# Patient Record
Sex: MALE | Race: WHITE | NOT HISPANIC OR LATINO | Employment: FULL TIME | URBAN - METROPOLITAN AREA
[De-identification: names, ages, dates, MRNs, and addresses within clinical notes are randomized per-mention and may not be internally consistent; named-entity substitution may affect disease eponyms.]

---

## 2017-05-04 ENCOUNTER — PATIENT MESSAGE (OUTPATIENT)
Dept: INTERNAL MEDICINE | Facility: CLINIC | Age: 62
End: 2017-05-04

## 2017-05-04 DIAGNOSIS — Z00.00 ROUTINE GENERAL MEDICAL EXAMINATION AT A HEALTH CARE FACILITY: Primary | ICD-10-CM

## 2017-05-18 ENCOUNTER — PATIENT MESSAGE (OUTPATIENT)
Dept: INTERNAL MEDICINE | Facility: CLINIC | Age: 62
End: 2017-05-18

## 2017-05-19 ENCOUNTER — LAB VISIT (OUTPATIENT)
Dept: LAB | Facility: HOSPITAL | Age: 62
End: 2017-05-19
Attending: INTERNAL MEDICINE
Payer: COMMERCIAL

## 2017-05-19 DIAGNOSIS — Z00.00 ROUTINE GENERAL MEDICAL EXAMINATION AT A HEALTH CARE FACILITY: ICD-10-CM

## 2017-05-19 LAB
ALBUMIN SERPL BCP-MCNC: 3.5 G/DL
ALP SERPL-CCNC: 76 U/L
ALT SERPL W/O P-5'-P-CCNC: 34 U/L
ANION GAP SERPL CALC-SCNC: 8 MMOL/L
AST SERPL-CCNC: 20 U/L
BASOPHILS # BLD AUTO: 0.02 K/UL
BASOPHILS NFR BLD: 0.3 %
BILIRUB SERPL-MCNC: 0.4 MG/DL
BUN SERPL-MCNC: 23 MG/DL
CALCIUM SERPL-MCNC: 8.8 MG/DL
CHLORIDE SERPL-SCNC: 107 MMOL/L
CHOLEST/HDLC SERPL: 2.9 {RATIO}
CO2 SERPL-SCNC: 25 MMOL/L
COMPLEXED PSA SERPL-MCNC: 0.22 NG/ML
CREAT SERPL-MCNC: 1.2 MG/DL
CREAT UR-MCNC: 204 MG/DL
DIFFERENTIAL METHOD: ABNORMAL
EOSINOPHIL # BLD AUTO: 0.1 K/UL
EOSINOPHIL NFR BLD: 1.2 %
ERYTHROCYTE [DISTWIDTH] IN BLOOD BY AUTOMATED COUNT: 13.4 %
EST. GFR  (AFRICAN AMERICAN): >60 ML/MIN/1.73 M^2
EST. GFR  (NON AFRICAN AMERICAN): >60 ML/MIN/1.73 M^2
GLUCOSE SERPL-MCNC: 152 MG/DL
HCT VFR BLD AUTO: 41.6 %
HDL/CHOLESTEROL RATIO: 34.2 %
HDLC SERPL-MCNC: 111 MG/DL
HDLC SERPL-MCNC: 38 MG/DL
HGB BLD-MCNC: 13.4 G/DL
LDLC SERPL CALC-MCNC: 56.2 MG/DL
LYMPHOCYTES # BLD AUTO: 1.9 K/UL
LYMPHOCYTES NFR BLD: 31.4 %
MCH RBC QN AUTO: 30.4 PG
MCHC RBC AUTO-ENTMCNC: 32.2 %
MCV RBC AUTO: 94 FL
MICROALBUMIN UR DL<=1MG/L-MCNC: 28 UG/ML
MICROALBUMIN/CREATININE RATIO: 13.7 UG/MG
MONOCYTES # BLD AUTO: 0.6 K/UL
MONOCYTES NFR BLD: 9.9 %
NEUTROPHILS # BLD AUTO: 3.5 K/UL
NEUTROPHILS NFR BLD: 57 %
NONHDLC SERPL-MCNC: 73 MG/DL
PLATELET # BLD AUTO: 235 K/UL
PMV BLD AUTO: 10.1 FL
POTASSIUM SERPL-SCNC: 4.3 MMOL/L
PROT SERPL-MCNC: 6.6 G/DL
RBC # BLD AUTO: 4.41 M/UL
SODIUM SERPL-SCNC: 140 MMOL/L
TRIGL SERPL-MCNC: 84 MG/DL
WBC # BLD AUTO: 6.05 K/UL

## 2017-05-19 PROCEDURE — 36415 COLL VENOUS BLD VENIPUNCTURE: CPT | Mod: PO

## 2017-05-19 PROCEDURE — 80053 COMPREHEN METABOLIC PANEL: CPT

## 2017-05-19 PROCEDURE — 83036 HEMOGLOBIN GLYCOSYLATED A1C: CPT

## 2017-05-19 PROCEDURE — 84153 ASSAY OF PSA TOTAL: CPT

## 2017-05-19 PROCEDURE — 85025 COMPLETE CBC W/AUTO DIFF WBC: CPT

## 2017-05-19 PROCEDURE — 82570 ASSAY OF URINE CREATININE: CPT

## 2017-05-19 PROCEDURE — 80061 LIPID PANEL: CPT

## 2017-05-20 LAB
ESTIMATED AVG GLUCOSE: 177 MG/DL
HBA1C MFR BLD HPLC: 7.8 %

## 2017-05-22 NOTE — PROGRESS NOTES
Here are the results of your recent labs.  We will review them in detail at your follow up visit.    Sincerely,    Jacek Mohr M.D.        If you would like to review your experience with Dr. Mohr or [facility], please follow the link below:    [email or web address]

## 2017-05-25 ENCOUNTER — OFFICE VISIT (OUTPATIENT)
Dept: INTERNAL MEDICINE | Facility: CLINIC | Age: 62
End: 2017-05-25
Payer: COMMERCIAL

## 2017-05-25 VITALS
HEIGHT: 72 IN | HEART RATE: 80 BPM | TEMPERATURE: 98 F | SYSTOLIC BLOOD PRESSURE: 110 MMHG | DIASTOLIC BLOOD PRESSURE: 70 MMHG | BODY MASS INDEX: 42.66 KG/M2 | WEIGHT: 315 LBS

## 2017-05-25 DIAGNOSIS — E11.59 HYPERTENSION ASSOCIATED WITH DIABETES: ICD-10-CM

## 2017-05-25 DIAGNOSIS — E66.01 MORBID OBESITY WITH BMI OF 50.0-59.9, ADULT: ICD-10-CM

## 2017-05-25 DIAGNOSIS — E78.5 HYPERLIPIDEMIA ASSOCIATED WITH TYPE 2 DIABETES MELLITUS: ICD-10-CM

## 2017-05-25 DIAGNOSIS — E11.69 HYPERLIPIDEMIA ASSOCIATED WITH TYPE 2 DIABETES MELLITUS: ICD-10-CM

## 2017-05-25 DIAGNOSIS — Z00.00 ROUTINE GENERAL MEDICAL EXAMINATION AT HEALTH CARE FACILITY: Primary | ICD-10-CM

## 2017-05-25 DIAGNOSIS — E11.9 TYPE 2 DIABETES MELLITUS WITHOUT COMPLICATION, WITHOUT LONG-TERM CURRENT USE OF INSULIN: ICD-10-CM

## 2017-05-25 DIAGNOSIS — I15.2 HYPERTENSION ASSOCIATED WITH DIABETES: ICD-10-CM

## 2017-05-25 PROCEDURE — 99396 PREV VISIT EST AGE 40-64: CPT | Mod: S$GLB,,, | Performed by: INTERNAL MEDICINE

## 2017-05-25 PROCEDURE — 99999 PR PBB SHADOW E&M-EST. PATIENT-LVL III: CPT | Mod: PBBFAC,,, | Performed by: INTERNAL MEDICINE

## 2017-05-25 RX ORDER — METFORMIN HYDROCHLORIDE 850 MG/1
850 TABLET ORAL 2 TIMES DAILY WITH MEALS
Qty: 180 TABLET | Refills: 3 | Status: SHIPPED | OUTPATIENT
Start: 2017-05-25 | End: 2018-03-20 | Stop reason: SDUPTHER

## 2017-05-25 NOTE — ASSESSMENT & PLAN NOTE
Diabetes is not under good control at this time.  Most recent a1c is 7.8.  We will make the following adjustments to the medications: Increase metformin to 850 mg twice daily.      We need to see continued focus on low carbohydrate diet and consistent exercise.

## 2017-05-25 NOTE — PROGRESS NOTES
Subjective:       Patient ID: Santhosh Goff is a 62 y.o. male.    Chief Complaint: Annual Exam    HPI  Patient is a 62-year-old male presenting today for updated physical exam review chronic health issues.  Patient has type 2 diabetes, hypertension, hyperlipidemia and morbid obesity.  In general he is been doing well however his diabetes is showing lack of control at this time.  His A1c was under control previously but now is gone up to 7.8.  He indicates a lack of dietary control.  He notes no other significant issues.  He is tolerating his medications well.  We discussed the importance of glycemic control.  We talked about diet and exercise.  We talked about counting calories and limiting carbohydrate intake.  We will make adjustments to his medication as well.    Review of Systems   Constitutional: Negative for activity change, fever and unexpected weight change.   HENT: Negative for hearing loss, postnasal drip, rhinorrhea and trouble swallowing.    Eyes: Negative for pain, discharge and visual disturbance.   Respiratory: Negative for cough, chest tightness, shortness of breath and wheezing.    Cardiovascular: Negative for chest pain and palpitations.   Gastrointestinal: Negative for blood in stool, constipation, diarrhea, nausea and vomiting.   Endocrine: Negative for polydipsia and polyuria.   Genitourinary: Negative for difficulty urinating, dysuria, hematuria and urgency.   Musculoskeletal: Negative for arthralgias, back pain, joint swelling, myalgias, neck pain and neck stiffness.   Skin: Negative for pallor and rash.   Neurological: Negative for seizures, syncope, weakness and headaches.   Hematological: Negative for adenopathy.   Psychiatric/Behavioral: Negative for confusion and dysphoric mood. The patient is not nervous/anxious.        Objective:   /70   Pulse 80   Temp 98.2 °F (36.8 °C) (Tympanic)   Ht 6' (1.829 m)   Wt (!) 171 kg (376 lb 15.8 oz)   BMI 51.13 kg/m²      Physical Exam    Constitutional: He is oriented to person, place, and time. He appears well-developed and well-nourished. No distress.   HENT:   Head: Normocephalic and atraumatic.   Mouth/Throat: Oropharynx is clear and moist.   Eyes: EOM are normal. Pupils are equal, round, and reactive to light.   Neck: Normal range of motion. Neck supple. No JVD present. No thyromegaly present.   Cardiovascular: Normal rate, regular rhythm, normal heart sounds and intact distal pulses.  Exam reveals no gallop and no friction rub.    No murmur heard.  Pulses:       Dorsalis pedis pulses are 2+ on the right side, and 2+ on the left side.        Posterior tibial pulses are 2+ on the right side, and 2+ on the left side.   Pulmonary/Chest: Effort normal and breath sounds normal. He has no wheezes. He has no rales.   Abdominal: Soft. Bowel sounds are normal. He exhibits no distension. There is no tenderness. There is no rebound and no guarding.   Musculoskeletal: Normal range of motion. He exhibits no edema.        Right foot: There is normal range of motion and no deformity.        Left foot: There is normal range of motion and no deformity.   Feet:   Right Foot:   Protective Sensation: 10 sites tested. 10 sites sensed.   Skin Integrity: Negative for ulcer, blister, skin breakdown, erythema, callus or dry skin.   Left Foot:   Protective Sensation: 10 sites tested. 10 sites sensed.   Skin Integrity: Negative for ulcer, blister, skin breakdown, erythema, callus or dry skin.   Lymphadenopathy:     He has no cervical adenopathy.   Neurological: He is alert and oriented to person, place, and time. He has normal reflexes. No cranial nerve deficit.   Skin: Skin is warm and dry. No rash noted.   Psychiatric: He has a normal mood and affect. Judgment normal.   Vitals reviewed.      Lab Visit on 05/19/2017   Component Date Value    WBC 05/19/2017 6.05     RBC 05/19/2017 4.41*    Hemoglobin 05/19/2017 13.4*    Hematocrit 05/19/2017 41.6     MCV  05/19/2017 94     MCH 05/19/2017 30.4     MCHC 05/19/2017 32.2     RDW 05/19/2017 13.4     Platelets 05/19/2017 235     MPV 05/19/2017 10.1     Gran # 05/19/2017 3.5     Lymph # 05/19/2017 1.9     Mono # 05/19/2017 0.6     Eos # 05/19/2017 0.1     Baso # 05/19/2017 0.02     Gran% 05/19/2017 57.0     Lymph% 05/19/2017 31.4     Mono% 05/19/2017 9.9     Eosinophil% 05/19/2017 1.2     Basophil% 05/19/2017 0.3     Differential Method 05/19/2017 Automated     Sodium 05/19/2017 140     Potassium 05/19/2017 4.3     Chloride 05/19/2017 107     CO2 05/19/2017 25     Glucose 05/19/2017 152*    BUN, Bld 05/19/2017 23     Creatinine 05/19/2017 1.2     Calcium 05/19/2017 8.8     Total Protein 05/19/2017 6.6     Albumin 05/19/2017 3.5     Total Bilirubin 05/19/2017 0.4     Alkaline Phosphatase 05/19/2017 76     AST 05/19/2017 20     ALT 05/19/2017 34     Anion Gap 05/19/2017 8     eGFR if African American 05/19/2017 >60.0     eGFR if non African Amer* 05/19/2017 >60.0     Cholesterol 05/19/2017 111*    Triglycerides 05/19/2017 84     HDL 05/19/2017 38*    LDL Cholesterol 05/19/2017 56.2*    HDL/Chol Ratio 05/19/2017 34.2     Total Cholesterol/HDL Ra* 05/19/2017 2.9     Non-HDL Cholesterol 05/19/2017 73     Hemoglobin A1C 05/20/2017 7.8*    Estimated Avg Glucose 05/20/2017 177*    PSA, SCREEN 05/19/2017 0.22     Microalbum.,U,Random 05/19/2017 28.0     Creatinine, Random Ur 05/19/2017 204.0     Microalb Creat Ratio 05/19/2017 13.7        Assessment:       1. Routine general medical examination at health care facility    2. Type 2 diabetes mellitus without complication, without long-term current use of insulin    3. Hypertension associated with diabetes    4. Hyperlipidemia associated with type 2 diabetes mellitus    5. Morbid obesity with BMI of 50.0-59.9, adult        Plan:   Type 2 diabetes mellitus without complication, without long-term current use of insulin  Diabetes is not under  good control at this time.  Most recent a1c is 7.8.  We will make the following adjustments to the medications: Increase metformin to 850 mg twice daily.      We need to see continued focus on low carbohydrate diet and consistent exercise.      Hypertension associated with diabetes  Blood pressure is under good control.  We will continue the current regimen.  Will work on regular aerobic exercise and a low salt diet.        Hyperlipidemia associated with type 2 diabetes mellitus  Cholesterol numbers look good.  We will continue the current regimen at this time.  Remain focused on low fat diet and high dietary fiber intake.      Morbid obesity with BMI of 50.0-59.9, adult  Focus on diet and exercise, weight loss.      Low carbohydrate diet.  Increase lean protein.    Santhosh was seen today for annual exam.    Diagnoses and all orders for this visit:    Routine general medical examination at health care facility  Comments:  focus on good health habits, low fat diet, regular exercise, seatbelt use, sunscreen use.    Type 2 diabetes mellitus without complication, without long-term current use of insulin  -     Hemoglobin A1c; Future    Hypertension associated with diabetes    Hyperlipidemia associated with type 2 diabetes mellitus    Morbid obesity with BMI of 50.0-59.9, adult    Other orders  -     metformin (GLUCOPHAGE) 850 MG tablet; Take 1 tablet (850 mg total) by mouth 2 (two) times daily with meals.        Return in about 3 months (around 8/25/2017).

## 2017-08-21 ENCOUNTER — LAB VISIT (OUTPATIENT)
Dept: LAB | Facility: HOSPITAL | Age: 62
End: 2017-08-21
Attending: INTERNAL MEDICINE
Payer: COMMERCIAL

## 2017-08-21 DIAGNOSIS — E11.9 TYPE 2 DIABETES MELLITUS WITHOUT COMPLICATION, WITHOUT LONG-TERM CURRENT USE OF INSULIN: ICD-10-CM

## 2017-08-21 PROCEDURE — 83036 HEMOGLOBIN GLYCOSYLATED A1C: CPT

## 2017-08-21 PROCEDURE — 36415 COLL VENOUS BLD VENIPUNCTURE: CPT | Mod: PO

## 2017-08-22 LAB
ESTIMATED AVG GLUCOSE: 151 MG/DL
HBA1C MFR BLD HPLC: 6.9 %

## 2017-08-22 NOTE — PROGRESS NOTES
Here are the results of your recent labs.  We will review them in detail at your follow up visit.    Sincerely,    Jacek Mohr M.D.        If you would like to review your experience with Dr. Mohr or [facility], please follow the link below:    [email or web address]

## 2017-08-24 ENCOUNTER — OFFICE VISIT (OUTPATIENT)
Dept: INTERNAL MEDICINE | Facility: CLINIC | Age: 62
End: 2017-08-24
Payer: COMMERCIAL

## 2017-08-24 VITALS
TEMPERATURE: 98 F | BODY MASS INDEX: 42.66 KG/M2 | HEIGHT: 72 IN | DIASTOLIC BLOOD PRESSURE: 80 MMHG | SYSTOLIC BLOOD PRESSURE: 124 MMHG | HEART RATE: 74 BPM | WEIGHT: 315 LBS

## 2017-08-24 DIAGNOSIS — E11.59 HYPERTENSION ASSOCIATED WITH DIABETES: ICD-10-CM

## 2017-08-24 DIAGNOSIS — I15.2 HYPERTENSION ASSOCIATED WITH DIABETES: ICD-10-CM

## 2017-08-24 DIAGNOSIS — E11.9 TYPE 2 DIABETES MELLITUS WITHOUT COMPLICATION, WITHOUT LONG-TERM CURRENT USE OF INSULIN: ICD-10-CM

## 2017-08-24 PROCEDURE — 4010F ACE/ARB THERAPY RXD/TAKEN: CPT | Mod: S$GLB,,, | Performed by: INTERNAL MEDICINE

## 2017-08-24 PROCEDURE — 99213 OFFICE O/P EST LOW 20 MIN: CPT | Mod: S$GLB,,, | Performed by: INTERNAL MEDICINE

## 2017-08-24 PROCEDURE — 3079F DIAST BP 80-89 MM HG: CPT | Mod: S$GLB,,, | Performed by: INTERNAL MEDICINE

## 2017-08-24 PROCEDURE — 99999 PR PBB SHADOW E&M-EST. PATIENT-LVL III: CPT | Mod: PBBFAC,,, | Performed by: INTERNAL MEDICINE

## 2017-08-24 PROCEDURE — 3044F HG A1C LEVEL LT 7.0%: CPT | Mod: S$GLB,,, | Performed by: INTERNAL MEDICINE

## 2017-08-24 PROCEDURE — 3008F BODY MASS INDEX DOCD: CPT | Mod: S$GLB,,, | Performed by: INTERNAL MEDICINE

## 2017-08-24 PROCEDURE — 3074F SYST BP LT 130 MM HG: CPT | Mod: S$GLB,,, | Performed by: INTERNAL MEDICINE

## 2017-08-24 NOTE — ASSESSMENT & PLAN NOTE
Diabetes continues to do well at this time.  Most recent a1c is 6.9.  We will continue the current regimen.  Focus on a low carbohydrate diet and consistent exercise.

## 2017-08-24 NOTE — ASSESSMENT & PLAN NOTE
Blood pressure is under good control.  We will continue the current regimen.  Will work on regular aerobic exercise and a low salt diet.  ]

## 2017-08-24 NOTE — PROGRESS NOTES
Subjective:       Patient ID: Santhosh Goff is a 62 y.o. male.    Chief Complaint: Follow-up    HPI  patient is a 62-year-old male coming in following up on his diabetes.  His A1c has improved to 6.9.  He is not quite back down to his best numbers of 6.2 but he deftly improved from 7.4 where he was last time.  He indicates he's continuing to work on the diet and doing some exercising.  He is not really had much success with weight loss at this point.  He is taking his medications as prescribed.    His blood pressure also remains under good control at this time he is not experiencing any adverse effects the medications.  He is stable on the medication at this time.  No signs or symptoms of cardiac decompensation.    Review of Systems    Objective:   /80   Pulse 74   Temp 97.9 °F (36.6 °C)   Ht 6' (1.829 m)   Wt (!) 171 kg (376 lb 15.8 oz)   BMI 51.13 kg/m²      Physical Exam   Constitutional: He appears well-developed and well-nourished.   HENT:   Head: Normocephalic and atraumatic.   Eyes: Pupils are equal, round, and reactive to light.   Neck: Neck supple. No thyromegaly present.   Cardiovascular: Normal rate, regular rhythm and normal heart sounds.  Exam reveals no gallop and no friction rub.    No murmur heard.  Pulmonary/Chest: Breath sounds normal. He has no wheezes. He has no rales.   Abdominal: Soft. Bowel sounds are normal. He exhibits no distension. There is no tenderness.   Vitals reviewed.      Lab Visit on 08/21/2017   Component Date Value    Hemoglobin A1C 08/22/2017 6.9*    Estimated Avg Glucose 08/22/2017 151*       Assessment:       1. Type 2 diabetes mellitus without complication, without long-term current use of insulin    2. Hypertension associated with diabetes        Plan:   Type 2 diabetes mellitus without complication, without long-term current use of insulin  Diabetes continues to do well at this time.  Most recent a1c is 6.9.  We will continue the current regimen.  Focus on  a low carbohydrate diet and consistent exercise.        Hypertension associated with diabetes  Blood pressure is under good control.  We will continue the current regimen.  Will work on regular aerobic exercise and a low salt diet.  ]      Santhosh was seen today for follow-up.    Diagnoses and all orders for this visit:    Type 2 diabetes mellitus without complication, without long-term current use of insulin  -     Hemoglobin A1c; Future  -     Lipid panel; Future    Hypertension associated with diabetes        Return in about 5 months (around 1/10/2018).

## 2017-10-02 ENCOUNTER — OFFICE VISIT (OUTPATIENT)
Dept: OPHTHALMOLOGY | Facility: CLINIC | Age: 62
End: 2017-10-02
Payer: COMMERCIAL

## 2017-10-02 ENCOUNTER — TELEPHONE (OUTPATIENT)
Dept: OPHTHALMOLOGY | Facility: CLINIC | Age: 62
End: 2017-10-02

## 2017-10-02 DIAGNOSIS — E11.9 TYPE 2 DIABETES MELLITUS WITHOUT COMPLICATION, WITHOUT LONG-TERM CURRENT USE OF INSULIN: Primary | ICD-10-CM

## 2017-10-02 DIAGNOSIS — H35.30 AMD (AGE-RELATED MACULAR DEGENERATION), BILATERAL: ICD-10-CM

## 2017-10-02 DIAGNOSIS — H52.7 REFRACTION DISORDER: ICD-10-CM

## 2017-10-02 PROCEDURE — 92014 COMPRE OPH EXAM EST PT 1/>: CPT | Mod: S$GLB,,, | Performed by: OPHTHALMOLOGY

## 2017-10-02 PROCEDURE — 92015 DETERMINE REFRACTIVE STATE: CPT | Mod: S$GLB,,, | Performed by: OPHTHALMOLOGY

## 2017-10-02 PROCEDURE — 99999 PR PBB SHADOW E&M-EST. PATIENT-LVL I: CPT | Mod: PBBFAC,,, | Performed by: OPHTHALMOLOGY

## 2017-10-02 RX ORDER — LISINOPRIL 5 MG/1
5 TABLET ORAL DAILY
COMMUNITY
End: 2017-10-09 | Stop reason: SDUPTHER

## 2017-10-02 NOTE — TELEPHONE ENCOUNTER
----- Message from Melina Lowery sent at 10/2/2017  9:09 AM CDT -----  Contact: [phone]  Would like to consult with nurse about appt for today[place] like to know if he can still arrive. Please call bk at [phone]. thx lj

## 2017-10-09 DIAGNOSIS — E11.59 HYPERTENSION ASSOCIATED WITH DIABETES: ICD-10-CM

## 2017-10-09 DIAGNOSIS — I15.2 HYPERTENSION ASSOCIATED WITH DIABETES: ICD-10-CM

## 2017-10-09 DIAGNOSIS — E78.5 HYPERLIPIDEMIA, UNSPECIFIED HYPERLIPIDEMIA TYPE: ICD-10-CM

## 2017-10-09 RX ORDER — LISINOPRIL 5 MG/1
TABLET ORAL
Qty: 90 TABLET | Refills: 3 | Status: SHIPPED | OUTPATIENT
Start: 2017-10-09 | End: 2018-09-25 | Stop reason: SDUPTHER

## 2017-10-09 RX ORDER — ATORVASTATIN CALCIUM 40 MG/1
TABLET, FILM COATED ORAL
Qty: 90 TABLET | Refills: 3 | Status: SHIPPED | OUTPATIENT
Start: 2017-10-09 | End: 2018-10-23 | Stop reason: SDUPTHER

## 2017-12-22 ENCOUNTER — OFFICE VISIT (OUTPATIENT)
Dept: PULMONOLOGY | Facility: CLINIC | Age: 62
End: 2017-12-22
Payer: COMMERCIAL

## 2017-12-22 VITALS
DIASTOLIC BLOOD PRESSURE: 80 MMHG | RESPIRATION RATE: 20 BRPM | OXYGEN SATURATION: 98 % | HEIGHT: 72 IN | HEART RATE: 82 BPM | WEIGHT: 315 LBS | SYSTOLIC BLOOD PRESSURE: 146 MMHG | BODY MASS INDEX: 42.66 KG/M2

## 2017-12-22 DIAGNOSIS — G47.33 OSA TREATED WITH BIPAP: ICD-10-CM

## 2017-12-22 DIAGNOSIS — E66.01 MORBID OBESITY WITH BMI OF 50.0-59.9, ADULT: Primary | ICD-10-CM

## 2017-12-22 PROCEDURE — 99999 PR PBB SHADOW E&M-EST. PATIENT-LVL III: CPT | Mod: PBBFAC,,, | Performed by: INTERNAL MEDICINE

## 2017-12-22 PROCEDURE — 99214 OFFICE O/P EST MOD 30 MIN: CPT | Mod: S$GLB,,, | Performed by: INTERNAL MEDICINE

## 2017-12-22 NOTE — PROGRESS NOTES
Subjective:      Patient ID: Santhosh Goff is a 62 y.o. male.    Patient Active Problem List   Diagnosis    Type 2 diabetes mellitus without complication, without long-term current use of insulin    Hyperlipidemia associated with type 2 diabetes mellitus    Hypertension associated with diabetes    Morbid obesity with BMI of 50.0-59.9, adult    Hypoventilation associated with obesity    Need for influenza vaccination    ALIREZA treated with AVAPS     Problem list has been reviewed.    Chief Complaint: Sleep Apnea    HPI    He is a here for follow up for ALIREZA. He is on TRIOLOGY 100 Ventilator. AVAPS /5/22:14/6/8:8. Complaince download today reveals 94.0 % days with greater than 4 hours of device use. He is complaint with his PAP. Patient denies snoring, headaches, excessive daytime sleepiness.      He definitely thinks PAP is beneficial to his health and he wants to continue with PAP therapy.     [place] Sleepiness Scale   EPWORTH SLEEPINESS SCALE 12/22/2017 12/23/2016 6/23/2016 12/23/2015 6/22/2015   Sitting and reading 0 1 0 1 0   Watching TV 1 1 1 1 3   Sitting, inactive in a public place (e.g. a theatre or a meeting) 0 0 0 1 0   As a passenger in a car for an hour without a break 0 0 0 0 2   Lying down to rest in the afternoon when circumstances permit 1 0 1 0 1   Sitting and talking to someone 0 0 0 0 0   Sitting quietly after a lunch without alcohol 0 1 0 0 3   In a car, while stopped for a few minutes in traffic 0 0 0 0 0   Total score 2 3 2 3 9     Previous Report Reviewed: office notes     The following portions of the patient's history were reviewed and updated as appropriate: He  has a past medical history of Arthritis; Diabetes mellitus, type 2; Hyperlipidemia; Hypertension; Morbid obesity with BMI of 45.0-49.9, adult; and Staphylococcus aureus infection, multiple-resistant (MRSA) (2009).  He  has no past surgical history on file.  His family history includes Early death in his father.  He   reports that he has never smoked. He has never used smokeless tobacco. He reports that he does not drink alcohol or use drugs.  He has a current medication list which includes the following prescription(s): aspirin, atorvastatin, furosemide, glimepiride, lisinopril, metformin, and potassium chloride sa.  He is allergic to cephalosporins..    Review of Systems   Constitutional: Positive for fatigue. Negative for fever and chills.   HENT: Negative for nosebleeds and congestion.    Eyes: Negative for itching.   Respiratory: Positive for dyspnea on extertion. Negative for cough and shortness of breath.    Cardiovascular: Negative for chest pain and leg swelling.   Genitourinary: Negative for difficulty urinating and hematuria.   Skin: Negative for rash.   Gastrointestinal: Negative for nausea and vomiting.   Hematological: Does not bruise/bleed easily.   All other systems reviewed and are negative.     Objective:     BP (!) 146/80   Pulse 82   Resp 20   Ht 6' (1.829 m)   Wt (!) 168.1 kg (370 lb 7.7 oz)   SpO2 98%   BMI 50.25 kg/m²   Body mass index is 50.25 kg/m².    Physical Exam   Constitutional: He is oriented to person, place, and time. He appears well-developed and well-nourished.   Morbidly obese   HENT:   Head: Normocephalic and atraumatic.   Eyes: Conjunctivae are normal. Pupils are equal, round, and reactive to light.   Neck: Normal range of motion. Neck supple.   Cardiovascular: Normal rate.    Pulmonary/Chest: Effort normal and breath sounds normal.   Abdominal: Soft. Bowel sounds are normal.   Musculoskeletal: Normal range of motion. He exhibits edema.   Neurological: He is alert and oriented to person, place, and time.   Skin: Skin is warm and dry.   Psychiatric: He has a normal mood and affect.   Nursing note and vitals reviewed.      Personal Diagnostic Review  none pertinent    Assessment:     1. Morbid obesity with BMI of 50.0-59.9, adult    2. ALIREZA treated with AVAPS      Outpatient Encounter  Prescriptions as of 12/22/2017   Medication Sig Dispense Refill    aspirin (ECOTRIN) 81 MG EC tablet Take 81 mg by mouth once daily.      atorvastatin (LIPITOR) 40 MG tablet TAKE ONE TABLET BY MOUTH ONCE DAILY 90 tablet 3    furosemide (LASIX) 20 MG tablet Take 1 tablet (20 mg total) by mouth once daily. 90 tablet 4    glimepiride (AMARYL) 4 MG tablet Take 1 tablet (4 mg total) by mouth daily with breakfast. 90 tablet 4    lisinopril (PRINIVIL,ZESTRIL) 5 MG tablet TAKE ONE TABLET BY MOUTH ONCE DAILY 90 tablet 3    metformin (GLUCOPHAGE) 850 MG tablet Take 1 tablet (850 mg total) by mouth 2 (two) times daily with meals. 180 tablet 3    potassium chloride SA (K-DUR,KLOR-CON) 20 MEQ tablet Take 1 tablet (20 mEq total) by mouth once daily. 90 tablet 4     No facility-administered encounter medications on file as of 12/22/2017.      No orders of the defined types were placed in this encounter.    Plan:     Discussed diagnosis, its evaluation, treatment and usual course. All questions answered.     ALIREZA treated with AVAPS  Continue AVAPS AE: AVAPS /5/22:14/6/8:8. (DME -  [facility])     Discussed therapeutic goals for positive airway pressure therapy(CPAP or BiPAP): Ideal is usage 100% of nights for 6 - 8 hours per night. Minimum usage is 70% of night for at least 4 hours per night used. Pateint expressed understanding. All Questions answered.    SUPPLIES RENEWED.     Morbid obesity with BMI of 50.0-59.9, adult  General weight loss/lifestyle modification strategies discussed (elicit support from others; identify saboteurs; non-food rewards, etc).  Diet interventions: low calorie (1000 kCal/d) deficit diet.      TIME SPENT WITH PATIENT: Time spent: 25 minutes in face to face  discussion concerning diagnosis, prognosis, review of lab and test results, benefits of treatment as well as management of disease, counseling of patient and coordination of care between various health  care providers . Greater than half the  time spent was used for coordination of care and counseling of patient.          Return in about 1 year (around 12/22/2018) for ALIREZA, Morbid Obesity.

## 2017-12-22 NOTE — ASSESSMENT & PLAN NOTE
Continue AVAPS AE: AVAPS /5/22:14/6/8:8. (DME -  [facility])     Discussed therapeutic goals for positive airway pressure therapy(CPAP or BiPAP): Ideal is usage 100% of nights for 6 - 8 hours per night. Minimum usage is 70% of night for at least 4 hours per night used. Pateint expressed understanding. All Questions answered.    SUPPLIES RENEWED.

## 2017-12-22 NOTE — PATIENT INSTRUCTIONS
Using a BPAP  A BPAP is a type of ventilator, a device that helps with breathing. Its also known as a bilevel positive airway pressure device. It may be used when a health problem is making it hard for you to breathe.  The BPAP machine  You should be familiar with the parts of your BPAP machine. They include:  · A face mask, nasal mask, or nasal plugs  · The machines motor, which blows air into a tube  · The tubing that connects the machines motor to the mask or plugs  Your BPAP machine might also have other features, such as a heated humidifier.  Before you start BPAP therapy, your machine may need to be calibrated. Someone from your healthcare team will adjust the settings. That person is often a respiratory therapist. The settings need to be correct so that you receive the right therapy. You may also get other instructions on how to get ready for your BPAP therapy.  When to use your BPAP  You might get BPAP therapy while at the hospital for a breathing emergency. You also might use it at home for a chronic condition. Follow your healthcare providers instructions about when to use your BPAP. You might need to use it only while you sleep. Or you might need to use it all the time. Make sure to use it exactly as instructed. Otherwise you will not get the full benefits from your BPAP therapy.  Getting used to your BPAP  When you first start using BPAP, you may feel uncomfortable. It may feel odd wearing a mask and feeling the flow of air. Over time, you should get used to it. If you feel like you really cant breathe while using BPAP, talk with your healthcare provider. He or she may need to adjust the pressure settings on your machine.  Its important not to eat or drink anything while using BPAP. You might inhale food or liquid into your lungs if you do so. Your healthcare provider may give you other instructions about the best way to use your machine.  If your health problem gets better, you may be able to  start using less pressure on your BPAP machine. Or you might be able to use the machine less often. Work with your healthcare team to help get the best treatment.     The BPAP device settings are given as centimeters of water, or cm/H2O. Each persons pressure settings are different. Your healthcare provider will tell you what settings to use. Never change your BPAP pressure setting unless your provider tells you to.  BPAP ____________cm/H20 pressure when you breathe in  BPAP ____________cm/H20 pressure when you breathe out   Fixing problems with your BPAP  · Nasal dryness. A humidifier may help reduce nasal dryness.  · Eye or sinus symptoms. Using a facial mask instead of a nasal mask may also help lessen any eye or sinus symptoms.  · Headaches. If you get headaches, they could be because of sinus congestion. In some cases, your healthcare provider might prescribe nasal saline or an antihistamine for these symptoms.  · Leaky mask, skin irritation, or pressure lines. You may need a different size or type of mask. You may also find that adjusting the straps around your mask helps.  · Stomach bloating. Your healthcare provider may be able to reduce the pressure setting on your machine to stop stomach bloating.  · Noise. If the noise from the BPAP bothers you, try using earplugs. If the device is very loud, check with the medical supplier to make sure it is working correctly.     When to call your healthcare provider  Have someone call 911 if you are struggling for breath or unable to breathe.  Call your healthcare provider right away if any of these occur:  · You feel like you cant breathe while using your BPAP  · Your mask is constantly leaking air and you cant make it fit  · You have severe or ongoing skin irritation from your mask   Date Last Reviewed: 2/1/2017  © 6396-5953 [employer]. [address] [place] [address], [place] [address]. All rights reserved. This information is not intended as a substitute  for professional medical care. Always follow your healthcare professional's instructions.

## 2018-01-02 RX ORDER — POTASSIUM CHLORIDE 20 MEQ/1
TABLET, EXTENDED RELEASE ORAL
Qty: 90 TABLET | Refills: 4 | Status: SHIPPED | OUTPATIENT
Start: 2018-01-02 | End: 2019-05-29 | Stop reason: SDUPTHER

## 2018-01-06 DIAGNOSIS — E11.9 TYPE 2 DIABETES MELLITUS WITHOUT COMPLICATION, WITHOUT LONG-TERM CURRENT USE OF INSULIN: ICD-10-CM

## 2018-01-07 RX ORDER — FUROSEMIDE 20 MG/1
TABLET ORAL
Qty: 90 TABLET | Refills: 4 | Status: SHIPPED | OUTPATIENT
Start: 2018-01-07 | End: 2019-03-19 | Stop reason: SDUPTHER

## 2018-01-07 RX ORDER — GLIMEPIRIDE 4 MG/1
TABLET ORAL
Qty: 90 TABLET | Refills: 4 | Status: SHIPPED | OUTPATIENT
Start: 2018-01-07 | End: 2019-03-19 | Stop reason: SDUPTHER

## 2018-01-18 ENCOUNTER — OFFICE VISIT (OUTPATIENT)
Dept: URGENT CARE | Facility: CLINIC | Age: 63
End: 2018-01-18
Payer: COMMERCIAL

## 2018-01-18 ENCOUNTER — PATIENT MESSAGE (OUTPATIENT)
Dept: INTERNAL MEDICINE | Facility: CLINIC | Age: 63
End: 2018-01-18

## 2018-01-18 VITALS
DIASTOLIC BLOOD PRESSURE: 82 MMHG | OXYGEN SATURATION: 95 % | TEMPERATURE: 101 F | HEIGHT: 72 IN | HEART RATE: 86 BPM | WEIGHT: 315 LBS | BODY MASS INDEX: 42.66 KG/M2 | SYSTOLIC BLOOD PRESSURE: 146 MMHG

## 2018-01-18 DIAGNOSIS — R68.89 FLU-LIKE SYMPTOMS: Primary | ICD-10-CM

## 2018-01-18 DIAGNOSIS — E11.9 TYPE 2 DIABETES MELLITUS WITHOUT COMPLICATION, WITHOUT LONG-TERM CURRENT USE OF INSULIN: ICD-10-CM

## 2018-01-18 DIAGNOSIS — I15.2 HYPERTENSION ASSOCIATED WITH DIABETES: ICD-10-CM

## 2018-01-18 DIAGNOSIS — E66.2 HYPOVENTILATION ASSOCIATED WITH OBESITY: Chronic | ICD-10-CM

## 2018-01-18 DIAGNOSIS — J10.1 INFLUENZA A: ICD-10-CM

## 2018-01-18 DIAGNOSIS — E11.59 HYPERTENSION ASSOCIATED WITH DIABETES: ICD-10-CM

## 2018-01-18 LAB
CTP QC/QA: YES
FLUAV AG NPH QL: POSITIVE
FLUBV AG NPH QL: NEGATIVE

## 2018-01-18 PROCEDURE — 99214 OFFICE O/P EST MOD 30 MIN: CPT | Mod: S$GLB,,, | Performed by: PHYSICIAN ASSISTANT

## 2018-01-18 PROCEDURE — 99999 PR PBB SHADOW E&M-EST. PATIENT-LVL III: CPT | Mod: PBBFAC,,, | Performed by: PHYSICIAN ASSISTANT

## 2018-01-18 PROCEDURE — 87804 INFLUENZA ASSAY W/OPTIC: CPT | Mod: QW,S$GLB,, | Performed by: PHYSICIAN ASSISTANT

## 2018-01-18 RX ORDER — OSELTAMIVIR PHOSPHATE 75 MG/1
75 CAPSULE ORAL 2 TIMES DAILY
Qty: 14 CAPSULE | Refills: 0 | Status: SHIPPED | OUTPATIENT
Start: 2018-01-18 | End: 2018-01-25

## 2018-01-18 NOTE — PROGRESS NOTES
Subjective:       Patient ID: Santhosh Goff is a 63 y.o. male.    Chief Complaint: Influenza    Fever    This is a new problem. The current episode started yesterday. The problem occurs constantly. The problem has been unchanged. The maximum temperature noted was 101 to 101.9 F. The temperature was taken using an oral thermometer. Associated symptoms include congestion, coughing, headaches and muscle aches. Pertinent negatives include no abdominal pain, chest pain, diarrhea, ear pain, nausea, rash, sleepiness, sore throat, urinary pain, vomiting or wheezing. He has tried nothing for the symptoms.   Risk factors: sick contacts    Risk factors comment:         Past Medical History:   Diagnosis Date    Arthritis     Diabetes mellitus, type 2     Hyperlipidemia     Hypertension     Morbid obesity with BMI of 45.0-49.9, adult     Staphylococcus aureus infection, multiple-resistant (MRSA) 2009       Current Outpatient Prescriptions   Medication Sig Dispense Refill    aspirin (ECOTRIN) 81 MG EC tablet Take 81 mg by mouth once daily.      atorvastatin (LIPITOR) 40 MG tablet TAKE ONE TABLET BY MOUTH ONCE DAILY 90 tablet 3    furosemide (LASIX) 20 MG tablet TAKE ONE TABLET BY MOUTH ONCE DAILY 90 tablet 4    glimepiride (AMARYL) 4 MG tablet TAKE ONE TABLET BY MOUTH ONCE DAILY WITH BREAKFAST 90 tablet 4    lisinopril (PRINIVIL,ZESTRIL) 5 MG tablet TAKE ONE TABLET BY MOUTH ONCE DAILY 90 tablet 3    metformin (GLUCOPHAGE) 850 MG tablet Take 1 tablet (850 mg total) by mouth 2 (two) times daily with meals. 180 tablet 3    potassium chloride SA (K-DUR,KLOR-CON) 20 MEQ tablet TAKE ONE TABLET BY MOUTH ONCE DAILY 90 tablet 4    oseltamivir (TAMIFLU) 75 MG capsule Take 1 capsule (75 mg total) by mouth 2 (two) times daily. 14 capsule 0     No current facility-administered medications for this visit.        Review of Systems   Constitutional: Positive for fever.   HENT: Positive for congestion. Negative  for ear pain and sore throat.    Respiratory: Positive for cough. Negative for wheezing.    Cardiovascular: Negative for chest pain.   Gastrointestinal: Negative for abdominal pain, diarrhea, nausea and vomiting.   Genitourinary: Negative for dysuria.   Skin: Negative for rash.   Neurological: Positive for headaches.       Objective:   BP (!) 146/82   Pulse 86   Temp (!) 101.3 °F (38.5 °C)   Ht 6' (1.829 m)   Wt (!) 166 kg (365 lb 15.4 oz)   SpO2 95%   BMI 49.63 kg/m²      Physical Exam   Constitutional: He is oriented to person, place, and time. He appears well-developed and well-nourished. No distress.   Morbid obesity    HENT:   Head: Normocephalic and atraumatic.   Right Ear: External ear normal.   Left Ear: External ear normal.   Nose: Nose normal.   Mouth/Throat: Oropharynx is clear and moist. No oropharyngeal exudate.   Eyes: Conjunctivae and EOM are normal. Pupils are equal, round, and reactive to light.   Neck: Normal range of motion. Neck supple.   Cardiovascular: Normal rate, regular rhythm and normal heart sounds.    Pulmonary/Chest: Effort normal and breath sounds normal.   Musculoskeletal: Normal range of motion.   Neurological: He is alert and oriented to person, place, and time.   Skin: Skin is warm. He is not diaphoretic.   Psychiatric: He has a normal mood and affect. His behavior is normal. Judgment and thought content normal.         Lab Results   Component Value Date    WBC 6.05 05/19/2017    HGB 13.4 (L) 05/19/2017    HCT 41.6 05/19/2017     05/19/2017    CHOL 111 (L) 05/19/2017    TRIG 84 05/19/2017    HDL 38 (L) 05/19/2017    ALT 34 05/19/2017    AST 20 05/19/2017     05/19/2017    K 4.3 05/19/2017     05/19/2017    CREATININE 1.2 05/19/2017    BUN 23 05/19/2017    CO2 25 05/19/2017    PSA 0.22 05/19/2017    INR 1.1 02/04/2015    HGBA1C 6.9 (H) 08/21/2017       Assessment:       1. Flu-like symptoms    2. Influenza A    3. Hypoventilation associated with obesity    4.  Hypertension associated with diabetes    5. Type 2 diabetes mellitus without complication, without long-term current use of insulin        Plan:       Flu-like symptoms  -     POCT Influenza A/B    Influenza A  -     oseltamivir (TAMIFLU) 75 MG capsule; Take 1 capsule (75 mg total) by mouth 2 (two) times daily.  Dispense: 14 capsule; Refill: 0  Hypoventilation associated with obesity  Breath sounds normal, pulse O2 ok but did discuss with patient and wife to make sure symptosm do not worsen.  If cough, cp, trouble breathing occur, seek UC or ER treatment   Hypertension associated with diabetes  A little elevated today, monitor at home   Type 2 diabetes mellitus without complication, without long-term current use of insulin  Monitor sugar levels, may rise in illness   Other orders

## 2018-01-22 ENCOUNTER — HOSPITAL ENCOUNTER (OUTPATIENT)
Dept: RADIOLOGY | Facility: HOSPITAL | Age: 63
Discharge: HOME | End: 2018-01-22
Attending: NURSE PRACTITIONER
Payer: COMMERCIAL

## 2018-01-22 ENCOUNTER — OFFICE VISIT (OUTPATIENT)
Dept: URGENT CARE | Facility: CLINIC | Age: 63
End: 2018-01-22
Payer: COMMERCIAL

## 2018-01-22 VITALS
TEMPERATURE: 98 F | OXYGEN SATURATION: 96 % | HEART RATE: 100 BPM | SYSTOLIC BLOOD PRESSURE: 120 MMHG | WEIGHT: 315 LBS | DIASTOLIC BLOOD PRESSURE: 80 MMHG | BODY MASS INDEX: 49.19 KG/M2

## 2018-01-22 DIAGNOSIS — R06.2 WHEEZING: ICD-10-CM

## 2018-01-22 DIAGNOSIS — R05.9 COUGH: ICD-10-CM

## 2018-01-22 DIAGNOSIS — J18.9 PNEUMONIA OF RIGHT LOWER LOBE DUE TO INFECTIOUS ORGANISM: Primary | ICD-10-CM

## 2018-01-22 PROCEDURE — 99999 PR PBB SHADOW E&M-EST. PATIENT-LVL III: CPT | Mod: PBBFAC,,, | Performed by: NURSE PRACTITIONER

## 2018-01-22 PROCEDURE — 99214 OFFICE O/P EST MOD 30 MIN: CPT | Mod: S$GLB,,, | Performed by: NURSE PRACTITIONER

## 2018-01-22 PROCEDURE — 71046 X-RAY EXAM CHEST 2 VIEWS: CPT | Mod: 26,,, | Performed by: RADIOLOGY

## 2018-01-22 PROCEDURE — 94640 AIRWAY INHALATION TREATMENT: CPT | Mod: S$GLB,,, | Performed by: FAMILY MEDICINE

## 2018-01-22 PROCEDURE — 71046 X-RAY EXAM CHEST 2 VIEWS: CPT | Mod: TC,FY,PO

## 2018-01-22 RX ORDER — DOXYCYCLINE 100 MG/1
100 CAPSULE ORAL EVERY 12 HOURS
Qty: 20 CAPSULE | Refills: 0 | Status: SHIPPED | OUTPATIENT
Start: 2018-01-22 | End: 2018-02-01

## 2018-01-22 RX ORDER — ALBUTEROL SULFATE 0.83 MG/ML
2.5 SOLUTION RESPIRATORY (INHALATION)
Status: COMPLETED | OUTPATIENT
Start: 2018-01-22 | End: 2018-01-22

## 2018-01-22 RX ORDER — BENZONATATE 100 MG/1
100 CAPSULE ORAL 3 TIMES DAILY PRN
Qty: 60 CAPSULE | Refills: 0 | Status: SHIPPED | OUTPATIENT
Start: 2018-01-22 | End: 2018-02-01

## 2018-01-22 RX ORDER — ALBUTEROL SULFATE 90 UG/1
2 AEROSOL, METERED RESPIRATORY (INHALATION) EVERY 6 HOURS PRN
Qty: 18 G | Refills: 0 | Status: SHIPPED | OUTPATIENT
Start: 2018-01-22 | End: 2019-05-13 | Stop reason: SDUPTHER

## 2018-01-22 RX ADMIN — ALBUTEROL SULFATE 2.5 MG: 0.83 SOLUTION RESPIRATORY (INHALATION) at 10:01

## 2018-01-22 NOTE — PROGRESS NOTES
Subjective:       Patient ID: Santhosh Goff is a 63 y.o. male.    Chief Complaint: Sinus Problem    Cough   This is a new problem. The current episode started yesterday. The problem has been unchanged. The cough is non-productive. Associated symptoms include rhinorrhea and wheezing. Pertinent negatives include no chest pain, ear congestion, ear pain, fever, headaches, heartburn, hemoptysis, myalgias, nasal congestion, postnasal drip, shortness of breath or sweats. Nothing aggravates the symptoms. He has tried nothing for the symptoms. The treatment provided no relief. There is no history of asthma, bronchiectasis, bronchitis, COPD, emphysema, environmental allergies or pneumonia.     Review of Systems   Constitutional: Negative for fever.   HENT: Positive for rhinorrhea. Negative for ear pain and postnasal drip.    Respiratory: Positive for cough and wheezing. Negative for hemoptysis, shortness of breath and stridor.    Cardiovascular: Negative for chest pain, palpitations and leg swelling.   Gastrointestinal: Negative for heartburn.   Musculoskeletal: Negative for myalgias.   Allergic/Immunologic: Negative for environmental allergies.   Neurological: Negative for dizziness and headaches.   Psychiatric/Behavioral: Negative for agitation.       Objective:      Physical Exam   Constitutional: He appears well-developed and well-nourished.   HENT:   Head: Normocephalic.   Nose: Rhinorrhea present. No mucosal edema.   Mouth/Throat: Uvula is midline, oropharynx is clear and moist and mucous membranes are normal.   Cardiovascular: Normal rate and normal heart sounds.    Pulmonary/Chest: Effort normal. No accessory muscle usage. No apnea, no tachypnea and no bradypnea. No respiratory distress. He has no decreased breath sounds. He has wheezes (mildly and faint) in the right upper field and the right lower field. He has no rhonchi. He has no rales.   Nursing note and vitals reviewed.      Assessment:       1. Pneumonia  of right lower lobe due to infectious organism    2. Wheezing    3. Cough        Plan:         Santhosh was seen today for sinus problem.    Diagnoses and all orders for this visit:    Pneumonia of right lower lobe due to infectious organism  -     doxycycline (MONODOX) 100 MG capsule; Take 1 capsule (100 mg total) by mouth every 12 (twelve) hours.  -     albuterol 90 mcg/actuation inhaler; Inhale 2 puffs into the lungs every 6 (six) hours as needed for Wheezing. Rescue    Wheezing  -     X-Ray Chest PA And Lateral; Future  -     albuterol nebulizer solution 2.5 mg; Take 3 mLs (2.5 mg total) by nebulization one time.    Cough  -     benzonatate (TESSALON) 100 MG capsule; Take 1 capsule (100 mg total) by mouth 3 (three) times daily as needed.    Antibiotic Therapy  Take antibiotics for entire course.  Do not save medications for later, all medications must be taken for full regimen.  Follow up with PCP or ER if symptoms do not improve or worsen.

## 2018-01-22 NOTE — PATIENT INSTRUCTIONS
Treating Pneumonia  Pneumonia is an infection of one or both of the lungs. Pneumonia:  · Is usually caused by either a virus or a bacteria  · Can be very serious, especially in infants, young children, and older adults. Its also serious for those with other long-term health problems or weakened immune systems.  · Is sometimes treated at home and sometimes in the hospital    Antibiotic medicines  Antibiotics may be prescribed for pneumonia caused by bacteria. They may be pills (oral medicines), or shots (injections). Or they may be given by IV (intravenously) into a vein. If you are taking oral medicines at home:  · Fill your prescription and start taking your medicine as soon as you can.  · You will likely start to feel better in a day or 2, but dont stop taking the antibiotic.  · Use a pill organizer to help you remember to take your medicine.  · Let your healthcare provider know if you have side effects.  · Take your medicine exactly as directed on the label. Talk to your provider or pharmacist if you have any questions.  Antiviral medicines  Antiviral medicine may be prescribed for pneumonia caused by a virus. For example, antiviral medicine may be prescribed for pneumonia caused by the flu virus. Antibiotics do not work against viruses. If you are taking antiviral medicine at home:  · Fill your prescription and start taking your medicine as soon as you can.  · Talk with your provider or pharmacist about possible side effects.  · Take the medicine exactly as instructed.  To relieve symptoms  There are many medicines that can help relieve symptoms of pneumonia. Some are prescription and some are over-the-counter.  Your healthcare provider may recommend:  · Acetaminophen or ibuprofen to lower your fever and to lessen headache or other pain  · Cough medicine to loosen mucus or to reduce coughing  Make sure you check with your healthcare provider or pharmacist before taking any over-the-counter  medicines.  Special treatments  If you are hospitalized for pneumonia, you may have other therapies, including:  · Inhaled medicines to help with breathing or chest congestion  · Supplemental oxygen to increase low oxygen levels  Drink fluids and eat healthy  You should eat healthy to help your body fight the infection. Drinking a lot of fluids helps to replace fluids lost from fever and to loosen mucus in your chest.  · Diet. Make healthy food choices, including fruits and vegetables, lean meats and other proteins, 100% whole grain and low- or no-fat dairy products.  · Fluids. Drink at least 6 to 8 tall glasses a day. Water and 100% fruit or vegetable juice are best.  Get plenty of rest and sleep  You may be more tired than usual for a while. It is important to get enough sleep at night. Its also important to rest during the day. Talk with your healthcare provider if coughing or other symptoms are interfering with your sleep.  Preventing the spread of germs  The best thing you can do to prevent spreading germs is to wash your hands often. You should:  · Rub your hand with soap and water for 20 to 30 seconds.  · Clean in between your fingers, the backs of your hands, and around your nails.  · Dry your hands on a separate towel or use paper towels.  You should also:  · Keep alcohol-based hand  nearby.  · Make sure you also clean surfaces that you touch. Use a product that kills all types of germs.  · Stay away from others until you are feeling better.  When to call your healthcare provider  Call your healthcare provider if you have any of the following:  · Symptoms get worse  · Fever continues  · Shortness of breath gets worse  · Increased mucus or mucus that is darker in color  · Coughing gets worse  · Lips or fingers are bluish in color  · Side effects from your medicine   Date Last Reviewed: 12/1/2016  © 6459-3758 [employer]. [address] [place] [address], [place] [address]. All rights reserved.  This information is not intended as a substitute for professional medical care. Always follow your healthcare professional's instructions.

## 2018-01-24 ENCOUNTER — PATIENT MESSAGE (OUTPATIENT)
Dept: INTERNAL MEDICINE | Facility: CLINIC | Age: 63
End: 2018-01-24

## 2018-01-29 ENCOUNTER — LAB VISIT (OUTPATIENT)
Dept: LAB | Facility: HOSPITAL | Age: 63
End: 2018-01-29
Attending: INTERNAL MEDICINE
Payer: COMMERCIAL

## 2018-01-29 DIAGNOSIS — E11.9 TYPE 2 DIABETES MELLITUS WITHOUT COMPLICATION, WITHOUT LONG-TERM CURRENT USE OF INSULIN: ICD-10-CM

## 2018-01-29 LAB
CHOLEST SERPL-MCNC: 131 MG/DL
CHOLEST/HDLC SERPL: 3 {RATIO}
ESTIMATED AVG GLUCOSE: 151 MG/DL
HBA1C MFR BLD HPLC: 6.9 %
HDLC SERPL-MCNC: 43 MG/DL
HDLC SERPL: 32.8 %
LDLC SERPL CALC-MCNC: 64.6 MG/DL
NONHDLC SERPL-MCNC: 88 MG/DL
TRIGL SERPL-MCNC: 117 MG/DL

## 2018-01-29 PROCEDURE — 83036 HEMOGLOBIN GLYCOSYLATED A1C: CPT

## 2018-01-29 PROCEDURE — 80061 LIPID PANEL: CPT

## 2018-01-29 PROCEDURE — 36415 COLL VENOUS BLD VENIPUNCTURE: CPT | Mod: PO

## 2018-01-30 NOTE — PROGRESS NOTES
Here are the results of your recent labs.  We will review them in detail at your follow up visit.    Sincerely,    Jacek Mohr M.D.        If you would like to review your experience with Dr. Mohr or [facility], please follow the link below:    [email or web address]

## 2018-02-12 ENCOUNTER — OFFICE VISIT (OUTPATIENT)
Dept: INTERNAL MEDICINE | Facility: CLINIC | Age: 63
End: 2018-02-12
Payer: COMMERCIAL

## 2018-02-12 VITALS
BODY MASS INDEX: 42.66 KG/M2 | HEIGHT: 72 IN | TEMPERATURE: 97 F | SYSTOLIC BLOOD PRESSURE: 128 MMHG | HEART RATE: 70 BPM | DIASTOLIC BLOOD PRESSURE: 80 MMHG | WEIGHT: 315 LBS

## 2018-02-12 DIAGNOSIS — I15.2 HYPERTENSION ASSOCIATED WITH DIABETES: ICD-10-CM

## 2018-02-12 DIAGNOSIS — N52.1 ERECTILE DYSFUNCTION DUE TO DISEASES CLASSIFIED ELSEWHERE: ICD-10-CM

## 2018-02-12 DIAGNOSIS — E11.69 HYPERLIPIDEMIA ASSOCIATED WITH TYPE 2 DIABETES MELLITUS: ICD-10-CM

## 2018-02-12 DIAGNOSIS — E11.59 HYPERTENSION ASSOCIATED WITH DIABETES: ICD-10-CM

## 2018-02-12 DIAGNOSIS — E78.5 HYPERLIPIDEMIA ASSOCIATED WITH TYPE 2 DIABETES MELLITUS: ICD-10-CM

## 2018-02-12 DIAGNOSIS — E11.9 TYPE 2 DIABETES MELLITUS WITHOUT COMPLICATION, WITHOUT LONG-TERM CURRENT USE OF INSULIN: ICD-10-CM

## 2018-02-12 DIAGNOSIS — E66.01 MORBID OBESITY WITH BMI OF 50.0-59.9, ADULT: ICD-10-CM

## 2018-02-12 PROCEDURE — 99999 PR PBB SHADOW E&M-EST. PATIENT-LVL IV: CPT | Mod: PBBFAC,,, | Performed by: INTERNAL MEDICINE

## 2018-02-12 PROCEDURE — 3008F BODY MASS INDEX DOCD: CPT | Mod: S$GLB,,, | Performed by: INTERNAL MEDICINE

## 2018-02-12 PROCEDURE — 99214 OFFICE O/P EST MOD 30 MIN: CPT | Mod: 25,S$GLB,, | Performed by: INTERNAL MEDICINE

## 2018-02-12 PROCEDURE — 90471 IMMUNIZATION ADMIN: CPT | Mod: S$GLB,,, | Performed by: INTERNAL MEDICINE

## 2018-02-12 PROCEDURE — 90686 IIV4 VACC NO PRSV 0.5 ML IM: CPT | Mod: S$GLB,,, | Performed by: INTERNAL MEDICINE

## 2018-02-12 PROCEDURE — 90715 TDAP VACCINE 7 YRS/> IM: CPT | Mod: S$GLB,,, | Performed by: INTERNAL MEDICINE

## 2018-02-12 PROCEDURE — 90472 IMMUNIZATION ADMIN EACH ADD: CPT | Mod: S$GLB,,, | Performed by: INTERNAL MEDICINE

## 2018-02-12 RX ORDER — SILDENAFIL 100 MG/1
TABLET, FILM COATED ORAL
Qty: 10 TABLET | Refills: 11 | Status: SHIPPED | OUTPATIENT
Start: 2018-02-12 | End: 2018-08-13

## 2018-02-12 NOTE — ASSESSMENT & PLAN NOTE
Has lost about 10 pounds over the last six months.  Needs to continue to focus on low calorie diet.

## 2018-02-12 NOTE — PROGRESS NOTES
"Subjective:       Patient ID: Santhosh Goff is a 63 y.o. male.    Chief Complaint: Follow-up (6 mo )    HPI  Patient is a 63-year-old male presenting today for follow-up of his diabetes, hyperlipidemia, hypertension, obesity.  In regards to his hyper tension his blood pressure is doing well at this time.  He is tolerating his medication without adverse effects.  His diabetes is showing good control with an A1c of 6.9.  He's been stable for 3 months at that level.  He's not quite back down to the 6.2 where he was before but he says at least maintaining at this time.  Cholesterol numbers have been stable he continues take his cholesterol medications as prescribed.  He has lost about 10 pounds over the last 6 months.  He states he really started focusing on that just a few weeks ago so he is happy about the weight loss.  He is just trying to limit his calories.    He's had some issues with erectile dysfunction.  He notes that his erections are not as strong as it once were and they sometimes fail on him.  He is interested in seeing what we can do for that.    Review of Systems   Constitutional: Negative for activity change and unexpected weight change.   HENT: Negative for hearing loss, rhinorrhea and trouble swallowing.    Eyes: Negative for discharge and visual disturbance.   Respiratory: Negative for chest tightness and wheezing.    Cardiovascular: Negative for chest pain and palpitations.   Gastrointestinal: Negative for blood in stool, constipation, diarrhea and vomiting.   Endocrine: Negative for polydipsia and polyuria.   Genitourinary: Negative for difficulty urinating, hematuria and urgency.   Musculoskeletal: Negative for arthralgias, joint swelling and neck pain.   Neurological: Negative for weakness and headaches.   Psychiatric/Behavioral: Negative for confusion and dysphoric mood.       Objective:   /80   Pulse 70   Temp 97.2 °F (36.2 °C) (Tympanic)   Ht 5' 11.75" (1.822 m)   Wt (!) 166.5 kg " (367 lb 1.1 oz)   BMI 50.13 kg/m²      Physical Exam   Constitutional: He appears well-developed and well-nourished.   HENT:   Head: Normocephalic and atraumatic.   Eyes: Pupils are equal, round, and reactive to light.   Neck: Neck supple. No thyromegaly present.   Cardiovascular: Normal rate, regular rhythm and normal heart sounds.  Exam reveals no gallop and no friction rub.    No murmur heard.  Pulmonary/Chest: Breath sounds normal. He has no wheezes. He has no rales.   Abdominal: Soft. Bowel sounds are normal. He exhibits no distension. There is no tenderness.   Vitals reviewed.      No visits with results within 2 Week(s) from this visit.   Latest known visit with results is:   Lab Visit on 01/29/2018   Component Date Value    Hemoglobin A1C 01/29/2018 6.9*    Estimated Avg Glucose 01/29/2018 151*    Cholesterol 01/29/2018 131     Triglycerides 01/29/2018 117     HDL 01/29/2018 43     LDL Cholesterol 01/29/2018 64.6     HDL/Chol Ratio 01/29/2018 32.8     Total Cholesterol/HDL Ra* 01/29/2018 3.0     Non-HDL Cholesterol 01/29/2018 88        Assessment:       1. Type 2 diabetes mellitus without complication, without long-term current use of insulin    2. Hyperlipidemia associated with type 2 diabetes mellitus    3. Hypertension associated with diabetes    4. Morbid obesity with BMI of 50.0-59.9, adult    5. Erectile dysfunction due to diseases classified elsewhere        Plan:   Type 2 diabetes mellitus without complication, without long-term current use of insulin  Diabetes continues to do well at this time.  Most recent a1c is 6.9.  We will continue the current regimen.  Focus on a low carbohydrate diet and consistent exercise.        Hyperlipidemia associated with type 2 diabetes mellitus  Cholesterol numbers look good.  We will continue the current regimen at this time.  Remain focused on low fat diet and high dietary fiber intake.      Hypertension associated with diabetes  Blood pressure is under  good control.  We will continue the current regimen.  Will work on regular aerobic exercise and a low salt diet.        Morbid obesity with BMI of 50.0-59.9, adult  Has lost about 10 pounds over the last six months.  Needs to continue to focus on low calorie diet.    Santhosh was seen today for follow-up.    Diagnoses and all orders for this visit:    Type 2 diabetes mellitus without complication, without long-term current use of insulin  -     CBC auto differential; Future  -     Comprehensive metabolic panel; Future  -     Lipid panel; Future  -     Hemoglobin A1c; Future  -     Microalbumin/creatinine urine ratio; Future  -     PSA, Screening; Future    Hyperlipidemia associated with type 2 diabetes mellitus    Hypertension associated with diabetes    Morbid obesity with BMI of 50.0-59.9, adult    Erectile dysfunction due to diseases classified elsewhere  -     sildenafil (VIAGRA) 100 MG tablet; Take 1/2 tablet or 1 tablet 30 minutes before sexual activity    Other orders  -     (In Office Administered) Tdap Vaccine  -     Influenza - Quadrivalent (3 years & older) (PF)        Follow-up in about 6 months (around 8/12/2018).

## 2018-03-15 ENCOUNTER — PATIENT OUTREACH (OUTPATIENT)
Dept: ADMINISTRATIVE | Facility: HOSPITAL | Age: 63
End: 2018-03-15

## 2018-03-15 NOTE — PROGRESS NOTES
PATIENT IS ON [employer] OPTIMAL CKD REPORT. DOS 1/18/18 WAS AN URGENT CARE VISIT WITH B/P >140/90. PATIENT HAS HAD 2 VISITS SINCE AND B/P IS WNL.

## 2018-03-20 RX ORDER — METFORMIN HYDROCHLORIDE 850 MG/1
TABLET ORAL
Qty: 180 TABLET | Refills: 3 | Status: SHIPPED | OUTPATIENT
Start: 2018-03-20 | End: 2018-07-04 | Stop reason: SDUPTHER

## 2018-06-01 ENCOUNTER — PATIENT MESSAGE (OUTPATIENT)
Dept: INTERNAL MEDICINE | Facility: CLINIC | Age: 63
End: 2018-06-01

## 2018-07-02 ENCOUNTER — PATIENT MESSAGE (OUTPATIENT)
Dept: INTERNAL MEDICINE | Facility: CLINIC | Age: 63
End: 2018-07-02

## 2018-07-02 DIAGNOSIS — E11.9 TYPE 2 DIABETES MELLITUS WITHOUT COMPLICATION, WITHOUT LONG-TERM CURRENT USE OF INSULIN: Primary | ICD-10-CM

## 2018-07-03 ENCOUNTER — LAB VISIT (OUTPATIENT)
Dept: LAB | Facility: HOSPITAL | Age: 63
End: 2018-07-03
Attending: INTERNAL MEDICINE
Payer: COMMERCIAL

## 2018-07-03 DIAGNOSIS — E11.9 TYPE 2 DIABETES MELLITUS WITHOUT COMPLICATION, WITHOUT LONG-TERM CURRENT USE OF INSULIN: ICD-10-CM

## 2018-07-03 LAB
ESTIMATED AVG GLUCOSE: 169 MG/DL
HBA1C MFR BLD HPLC: 7.5 %

## 2018-07-03 PROCEDURE — 36415 COLL VENOUS BLD VENIPUNCTURE: CPT | Mod: PO

## 2018-07-03 PROCEDURE — 83036 HEMOGLOBIN GLYCOSYLATED A1C: CPT

## 2018-07-04 ENCOUNTER — PATIENT MESSAGE (OUTPATIENT)
Dept: INTERNAL MEDICINE | Facility: CLINIC | Age: 63
End: 2018-07-04

## 2018-07-04 RX ORDER — METFORMIN HYDROCHLORIDE 1000 MG/1
1000 TABLET ORAL 2 TIMES DAILY WITH MEALS
Qty: 180 TABLET | Refills: 3 | Status: SHIPPED | OUTPATIENT
Start: 2018-07-04 | End: 2019-06-26 | Stop reason: SDUPTHER

## 2018-08-06 ENCOUNTER — LAB VISIT (OUTPATIENT)
Dept: LAB | Facility: HOSPITAL | Age: 63
End: 2018-08-06
Attending: INTERNAL MEDICINE
Payer: COMMERCIAL

## 2018-08-06 DIAGNOSIS — E11.9 TYPE 2 DIABETES MELLITUS WITHOUT COMPLICATION, WITHOUT LONG-TERM CURRENT USE OF INSULIN: ICD-10-CM

## 2018-08-06 LAB
ALBUMIN SERPL BCP-MCNC: 3.8 G/DL
ALP SERPL-CCNC: 61 U/L
ALT SERPL W/O P-5'-P-CCNC: 41 U/L
ANION GAP SERPL CALC-SCNC: 10 MMOL/L
AST SERPL-CCNC: 27 U/L
BASOPHILS # BLD AUTO: 0.03 K/UL
BASOPHILS NFR BLD: 0.4 %
BILIRUB SERPL-MCNC: 0.5 MG/DL
BUN SERPL-MCNC: 23 MG/DL
CALCIUM SERPL-MCNC: 9.6 MG/DL
CHLORIDE SERPL-SCNC: 103 MMOL/L
CHOLEST SERPL-MCNC: 129 MG/DL
CHOLEST/HDLC SERPL: 3 {RATIO}
CO2 SERPL-SCNC: 25 MMOL/L
COMPLEXED PSA SERPL-MCNC: 0.2 NG/ML
CREAT SERPL-MCNC: 1.2 MG/DL
DIFFERENTIAL METHOD: ABNORMAL
EOSINOPHIL # BLD AUTO: 0.1 K/UL
EOSINOPHIL NFR BLD: 1.9 %
ERYTHROCYTE [DISTWIDTH] IN BLOOD BY AUTOMATED COUNT: 13.3 %
EST. GFR  (AFRICAN AMERICAN): >60 ML/MIN/1.73 M^2
EST. GFR  (NON AFRICAN AMERICAN): >60 ML/MIN/1.73 M^2
ESTIMATED AVG GLUCOSE: 148 MG/DL
GLUCOSE SERPL-MCNC: 104 MG/DL
HBA1C MFR BLD HPLC: 6.8 %
HCT VFR BLD AUTO: 44.5 %
HDLC SERPL-MCNC: 43 MG/DL
HDLC SERPL: 33.3 %
HGB BLD-MCNC: 14 G/DL
IMM GRANULOCYTES # BLD AUTO: 0.01 K/UL
IMM GRANULOCYTES NFR BLD AUTO: 0.1 %
LDLC SERPL CALC-MCNC: 65.4 MG/DL
LYMPHOCYTES # BLD AUTO: 2.4 K/UL
LYMPHOCYTES NFR BLD: 32.7 %
MCH RBC QN AUTO: 30.7 PG
MCHC RBC AUTO-ENTMCNC: 31.5 G/DL
MCV RBC AUTO: 98 FL
MONOCYTES # BLD AUTO: 0.7 K/UL
MONOCYTES NFR BLD: 9.4 %
NEUTROPHILS # BLD AUTO: 4.1 K/UL
NEUTROPHILS NFR BLD: 55.5 %
NONHDLC SERPL-MCNC: 86 MG/DL
NRBC BLD-RTO: 0 /100 WBC
PLATELET # BLD AUTO: 254 K/UL
PMV BLD AUTO: 10 FL
POTASSIUM SERPL-SCNC: 4.3 MMOL/L
PROT SERPL-MCNC: 7.1 G/DL
RBC # BLD AUTO: 4.56 M/UL
SODIUM SERPL-SCNC: 138 MMOL/L
TRIGL SERPL-MCNC: 103 MG/DL
WBC # BLD AUTO: 7.36 K/UL

## 2018-08-06 PROCEDURE — 36415 COLL VENOUS BLD VENIPUNCTURE: CPT | Mod: PO

## 2018-08-06 PROCEDURE — 80053 COMPREHEN METABOLIC PANEL: CPT

## 2018-08-06 PROCEDURE — 83036 HEMOGLOBIN GLYCOSYLATED A1C: CPT

## 2018-08-06 PROCEDURE — 85025 COMPLETE CBC W/AUTO DIFF WBC: CPT

## 2018-08-06 PROCEDURE — 80061 LIPID PANEL: CPT

## 2018-08-06 PROCEDURE — 84153 ASSAY OF PSA TOTAL: CPT

## 2018-08-07 NOTE — PROGRESS NOTES
Here are the results of your recent labs.  We will review them in detail at your follow up visit.    Sincerely,    Jacek Mohr M.D.        If you would like to review your experience with Dr. Mohr or [facility], please follow the link below:    [email or web address]

## 2018-08-13 ENCOUNTER — OFFICE VISIT (OUTPATIENT)
Dept: INTERNAL MEDICINE | Facility: CLINIC | Age: 63
End: 2018-08-13
Payer: COMMERCIAL

## 2018-08-13 VITALS
SYSTOLIC BLOOD PRESSURE: 130 MMHG | BODY MASS INDEX: 42.66 KG/M2 | WEIGHT: 315 LBS | HEART RATE: 74 BPM | DIASTOLIC BLOOD PRESSURE: 78 MMHG | TEMPERATURE: 97 F | HEIGHT: 72 IN

## 2018-08-13 DIAGNOSIS — E11.9 TYPE 2 DIABETES MELLITUS WITHOUT COMPLICATION, WITHOUT LONG-TERM CURRENT USE OF INSULIN: ICD-10-CM

## 2018-08-13 DIAGNOSIS — E11.59 HYPERTENSION ASSOCIATED WITH DIABETES: ICD-10-CM

## 2018-08-13 DIAGNOSIS — E78.5 HYPERLIPIDEMIA ASSOCIATED WITH TYPE 2 DIABETES MELLITUS: ICD-10-CM

## 2018-08-13 DIAGNOSIS — E11.69 HYPERLIPIDEMIA ASSOCIATED WITH TYPE 2 DIABETES MELLITUS: ICD-10-CM

## 2018-08-13 DIAGNOSIS — Z00.00 ROUTINE GENERAL MEDICAL EXAMINATION AT HEALTH CARE FACILITY: Primary | ICD-10-CM

## 2018-08-13 DIAGNOSIS — I15.2 HYPERTENSION ASSOCIATED WITH DIABETES: ICD-10-CM

## 2018-08-13 DIAGNOSIS — G47.33 OSA TREATED WITH BIPAP: ICD-10-CM

## 2018-08-13 PROCEDURE — 99999 PR PBB SHADOW E&M-EST. PATIENT-LVL III: CPT | Mod: PBBFAC,,, | Performed by: INTERNAL MEDICINE

## 2018-08-13 PROCEDURE — 3044F HG A1C LEVEL LT 7.0%: CPT | Mod: CPTII,S$GLB,, | Performed by: INTERNAL MEDICINE

## 2018-08-13 PROCEDURE — 3075F SYST BP GE 130 - 139MM HG: CPT | Mod: CPTII,S$GLB,, | Performed by: INTERNAL MEDICINE

## 2018-08-13 PROCEDURE — 3078F DIAST BP <80 MM HG: CPT | Mod: CPTII,S$GLB,, | Performed by: INTERNAL MEDICINE

## 2018-08-13 PROCEDURE — 99396 PREV VISIT EST AGE 40-64: CPT | Mod: S$GLB,,, | Performed by: INTERNAL MEDICINE

## 2018-08-13 NOTE — PROGRESS NOTES
Subjective:       Patient ID: Santhosh Goff is a 63 y.o. male.    Chief Complaint: No chief complaint on file.    HPI  Patient is a 63-year-old male presenting today for updated physical exam review of chronic health issues.  Patient has history of type 2 diabetes, hypertension, hyperlipidemia, sleep apnea.  He indicates he has been doing well.  He is taking his medications as prescribed.  He is not experiencing any adverse effects.  He notes no significant issues.  We reviewed his lab work and everything is looking pretty good at this time.  We discussed his weight and that this continues to be a problem for him.  He indicates he is trying to lose weight and will continue to focus on that.    Review of Systems   Constitutional: Negative for fever and unexpected weight change.   HENT: Negative for hearing loss, postnasal drip and rhinorrhea.    Eyes: Negative for pain and visual disturbance.   Respiratory: Negative for cough, shortness of breath and wheezing.    Cardiovascular: Negative for chest pain and palpitations.   Gastrointestinal: Negative for constipation, diarrhea, nausea and vomiting.   Genitourinary: Negative for dysuria and hematuria.   Musculoskeletal: Negative for arthralgias, back pain, myalgias and neck stiffness.   Skin: Negative for pallor and rash.   Neurological: Negative for seizures, syncope and headaches.   Hematological: Negative for adenopathy.   Psychiatric/Behavioral: Negative for dysphoric mood. The patient is not nervous/anxious.        Objective:   /78   Pulse 74   Temp 97.4 °F (36.3 °C) (Tympanic)   Ht 6' (1.829 m)   Wt (!) 164.7 kg (363 lb 1.6 oz)   BMI 49.24 kg/m²      Physical Exam   Constitutional: He is oriented to person, place, and time. He appears well-developed and well-nourished. No distress.   HENT:   Head: Normocephalic and atraumatic.   Mouth/Throat: Oropharynx is clear and moist.   Eyes: EOM are normal. Pupils are equal, round, and reactive to light.    Neck: Normal range of motion. Neck supple. No JVD present. No thyromegaly present.   Cardiovascular: Normal rate, regular rhythm, normal heart sounds and intact distal pulses. Exam reveals no gallop and no friction rub.   No murmur heard.  Pulmonary/Chest: Effort normal and breath sounds normal. He has no wheezes. He has no rales.   Abdominal: Soft. Bowel sounds are normal. He exhibits no distension. There is no tenderness. There is no rebound and no guarding.   Musculoskeletal: Normal range of motion. He exhibits no edema.   Lymphadenopathy:     He has no cervical adenopathy.   Neurological: He is alert and oriented to person, place, and time. He has normal reflexes. No cranial nerve deficit.   Skin: Skin is warm and dry. No rash noted.   Psychiatric: He has a normal mood and affect. Judgment normal.   Vitals reviewed.      Lab Visit on 08/06/2018   Component Date Value    Microalbum.,U,Random 08/06/2018 4.0     Creatinine, Random Ur 08/06/2018 25.0     Microalb Creat Ratio 08/06/2018 16.0    Lab Visit on 08/06/2018   Component Date Value    WBC 08/06/2018 7.36     RBC 08/06/2018 4.56*    Hemoglobin 08/06/2018 14.0     Hematocrit 08/06/2018 44.5     MCV 08/06/2018 98     MCH 08/06/2018 30.7     MCHC 08/06/2018 31.5*    RDW 08/06/2018 13.3     Platelets 08/06/2018 254     MPV 08/06/2018 10.0     Immature Granulocytes 08/06/2018 0.1     Gran # (ANC) 08/06/2018 4.1     Immature Grans (Abs) 08/06/2018 0.01     Lymph # 08/06/2018 2.4     Mono # 08/06/2018 0.7     Eos # 08/06/2018 0.1     Baso # 08/06/2018 0.03     nRBC 08/06/2018 0     Gran% 08/06/2018 55.5     Lymph% 08/06/2018 32.7     Mono% 08/06/2018 9.4     Eosinophil% 08/06/2018 1.9     Basophil% 08/06/2018 0.4     Differential Method 08/06/2018 Automated     Sodium 08/06/2018 138     Potassium 08/06/2018 4.3     Chloride 08/06/2018 103     CO2 08/06/2018 25     Glucose 08/06/2018 104     BUN, Bld 08/06/2018 23     Creatinine  08/06/2018 1.2     Calcium 08/06/2018 9.6     Total Protein 08/06/2018 7.1     Albumin 08/06/2018 3.8     Total Bilirubin 08/06/2018 0.5     Alkaline Phosphatase 08/06/2018 61     AST 08/06/2018 27     ALT 08/06/2018 41     Anion Gap 08/06/2018 10     eGFR if African American 08/06/2018 >60.0     eGFR if non African Amer* 08/06/2018 >60.0     Cholesterol 08/06/2018 129     Triglycerides 08/06/2018 103     HDL 08/06/2018 43     LDL Cholesterol 08/06/2018 65.4     HDL/Chol Ratio 08/06/2018 33.3     Total Cholesterol/HDL Ra* 08/06/2018 3.0     Non-HDL Cholesterol 08/06/2018 86     Hemoglobin A1C 08/06/2018 6.8*    Estimated Avg Glucose 08/06/2018 148*    PSA, SCREEN 08/06/2018 0.20        Assessment:       1. Routine general medical examination at health care facility    2. Type 2 diabetes mellitus without complication, without long-term current use of insulin    3. Hypertension associated with diabetes    4. Hyperlipidemia associated with type 2 diabetes mellitus    5. ALIREZA treated with AVAPS        Plan:   Type 2 diabetes mellitus without complication, without long-term current use of insulin  Diabetes continues to do well at this time.  Most recent a1c is 6.8.  We will continue the current regimen.  Focus on a low carbohydrate diet and consistent exercise.        Hypertension associated with diabetes  Blood pressure is under good control.  We will continue the current regimen.  Will work on regular aerobic exercise and a low salt diet.        Hyperlipidemia associated with type 2 diabetes mellitus  Cholesterol numbers look good.  We will continue the current regimen at this time.  Remain focused on low fat diet and high dietary fiber intake.      ALIREZA treated with AVAPS  Patient is compliant with use of cpap, using it the majority of nights.  Benefit from the use of the device is noted.      Diagnoses and all orders for this visit:    Routine general medical examination at [facility]  facility  Comments:  Focus on good health habits, low fat low carb diet, regular exercise, seatbelt use, sunscreen use    Type 2 diabetes mellitus without complication, without long-term current use of insulin  -     Hemoglobin A1c; Future    Hypertension associated with diabetes    Hyperlipidemia associated with type 2 diabetes mellitus  -     Lipid panel; Future    ALIREZA treated with AVAPS        Follow-up in about 6 months (around 2/13/2019) for DM, HTN, HLP.

## 2018-08-13 NOTE — ASSESSMENT & PLAN NOTE
Diabetes continues to do well at this time.  Most recent a1c is 6.8.  We will continue the current regimen.  Focus on a low carbohydrate diet and consistent exercise.

## 2018-09-25 DIAGNOSIS — E11.59 HYPERTENSION ASSOCIATED WITH DIABETES: ICD-10-CM

## 2018-09-25 DIAGNOSIS — I15.2 HYPERTENSION ASSOCIATED WITH DIABETES: ICD-10-CM

## 2018-09-25 RX ORDER — LISINOPRIL 5 MG/1
TABLET ORAL
Qty: 90 TABLET | Refills: 3 | Status: SHIPPED | OUTPATIENT
Start: 2018-09-25 | End: 2019-03-18 | Stop reason: ALTCHOICE

## 2018-10-10 ENCOUNTER — OFFICE VISIT (OUTPATIENT)
Dept: OPHTHALMOLOGY | Facility: CLINIC | Age: 63
End: 2018-10-10
Payer: COMMERCIAL

## 2018-10-10 DIAGNOSIS — H25.13 CATARACT, NUCLEAR SCLEROTIC SENILE, BILATERAL: ICD-10-CM

## 2018-10-10 DIAGNOSIS — H52.03 HYPEROPIA, BILATERAL: ICD-10-CM

## 2018-10-10 DIAGNOSIS — H52.4 BILATERAL PRESBYOPIA: ICD-10-CM

## 2018-10-10 DIAGNOSIS — E11.9 TYPE 2 DIABETES MELLITUS WITHOUT COMPLICATION, WITHOUT LONG-TERM CURRENT USE OF INSULIN: Primary | ICD-10-CM

## 2018-10-10 DIAGNOSIS — H35.3131 NONEXUDATIVE AGE-RELATED MACULAR DEGENERATION, BILATERAL, EARLY DRY STAGE: ICD-10-CM

## 2018-10-10 PROCEDURE — 92014 COMPRE OPH EXAM EST PT 1/>: CPT | Mod: S$GLB,,, | Performed by: OPTOMETRIST

## 2018-10-10 PROCEDURE — 92015 DETERMINE REFRACTIVE STATE: CPT | Mod: S$GLB,,, | Performed by: OPTOMETRIST

## 2018-10-10 PROCEDURE — 99999 PR PBB SHADOW E&M-EST. PATIENT-LVL II: CPT | Mod: PBBFAC,,, | Performed by: OPTOMETRIST

## 2018-10-10 NOTE — PATIENT INSTRUCTIONS
Type 2 diabetes mellitus without complication, without long-term current use of insulin     Nonexudative age-related macular degeneration, bilateral, early dry stage     Cataract, nuclear sclerotic senile, bilateral     Hyperopia, bilateral     Bilateral presbyopia        No Background Diabetic Retinopathy     Minimal mottling of macula OU, vitamins.     Minimal cataracts OU, not surgical     Dispense Final Rx for glasses.  RTC 1 year  Discussed above and answered questions.

## 2018-10-10 NOTE — PROGRESS NOTES
HPI     NIDDM exam.  No visual complaints.  Need new glasses.   Last eye exam 10/02/2017 [facility].  New patient to [facility].    Last edited by Keyla López on 10/10/2018 10:18 AM. (History)            Assessment /Plan     For exam results, see Encounter Report.    Type 2 diabetes mellitus without complication, without long-term current use of insulin    Nonexudative age-related macular degeneration, bilateral, early dry stage    Cataract, nuclear sclerotic senile, bilateral    Hyperopia, bilateral    Bilateral presbyopia      No Background Diabetic Retinopathy    Minimal mottling of macula OU.    Minimal cataracts OU, not surgical    Dispense Final Rx for glasses.  RTC 1 year  Discussed above and answered questions.

## 2018-10-23 DIAGNOSIS — E78.5 HYPERLIPIDEMIA, UNSPECIFIED HYPERLIPIDEMIA TYPE: ICD-10-CM

## 2018-10-24 RX ORDER — ATORVASTATIN CALCIUM 40 MG/1
TABLET, FILM COATED ORAL
Qty: 90 TABLET | Refills: 3 | Status: SHIPPED | OUTPATIENT
Start: 2018-10-24 | End: 2019-10-10 | Stop reason: SDUPTHER

## 2018-12-17 ENCOUNTER — OFFICE VISIT (OUTPATIENT)
Dept: PULMONOLOGY | Facility: CLINIC | Age: 63
End: 2018-12-17
Payer: COMMERCIAL

## 2018-12-17 VITALS
HEART RATE: 88 BPM | HEIGHT: 72 IN | SYSTOLIC BLOOD PRESSURE: 130 MMHG | BODY MASS INDEX: 42.66 KG/M2 | DIASTOLIC BLOOD PRESSURE: 80 MMHG | OXYGEN SATURATION: 97 % | WEIGHT: 315 LBS | RESPIRATION RATE: 17 BRPM

## 2018-12-17 DIAGNOSIS — G47.33 OSA TREATED WITH BIPAP: Primary | Chronic | ICD-10-CM

## 2018-12-17 DIAGNOSIS — E66.2 HYPOVENTILATION ASSOCIATED WITH OBESITY: Chronic | ICD-10-CM

## 2018-12-17 DIAGNOSIS — E66.01 MORBID OBESITY WITH BMI OF 50.0-59.9, ADULT: Chronic | ICD-10-CM

## 2018-12-17 PROCEDURE — 3075F SYST BP GE 130 - 139MM HG: CPT | Mod: CPTII,S$GLB,, | Performed by: INTERNAL MEDICINE

## 2018-12-17 PROCEDURE — 3079F DIAST BP 80-89 MM HG: CPT | Mod: CPTII,S$GLB,, | Performed by: INTERNAL MEDICINE

## 2018-12-17 PROCEDURE — 99999 PR PBB SHADOW E&M-EST. PATIENT-LVL III: CPT | Mod: PBBFAC,,, | Performed by: INTERNAL MEDICINE

## 2018-12-17 PROCEDURE — 3008F BODY MASS INDEX DOCD: CPT | Mod: CPTII,S$GLB,, | Performed by: INTERNAL MEDICINE

## 2018-12-17 PROCEDURE — 99214 OFFICE O/P EST MOD 30 MIN: CPT | Mod: S$GLB,,, | Performed by: INTERNAL MEDICINE

## 2018-12-17 NOTE — PROGRESS NOTES
Subjective:      Patient ID: Santhosh Goff is a 63 y.o. male.    Patient Active Problem List   Diagnosis    Type 2 diabetes mellitus without complication, without long-term current use of insulin    Hyperlipidemia associated with type 2 diabetes mellitus    Hypertension associated with diabetes    Morbid obesity with BMI of 50.0-59.9, adult    Hypoventilation associated with obesity    Need for influenza vaccination    ALIREZA treated with AVAPS     Problem list has been reviewed.    Chief Complaint: Sleep Apnea    HPI    He is a here for follow up for ALIREZA.     He is on TRIOLOGY 100 Ventilator. AVAPS /5/22:14/6/8:8. He uses his TRIOLOGY religiously with the exception of when his has sinus problems for an average of 5 hours per night.     Complaince download reveals  85% days with greater than 4 hours of device use.     Patient denies snoring, headaches, excessive daytime sleepiness.      He definitely thinks PAP is beneficial to his health and he wants to continue with PAP therapy.     [place] Sleepiness Scale   EPWORTH SLEEPINESS SCALE 12/17/2018 12/22/2017 12/23/2016 6/23/2016 12/23/2015 6/22/2015   Sitting and reading 0 0 1 0 1 0   Watching TV 1 1 1 1 1 3   Sitting, inactive in a public place (e.g. a theatre or a meeting) 0 0 0 0 1 0   As a passenger in a car for an hour without a break 0 0 0 0 0 2   Lying down to rest in the afternoon when circumstances permit 0 1 0 1 0 1   Sitting and talking to someone 0 0 0 0 0 0   Sitting quietly after a lunch without alcohol 0 0 1 0 0 3   In a car, while stopped for a few minutes in traffic 0 0 0 0 0 0   Total score 1 2 3 2 3 9     Previous Report Reviewed: office notes     The following portions of the patient's history were reviewed and updated as appropriate: He  has a past medical history of Arthritis, Diabetes mellitus, type 2 (2014), Hyperlipidemia, Hypertension, Morbid obesity with BMI of 45.0-49.9, adult, and Staphylococcus aureus infection,  multiple-resistant (MRSA) (2009).  He  has a past surgical history that includes THORACOSCOPY (Right, 8/21/2014); EVACUATION-CLOT (Right, 8/21/2014); and COLONOSCOPY (N/A, 6/3/2014).  His family history includes Early death in his father.  He  reports that  has never smoked. he has never used smokeless tobacco. He reports that he does not drink alcohol or use drugs.  He has a current medication list which includes the following prescription(s): albuterol, aspirin, atorvastatin, chromium-brindal berry, furosemide, glimepiride, lisinopril, metformin, and potassium chloride sa.  He is allergic to cephalosporins..    Review of Systems   Constitutional: Positive for fatigue. Negative for fever and chills.   HENT: Negative for nosebleeds and congestion.    Eyes: Negative for itching.   Respiratory: Positive for dyspnea on extertion. Negative for cough and shortness of breath.    Cardiovascular: Negative for chest pain and leg swelling.   Genitourinary: Negative for difficulty urinating and hematuria.   Skin: Negative for rash.   Gastrointestinal: Negative for nausea and vomiting.   Hematological: Does not bruise/bleed easily.   All other systems reviewed and are negative.     Objective:     /80   Pulse 88   Resp 17   Ht 6' (1.829 m)   Wt (!) 162.9 kg (359 lb 3.8 oz)   SpO2 97%   BMI 48.72 kg/m²   Body mass index is 48.72 kg/m².    Physical Exam   Constitutional: He is oriented to person, place, and time. He appears well-developed and well-nourished.   Morbidly obese   HENT:   Head: Normocephalic and atraumatic.   Eyes: Conjunctivae are normal. Pupils are equal, round, and reactive to light.   Neck: Normal range of motion. Neck supple.   Cardiovascular: Normal rate.   Pulmonary/Chest: Effort normal and breath sounds normal.   Abdominal: Soft. Bowel sounds are normal.   Musculoskeletal: Normal range of motion. He exhibits edema.   Neurological: He is alert and oriented to person, place, and time.   Skin: Skin is  warm and dry.   Psychiatric: He has a normal mood and affect.   Nursing note and vitals reviewed.      Personal Diagnostic Review  none pertinent    Assessment / plan:       Discussed diagnosis, its evaluation, treatment and usual course. All questions answered.     Problem List Items Addressed This Visit        Endocrine    Morbid obesity with BMI of 50.0-59.9, adult    Current Assessment & Plan     General weight loss/lifestyle modification strategies discussed (elicit support from others; identify saboteurs; non-food rewards, etc).  Diet interventions: low calorie (1000 kCal/d) deficit diet.            Other    Hypoventilation associated with obesity (Chronic)    Current Assessment & Plan     General weight loss/lifestyle modification strategies discussed (elicit support from others; identify saboteurs; non-food rewards, etc).  Diet interventions: low calorie (1000 kCal/d) deficit diet.         ALIREZA treated with AVAPS - Primary    Current Assessment & Plan     Continue AVAPS AE: AVAPS /5/22:14/6/8:8. (DME -  [facility])     Discussed therapeutic goals for positive airway pressure therapy(CPAP or BiPAP): Ideal is usage 100% of nights for 6 - 8 hours per night. Minimum usage is 70% of night for at least 4 hours per night used. Pateint expressed understanding. All Questions answered.    SUPPLIES RENEWED.          Relevant Orders    CPAP/BIPAP SUPPLIES        TIME SPENT WITH PATIENT: Time spent: 25 minutes in face to face  discussion concerning diagnosis, prognosis, review of lab and test results, benefits of treatment as well as management of disease, counseling of patient and coordination of care between various health  care providers . Greater than half the time spent was used for coordination of care and counseling of patient.          Follow-up in about 1 year (around 12/17/2019) for ALIREZA, Morbid Obesity.

## 2018-12-17 NOTE — PATIENT INSTRUCTIONS
Obstructive Sleep Apnea  Obstructive sleep apnea is a condition that causes your air passages to become narrowed or blocked during sleep. As a result, breathing stops for short periods. Your body wakes up enough for breathing to begin again, though you don't remember it. The cycle of stopped breathing and brief awakenings can repeat dozens of times a night. This prevents the body from getting to the deeper stages of sleep that are needed for good rest and may cause your body's oxygen level to fall.  Signs of sleep apnea include loud snoring, noisy breathing, and gasping sounds during sleep. Daytime symptoms include waking up tired after a full night's sleep, waking up with headaches, feeling very sleepy or falling asleep during the day, and having problems with memory or concentration.  Risk factors for sleep apnea include:  · Being overweight  · Being a man, or a woman in menopause  · Smoking  · Using alcohol or sedating medicines  · Having enlarged structures in the nose or throat  Home care  Lifestyle changes that can help treat snoring and sleep apnea include the following:  · If you are overweight, lose weight. Talk to your healthcare provider about a weight-loss plan for you.  · Avoid alcohol for 3 to 4 hours before bedtime. Avoid sedating medications. Ask your healthcare provider about the medicines you take.  · If you smoke, talk to your healthcare provider about ways to quit.  · Sleep on your side. This can help prevent gravity from pulling relaxed throat tissues into your breathing passages.  · If you have allergies or sinus problems that block your nose, ask your healthcare provider for help.  Follow-up care  Follow up with your healthcare provider, or as advised. A diagnosis of sleep apnea is made with a sleep study. Your healthcare provider can tell you more about this test.  When to seek medical advice  Sleep apnea can make you more likely to have certain health problems. These include high blood  pressure, heart attack, stroke, and sexual dysfunction. If you have sleep apnea, talk to your healthcare provider about the best treatments for you.  Date Last Reviewed: 4/1/2017 © 2000-2017 [employer] [address] [place] [id number]. All rights reserved. This information is not intended as a substitute for professional medical care. Always follow your healthcare professional's instructions.        Lung Anatomy  Your lungs take air in to give your body oxygen, which the body needs to work. Your lungs, like all the tissues in your body, are made up of billions of tiny specialized cells. Old lung cells die and are replaced by new, identical lung cells. This natural process helps ensure healthy lungs.    Date Last Reviewed: 11/1/2016 © 2000-2017 [employer] [address] [place] [id number]. All rights reserved. This information is not intended as a substitute for professional medical care. Always follow your healthcare professional's instructions.

## 2019-02-09 ENCOUNTER — HOSPITAL ENCOUNTER (INPATIENT)
Facility: HOSPITAL | Age: 64
LOS: 6 days | Discharge: HOME | DRG: 871 | End: 2019-02-15
Attending: FAMILY MEDICINE | Admitting: INTERNAL MEDICINE
Payer: COMMERCIAL

## 2019-02-09 DIAGNOSIS — A41.9 SEPTIC SHOCK: ICD-10-CM

## 2019-02-09 DIAGNOSIS — I15.2 HYPERTENSION ASSOCIATED WITH DIABETES: ICD-10-CM

## 2019-02-09 DIAGNOSIS — E11.59 HYPERTENSION ASSOCIATED WITH DIABETES: ICD-10-CM

## 2019-02-09 DIAGNOSIS — L03.115 CELLULITIS OF RIGHT LEG WITHOUT FOOT: ICD-10-CM

## 2019-02-09 DIAGNOSIS — E11.9 TYPE 2 DIABETES MELLITUS WITHOUT COMPLICATION, WITHOUT LONG-TERM CURRENT USE OF INSULIN: ICD-10-CM

## 2019-02-09 DIAGNOSIS — L03.115 CELLULITIS OF RIGHT LOWER EXTREMITY: ICD-10-CM

## 2019-02-09 DIAGNOSIS — R60.9 SWELLING: ICD-10-CM

## 2019-02-09 DIAGNOSIS — R65.21 SEPTIC SHOCK: ICD-10-CM

## 2019-02-09 DIAGNOSIS — E66.01 MORBID OBESITY WITH BMI OF 50.0-59.9, ADULT: ICD-10-CM

## 2019-02-09 DIAGNOSIS — A41.9 SEPSIS, DUE TO UNSPECIFIED ORGANISM: Primary | ICD-10-CM

## 2019-02-09 DIAGNOSIS — R53.83 FATIGUE: ICD-10-CM

## 2019-02-09 PROBLEM — N17.9 AKI (ACUTE KIDNEY INJURY): Status: ACTIVE | Noted: 2019-02-09

## 2019-02-09 PROBLEM — R65.20 SEVERE SEPSIS: Status: ACTIVE | Noted: 2019-02-09

## 2019-02-09 LAB
ALBUMIN SERPL BCP-MCNC: 3.3 G/DL
ALP SERPL-CCNC: 69 U/L
ALT SERPL W/O P-5'-P-CCNC: 44 U/L
ANION GAP SERPL CALC-SCNC: 12 MMOL/L
ANISOCYTOSIS BLD QL SMEAR: SLIGHT
APTT BLDCRRT: 26.8 SEC
AST SERPL-CCNC: 31 U/L
BACTERIA #/AREA URNS HPF: ABNORMAL /HPF
BASOPHILS # BLD AUTO: 0.02 K/UL
BASOPHILS NFR BLD: 0.1 %
BILIRUB SERPL-MCNC: 0.9 MG/DL
BILIRUB UR QL STRIP: NEGATIVE
BUN SERPL-MCNC: 23 MG/DL
CALCIUM SERPL-MCNC: 9.2 MG/DL
CHLORIDE SERPL-SCNC: 98 MMOL/L
CLARITY UR: CLEAR
CO2 SERPL-SCNC: 24 MMOL/L
COLOR UR: YELLOW
CREAT SERPL-MCNC: 1.9 MG/DL
DACRYOCYTES BLD QL SMEAR: ABNORMAL
DIFFERENTIAL METHOD: ABNORMAL
EOSINOPHIL # BLD AUTO: 0 K/UL
EOSINOPHIL NFR BLD: 0 %
ERYTHROCYTE [DISTWIDTH] IN BLOOD BY AUTOMATED COUNT: 13.4 %
EST. GFR  (AFRICAN AMERICAN): 42 ML/MIN/1.73 M^2
EST. GFR  (NON AFRICAN AMERICAN): 36 ML/MIN/1.73 M^2
GLUCOSE SERPL-MCNC: 338 MG/DL
GLUCOSE UR QL STRIP: ABNORMAL
HCT VFR BLD AUTO: 40.8 %
HGB BLD-MCNC: 13.7 G/DL
HGB UR QL STRIP: NEGATIVE
HYALINE CASTS #/AREA URNS LPF: 10 /LPF
INR PPP: 1.1
KETONES UR QL STRIP: ABNORMAL
LACTATE SERPL-SCNC: 4 MMOL/L
LACTATE SERPL-SCNC: 5.8 MMOL/L
LEUKOCYTE ESTERASE UR QL STRIP: NEGATIVE
LYMPHOCYTES # BLD AUTO: 0.5 K/UL
LYMPHOCYTES NFR BLD: 2.2 %
MAGNESIUM SERPL-MCNC: 1.1 MG/DL
MCH RBC QN AUTO: 31.1 PG
MCHC RBC AUTO-ENTMCNC: 33.6 G/DL
MCV RBC AUTO: 93 FL
MICROSCOPIC COMMENT: ABNORMAL
MONOCYTES # BLD AUTO: 1.2 K/UL
MONOCYTES NFR BLD: 5.3 %
NEUTROPHILS # BLD AUTO: 21 K/UL
NEUTROPHILS NFR BLD: 93.2 %
NITRITE UR QL STRIP: NEGATIVE
OVALOCYTES BLD QL SMEAR: ABNORMAL
PH UR STRIP: 6 [PH] (ref 5–8)
PLATELET # BLD AUTO: 213 K/UL
PMV BLD AUTO: 9.6 FL
POCT GLUCOSE: 347 MG/DL (ref 70–110)
POIKILOCYTOSIS BLD QL SMEAR: SLIGHT
POLYCHROMASIA BLD QL SMEAR: ABNORMAL
POTASSIUM SERPL-SCNC: 4.8 MMOL/L
PROT SERPL-MCNC: 6.8 G/DL
PROT UR QL STRIP: ABNORMAL
PROTHROMBIN TIME: 12.1 SEC
RBC # BLD AUTO: 4.4 M/UL
RBC #/AREA URNS HPF: 1 /HPF (ref 0–4)
SODIUM SERPL-SCNC: 134 MMOL/L
SP GR UR STRIP: 1.02 (ref 1–1.03)
TROPONIN I SERPL DL<=0.01 NG/ML-MCNC: 0.01 NG/ML
TSH SERPL DL<=0.005 MIU/L-ACNC: 1.28 UIU/ML
URN SPEC COLLECT METH UR: ABNORMAL
UROBILINOGEN UR STRIP-ACNC: NEGATIVE EU/DL
WBC # BLD AUTO: 22.72 K/UL
WBC #/AREA URNS HPF: 3 /HPF (ref 0–5)
YEAST URNS QL MICRO: ABNORMAL

## 2019-02-09 PROCEDURE — 83605 ASSAY OF LACTIC ACID: CPT

## 2019-02-09 PROCEDURE — 85730 THROMBOPLASTIN TIME PARTIAL: CPT

## 2019-02-09 PROCEDURE — 84484 ASSAY OF TROPONIN QUANT: CPT

## 2019-02-09 PROCEDURE — 96367 TX/PROPH/DG ADDL SEQ IV INF: CPT

## 2019-02-09 PROCEDURE — 85610 PROTHROMBIN TIME: CPT

## 2019-02-09 PROCEDURE — 96372 THER/PROPH/DIAG INJ SC/IM: CPT

## 2019-02-09 PROCEDURE — 96361 HYDRATE IV INFUSION ADD-ON: CPT

## 2019-02-09 PROCEDURE — 84443 ASSAY THYROID STIM HORMONE: CPT

## 2019-02-09 PROCEDURE — 85025 COMPLETE CBC W/AUTO DIFF WBC: CPT

## 2019-02-09 PROCEDURE — 81000 URINALYSIS NONAUTO W/SCOPE: CPT

## 2019-02-09 PROCEDURE — 83735 ASSAY OF MAGNESIUM: CPT

## 2019-02-09 PROCEDURE — 25000003 PHARM REV CODE 250: Performed by: INTERNAL MEDICINE

## 2019-02-09 PROCEDURE — 83036 HEMOGLOBIN GLYCOSYLATED A1C: CPT

## 2019-02-09 PROCEDURE — 96366 THER/PROPH/DIAG IV INF ADDON: CPT

## 2019-02-09 PROCEDURE — 63600175 PHARM REV CODE 636 W HCPCS: Performed by: INTERNAL MEDICINE

## 2019-02-09 PROCEDURE — 93010 EKG 12-LEAD: ICD-10-PCS | Mod: ,,, | Performed by: INTERNAL MEDICINE

## 2019-02-09 PROCEDURE — 11000001 HC ACUTE MED/SURG PRIVATE ROOM

## 2019-02-09 PROCEDURE — 93010 ELECTROCARDIOGRAM REPORT: CPT | Mod: ,,, | Performed by: INTERNAL MEDICINE

## 2019-02-09 PROCEDURE — 25000003 PHARM REV CODE 250: Performed by: FAMILY MEDICINE

## 2019-02-09 PROCEDURE — 99291 CRITICAL CARE FIRST HOUR: CPT | Mod: 25

## 2019-02-09 PROCEDURE — 87040 BLOOD CULTURE FOR BACTERIA: CPT | Mod: 59

## 2019-02-09 PROCEDURE — 82962 GLUCOSE BLOOD TEST: CPT

## 2019-02-09 PROCEDURE — 86803 HEPATITIS C AB TEST: CPT

## 2019-02-09 PROCEDURE — 96365 THER/PROPH/DIAG IV INF INIT: CPT

## 2019-02-09 PROCEDURE — 80053 COMPREHEN METABOLIC PANEL: CPT

## 2019-02-09 PROCEDURE — 83605 ASSAY OF LACTIC ACID: CPT | Mod: 91

## 2019-02-09 PROCEDURE — 63600175 PHARM REV CODE 636 W HCPCS: Performed by: FAMILY MEDICINE

## 2019-02-09 PROCEDURE — 96368 THER/DIAG CONCURRENT INF: CPT

## 2019-02-09 RX ORDER — GUAIFENESIN 100 MG/5ML
200 SOLUTION ORAL EVERY 4 HOURS PRN
Status: DISCONTINUED | OUTPATIENT
Start: 2019-02-09 | End: 2019-02-16 | Stop reason: HOSPADM

## 2019-02-09 RX ORDER — INSULIN ASPART 100 [IU]/ML
1-10 INJECTION, SOLUTION INTRAVENOUS; SUBCUTANEOUS
Status: DISCONTINUED | OUTPATIENT
Start: 2019-02-09 | End: 2019-02-16 | Stop reason: HOSPADM

## 2019-02-09 RX ORDER — VANCOMYCIN HCL IN 5 % DEXTROSE 1G/250ML
2000 PLASTIC BAG, INJECTION (ML) INTRAVENOUS
Status: COMPLETED | OUTPATIENT
Start: 2019-02-09 | End: 2019-02-10

## 2019-02-09 RX ORDER — MAGNESIUM SULFATE HEPTAHYDRATE 40 MG/ML
2 INJECTION, SOLUTION INTRAVENOUS
Status: COMPLETED | OUTPATIENT
Start: 2019-02-09 | End: 2019-02-09

## 2019-02-09 RX ORDER — IBUPROFEN 200 MG
16 TABLET ORAL
Status: DISCONTINUED | OUTPATIENT
Start: 2019-02-09 | End: 2019-02-16 | Stop reason: HOSPADM

## 2019-02-09 RX ORDER — ASPIRIN 81 MG/1
81 TABLET ORAL DAILY
Status: DISCONTINUED | OUTPATIENT
Start: 2019-02-10 | End: 2019-02-16 | Stop reason: HOSPADM

## 2019-02-09 RX ORDER — IPRATROPIUM BROMIDE AND ALBUTEROL SULFATE 2.5; .5 MG/3ML; MG/3ML
3 SOLUTION RESPIRATORY (INHALATION) EVERY 4 HOURS PRN
Status: DISCONTINUED | OUTPATIENT
Start: 2019-02-09 | End: 2019-02-16 | Stop reason: HOSPADM

## 2019-02-09 RX ORDER — ACETAMINOPHEN 325 MG/1
650 TABLET ORAL EVERY 6 HOURS PRN
Status: DISCONTINUED | OUTPATIENT
Start: 2019-02-09 | End: 2019-02-16 | Stop reason: HOSPADM

## 2019-02-09 RX ORDER — ATORVASTATIN CALCIUM 40 MG/1
40 TABLET, FILM COATED ORAL DAILY
Status: DISCONTINUED | OUTPATIENT
Start: 2019-02-10 | End: 2019-02-16 | Stop reason: HOSPADM

## 2019-02-09 RX ORDER — MAG HYDROX/ALUMINUM HYD/SIMETH 200-200-20
30 SUSPENSION, ORAL (FINAL DOSE FORM) ORAL EVERY 6 HOURS PRN
Status: DISCONTINUED | OUTPATIENT
Start: 2019-02-09 | End: 2019-02-16 | Stop reason: HOSPADM

## 2019-02-09 RX ORDER — ONDANSETRON 2 MG/ML
4 INJECTION INTRAMUSCULAR; INTRAVENOUS EVERY 8 HOURS PRN
Status: DISCONTINUED | OUTPATIENT
Start: 2019-02-09 | End: 2019-02-16 | Stop reason: HOSPADM

## 2019-02-09 RX ORDER — GLUCAGON 1 MG
1 KIT INJECTION
Status: DISCONTINUED | OUTPATIENT
Start: 2019-02-09 | End: 2019-02-16 | Stop reason: HOSPADM

## 2019-02-09 RX ORDER — DIPHENHYDRAMINE HCL 25 MG
25 CAPSULE ORAL EVERY 6 HOURS PRN
Status: DISCONTINUED | OUTPATIENT
Start: 2019-02-09 | End: 2019-02-16 | Stop reason: HOSPADM

## 2019-02-09 RX ORDER — IBUPROFEN 200 MG
24 TABLET ORAL
Status: DISCONTINUED | OUTPATIENT
Start: 2019-02-09 | End: 2019-02-16 | Stop reason: HOSPADM

## 2019-02-09 RX ORDER — SODIUM CHLORIDE 0.9 % (FLUSH) 0.9 %
5 SYRINGE (ML) INJECTION
Status: DISCONTINUED | OUTPATIENT
Start: 2019-02-09 | End: 2019-02-16 | Stop reason: HOSPADM

## 2019-02-09 RX ORDER — OXYCODONE AND ACETAMINOPHEN 10; 325 MG/1; MG/1
1 TABLET ORAL EVERY 4 HOURS PRN
Status: DISCONTINUED | OUTPATIENT
Start: 2019-02-09 | End: 2019-02-10

## 2019-02-09 RX ORDER — OXYCODONE AND ACETAMINOPHEN 5; 325 MG/1; MG/1
1 TABLET ORAL EVERY 4 HOURS PRN
Status: DISCONTINUED | OUTPATIENT
Start: 2019-02-09 | End: 2019-02-10

## 2019-02-09 RX ORDER — ENOXAPARIN SODIUM 100 MG/ML
40 INJECTION SUBCUTANEOUS EVERY 24 HOURS
Status: DISCONTINUED | OUTPATIENT
Start: 2019-02-09 | End: 2019-02-16 | Stop reason: HOSPADM

## 2019-02-09 RX ADMIN — ENOXAPARIN SODIUM 40 MG: 100 INJECTION SUBCUTANEOUS at 09:02

## 2019-02-09 RX ADMIN — MAGNESIUM SULFATE IN WATER 2 G: 40 INJECTION, SOLUTION INTRAVENOUS at 08:02

## 2019-02-09 RX ADMIN — INSULIN ASPART 4 UNITS: 100 INJECTION, SOLUTION INTRAVENOUS; SUBCUTANEOUS at 09:02

## 2019-02-09 RX ADMIN — SODIUM CHLORIDE 3000 ML: 0.9 INJECTION, SOLUTION INTRAVENOUS at 11:02

## 2019-02-09 RX ADMIN — SODIUM CHLORIDE 1000 ML: 0.9 INJECTION, SOLUTION INTRAVENOUS at 08:02

## 2019-02-09 RX ADMIN — VANCOMYCIN HYDROCHLORIDE 2000 MG: 1 INJECTION, POWDER, LYOPHILIZED, FOR SOLUTION INTRAVENOUS at 08:02

## 2019-02-09 RX ADMIN — OXYCODONE HYDROCHLORIDE AND ACETAMINOPHEN 1 TABLET: 5; 325 TABLET ORAL at 09:02

## 2019-02-10 PROBLEM — A41.9 SEPSIS: Status: ACTIVE | Noted: 2019-02-10

## 2019-02-10 LAB
ALBUMIN SERPL BCP-MCNC: 2.8 G/DL
ALP SERPL-CCNC: 59 U/L
ALT SERPL W/O P-5'-P-CCNC: 41 U/L
ANION GAP SERPL CALC-SCNC: 10 MMOL/L
AST SERPL-CCNC: 31 U/L
BASOPHILS # BLD AUTO: 0.01 K/UL
BASOPHILS NFR BLD: 0 %
BILIRUB SERPL-MCNC: 0.6 MG/DL
BUN SERPL-MCNC: 23 MG/DL
CALCIUM SERPL-MCNC: 8.3 MG/DL
CHLORIDE SERPL-SCNC: 102 MMOL/L
CO2 SERPL-SCNC: 20 MMOL/L
CREAT SERPL-MCNC: 1.5 MG/DL
DIFFERENTIAL METHOD: ABNORMAL
EOSINOPHIL # BLD AUTO: 0 K/UL
EOSINOPHIL NFR BLD: 0 %
ERYTHROCYTE [DISTWIDTH] IN BLOOD BY AUTOMATED COUNT: 13.7 %
EST. GFR  (AFRICAN AMERICAN): 56 ML/MIN/1.73 M^2
EST. GFR  (NON AFRICAN AMERICAN): 48 ML/MIN/1.73 M^2
ESTIMATED AVG GLUCOSE: 212 MG/DL
GLUCOSE SERPL-MCNC: 267 MG/DL
HBA1C MFR BLD HPLC: 9 %
HCT VFR BLD AUTO: 36.9 %
HGB BLD-MCNC: 12.3 G/DL
LACTATE SERPL-SCNC: 1.7 MMOL/L
LACTATE SERPL-SCNC: 3.4 MMOL/L
LYMPHOCYTES # BLD AUTO: 0.9 K/UL
LYMPHOCYTES NFR BLD: 3.6 %
MAGNESIUM SERPL-MCNC: 1.5 MG/DL
MCH RBC QN AUTO: 30.7 PG
MCHC RBC AUTO-ENTMCNC: 33.3 G/DL
MCV RBC AUTO: 92 FL
MONOCYTES # BLD AUTO: 0.9 K/UL
MONOCYTES NFR BLD: 3.6 %
NEUTROPHILS # BLD AUTO: 22 K/UL
NEUTROPHILS NFR BLD: 93.7 %
PHOSPHATE SERPL-MCNC: 1.8 MG/DL
PLATELET # BLD AUTO: 193 K/UL
PMV BLD AUTO: 9.2 FL
POCT GLUCOSE: 239 MG/DL (ref 70–110)
POCT GLUCOSE: 255 MG/DL (ref 70–110)
POCT GLUCOSE: 283 MG/DL (ref 70–110)
POCT GLUCOSE: 283 MG/DL (ref 70–110)
POTASSIUM SERPL-SCNC: 4.5 MMOL/L
PROT SERPL-MCNC: 6 G/DL
RBC # BLD AUTO: 4.01 M/UL
SODIUM SERPL-SCNC: 132 MMOL/L
VANCOMYCIN SERPL-MCNC: 7.6 UG/ML
WBC # BLD AUTO: 23.68 K/UL

## 2019-02-10 PROCEDURE — 21400001 HC TELEMETRY ROOM

## 2019-02-10 PROCEDURE — 63600175 PHARM REV CODE 636 W HCPCS: Performed by: INTERNAL MEDICINE

## 2019-02-10 PROCEDURE — 25000003 PHARM REV CODE 250: Performed by: INTERNAL MEDICINE

## 2019-02-10 PROCEDURE — 36415 COLL VENOUS BLD VENIPUNCTURE: CPT

## 2019-02-10 PROCEDURE — 83735 ASSAY OF MAGNESIUM: CPT

## 2019-02-10 PROCEDURE — 84100 ASSAY OF PHOSPHORUS: CPT

## 2019-02-10 PROCEDURE — 83605 ASSAY OF LACTIC ACID: CPT

## 2019-02-10 PROCEDURE — 80202 ASSAY OF VANCOMYCIN: CPT

## 2019-02-10 PROCEDURE — 63600175 PHARM REV CODE 636 W HCPCS: Performed by: NURSE PRACTITIONER

## 2019-02-10 PROCEDURE — 96372 THER/PROPH/DIAG INJ SC/IM: CPT

## 2019-02-10 PROCEDURE — 83605 ASSAY OF LACTIC ACID: CPT | Mod: 91

## 2019-02-10 PROCEDURE — 25000003 PHARM REV CODE 250: Performed by: NURSE PRACTITIONER

## 2019-02-10 PROCEDURE — 80053 COMPREHEN METABOLIC PANEL: CPT

## 2019-02-10 PROCEDURE — 85025 COMPLETE CBC W/AUTO DIFF WBC: CPT

## 2019-02-10 PROCEDURE — 11000001 HC ACUTE MED/SURG PRIVATE ROOM

## 2019-02-10 RX ORDER — SODIUM CHLORIDE 9 MG/ML
INJECTION, SOLUTION INTRAVENOUS CONTINUOUS
Status: DISCONTINUED | OUTPATIENT
Start: 2019-02-10 | End: 2019-02-12

## 2019-02-10 RX ORDER — MORPHINE SULFATE 4 MG/ML
4 INJECTION, SOLUTION INTRAMUSCULAR; INTRAVENOUS EVERY 6 HOURS PRN
Status: DISCONTINUED | OUTPATIENT
Start: 2019-02-10 | End: 2019-02-16 | Stop reason: HOSPADM

## 2019-02-10 RX ORDER — OXYCODONE AND ACETAMINOPHEN 10; 325 MG/1; MG/1
1 TABLET ORAL EVERY 4 HOURS PRN
Status: DISCONTINUED | OUTPATIENT
Start: 2019-02-10 | End: 2019-02-16 | Stop reason: HOSPADM

## 2019-02-10 RX ADMIN — INSULIN ASPART 6 UNITS: 100 INJECTION, SOLUTION INTRAVENOUS; SUBCUTANEOUS at 04:02

## 2019-02-10 RX ADMIN — INSULIN ASPART 6 UNITS: 100 INJECTION, SOLUTION INTRAVENOUS; SUBCUTANEOUS at 07:02

## 2019-02-10 RX ADMIN — PIPERACILLIN SODIUM AND TAZOBACTAM SODIUM 3.38 G: 3; .375 INJECTION, POWDER, LYOPHILIZED, FOR SOLUTION INTRAVENOUS at 12:02

## 2019-02-10 RX ADMIN — INSULIN ASPART 4 UNITS: 100 INJECTION, SOLUTION INTRAVENOUS; SUBCUTANEOUS at 11:02

## 2019-02-10 RX ADMIN — MORPHINE SULFATE 4 MG: 4 INJECTION INTRAVENOUS at 03:02

## 2019-02-10 RX ADMIN — VANCOMYCIN HYDROCHLORIDE 1750 MG: 750 INJECTION, POWDER, LYOPHILIZED, FOR SOLUTION INTRAVENOUS at 04:02

## 2019-02-10 RX ADMIN — OXYCODONE HYDROCHLORIDE AND ACETAMINOPHEN 1 TABLET: 5; 325 TABLET ORAL at 05:02

## 2019-02-10 RX ADMIN — ATORVASTATIN CALCIUM 40 MG: 40 TABLET, FILM COATED ORAL at 08:02

## 2019-02-10 RX ADMIN — PIPERACILLIN SODIUM AND TAZOBACTAM SODIUM 3.38 G: 3; .375 INJECTION, POWDER, LYOPHILIZED, FOR SOLUTION INTRAVENOUS at 08:02

## 2019-02-10 RX ADMIN — PIPERACILLIN SODIUM AND TAZOBACTAM SODIUM 3.38 G: 3; .375 INJECTION, POWDER, LYOPHILIZED, FOR SOLUTION INTRAVENOUS at 02:02

## 2019-02-10 RX ADMIN — ENOXAPARIN SODIUM 40 MG: 100 INJECTION SUBCUTANEOUS at 04:02

## 2019-02-10 RX ADMIN — INSULIN ASPART 3 UNITS: 100 INJECTION, SOLUTION INTRAVENOUS; SUBCUTANEOUS at 09:02

## 2019-02-10 RX ADMIN — SODIUM CHLORIDE: 0.9 INJECTION, SOLUTION INTRAVENOUS at 06:02

## 2019-02-10 RX ADMIN — ASPIRIN 81 MG: 81 TABLET, COATED ORAL at 08:02

## 2019-02-10 RX ADMIN — OXYCODONE HYDROCHLORIDE AND ACETAMINOPHEN 1 TABLET: 10; 325 TABLET ORAL at 11:02

## 2019-02-10 NOTE — PLAN OF CARE
02/10/19 1602   Discharge Assessment   Assessment Type Discharge Planning Assessment   Confirmed/corrected address and phone number on facesheet? Yes   Assessment information obtained from? Medical Record   Communicated expected length of stay with patient/caregiver yes   Prior to hospitilization cognitive status: Alert/Oriented;Not Oriented to Time   Prior to hospitalization functional status: Independent   Current cognitive status: Alert/Oriented   Current Functional Status: Independent   Able to Return to Prior Arrangements yes   Is patient able to care for self after discharge? Yes   Patient's perception of discharge disposition home or selfcare   Readmission Within the Last 30 Days no previous admission in last 30 days   Patient currently being followed by outpatient case management? No   Patient currently receives any other outside agency services? No   Equipment Currently Used at Home none   Do you have any problems affording any of your prescribed medications? No   Is the patient taking medications as prescribed? yes   Does the patient have transportation home? Yes   Transportation Anticipated car, drives self   Does the patient receive services at the [facility]? No   Discharge Plan A Home   DME Needed Upon Discharge  none   Patient/Family in Agreement with Plan yes

## 2019-02-10 NOTE — ASSESSMENT & PLAN NOTE
Baseline creatinine less than 1.2.  Currently elevated at 1.9.  Likely secondary to sepsis.  Patient received normal saline 30 mL/kilogram bolus (almost close to 5 L).  Repeat labs in a.m..

## 2019-02-10 NOTE — H&P
[facility] Hospital Medicine  History & Physical    Patient Name: Santhosh Goff  MRN: [id number]  Admission Date: 2/9/2019  Attending Physician: Farhad Louis MD  Primary Care Provider: Jacek Mohr MD         Patient information was obtained from patient, spouse/SO, past medical records and ER records.     Subjective:     Principal Problem:Septic shock    Chief Complaint:   Chief Complaint   Patient presents with    Leg Pain     right leg with redness.    Fatigue     gen. fatigue and weakness        HPI: Mr. Goff is a 64-year-old morbidly obese  male with PMH significant for non-insulin-dependent DM, HTN, prior right leg wound/cellulitis in 2015, was in his usual state of health until two days ago.  He started complaining of not feeling well, with worsening right lower extremity redness, progressing to fever today, chills, nausea, and increasing redness of the right lower extremity.  In the ED, he is afebrile, heart rate less than 90, blood pressure 95/68, WBC 53840, 0% bands, lactic acid 4.0, creatinine 1.9 (baseline less than 1.2).  Patient was ordered 2 L normal saline boluses (will add three more L to make it a total of 5 L boluses, 30 mL/kilogram).  Blood cultures were obtained.  Started on IV vancomycin, IV Zosyn.    Past Medical History:   Diagnosis Date    Arthritis     Diabetes mellitus, type 2 2014     x 1 week ago 10/10/2018    Hyperlipidemia     Hypertension     Morbid obesity with BMI of 45.0-49.9, adult     Staphylococcus aureus infection, multiple-resistant (MRSA) 2009       Past Surgical History:   Procedure Laterality Date    COLONOSCOPY N/A 6/3/2014    Performed by [facility] Ellison [facility] at [facility] ENDO    EVACUATION-CLOT Right 8/21/2014    Performed by Ildefonso Hernández MD at [facility] OR    LUNG SURGERY      THORACOSCOPY Right 8/21/2014    Performed by Ildefonso Hernández MD at [facility] OR       Review of patient's allergies indicates:   Allergen Reactions     Cephalosporins Nausea And Vomiting       No current facility-administered medications on file prior to encounter.      Current Outpatient Medications on File Prior to Encounter   Medication Sig    atorvastatin (LIPITOR) 40 MG tablet TAKE ONE TABLET BY MOUTH ONCE DAILY    furosemide (LASIX) 20 MG tablet TAKE ONE TABLET BY MOUTH ONCE DAILY    glimepiride (AMARYL) 4 MG tablet TAKE ONE TABLET BY MOUTH ONCE DAILY WITH BREAKFAST    lisinopril (PRINIVIL,ZESTRIL) 5 MG tablet TAKE ONE TABLET BY MOUTH ONCE DAILY    metFORMIN (GLUCOPHAGE) 1000 MG tablet Take 1 tablet (1,000 mg total) by mouth 2 (two) times daily with meals.    potassium chloride SA (K-DUR,KLOR-CON) 20 MEQ tablet TAKE ONE TABLET BY MOUTH ONCE DAILY    albuterol 90 mcg/actuation inhaler Inhale 2 puffs into the lungs every 6 (six) hours as needed for Wheezing. Rescue    aspirin (ECOTRIN) 81 MG EC tablet Take 81 mg by mouth once daily.    chromium-brindal berry (GARCINIA CAMBOGIA) 200-500 mcg-mg Tab Take by mouth.     Family History     Problem Relation (Age of Onset)    Early death Father        Tobacco Use    Smoking status: Never Smoker    Smokeless tobacco: Never Used   Substance and Sexual Activity    Alcohol use: No    Drug use: No    Sexual activity: Yes     Partners: Female     Review of Systems   Constitutional: Positive for chills and fatigue. Negative for appetite change and fever.   HENT: Negative.  Negative for congestion, nosebleeds and sore throat.    Eyes: Negative.  Negative for visual disturbance.   Respiratory: Negative.  Negative for cough, shortness of breath and wheezing.    Cardiovascular: Positive for leg swelling. Negative for chest pain and palpitations.   Gastrointestinal: Negative.  Negative for abdominal pain, constipation, diarrhea, nausea and vomiting.   Endocrine: Negative.  Negative for polyuria.   Genitourinary: Negative.  Negative for dysuria, flank pain, frequency and urgency.   Musculoskeletal: Negative.   Negative for arthralgias, back pain and joint swelling.   Skin: Positive for color change (redness right lower leg). Negative for pallor and rash.   Allergic/Immunologic: Negative.  Negative for immunocompromised state.   Neurological: Negative.  Negative for dizziness, syncope, weakness, light-headedness, numbness and headaches.   Hematological: Negative.    Psychiatric/Behavioral: Negative.  Negative for confusion and hallucinations. The patient is not nervous/anxious.    All other systems reviewed and are negative.    Objective:     Vital Signs (Most Recent):  Temp: 98.1 °F (36.7 °C) (02/09/19 1808)  Pulse: 82 (02/09/19 1901)  Resp: 18 (02/09/19 1901)  BP: (!) 124/56 (02/09/19 1901)  SpO2: 96 % (02/09/19 1901) Vital Signs (24h Range):  Temp:  [98.1 °F (36.7 °C)] 98.1 °F (36.7 °C)  Pulse:  [82-91] 82  Resp:  [18-20] 18  SpO2:  [93 %-97 %] 96 %  BP: (108-124)/(56-64) 124/56     Weight: (!) 164.7 kg (363 lb)  Body mass index is 49.23 kg/m².    Physical Exam   Constitutional: He is oriented to person, place, and time. No distress.   Morbidly obese  male, appears uncomfortable, but in no distress. Spouse at the bedside.   HENT:   Head: Normocephalic and atraumatic.   Eyes: Conjunctivae and EOM are normal. Pupils are equal, round, and reactive to light. No scleral icterus.   Neck: Normal range of motion. Neck supple. No thyromegaly present.   Cardiovascular: Normal rate, regular rhythm, normal heart sounds and intact distal pulses.   No murmur heard.  Pulmonary/Chest: Effort normal and breath sounds normal. No respiratory distress. He has no wheezes. He exhibits no tenderness.   Abdominal: Soft. Bowel sounds are normal. He exhibits distension. There is no tenderness. No hernia.   Obese abdomen, but soft and nontender.   Musculoskeletal: Normal range of motion. He exhibits edema (trace). He exhibits no tenderness.   Lymphadenopathy:     He has no cervical adenopathy.   Neurological: He is alert and oriented to  person, place, and time. No cranial nerve deficit. He exhibits normal muscle tone. Coordination normal.   Skin: Skin is warm and dry. He is not diaphoretic. There is erythema (Erythema noted and marked on the right lower extremity, with small 1 cm x 1 cm discrete superficial ulcer on the right mid tib fib region.).   Psychiatric: He has a normal mood and affect. His behavior is normal. Judgment and thought content normal.   Nursing note and vitals reviewed.        CRANIAL NERVES     CN III, IV, VI   Pupils are equal, round, and reactive to light.  Extraocular motions are normal.        Significant Labs:   Bilirubin:   Recent Labs   Lab 02/09/19 1848   BILITOT 0.9     BMP:   Recent Labs   Lab 02/09/19 1848   *   *   K 4.8   CL 98   CO2 24   BUN 23   CREATININE 1.9*   CALCIUM 9.2   MG 1.1*     CBC:   Recent Labs   Lab 02/09/19 1848   WBC 22.72*   HGB 13.7*   HCT 40.8        CMP:   Recent Labs   Lab 02/09/19 1848   *   K 4.8   CL 98   CO2 24   *   BUN 23   CREATININE 1.9*   CALCIUM 9.2   PROT 6.8   ALBUMIN 3.3*   BILITOT 0.9   ALKPHOS 69   AST 31   ALT 44   ANIONGAP 12   EGFRNONAA 36*     Cardiac Markers: No results for input(s): CKMB, MYOGLOBIN, BNP, TROPISTAT in the last 48 hours.  Coagulation:   Recent Labs   Lab 02/09/19 1848   INR 1.1   APTT 26.8     Lactic Acid:   Recent Labs   Lab 02/09/19  1904   LACTATE 4.0*     Lipase: No results for input(s): LIPASE in the last 48 hours.  Lipid Panel: No results for input(s): CHOL, HDL, LDLCALC, TRIG, CHOLHDL in the last 48 hours.  Magnesium:   Recent Labs   Lab 02/09/19 1848   MG 1.1*     Troponin:   Recent Labs   Lab 02/09/19 1848   TROPONINI 0.009     TSH:   Recent Labs   Lab 02/09/19 1848   TSH 1.284     Urine Studies: No results for input(s): COLORU, APPEARANCEUA, PHUR, SPECGRAV, PROTEINUA, GLUCUA, KETONESU, BILIRUBINUA, OCCULTUA, NITRITE, UROBILINOGEN, LEUKOCYTESUR, RBCUA, WBCUA, BACTERIA, SQUAMEPITHEL, HYALINECASTS in the last  48 hours.    Invalid input(s): RadarChileR  All pertinent labs within the past 24 hours have been reviewed.    Significant Imaging: I have reviewed and interpreted all pertinent imaging results/findings within the past 24 hours.     Imaging Results          US Lower Extremity Veins Right (Final result)  Result time 02/09/19 19:53:05    Final result by Steve Boudreaux MD (02/09/19 19:53:05)                 Impression:      No evidence of deep venous thrombosis in the right lower extremity.      Electronically signed by: Steve Boudreaux MD  Date:    02/09/2019  Time:    19:53             Narrative:    EXAMINATION:  US LOWER EXTREMITY VEINS RIGHT    CLINICAL HISTORY:  Edema, unspecified    TECHNIQUE:  Duplex and color flow Doppler evaluation and graded compression of the right lower extremity veins was performed.    COMPARISON:  None    FINDINGS:  Right thigh veins: The common femoral, femoral, popliteal, upper greater saphenous, and deep femoral veins are patent and free of thrombus. The veins are normally compressible and have normal phasic flow and augmentation response.    Right calf veins: The visualized calf veins are patent.    Contralateral CFV: The contralateral (left) common femoral vein is patent and free of thrombus.    Miscellaneous: None                               X-Ray Chest AP Portable (Final result)  Result time 02/09/19 19:35:56    Final result by Micah Muller MD (02/09/19 19:35:56)                 Impression:      See above      Electronically signed by: Micah Muller MD  Date:    02/09/2019  Time:    19:35             Narrative:    EXAMINATION:  XR CHEST AP PORTABLE    CLINICAL HISTORY:  weakness;    COMPARISON:  Chest x-ray, 01/22/2018.    FINDINGS:  There is cardiomegaly.  There is right basilar atelectasis and/or developing infiltrate with a small right pleural effusion.  Left lung is clear.  There is a prominent opacity overlying the right hilar region which was not present on the prior exam and  could be due to different positioning as this x-ray is more lordotic than the prior x-rays, however a hilar mass cannot be excluded.                                I have independently reviewed and interpreted the EKG.    I have independently reviewed all pertinent labs within the past 24 hours.    I have independently reviewed, visualized and interpreted all pertinent imaging results within the past 24 hours and discussed the findings with the ED physician, Dr. Gandhi.            Assessment/Plan:     * Septic shock    Afebrile, HR 91, WBC 30437, 0% bands, lactic acid 4.0.  Blood pressure 95/68  Normal saline 30 mL/kilogram bolus, (close to 5 L).  Trend lactic acid Q 4 x 2.  Source right lower extremity cellulitis  Follow up on cultures.       Severe sepsis    Afebrile, HR 91, WBC 31354, lactic acid 4.0.  Source of right lower extremity cellulitis.  Creatinine 1.9 (baseline less than 1.2).  Follow up on blood cultures.  Continue IV vancomycin, IV Zosyn empirically.       Cellulitis of right leg without foot    Cellulitic area marked in the ED.  Continue IV vancomycin, IV Zosyn empirically.  Follow up on cultures.  Spouse showed pictures of prior infection 2015, history of MRSA and Pseudomonas requiring three week hospitalization at that time.     DON (acute kidney injury)    Baseline creatinine less than 1.2.  Currently elevated at 1.9.  Likely secondary to sepsis.  Patient received normal saline 30 mL/kilogram bolus (almost close to 5 L).  Repeat labs in a.m..     Type 2 diabetes mellitus without complication, without long-term current use of insulin    Non insulin-dependent type 2 diabetic.  Hold metformin and glipizide.  Will place him on moderate dose insulin sliding scale.  Monitor blood sugars every 6 hr and make adjustments as needed.       Hypertension associated with diabetes    Blood pressure currently on the low side systolic 90s.  When old antihypertensive agents.  Continue aggressive IV  hydration.  Monitor closely and make adjustments as needed.       Morbid obesity with BMI of 50.0-59.9, adult    BMI 50.         VTE Risk Mitigation (From admission, onward)        Ordered     enoxaparin injection 40 mg  Daily      02/09/19 2059     IP VTE HIGH RISK PATIENT  Once      02/09/19 2052     Place sequential compression device  Until discontinued      02/09/19 2052             Farhad Louis MD  Department of Hospital Medicine   [facility]

## 2019-02-10 NOTE — ED NOTES
Pt states redness, swelling and pain to RLE for 1 day.  Warm to touch, with redness noted at the level of the ankle upward to middle of calf.  Tender to palp.  + pedal pulse noted.

## 2019-02-10 NOTE — ASSESSMENT & PLAN NOTE
Afebrile, HR 91, WBC 36693, 0% bands, lactic acid 4.0.  Blood pressure 95/68  Normal saline 30 mL/kilogram bolus, (close to 5 L).  Trend lactic acid Q 4 x 2.  Source right lower extremity cellulitis  Follow up on cultures.

## 2019-02-10 NOTE — ED NOTES
Called [facility] to give report to nurse and was informed the nurse was not available on the floor yet

## 2019-02-10 NOTE — HPI
Mr. Goff is a 64-year-old morbidly obese  male with PMH significant for non-insulin-dependent DM, HTN, prior right leg wound/cellulitis in 2015, was in his usual state of health until two days ago.  He started complaining of not feeling well, with worsening right lower extremity redness, progressing to fever today, chills, nausea, and increasing redness of the right lower extremity.  In the ED, he is afebrile, heart rate less than 90, blood pressure 95/68, WBC 15023, 0% bands, lactic acid 4.0, creatinine 1.9 (baseline less than 1.2).  Patient was ordered 2 L normal saline boluses (will add three more L to make it a total of 5 L boluses, 30 mL/kilogram).  Blood cultures were obtained.  Started on IV vancomycin, IV Zosyn.

## 2019-02-10 NOTE — PROGRESS NOTES
Vanc Progress Note:  Cellulitis RLE, Zosyn/Vancomycin.   CrCl 78.1, SCR 1.5 ( down from 1.9 ),   WBC 23.68, Tmax 98.2, Goal Vanc level 10-15.   Random today = 7.6.Will dose Vancomycin 1750 mg today,   level with Am labs. Will change frequency if continues to improve.   Debi Santos RPh. 2/10/2019 10:40 AM

## 2019-02-10 NOTE — ASSESSMENT & PLAN NOTE
Cellulitic area marked in the ED.  Continue IV vancomycin, IV Zosyn empirically.  Follow up on cultures.  Spouse showed pictures of prior infection 2015, history of MRSA and Pseudomonas requiring three week hospitalization at that time.

## 2019-02-10 NOTE — ASSESSMENT & PLAN NOTE
Afebrile, HR 91, WBC 95961, lactic acid 4.0.  Source of right lower extremity cellulitis.  Creatinine 1.9 (baseline less than 1.2).  Follow up on blood cultures.  Continue IV vancomycin, IV Zosyn empirically.

## 2019-02-10 NOTE — ASSESSMENT & PLAN NOTE
Non insulin-dependent type 2 diabetic.  Hold metformin and glipizide.  Will place him on moderate dose insulin sliding scale.  Monitor blood sugars every 6 hr and make adjustments as needed.

## 2019-02-10 NOTE — CONSULTS
Santhosh Goff [id number] is a 64 y.o. male who has been consulted for vancomycin dosing.  Consult ordered by Farhad Louis MD     Dx: SSTI  Vancomycin goal trough = 10-15 mcg/mL  Concomitant abx: Zosyn 3.375 g every 8 hours    Tmax: 98.1 F,  Blood cultures collected: NGTD  The patient has the following labs:     Date Creatinine (mg/dl)    BUN WBC Count   2/9/2019 Estimated Creatinine Clearance: 61.6 mL/min (A) (based on SCr of 1.9 mg/dL (H)). Lab Results   Component Value Date    BUN 23 02/09/2019     Lab Results   Component Value Date    WBC 22.72 (H) 02/09/2019      which calculates to an Estimated Creatinine Clearance: 61.6 mL/min (A) (based on SCr of 1.9 mg/dL (H))..       Current weight is (!) 164.7 kg (363 lb)    The patient will be started on vancomycin at a dose of 2000 mg as an initial dose. Due to the patient's poor renal function we will collect random levels daily to determine the appropriate dose and interval. The goal trough range is 10-15 mcg/mL  A placeholder dose of Vancomycin has been ordered.     Patient will be followed by pharmacy for changes in renal function, toxicity, and efficacy.    The initial vancomycin random level has been ordered for 2/10 at 0900 due to the patient's large BMI. We will collect within 12 hours of the initial dose instead of waiting 18-24 hours post dose.      Thank you for allowing us to participate in this patient's care.     Juan Tatum

## 2019-02-10 NOTE — ED NOTES
Pt resting NAD.  Denies any new complaints or needs at this time.  Bed in low position, call light in reach.

## 2019-02-10 NOTE — ED NOTES
Called [facility] to give report to nurse taking patient. Nurse unavailable at this time. Charge nurse notified.

## 2019-02-10 NOTE — SUBJECTIVE & OBJECTIVE
Past Medical History:   Diagnosis Date    Arthritis     Diabetes mellitus, type 2 2014     x 1 week ago 10/10/2018    Hyperlipidemia     Hypertension     Morbid obesity with BMI of 45.0-49.9, adult     Staphylococcus aureus infection, multiple-resistant (MRSA) 2009       Past Surgical History:   Procedure Laterality Date    COLONOSCOPY N/A 6/3/2014    Performed by Aris Ellison MD at [facility] ENDO    EVACUATION-CLOT Right 8/21/2014    Performed by Ildefonso Hernández MD at [facility] OR    LUNG SURGERY      THORACOSCOPY Right 8/21/2014    Performed by Ildefonso Hernández MD at [facility] OR       Review of patient's allergies indicates:   Allergen Reactions    Cephalosporins Nausea And Vomiting       No current facility-administered medications on file prior to encounter.      Current Outpatient Medications on File Prior to Encounter   Medication Sig    atorvastatin (LIPITOR) 40 MG tablet TAKE ONE TABLET BY MOUTH ONCE DAILY    furosemide (LASIX) 20 MG tablet TAKE ONE TABLET BY MOUTH ONCE DAILY    glimepiride (AMARYL) 4 MG tablet TAKE ONE TABLET BY MOUTH ONCE DAILY WITH BREAKFAST    lisinopril (PRINIVIL,ZESTRIL) 5 MG tablet TAKE ONE TABLET BY MOUTH ONCE DAILY    metFORMIN (GLUCOPHAGE) 1000 MG tablet Take 1 tablet (1,000 mg total) by mouth 2 (two) times daily with meals.    potassium chloride SA (K-DUR,KLOR-CON) 20 MEQ tablet TAKE ONE TABLET BY MOUTH ONCE DAILY    albuterol 90 mcg/actuation inhaler Inhale 2 puffs into the lungs every 6 (six) hours as needed for Wheezing. Rescue    aspirin (ECOTRIN) 81 MG EC tablet Take 81 mg by mouth once daily.    chromium-brindal berry (GARCINIA CAMBOGIA) 200-500 mcg-mg Tab Take by mouth.     Family History     Problem Relation (Age of Onset)    Early death Father        Tobacco Use    Smoking status: Never Smoker    Smokeless tobacco: Never Used   Substance and Sexual Activity    Alcohol use: No    Drug use: No    Sexual activity: Yes     Partners: Female      Review of Systems   Constitutional: Positive for chills and fatigue. Negative for appetite change and fever.   HENT: Negative.  Negative for congestion, nosebleeds and sore throat.    Eyes: Negative.  Negative for visual disturbance.   Respiratory: Negative.  Negative for cough, shortness of breath and wheezing.    Cardiovascular: Positive for leg swelling. Negative for chest pain and palpitations.   Gastrointestinal: Negative.  Negative for abdominal pain, constipation, diarrhea, nausea and vomiting.   Endocrine: Negative.  Negative for polyuria.   Genitourinary: Negative.  Negative for dysuria, flank pain, frequency and urgency.   Musculoskeletal: Negative.  Negative for arthralgias, back pain and joint swelling.   Skin: Positive for color change (redness right lower leg). Negative for pallor and rash.   Allergic/Immunologic: Negative.  Negative for immunocompromised state.   Neurological: Negative.  Negative for dizziness, syncope, weakness, light-headedness, numbness and headaches.   Hematological: Negative.    Psychiatric/Behavioral: Negative.  Negative for confusion and hallucinations. The patient is not nervous/anxious.    All other systems reviewed and are negative.    Objective:     Vital Signs (Most Recent):  Temp: 98.1 °F (36.7 °C) (02/09/19 1808)  Pulse: 82 (02/09/19 1901)  Resp: 18 (02/09/19 1901)  BP: (!) 124/56 (02/09/19 1901)  SpO2: 96 % (02/09/19 1901) Vital Signs (24h Range):  Temp:  [98.1 °F (36.7 °C)] 98.1 °F (36.7 °C)  Pulse:  [82-91] 82  Resp:  [18-20] 18  SpO2:  [93 %-97 %] 96 %  BP: (108-124)/(56-64) 124/56     Weight: (!) 164.7 kg (363 lb)  Body mass index is 49.23 kg/m².    Physical Exam   Constitutional: He is oriented to person, place, and time. No distress.   Morbidly obese  male, appears uncomfortable, but in no distress. Spouse at the bedside.   HENT:   Head: Normocephalic and atraumatic.   Eyes: Conjunctivae and EOM are normal. Pupils are equal, round, and reactive to  light. No scleral icterus.   Neck: Normal range of motion. Neck supple. No thyromegaly present.   Cardiovascular: Normal rate, regular rhythm, normal heart sounds and intact distal pulses.   No murmur heard.  Pulmonary/Chest: Effort normal and breath sounds normal. No respiratory distress. He has no wheezes. He exhibits no tenderness.   Abdominal: Soft. Bowel sounds are normal. He exhibits distension. There is no tenderness. No hernia.   Obese abdomen, but soft and nontender.   Musculoskeletal: Normal range of motion. He exhibits edema (trace). He exhibits no tenderness.   Lymphadenopathy:     He has no cervical adenopathy.   Neurological: He is alert and oriented to person, place, and time. No cranial nerve deficit. He exhibits normal muscle tone. Coordination normal.   Skin: Skin is warm and dry. He is not diaphoretic. There is erythema (Erythema noted and marked on the right lower extremity, with small 1 cm x 1 cm discrete superficial ulcer on the right mid tib fib region.).   Psychiatric: He has a normal mood and affect. His behavior is normal. Judgment and thought content normal.   Nursing note and vitals reviewed.        CRANIAL NERVES     CN III, IV, VI   Pupils are equal, round, and reactive to light.  Extraocular motions are normal.        Significant Labs:   Bilirubin:   Recent Labs   Lab 02/09/19  1848   BILITOT 0.9     BMP:   Recent Labs   Lab 02/09/19  1848   *   *   K 4.8   CL 98   CO2 24   BUN 23   CREATININE 1.9*   CALCIUM 9.2   MG 1.1*     CBC:   Recent Labs   Lab 02/09/19  1848   WBC 22.72*   HGB 13.7*   HCT 40.8        CMP:   Recent Labs   Lab 02/09/19  1848   *   K 4.8   CL 98   CO2 24   *   BUN 23   CREATININE 1.9*   CALCIUM 9.2   PROT 6.8   ALBUMIN 3.3*   BILITOT 0.9   ALKPHOS 69   AST 31   ALT 44   ANIONGAP 12   EGFRNONAA 36*     Cardiac Markers: No results for input(s): CKMB, MYOGLOBIN, BNP, TROPISTAT in the last 48 hours.  Coagulation:   Recent Labs   Lab  02/09/19 1848   INR 1.1   APTT 26.8     Lactic Acid:   Recent Labs   Lab 02/09/19  1904   LACTATE 4.0*     Lipase: No results for input(s): LIPASE in the last 48 hours.  Lipid Panel: No results for input(s): CHOL, HDL, LDLCALC, TRIG, CHOLHDL in the last 48 hours.  Magnesium:   Recent Labs   Lab 02/09/19 1848   MG 1.1*     Troponin:   Recent Labs   Lab 02/09/19 1848   TROPONINI 0.009     TSH:   Recent Labs   Lab 02/09/19 1848   TSH 1.284     Urine Studies: No results for input(s): COLORU, APPEARANCEUA, PHUR, SPECGRAV, PROTEINUA, GLUCUA, KETONESU, BILIRUBINUA, OCCULTUA, NITRITE, UROBILINOGEN, LEUKOCYTESUR, RBCUA, WBCUA, BACTERIA, SQUAMEPITHEL, HYALINECASTS in the last 48 hours.    Invalid input(s): WRIGHTSUR  All pertinent labs within the past 24 hours have been reviewed.    Significant Imaging: I have reviewed and interpreted all pertinent imaging results/findings within the past 24 hours.     Imaging Results          US Lower Extremity Veins Right (Final result)  Result time 02/09/19 19:53:05    Final result by Steve Boudreaux MD (02/09/19 19:53:05)                 Impression:      No evidence of deep venous thrombosis in the right lower extremity.      Electronically signed by: Steve Boudreaux MD  Date:    02/09/2019  Time:    19:53             Narrative:    EXAMINATION:  US LOWER EXTREMITY VEINS RIGHT    CLINICAL HISTORY:  Edema, unspecified    TECHNIQUE:  Duplex and color flow Doppler evaluation and graded compression of the right lower extremity veins was performed.    COMPARISON:  None    FINDINGS:  Right thigh veins: The common femoral, femoral, popliteal, upper greater saphenous, and deep femoral veins are patent and free of thrombus. The veins are normally compressible and have normal phasic flow and augmentation response.    Right calf veins: The visualized calf veins are patent.    Contralateral CFV: The contralateral (left) common femoral vein is patent and free of thrombus.    Miscellaneous: None                                X-Ray Chest AP Portable (Final result)  Result time 02/09/19 19:35:56    Final result by Micah Muller MD (02/09/19 19:35:56)                 Impression:      See above      Electronically signed by: Micah Muller MD  Date:    02/09/2019  Time:    19:35             Narrative:    EXAMINATION:  XR CHEST AP PORTABLE    CLINICAL HISTORY:  weakness;    COMPARISON:  Chest x-ray, 01/22/2018.    FINDINGS:  There is cardiomegaly.  There is right basilar atelectasis and/or developing infiltrate with a small right pleural effusion.  Left lung is clear.  There is a prominent opacity overlying the right hilar region which was not present on the prior exam and could be due to different positioning as this x-ray is more lordotic than the prior x-rays, however a hilar mass cannot be excluded.                                I have independently reviewed and interpreted the EKG.    I have independently reviewed all pertinent labs within the past 24 hours.    I have independently reviewed, visualized and interpreted all pertinent imaging results within the past 24 hours and discussed the findings with the ED physician, Dr. Gandhi.

## 2019-02-10 NOTE — ED PROVIDER NOTES
SCRIBE #1 NOTE: I, Dominique Gregg, am scribing for, and in the presence of, Heydi Gandhi MD. I have scribed the entire note.      History      Chief Complaint   Patient presents with    Leg Pain     right leg with redness.    Fatigue     gen. fatigue and weakness       Review of patient's allergies indicates:   Allergen Reactions    Cephalosporins Nausea And Vomiting        HPI   HPI    2/9/2019, 6:13 PM   History obtained from the patient      History of Present Illness: Santhosh Goff is a 64 y.o. male patient with a PMHx of DM2, HLD, HTN who presents to the Emergency Department for RLE pain which onset gradually last PM. Symptoms are constant and moderate in severity. No mitigating or exacerbating factors reported. Associated sxs include RLE erythema, wounds to RLE, fatigue, chills. Patient denies any fever, n/v, CP, SOB, recent travel, extremity weakness/numbness, dizziness, and all other sxs at this time. Pt is a . He reports he has had cellulitis of the RLE in the past and required admission. No further complaints or concerns at this time.       Arrival mode: Personal vehicle      PCP: Jacek Mohr MD       Past Medical History:  Past Medical History:   Diagnosis Date    Arthritis     Diabetes mellitus, type 2 2014     x 1 week ago 10/10/2018    Hyperlipidemia     Hypertension     Morbid obesity with BMI of 45.0-49.9, adult     Staphylococcus aureus infection, multiple-resistant (MRSA) 2009       Past Surgical History:  Past Surgical History:   Procedure Laterality Date    COLONOSCOPY N/A 6/3/2014    Performed by [facility]ison [facility] at [facility] ENDO    EVACUATION-CLOT Right 8/21/2014    Performed by Ildefonso Hern[facility]dez MD at [facility] OR    LUNG SURGERY      THORACOSCOPY Right 8/21/2014    Performed by Ildefonso Hern[facility]dez MD at [facility] OR         Family History:  Family History   Problem Relation Age of Onset    Early death Father        Social History:  Social History      Tobacco Use    Smoking status: Never Smoker    Smokeless tobacco: Never Used   Substance and Sexual Activity    Alcohol use: No    Drug use: No    Sexual activity: Yes     Partners: Female       ROS   Review of Systems   Constitutional: Positive for chills and fatigue. Negative for fever.   HENT: Negative for sore throat.    Respiratory: Negative for shortness of breath.    Cardiovascular: Negative for chest pain and palpitations.   Gastrointestinal: Negative for nausea and vomiting.   Genitourinary: Negative for dysuria.   Musculoskeletal: Positive for myalgias (RLE). Negative for back pain.   Skin: Positive for color change (RLE erythema) and wound (RLE). Negative for rash.   Neurological: Negative for dizziness, weakness and numbness.   Hematological: Does not bruise/bleed easily.   All other systems reviewed and are negative.    Physical Exam      Initial Vitals [02/09/19 1808]   BP Pulse Resp Temp SpO2   108/64 89 20 98.1 °F (36.7 °C) (!) 93 %      MAP       --          Physical Exam  Nursing Notes and Vital Signs Reviewed.  Constitutional: Patient is in no acute distress. Well-developed and well-nourished.  Head: Atraumatic. Normocephalic.  Eyes: PERRL. EOM intact. Conjunctivae are not pale. No scleral icterus.  ENT: Mucous membranes are moist. Oropharynx is clear and symmetric.    Neck: Supple. Full ROM. No lymphadenopathy.  Cardiovascular: Regular rate. Regular rhythm. No murmurs, rubs, or gallops. Distal pulses are 2+ and symmetric.  Pulmonary/Chest: No respiratory distress. Clear to auscultation bilaterally. No wheezing or rales.  Abdominal: Soft and non-distended.  There is no tenderness.    Musculoskeletal: Moves all extremities. No obvious deformities.   RLE: RLE is hyperemic and warm to touch. 3+ pitting edema below patella. Multiple small eschars noted, largest is 1x1cm with surrounding erythema. Diffuse erythema noted to R lower leg. no evident deformity. ROM is normal. Cap refill distally  is <2 seconds. DP and PT pulses are equal and 2+ bilaterally. No motor deficit. No distal sensory deficit. NVI distally.   Skin: Warm and dry. Eschar with surrounding erythema to flexor aspect of LUE. Mild telangiectases to bilateral malar regions.   Neurological:  Alert, awake, and appropriate.  Normal speech.  No acute focal neurological deficits are appreciated.  Psychiatric: Normal affect. Good eye contact. Appropriate in content.    ED Course    Critical Care  Date/Time: 2/9/2019 8:25 PM  Performed by: Heydi Gandhi MD  Authorized by: Heydi Gandhi MD   Direct patient critical care time: 10 minutes  Additional history critical care time: 5 minutes  Ordering / reviewing critical care time: 5 minutes  Documentation critical care time: 5 minutes  Consulting other physicians critical care time: 5 minutes  Consult with family critical care time: 5 minutes  Total critical care time (exclusive of procedural time) : 35 minutes  Critical care time was exclusive of separately billable procedures and treating other patients and teaching time.  Critical care was necessary to treat or prevent imminent or life-threatening deterioration of the following conditions: sepsis.  Critical care was time spent personally by me on the following activities: blood draw for specimens, development of treatment plan with patient or surrogate, discussions with consultants, interpretation of cardiac output measurements, evaluation of patient's response to treatment, examination of patient, obtaining history from patient or surrogate, ordering and performing treatments and interventions, ordering and review of radiographic studies, ordering and review of laboratory studies, pulse oximetry, re-evaluation of patient's condition and review of old charts.        ED Vital Signs:  Vitals:    02/09/19 1808 02/09/19 1813 02/09/19 1816 02/09/19 1819   BP: 108/64   121/61   Pulse: 89   91   Resp: 20   18   Temp: 98.1 °F (36.7 °C)      TempSrc: Oral       SpO2: (!) 93%   97%   Weight:  (!) 164.7 kg (363 lb)     Height:   6' (1.829 m)     02/09/19 1901 02/09/19 2317 02/10/19 0003   BP: (!) 124/56 (!) 98/53 (!) 106/53   Pulse: 82 78 80   Resp: 18 (!) 32 (!) 39   Temp:      TempSrc:      SpO2: 96% 95% 96%   Weight:      Height:          Abnormal Lab Results:  Labs Reviewed   CBC W/ AUTO DIFFERENTIAL - Abnormal; Notable for the following components:       Result Value    WBC 22.72 (*)     RBC 4.40 (*)     Hemoglobin 13.7 (*)     MCH 31.1 (*)     Gran # (ANC) 21.0 (*)     Lymph # 0.5 (*)     Mono # 1.2 (*)     Gran% 93.2 (*)     Lymph% 2.2 (*)     All other components within normal limits   COMPREHENSIVE METABOLIC PANEL - Abnormal; Notable for the following components:    Sodium 134 (*)     Glucose 338 (*)     Creatinine 1.9 (*)     Albumin 3.3 (*)     eGFR if  42 (*)     eGFR if non  36 (*)     All other components within normal limits   LACTIC ACID, PLASMA - Abnormal; Notable for the following components:    Lactate (Lactic Acid) 4.0 (*)     All other components within normal limits    Narrative:      LA  critical result(s) called and verbal readback obtained from   Lia Marie RN, 02/09/2019 19:57   URINALYSIS, REFLEX TO URINE CULTURE - Abnormal; Notable for the following components:    Protein, UA Trace (*)     Glucose, UA 3+ (*)     Ketones, UA Trace (*)     All other components within normal limits    Narrative:     Preferred Collection Type->Urine, Clean Catch   MAGNESIUM - Abnormal; Notable for the following components:    Magnesium 1.1 (*)     All other components within normal limits   LACTIC ACID, PLASMA - Abnormal; Notable for the following components:    Lactate (Lactic Acid) 5.8 (*)     All other components within normal limits    Narrative:      LA  critical result(s) called and verbal readback obtained from   Rafi Silverman RN, 02/09/2019 23:04   URINALYSIS MICROSCOPIC - Abnormal; Notable for the following  components:    Hyaline Casts, UA 10 (*)     All other components within normal limits    Narrative:     Preferred Collection Type->Urine, Clean Catch   POCT GLUCOSE - Abnormal; Notable for the following components:    POCT Glucose 347 (*)     All other components within normal limits   CULTURE, BLOOD   CULTURE, BLOOD   APTT   PROTIME-INR   TSH   TROPONIN I   HEPATITIS C ANTIBODY   HEMOGLOBIN A1C   LACTIC ACID, PLASMA   CBC W/ AUTO DIFFERENTIAL   MAGNESIUM   PHOSPHORUS   COMPREHENSIVE METABOLIC PANEL   CBC W/ AUTO DIFFERENTIAL   MAGNESIUM   PHOSPHORUS   COMPREHENSIVE METABOLIC PANEL   POCT GLUCOSE MONITORING CONTINUOUS        All Lab Results:  Results for orders placed or performed during the hospital encounter of 02/09/19   APTT   Result Value Ref Range    aPTT 26.8 21.0 - 32.0 sec   CBC auto differential   Result Value Ref Range    WBC 22.72 (H) 3.90 - 12.70 K/uL    RBC 4.40 (L) 4.60 - 6.20 M/uL    Hemoglobin 13.7 (L) 14.0 - 18.0 g/dL    Hematocrit 40.8 40.0 - 54.0 %    MCV 93 82 - 98 fL    MCH 31.1 (H) 27.0 - 31.0 pg    MCHC 33.6 32.0 - 36.0 g/dL    RDW 13.4 11.5 - 14.5 %    Platelets 213 150 - 350 K/uL    MPV 9.6 9.2 - 12.9 fL    Gran # (ANC) 21.0 (H) 1.8 - 7.7 K/uL    Lymph # 0.5 (L) 1.0 - 4.8 K/uL    Mono # 1.2 (H) 0.3 - 1.0 K/uL    Eos # 0.0 0.0 - 0.5 K/uL    Baso # 0.02 0.00 - 0.20 K/uL    Gran% 93.2 (H) 38.0 - 73.0 %    Lymph% 2.2 (L) 18.0 - 48.0 %    Mono% 5.3 4.0 - 15.0 %    Eosinophil% 0.0 0.0 - 8.0 %    Basophil% 0.1 0.0 - 1.9 %    Aniso Slight     Poik Slight     Poly Occasional     Ovalocytes Occasional     Tear Drop Cells Occasional     Differential Method Automated    Comprehensive metabolic panel   Result Value Ref Range    Sodium 134 (L) 136 - 145 mmol/L    Potassium 4.8 3.5 - 5.1 mmol/L    Chloride 98 95 - 110 mmol/L    CO2 24 23 - 29 mmol/L    Glucose 338 (H) 70 - 110 mg/dL    BUN, Bld 23 8 - 23 mg/dL    Creatinine 1.9 (H) 0.5 - 1.4 mg/dL    Calcium 9.2 8.7 - 10.5 mg/dL    Total Protein 6.8 6.0 -  8.4 g/dL    Albumin 3.3 (L) 3.5 - 5.2 g/dL    Total Bilirubin 0.9 0.1 - 1.0 mg/dL    Alkaline Phosphatase 69 55 - 135 U/L    AST 31 10 - 40 U/L    ALT 44 10 - 44 U/L    Anion Gap 12 8 - 16 mmol/L    eGFR if African American 42 (A) >60 mL/min/1.73 m^2    eGFR if non African American 36 (A) >60 mL/min/1.73 m^2   Lactic acid, plasma   Result Value Ref Range    Lactate (Lactic Acid) 4.0 (HH) 0.5 - 2.2 mmol/L   Protime-INR   Result Value Ref Range    Prothrombin Time 12.1 9.0 - 12.5 sec    INR 1.1 0.8 - 1.2   TSH   Result Value Ref Range    TSH 1.284 0.400 - 4.000 uIU/mL   Urinalysis, Reflex to Urine Culture Urine, Clean Catch   Result Value Ref Range    Specimen UA Urine, Clean Catch     Color, UA Yellow Yellow, Straw, Valerie    Appearance, UA Clear Clear    pH, UA 6.0 5.0 - 8.0    Specific Gravity, UA 1.020 1.005 - 1.030    Protein, UA Trace (A) Negative    Glucose, UA 3+ (A) Negative    Ketones, UA Trace (A) Negative    Bilirubin (UA) Negative Negative    Occult Blood UA Negative Negative    Nitrite, UA Negative Negative    Urobilinogen, UA Negative <2.0 EU/dL    Leukocytes, UA Negative Negative   Magnesium   Result Value Ref Range    Magnesium 1.1 (L) 1.6 - 2.6 mg/dL   Troponin I   Result Value Ref Range    Troponin I 0.009 0.000 - 0.026 ng/mL   Lactic acid, plasma   Result Value Ref Range    Lactate (Lactic Acid) 5.8 (HH) 0.5 - 2.2 mmol/L   Urinalysis Microscopic   Result Value Ref Range    RBC, UA 1 0 - 4 /hpf    WBC, UA 3 0 - 5 /hpf    Bacteria, UA Occasional None-Occ /hpf    Yeast, UA None None    Hyaline Casts, UA 10 (A) 0-1/lpf /lpf    Microscopic Comment SEE COMMENT    POCT glucose   Result Value Ref Range    POCT Glucose 347 (H) 70 - 110 mg/dL       Imaging Results:  Imaging Results          US Lower Extremity Veins Right (Final result)  Result time 02/09/19 19:53:05    Final result by Steve Boudreaux MD (02/09/19 19:53:05)                 Impression:      No evidence of deep venous thrombosis in the right  lower extremity.      Electronically signed by: Steve Boudreaux MD  Date:    02/09/2019  Time:    19:53             Narrative:    EXAMINATION:  US LOWER EXTREMITY VEINS RIGHT    CLINICAL HISTORY:  Edema, unspecified    TECHNIQUE:  Duplex and color flow Doppler evaluation and graded compression of the right lower extremity veins was performed.    COMPARISON:  None    FINDINGS:  Right thigh veins: The common femoral, femoral, popliteal, upper greater saphenous, and deep femoral veins are patent and free of thrombus. The veins are normally compressible and have normal phasic flow and augmentation response.    Right calf veins: The visualized calf veins are patent.    Contralateral CFV: The contralateral (left) common femoral vein is patent and free of thrombus.    Miscellaneous: None                               X-Ray Chest AP Portable (Final result)  Result time 02/09/19 19:35:56    Final result by Micah Muller MD (02/09/19 19:35:56)                 Impression:      See above      Electronically signed by: Micah Muller MD  Date:    02/09/2019  Time:    19:35             Narrative:    EXAMINATION:  XR CHEST AP PORTABLE    CLINICAL HISTORY:  weakness;    COMPARISON:  Chest x-ray, 01/22/2018.    FINDINGS:  There is cardiomegaly.  There is right basilar atelectasis and/or developing infiltrate with a small right pleural effusion.  Left lung is clear.  There is a prominent opacity overlying the right hilar region which was not present on the prior exam and could be due to different positioning as this x-ray is more lordotic than the prior x-rays, however a hilar mass cannot be excluded.                               The EKG was ordered, reviewed, and independently interpreted by the ED provider.  Interpretation time: 1851  Rate: 83 BPM  Rhythm: normal sinus rhythm  Interpretation: No acute ST changes. No STEMI.         The Emergency Provider reviewed the vital signs and test results, which are outlined above.    ED  Discussion     8:00 PM: Re-evaluated pt. I have discussed test results, shared treatment plan, and the need for admission with patient and family at bedside. Pt and family express understanding at this time and agree with all information. All questions answered. Pt and family have no further questions or concerns at this time. Pt is ready for admit.    8:11 PM: Discussed case with Dr. Louis ([facility]). Dr. Louis agrees with current care and management of pt and accepts admission.   Admitting Service: Hospital medicine   Admitting Physician: Dr. Louis  Admit to: med/tele          ED Medication(s):  Medications   sodium chloride 0.9% flush 5 mL (not administered)   piperacillin-tazobactam 3.375 g in dextrose 5 % 50 mL IVPB (ready to mix system) (0 g Intravenous Stopped 2/10/19 0024)   acetaminophen tablet 650 mg (not administered)   ondansetron injection 4 mg (not administered)   diphenhydrAMINE capsule 25 mg (not administered)   guaifenesin 100 mg/5 ml syrup 200 mg (not administered)   aluminum-magnesium hydroxide-simethicone 200-200-20 mg/5 mL suspension 30 mL (not administered)   albuterol-ipratropium 2.5 mg-0.5 mg/3 mL nebulizer solution 3 mL (not administered)   oxyCODONE-acetaminophen 5-325 mg per tablet 1 tablet (1 tablet Oral Given 2/9/19 2142)   oxyCODONE-acetaminophen  mg per tablet 1 tablet (not administered)   enoxaparin injection 40 mg (40 mg Subcutaneous Given 2/9/19 2140)   aspirin EC tablet 81 mg (not administered)   atorvastatin tablet 40 mg (not administered)   glucose chewable tablet 16 g (not administered)   glucose chewable tablet 24 g (not administered)   dextrose 50% injection 12.5 g (not administered)   dextrose 50% injection 25 g (not administered)   glucagon (human recombinant) injection 1 mg (not administered)   insulin aspart U-100 pen 1-10 Units (4 Units Subcutaneous Given 2/9/19 2141)   Vancomycin 1.75 g in D5W 500 mL PLACEHOLDER DOSE (not administered)   vancomycin in  dextrose 5 % 1 gram/250 mL IVPB 2,000 mg (0 mg Intravenous Stopped 2/10/19 0007)   magnesium sulfate 2g in water 50mL IVPB (premix) (0 g Intravenous Stopped 2/9/19 2315)   sodium chloride 0.9% bolus 1,000 mL (0 mLs Intravenous Stopped 2/9/19 2315)   sodium chloride 0.9% bolus 3,000 mL (0 mLs Intravenous Stopped 2/10/19 0125)             Medical Decision Making    Medical Decision Making:   Clinical Tests:   Lab Tests: Ordered and Reviewed  Radiological Study: Ordered and Reviewed  Medical Tests: Ordered and Reviewed  ED Management:  This is a 64-year-old  male with history of diabetes who presents emergency department for right lower extremity cellulitis.  Patient's leg was evaluated by myself and showed clear signs of cellulitis.  Hyperemic demarcation was outlined with a marker.  Labs were obtained as well as an ultrasound of the right lower extremity which was negative for DVT.  The patient had an elevated white count and met sepsis criteria and was started on antibiotics.  As well as fluids.  Patient had a similar history of cellulitis of the right lower extremity which she was hospitalized for 2 weeks for.  Patient was vitally stable upon admission to medical floors under Dr. Louis.           Scribe Attestation:   Scribe #1: I performed the above scribed service and the documentation accurately describes the services I performed. I attest to the accuracy of the note.    Attending:   Physician Attestation Statement for Scribe #1: I, Heydi Gandhi MD, personally performed the services described in this documentation, as scribed by Dominique Gregg, in my presence, and it is both accurate and complete.          Clinical Impression       ICD-10-CM ICD-9-CM   1. Sepsis, due to unspecified organism A41.9 038.9     995.91   2. Fatigue R53.83 780.79   3. Swelling R60.9 782.3   4. Cellulitis of right lower extremity L03.115 682.6       Disposition:   Disposition: Admitted  Condition: Stable         Heydi  MD Oksana  02/10/19 0143

## 2019-02-10 NOTE — ASSESSMENT & PLAN NOTE
Blood pressure currently on the low side systolic 90s.  When old antihypertensive agents.  Continue aggressive IV hydration.  Monitor closely and make adjustments as needed.

## 2019-02-11 ENCOUNTER — PATIENT MESSAGE (OUTPATIENT)
Dept: INTERNAL MEDICINE | Facility: CLINIC | Age: 64
End: 2019-02-11

## 2019-02-11 LAB
ANION GAP SERPL CALC-SCNC: 8 MMOL/L
BASOPHILS # BLD AUTO: 0.01 K/UL
BASOPHILS NFR BLD: 0 %
BUN SERPL-MCNC: 15 MG/DL
CALCIUM SERPL-MCNC: 9.5 MG/DL
CHLORIDE SERPL-SCNC: 98 MMOL/L
CO2 SERPL-SCNC: 30 MMOL/L
CREAT SERPL-MCNC: 1.2 MG/DL
DIFFERENTIAL METHOD: ABNORMAL
EOSINOPHIL # BLD AUTO: 0.1 K/UL
EOSINOPHIL NFR BLD: 0.5 %
ERYTHROCYTE [DISTWIDTH] IN BLOOD BY AUTOMATED COUNT: 13.8 %
EST. GFR  (AFRICAN AMERICAN): >60 ML/MIN/1.73 M^2
EST. GFR  (NON AFRICAN AMERICAN): >60 ML/MIN/1.73 M^2
GLUCOSE SERPL-MCNC: 241 MG/DL
HCT VFR BLD AUTO: 37.4 %
HCV AB SERPL QL IA: NEGATIVE
HGB BLD-MCNC: 12.4 G/DL
LYMPHOCYTES # BLD AUTO: 1 K/UL
LYMPHOCYTES NFR BLD: 4.7 %
MCH RBC QN AUTO: 30.7 PG
MCHC RBC AUTO-ENTMCNC: 33.2 G/DL
MCV RBC AUTO: 93 FL
MONOCYTES # BLD AUTO: 0.6 K/UL
MONOCYTES NFR BLD: 2.9 %
NEUTROPHILS # BLD AUTO: 18.4 K/UL
NEUTROPHILS NFR BLD: 91.9 %
PLATELET # BLD AUTO: 187 K/UL
PMV BLD AUTO: 9.6 FL
POCT GLUCOSE: 197 MG/DL (ref 70–110)
POCT GLUCOSE: 231 MG/DL (ref 70–110)
POCT GLUCOSE: 255 MG/DL (ref 70–110)
POCT GLUCOSE: 261 MG/DL (ref 70–110)
POTASSIUM SERPL-SCNC: 4 MMOL/L
RBC # BLD AUTO: 4.04 M/UL
SODIUM SERPL-SCNC: 136 MMOL/L
VANCOMYCIN SERPL-MCNC: 7.3 UG/ML
WBC # BLD AUTO: 20.05 K/UL

## 2019-02-11 PROCEDURE — S5571 INSULIN DISPOS PEN 3 ML: HCPCS | Performed by: NURSE PRACTITIONER

## 2019-02-11 PROCEDURE — 25000003 PHARM REV CODE 250: Performed by: INTERNAL MEDICINE

## 2019-02-11 PROCEDURE — 63600175 PHARM REV CODE 636 W HCPCS: Performed by: INTERNAL MEDICINE

## 2019-02-11 PROCEDURE — 63600175 PHARM REV CODE 636 W HCPCS: Performed by: NURSE PRACTITIONER

## 2019-02-11 PROCEDURE — 25000003 PHARM REV CODE 250: Performed by: NURSE PRACTITIONER

## 2019-02-11 PROCEDURE — 21400001 HC TELEMETRY ROOM

## 2019-02-11 PROCEDURE — 96372 THER/PROPH/DIAG INJ SC/IM: CPT | Mod: 59

## 2019-02-11 PROCEDURE — 80202 ASSAY OF VANCOMYCIN: CPT

## 2019-02-11 PROCEDURE — 80048 BASIC METABOLIC PNL TOTAL CA: CPT

## 2019-02-11 PROCEDURE — 25500020 PHARM REV CODE 255: Performed by: INTERNAL MEDICINE

## 2019-02-11 PROCEDURE — 36415 COLL VENOUS BLD VENIPUNCTURE: CPT

## 2019-02-11 PROCEDURE — 85025 COMPLETE CBC W/AUTO DIFF WBC: CPT

## 2019-02-11 RX ORDER — VANCOMYCIN HCL IN 5 % DEXTROSE 1.5G/250ML
1500 PLASTIC BAG, INJECTION (ML) INTRAVENOUS
Status: DISCONTINUED | OUTPATIENT
Start: 2019-02-11 | End: 2019-02-12

## 2019-02-11 RX ADMIN — INSULIN ASPART 4 UNITS: 100 INJECTION, SOLUTION INTRAVENOUS; SUBCUTANEOUS at 11:02

## 2019-02-11 RX ADMIN — ENOXAPARIN SODIUM 40 MG: 100 INJECTION SUBCUTANEOUS at 04:02

## 2019-02-11 RX ADMIN — INSULIN ASPART 6 UNITS: 100 INJECTION, SOLUTION INTRAVENOUS; SUBCUTANEOUS at 04:02

## 2019-02-11 RX ADMIN — PIPERACILLIN AND TAZOBACTAM 4.5 G: 4; .5 INJECTION, POWDER, LYOPHILIZED, FOR SOLUTION INTRAVENOUS; PARENTERAL at 11:02

## 2019-02-11 RX ADMIN — OXYCODONE HYDROCHLORIDE AND ACETAMINOPHEN 1 TABLET: 10; 325 TABLET ORAL at 10:02

## 2019-02-11 RX ADMIN — INSULIN ASPART 3 UNITS: 100 INJECTION, SOLUTION INTRAVENOUS; SUBCUTANEOUS at 09:02

## 2019-02-11 RX ADMIN — IOHEXOL 75 ML: 350 INJECTION, SOLUTION INTRAVENOUS at 07:02

## 2019-02-11 RX ADMIN — OXYCODONE HYDROCHLORIDE AND ACETAMINOPHEN 1 TABLET: 10; 325 TABLET ORAL at 04:02

## 2019-02-11 RX ADMIN — ASPIRIN 81 MG: 81 TABLET, COATED ORAL at 08:02

## 2019-02-11 RX ADMIN — OXYCODONE HYDROCHLORIDE AND ACETAMINOPHEN 1 TABLET: 10; 325 TABLET ORAL at 08:02

## 2019-02-11 RX ADMIN — SODIUM CHLORIDE: 0.9 INJECTION, SOLUTION INTRAVENOUS at 08:02

## 2019-02-11 RX ADMIN — PIPERACILLIN AND TAZOBACTAM 4.5 G: 4; .5 INJECTION, POWDER, LYOPHILIZED, FOR SOLUTION INTRAVENOUS; PARENTERAL at 03:02

## 2019-02-11 RX ADMIN — ATORVASTATIN CALCIUM 40 MG: 40 TABLET, FILM COATED ORAL at 08:02

## 2019-02-11 RX ADMIN — INSULIN ASPART 2 UNITS: 100 INJECTION, SOLUTION INTRAVENOUS; SUBCUTANEOUS at 06:02

## 2019-02-11 RX ADMIN — PIPERACILLIN SODIUM AND TAZOBACTAM SODIUM 3.38 G: 3; .375 INJECTION, POWDER, LYOPHILIZED, FOR SOLUTION INTRAVENOUS at 12:02

## 2019-02-11 RX ADMIN — PIPERACILLIN SODIUM AND TAZOBACTAM SODIUM 3.38 G: 3; .375 INJECTION, POWDER, LYOPHILIZED, FOR SOLUTION INTRAVENOUS at 08:02

## 2019-02-11 RX ADMIN — ONDANSETRON 4 MG: 2 INJECTION INTRAMUSCULAR; INTRAVENOUS at 07:02

## 2019-02-11 RX ADMIN — VANCOMYCIN HYDROCHLORIDE 1500 MG: 10 INJECTION, POWDER, LYOPHILIZED, FOR SOLUTION INTRAVENOUS at 11:02

## 2019-02-11 RX ADMIN — MORPHINE SULFATE 4 MG: 4 INJECTION INTRAVENOUS at 01:02

## 2019-02-11 RX ADMIN — INSULIN DETEMIR 10 UNITS: 100 INJECTION, SOLUTION SUBCUTANEOUS at 08:02

## 2019-02-11 RX ADMIN — OXYCODONE HYDROCHLORIDE AND ACETAMINOPHEN 1 TABLET: 10; 325 TABLET ORAL at 12:02

## 2019-02-11 NOTE — PROGRESS NOTES
Pharmacist Renal Dose Adjustment Note    Santhosh Goff is a 64 y.o. male being treated with the medication Zosyn.     Patient Data:    Vital Signs (Most Recent):  Temp: 97.9 °F (36.6 °C) (02/11/19 1228)  Pulse: 80 (02/11/19 1303)  Resp: 20 (02/11/19 1228)  BP: 132/68 (02/11/19 1228)  SpO2: (!) 94 % (02/11/19 1228)   Vital Signs (72h Range):  Temp:  [97.7 °F (36.5 °C)-100.7 °F (38.2 °C)]   Pulse:  [75-91]   Resp:  [16-39]   BP: ()/(46-76)   SpO2:  [91 %-97 %]      Recent Labs   Lab 02/09/19  1848 02/10/19  0439   CREATININE 1.9* 1.5*     Serum creatinine: 1.5 mg/dL (H) 02/10/19 0439  Estimated creatinine clearance: 78.1 mL/min (A)    Medication dose and/or frequency will be changed to Zosyn 4.5 g IV Q8H.   Thank you for allowing us to participate in this patient's care.      Pharmacist's Name: Colette Khoury  Pharmacist's Extension: [phone]

## 2019-02-11 NOTE — NURSING
"Patient assessed for diabetes educational needs following chart review  He reports was diagnosed over 6 years ago and was provided with education from his PCP  He has a home glucose monitor and reports it became "too boring" to check his glucose daily, so now checks 1-2 times weekly.  Reports average ranges of 120-140  He is consistent with taking his diabetes med's  He reports is in the process of "changing the way he's eating"  Reviewed info on target glucose values, hyper/hypoglycemia  He verbalizes understanding    Literature provided  "

## 2019-02-11 NOTE — ASSESSMENT & PLAN NOTE
- Cellulitic area marked in the ED.  - Continue IV vancomycin, IV Zosyn empirically.  - BC NGTD  - Spouse showed pictures of prior infection 2015, history of MRSA and Pseudomonas requiring three week hospitalization at that time  - Unable to obtain MRI of RLE due to weight limit  - CT RLE with contrast pending for suspected abscess

## 2019-02-11 NOTE — ASSESSMENT & PLAN NOTE
- Cellulitic area marked in the ED.  - Continue IV vancomycin, IV Zosyn empirically.  - BC NGTD  - Spouse showed pictures of prior infection 2015, history of MRSA and Pseudomonas requiring three week hospitalization at that time.

## 2019-02-11 NOTE — PROGRESS NOTES
[facility] Hospital Medicine  Progress Note    Patient Name: Santhosh Goff  MRN: [id number]  Patient Class: IP- Inpatient   Admission Date: 2/9/2019  Length of Stay: 1 days  Attending Physician: Baudilio Bryant MD  Primary Care Provider: Jacek Mohr MD        Subjective:     Principal Problem:Septic shock    HPI:  Mr. Goff is a 64-year-old morbidly obese  male with PMH significant for non-insulin-dependent DM, HTN, prior right leg wound/cellulitis in 2015, was in his usual state of health until two days ago.  He started complaining of not feeling well, with worsening right lower extremity redness, progressing to fever today, chills, nausea, and increasing redness of the right lower extremity.  In the ED, he is afebrile, heart rate less than 90, blood pressure 95/68, WBC 70006, 0% bands, lactic acid 4.0, creatinine 1.9 (baseline less than 1.2).  Patient was ordered 2 L normal saline boluses (will add three more L to make it a total of 5 L boluses, 30 mL/kilogram).  Blood cultures were obtained.  Started on IV vancomycin, IV Zosyn.    Hospital Course:  Santhosh Goff is a 64 year old male admitted for Septic shock secondary to LLE. RLE US negative for DVT. Lactic acid normalized. BP stabilized. WBC count remains elevated at 23.68K. BC NGTD. Remains afebrile. Will continue IV abx and IVF. MRI RLE pending to r/o abscess.     Interval History: Pt seen and examined. Pt reports having RLE pain not relieved with current analgesics. Erythema and swelling appears improved but will obtain MRI RLE to r/o abscess and will adjust pain medications. WBC slightly increased from 22.72K and 23.68K.     Review of Systems   Constitutional: Positive for chills and fatigue. Negative for appetite change and fever.   HENT: Negative.  Negative for congestion, nosebleeds and sore throat.    Eyes: Negative.  Negative for visual disturbance.   Respiratory: Negative.  Negative for cough, shortness of  breath and wheezing.    Cardiovascular: Positive for leg swelling. Negative for chest pain and palpitations.   Gastrointestinal: Negative.  Negative for abdominal pain, constipation, diarrhea, nausea and vomiting.   Endocrine: Negative.  Negative for polyuria.   Genitourinary: Negative.  Negative for dysuria, flank pain, frequency and urgency.   Musculoskeletal: Negative.  Negative for arthralgias, back pain and joint swelling.   Skin: Positive for color change (redness right lower leg). Negative for pallor and rash.   Allergic/Immunologic: Negative.  Negative for immunocompromised state.   Neurological: Negative.  Negative for dizziness, syncope, weakness, light-headedness, numbness and headaches.   Hematological: Negative.    Psychiatric/Behavioral: Negative.  Negative for confusion and hallucinations. The patient is not nervous/anxious.    All other systems reviewed and are negative.    Objective:     Vital Signs (Most Recent):  Temp: 99 °F (37.2 °C) (02/10/19 1534)  Pulse: 82 (02/10/19 1534)  Resp: 18 (02/10/19 1534)  BP: 119/65 (02/10/19 1534)  SpO2: (!) 91 % (02/10/19 1534) Vital Signs (24h Range):  Temp:  [98 °F (36.7 °C)-99 °F (37.2 °C)] 99 °F (37.2 °C)  Pulse:  [75-82] 82  Resp:  [16-39] 18  SpO2:  [91 %-97 %] 91 %  BP: ()/(46-65) 119/65     Weight: (!) 164.7 kg (363 lb)  Body mass index is 49.23 kg/m².    Intake/Output Summary (Last 24 hours) at 2/10/2019 1826  Last data filed at 2/10/2019 1622  Gross per 24 hour   Intake 5100 ml   Output 1875 ml   Net 3225 ml      Physical Exam   Constitutional: He is oriented to person, place, and time. No distress.   Morbidly obese  male, appears uncomfortable, but in no distress. Spouse at the bedside.   HENT:   Head: Normocephalic and atraumatic.   Eyes: Conjunctivae and EOM are normal. Pupils are equal, round, and reactive to light. No scleral icterus.   Neck: Normal range of motion. Neck supple. No thyromegaly present.   Cardiovascular: Normal rate,  regular rhythm, normal heart sounds and intact distal pulses.   No murmur heard.  Pulmonary/Chest: Effort normal and breath sounds normal. No stridor. No respiratory distress. He has no wheezes. He has no rales. He exhibits no tenderness.   Abdominal: Soft. Bowel sounds are normal. He exhibits no distension. There is no tenderness. There is no rebound and no guarding.   Obese abdomen   Musculoskeletal: Normal range of motion. He exhibits edema (trace). He exhibits no tenderness.   Lymphadenopathy:     He has no cervical adenopathy.   Neurological: He is alert and oriented to person, place, and time. No cranial nerve deficit. He exhibits normal muscle tone. Coordination normal.   Skin: Skin is warm and dry. He is not diaphoretic. There is erythema (Erythema noted and marked on the right lower extremity, with small 1 cm x 1 cm discrete superficial ulcer on the right mid tib fib region.).   Psychiatric: He has a normal mood and affect. His behavior is normal. Judgment and thought content normal.   Nursing note and vitals reviewed.      Significant Labs: All pertinent labs within the past 24 hours have been reviewed.    Significant Imaging: I have reviewed all pertinent imaging results/findings within the past 24 hours.    Assessment/Plan:      * Septic shock    Afebrile, HR 91, WBC 47474, 0% bands, lactic acid 4.0.  Blood pressure 95/68  Normal saline 30 mL/kilogram bolus, (close to 5 L).  Trend lactic acid Q 4 x 2.  Source right lower extremity cellulitis  Follow up on cultures    02/10  - Septic shock resolved  - Lactic acid normalized, now 1.7  - WBC slightly worsened from 22.72K to 23.68K  - Blood cultures NGTD  - Continue IV Vancomycin and Zosyn  - MRI RLE pending to r/o abscess       Cellulitis of right leg without foot    - Cellulitic area marked in the ED.  - Continue IV vancomycin, IV Zosyn empirically.  - BC NGTD  - Spouse showed pictures of prior infection 2015, history of MRSA and Pseudomonas requiring  three week hospitalization at that time.     DON (acute kidney injury)    - Baseline creatinine less than 1.2.  - Creatinine upon admit 1.9, improved to 1.5  - Likely secondary to sepsis.  - Patient received normal saline 30 mL/kilogram bolus (almost close to 5 L).  - Daily BMP     Morbid obesity with BMI of 50.0-59.9, adult    - BMI 50  - Pt counseled on lifestyle modifications including diet and exercise     Hypertension associated with diabetes    - Blood pressure on the low side systolic 90s upon admit -- improved but still borderline hypotensive  - Continue to hold antihypertensive agents  - Continue IV hydration  - Monitor closely and make adjustments as needed       Type 2 diabetes mellitus without complication, without long-term current use of insulin    - Non insulin-dependent type 2 diabetic  - Hold metformin and glipizide  - Continue moderate dose insulin sliding scale  - Monitor blood sugars every 6 hr and make adjustments as needed         VTE Risk Mitigation (From admission, onward)        Ordered     enoxaparin injection 40 mg  Daily      02/09/19 2059     IP VTE HIGH RISK PATIENT  Once      02/09/19 2052     Place sequential compression device  Until discontinued      02/09/19 2052              MUNIRA Villar  Department of Hospital Medicine   [facility]

## 2019-02-11 NOTE — CONSULTS
Pharmacy Vancomycin Consult Note    WBC=23.68  Zfqa=662.7  CrCl=78.1ml/min Scr=1.5 (no new labs from today--ordered for tomorrow)    Cultures: NGTD  Dx. Cellulitis  Goal trough=10-15mcg/ml    Currently being pulse dosed  Vancomycin random level=7.3mcg/ml    Looks like patient could tolerate scheduled dosing. Will schedule patient to vancomycin 1500mg Q18  Vancomycin trough due 2/12 @2200    Thank you for allowing us to participate in this patient's care.  Siri Swan, Pharm D 2/11/2019 10:02 AM

## 2019-02-11 NOTE — ASSESSMENT & PLAN NOTE
- Baseline creatinine less than 1.2.  - Creatinine upon admit 1.9, improved to 1.5  - Likely secondary to sepsis.  - Patient received normal saline 30 mL/kilogram bolus (almost close to 5 L).  - Daily BMP

## 2019-02-11 NOTE — ASSESSMENT & PLAN NOTE
- Baseline creatinine less than 1.2.  - Creatinine upon admit 1.9, improved to 1.5  - Likely secondary to sepsis.  - Patient received normal saline 30 mL/kilogram bolus (almost close to 5 L).  - Daily BMP    02/11  - Now resolved  - Creatinine at 1.2  - Will continue to monitor

## 2019-02-11 NOTE — PROGRESS NOTES
[facility] Hospital Medicine  Progress Note    Patient Name: Santhosh Goff  MRN: [id number]  Patient Class: IP- Inpatient   Admission Date: 2/9/2019  Length of Stay: 2 days  Attending Physician: Baudilio Bryant MD  Primary Care Provider: Jacek Mohr MD        Subjective:     Principal Problem:Cellulitis of right leg without foot    HPI:  Mr. Goff is a 64-year-old morbidly obese  male with PMH significant for non-insulin-dependent DM, HTN, prior right leg wound/cellulitis in 2015, was in his usual state of health until two days ago.  He started complaining of not feeling well, with worsening right lower extremity redness, progressing to fever today, chills, nausea, and increasing redness of the right lower extremity.  In the ED, he is afebrile, heart rate less than 90, blood pressure 95/68, WBC 32961, 0% bands, lactic acid 4.0, creatinine 1.9 (baseline less than 1.2).  Patient was ordered 2 L normal saline boluses (will add three more L to make it a total of 5 L boluses, 30 mL/kilogram).  Blood cultures were obtained.  Started on IV vancomycin, IV Zosyn.    Hospital Course:  Santhosh Goff is a 64 year old male admitted for Septic shock secondary to LLE. RLE US negative for DVT. Septic shock now resolved -- lactic acid has normalized. BP stabilized. WBC count remains elevated at 23.68K. BC NGTD. Remains afebrile. Will continue IV abx and IVF. MRI RLE pending to r/o abscess. As of 02/11 max temp of 100.7 overnight. Unable to obtain MRI due to weight limit of 350 lbs. Will get CT RLE to r/o abscess (weight limit 450 lbs). RLE cellulitis improving but has area of increased erythema and swelling to back of leg. Leukocytosis trending down, currently 20.05. DON resolved. Blood cultures remain with no growth to date.       Review of Systems   Constitutional: Positive for chills and fatigue. Negative for appetite change and fever.   HENT: Negative.  Negative for congestion,  nosebleeds and sore throat.    Eyes: Negative.  Negative for visual disturbance.   Respiratory: Negative.  Negative for cough, shortness of breath and wheezing.    Cardiovascular: Positive for leg swelling. Negative for chest pain and palpitations.   Gastrointestinal: Negative.  Negative for abdominal pain, constipation, diarrhea, nausea and vomiting.   Endocrine: Negative.  Negative for polyuria.   Genitourinary: Negative.  Negative for dysuria, flank pain, frequency and urgency.   Musculoskeletal: Negative.  Negative for arthralgias, back pain and joint swelling.   Skin: Positive for color change (redness right lower leg). Negative for pallor and rash.   Allergic/Immunologic: Negative.  Negative for immunocompromised state.   Neurological: Negative.  Negative for dizziness, syncope, weakness, light-headedness, numbness and headaches.   Hematological: Negative.    Psychiatric/Behavioral: Negative.  Negative for confusion and hallucinations. The patient is not nervous/anxious.    All other systems reviewed and are negative.    Objective:     Vital Signs (Most Recent):  Temp: 98.8 °F (37.1 °C) (02/11/19 1702)  Pulse: 78 (02/11/19 1702)  Resp: 20 (02/11/19 1702)  BP: (!) 111/58 (02/11/19 1702)  SpO2: (!) 92 % (02/11/19 1702) Vital Signs (24h Range):  Temp:  [97.7 °F (36.5 °C)-100.7 °F (38.2 °C)] 98.8 °F (37.1 °C)  Pulse:  [76-86] 78  Resp:  [18-20] 20  SpO2:  [92 %-95 %] 92 %  BP: (107-142)/(53-76) 111/58     Weight: (!) 164.7 kg (363 lb)  Body mass index is 49.23 kg/m².    Intake/Output Summary (Last 24 hours) at 2/11/2019 1724  Last data filed at 2/11/2019 1300  Gross per 24 hour   Intake 2271.67 ml   Output 3800 ml   Net -1528.33 ml      Physical Exam   Constitutional: He is oriented to person, place, and time. No distress.   Morbidly obese  male, appears uncomfortable, but in no distress.    HENT:   Head: Normocephalic and atraumatic.   Eyes: Conjunctivae and EOM are normal. Pupils are equal, round, and  reactive to light. No scleral icterus.   Neck: Normal range of motion. Neck supple. No thyromegaly present.   Cardiovascular: Normal rate, regular rhythm, normal heart sounds and intact distal pulses.   No murmur heard.  Pulmonary/Chest: Effort normal and breath sounds normal. No stridor. No respiratory distress. He has no wheezes. He has no rales. He exhibits no tenderness.   Abdominal: Soft. Bowel sounds are normal. He exhibits no distension. There is no tenderness. There is no rebound and no guarding.   Obese abdomen   Musculoskeletal: Normal range of motion. He exhibits edema (mild). He exhibits no tenderness.   Lymphadenopathy:     He has no cervical adenopathy.   Neurological: He is alert and oriented to person, place, and time. No cranial nerve deficit. He exhibits normal muscle tone. Coordination normal.   Skin: Skin is warm and dry. Capillary refill takes 2 to 3 seconds. He is not diaphoretic. There is erythema (Erythema noted and marked on the right lower extremity, with small 1 cm x 1 cm discrete superficial ulcer on the right mid tib fib region.).   Psychiatric: He has a normal mood and affect. His behavior is normal. Judgment and thought content normal.   Nursing note and vitals reviewed.                Significant Labs:   BMP:   Recent Labs   Lab 02/10/19  0439 02/11/19  1405   * 241*   * 136   K 4.5 4.0    98   CO2 20* 30*   BUN 23 15   CREATININE 1.5* 1.2   CALCIUM 8.3* 9.5   MG 1.5*  --      CBC:   Recent Labs   Lab 02/09/19  1848 02/10/19  0439 02/11/19  1405   WBC 22.72* 23.68* 20.05*   HGB 13.7* 12.3* 12.4*   HCT 40.8 36.9* 37.4*    193 187     POCT Glucose:   Recent Labs   Lab 02/11/19  0611 02/11/19  1158 02/11/19  1641   POCTGLUCOSE 197* 231* 255*       Significant Imaging: I have reviewed all pertinent imaging results/findings within the past 24 hours.    Assessment/Plan:      * Cellulitis of right leg without foot    - Cellulitic area marked in the ED.  - Continue  IV vancomycin, IV Zosyn empirically.  - BC NGTD  - Spouse showed pictures of prior infection 2015, history of MRSA and Pseudomonas requiring three week hospitalization at that time  - Unable to obtain MRI of RLE due to weight limit  - CT RLE with contrast pending for suspected abscess     DON (acute kidney injury)    - Baseline creatinine less than 1.2.  - Creatinine upon admit 1.9, improved to 1.5  - Likely secondary to sepsis.  - Patient received normal saline 30 mL/kilogram bolus (almost close to 5 L).  - Daily BMP    02/11  - Now resolved  - Creatinine at 1.2  - Will continue to monitor     Morbid obesity with BMI of 50.0-59.9, adult    - BMI 50  - Pt counseled on lifestyle modifications including diet and exercise     Hypertension associated with diabetes    - Blood pressure on the low side systolic 90s upon admit -- improved but still borderline hypotensive  - Continue to hold antihypertensive agents  - Continue IV hydration  - Monitor closely and make adjustments as needed       Type 2 diabetes mellitus without complication, without long-term current use of insulin    - Non insulin-dependent type 2 diabetic  - Hold metformin and glipizide  - Continue moderate dose insulin sliding scale  - Add Detemir 10 units SQ nightly  - Monitor blood sugars every 6 hr and make adjustments as needed         VTE Risk Mitigation (From admission, onward)        Ordered     enoxaparin injection 40 mg  Daily      02/09/19 2059     IP VTE HIGH RISK PATIENT  Once      02/09/19 2052     Place sequential compression device  Until discontinued      02/09/19 2052              MUNIRA Villar  Department of Hospital Medicine   [facility]

## 2019-02-11 NOTE — PLAN OF CARE
Problem: Adult Inpatient Plan of Care  Goal: Plan of Care Review  Outcome: Ongoing (interventions implemented as appropriate)  Reviewed POC,  Including indications and possible side effects of administer medications. Pt. Verbalized understanding and teach back.  Remain free from injury. Bed low and lock, SRx2 up and call bell within reach.Patient doing well this shift.  IVF and antibiotics infusing as ordered.  Vital signs stable.  Pain well controlled.  No acute distress noted.  Will continue to monitor. 24 hour chart check performed.      Chart Check complete.

## 2019-02-11 NOTE — PLAN OF CARE
Problem: Adult Inpatient Plan of Care  Goal: Plan of Care Review  Outcome: Ongoing (interventions implemented as appropriate)  Patient remains free of falls and safety precautions maintained. Afebrile. Pain controlled. Leg remains swollen and red. No numbness or tingling reported. IV abx per order. NSR. Will continue to monitor. 12 hour chart check.

## 2019-02-11 NOTE — SUBJECTIVE & OBJECTIVE
Interval History: Pt seen and examined. Pt reports having RLE pain not relieved with current analgesics. Erythema and swelling appears improved but will obtain MRI RLE to r/o abscess and will adjust pain medications. WBC slightly increased from 22.72K and 23.68K.     Review of Systems   Constitutional: Positive for chills and fatigue. Negative for appetite change and fever.   HENT: Negative.  Negative for congestion, nosebleeds and sore throat.    Eyes: Negative.  Negative for visual disturbance.   Respiratory: Negative.  Negative for cough, shortness of breath and wheezing.    Cardiovascular: Positive for leg swelling. Negative for chest pain and palpitations.   Gastrointestinal: Negative.  Negative for abdominal pain, constipation, diarrhea, nausea and vomiting.   Endocrine: Negative.  Negative for polyuria.   Genitourinary: Negative.  Negative for dysuria, flank pain, frequency and urgency.   Musculoskeletal: Negative.  Negative for arthralgias, back pain and joint swelling.   Skin: Positive for color change (redness right lower leg). Negative for pallor and rash.   Allergic/Immunologic: Negative.  Negative for immunocompromised state.   Neurological: Negative.  Negative for dizziness, syncope, weakness, light-headedness, numbness and headaches.   Hematological: Negative.    Psychiatric/Behavioral: Negative.  Negative for confusion and hallucinations. The patient is not nervous/anxious.    All other systems reviewed and are negative.    Objective:     Vital Signs (Most Recent):  Temp: 99 °F (37.2 °C) (02/10/19 1534)  Pulse: 82 (02/10/19 1534)  Resp: 18 (02/10/19 1534)  BP: 119/65 (02/10/19 1534)  SpO2: (!) 91 % (02/10/19 1534) Vital Signs (24h Range):  Temp:  [98 °F (36.7 °C)-99 °F (37.2 °C)] 99 °F (37.2 °C)  Pulse:  [75-82] 82  Resp:  [16-39] 18  SpO2:  [91 %-97 %] 91 %  BP: ()/(46-65) 119/65     Weight: (!) 164.7 kg (363 lb)  Body mass index is 49.23 kg/m².    Intake/Output Summary (Last 24 hours) at  2/10/2019 1826  Last data filed at 2/10/2019 1622  Gross per 24 hour   Intake 5100 ml   Output 1875 ml   Net 3225 ml      Physical Exam   Constitutional: He is oriented to person, place, and time. No distress.   Morbidly obese  male, appears uncomfortable, but in no distress. Spouse at the bedside.   HENT:   Head: Normocephalic and atraumatic.   Eyes: Conjunctivae and EOM are normal. Pupils are equal, round, and reactive to light. No scleral icterus.   Neck: Normal range of motion. Neck supple. No thyromegaly present.   Cardiovascular: Normal rate, regular rhythm, normal heart sounds and intact distal pulses.   No murmur heard.  Pulmonary/Chest: Effort normal and breath sounds normal. No stridor. No respiratory distress. He has no wheezes. He has no rales. He exhibits no tenderness.   Abdominal: Soft. Bowel sounds are normal. He exhibits no distension. There is no tenderness. There is no rebound and no guarding.   Obese abdomen   Musculoskeletal: Normal range of motion. He exhibits edema (trace). He exhibits no tenderness.   Lymphadenopathy:     He has no cervical adenopathy.   Neurological: He is alert and oriented to person, place, and time. No cranial nerve deficit. He exhibits normal muscle tone. Coordination normal.   Skin: Skin is warm and dry. He is not diaphoretic. There is erythema (Erythema noted and marked on the right lower extremity, with small 1 cm x 1 cm discrete superficial ulcer on the right mid tib fib region.).   Psychiatric: He has a normal mood and affect. His behavior is normal. Judgment and thought content normal.   Nursing note and vitals reviewed.      Significant Labs: All pertinent labs within the past 24 hours have been reviewed.    Significant Imaging: I have reviewed all pertinent imaging results/findings within the past 24 hours.

## 2019-02-11 NOTE — ASSESSMENT & PLAN NOTE
- Non insulin-dependent type 2 diabetic  - Hold metformin and glipizide  - Continue moderate dose insulin sliding scale  - Monitor blood sugars every 6 hr and make adjustments as needed

## 2019-02-11 NOTE — ASSESSMENT & PLAN NOTE
Afebrile, HR 91, WBC 15342, 0% bands, lactic acid 4.0.  Blood pressure 95/68  Normal saline 30 mL/kilogram bolus, (close to 5 L).  Trend lactic acid Q 4 x 2.  Source right lower extremity cellulitis  Follow up on cultures    02/10  - Septic shock resolved  - Lactic acid normalized, now 1.7  - WBC slightly worsened from 22.72K to 23.68K  - Blood cultures NGTD  - Continue IV Vancomycin and Zosyn  - MRI RLE pending to r/o abscess

## 2019-02-11 NOTE — ASSESSMENT & PLAN NOTE
Afebrile, HR 91, WBC 40370, 0% bands, lactic acid 4.0.  Blood pressure 95/68  Normal saline 30 mL/kilogram bolus, (close to 5 L).  Trend lactic acid Q 4 x 2.  Source right lower extremity cellulitis  Follow up on cultures    02/10  - Septic shock resolved  - Lactic acid normalized, now 1.7  - WBC slightly worsened from 22.72K to 23.68K  - Blood cultures NGTD  - Continue IV Vancomycin and Zosyn  - MRI RLE pending to r/o abscess

## 2019-02-11 NOTE — ASSESSMENT & PLAN NOTE
- Non insulin-dependent type 2 diabetic  - Hold metformin and glipizide  - Continue moderate dose insulin sliding scale  - Add Detemir 10 units SQ nightly  - Monitor blood sugars every 6 hr and make adjustments as needed

## 2019-02-11 NOTE — ASSESSMENT & PLAN NOTE
- Blood pressure on the low side systolic 90s upon admit -- improved but still borderline hypotensive  - Continue to hold antihypertensive agents  - Continue IV hydration  - Monitor closely and make adjustments as needed

## 2019-02-11 NOTE — SUBJECTIVE & OBJECTIVE
Review of Systems   Constitutional: Positive for chills and fatigue. Negative for appetite change and fever.   HENT: Negative.  Negative for congestion, nosebleeds and sore throat.    Eyes: Negative.  Negative for visual disturbance.   Respiratory: Negative.  Negative for cough, shortness of breath and wheezing.    Cardiovascular: Positive for leg swelling. Negative for chest pain and palpitations.   Gastrointestinal: Negative.  Negative for abdominal pain, constipation, diarrhea, nausea and vomiting.   Endocrine: Negative.  Negative for polyuria.   Genitourinary: Negative.  Negative for dysuria, flank pain, frequency and urgency.   Musculoskeletal: Negative.  Negative for arthralgias, back pain and joint swelling.   Skin: Positive for color change (redness right lower leg). Negative for pallor and rash.   Allergic/Immunologic: Negative.  Negative for immunocompromised state.   Neurological: Negative.  Negative for dizziness, syncope, weakness, light-headedness, numbness and headaches.   Hematological: Negative.    Psychiatric/Behavioral: Negative.  Negative for confusion and hallucinations. The patient is not nervous/anxious.    All other systems reviewed and are negative.    Objective:     Vital Signs (Most Recent):  Temp: 98.8 °F (37.1 °C) (02/11/19 1702)  Pulse: 78 (02/11/19 1702)  Resp: 20 (02/11/19 1702)  BP: (!) 111/58 (02/11/19 1702)  SpO2: (!) 92 % (02/11/19 1702) Vital Signs (24h Range):  Temp:  [97.7 °F (36.5 °C)-100.7 °F (38.2 °C)] 98.8 °F (37.1 °C)  Pulse:  [76-86] 78  Resp:  [18-20] 20  SpO2:  [92 %-95 %] 92 %  BP: (107-142)/(53-76) 111/58     Weight: (!) 164.7 kg (363 lb)  Body mass index is 49.23 kg/m².    Intake/Output Summary (Last 24 hours) at 2/11/2019 1724  Last data filed at 2/11/2019 1300  Gross per 24 hour   Intake 2271.67 ml   Output 3800 ml   Net -1528.33 ml      Physical Exam   Constitutional: He is oriented to person, place, and time. No distress.   Morbidly obese  male,  appears uncomfortable, but in no distress.    HENT:   Head: Normocephalic and atraumatic.   Eyes: Conjunctivae and EOM are normal. Pupils are equal, round, and reactive to light. No scleral icterus.   Neck: Normal range of motion. Neck supple. No thyromegaly present.   Cardiovascular: Normal rate, regular rhythm, normal heart sounds and intact distal pulses.   No murmur heard.  Pulmonary/Chest: Effort normal and breath sounds normal. No stridor. No respiratory distress. He has no wheezes. He has no rales. He exhibits no tenderness.   Abdominal: Soft. Bowel sounds are normal. He exhibits no distension. There is no tenderness. There is no rebound and no guarding.   Obese abdomen   Musculoskeletal: Normal range of motion. He exhibits edema (mild). He exhibits no tenderness.   Lymphadenopathy:     He has no cervical adenopathy.   Neurological: He is alert and oriented to person, place, and time. No cranial nerve deficit. He exhibits normal muscle tone. Coordination normal.   Skin: Skin is warm and dry. Capillary refill takes 2 to 3 seconds. He is not diaphoretic. There is erythema (Erythema noted and marked on the right lower extremity, with small 1 cm x 1 cm discrete superficial ulcer on the right mid tib fib region.).   Psychiatric: He has a normal mood and affect. His behavior is normal. Judgment and thought content normal.   Nursing note and vitals reviewed.                Significant Labs:   BMP:   Recent Labs   Lab 02/10/19  0439 02/11/19  1405   * 241*   * 136   K 4.5 4.0    98   CO2 20* 30*   BUN 23 15   CREATININE 1.5* 1.2   CALCIUM 8.3* 9.5   MG 1.5*  --      CBC:   Recent Labs   Lab 02/09/19  1848 02/10/19  0439 02/11/19  1405   WBC 22.72* 23.68* 20.05*   HGB 13.7* 12.3* 12.4*   HCT 40.8 36.9* 37.4*    193 187     POCT Glucose:   Recent Labs   Lab 02/11/19  0611 02/11/19  1158 02/11/19  1641   POCTGLUCOSE 197* 231* 255*       Significant Imaging: I have reviewed all pertinent imaging  results/findings within the past 24 hours.

## 2019-02-11 NOTE — HOSPITAL COURSE
Santhosh Goff is a 64 year old male admitted for Septic shock secondary to LLE. RLE US negative for DVT. Septic shock now resolved -- lactic acid has normalized. BP stabilized. WBC count remains elevated at 23.68K. BC NGTD. Remains afebrile. Will continue IV abx and IVF. MRI RLE pending to r/o abscess. As of 02/11 max temp of 100.7 overnight. Unable to obtain MRI due to weight limit of 350 lbs. Will get CT RLE to r/o abscess (weight limit 450 lbs). RLE cellulitis improving but has area of increased erythema and swelling to back of leg. Leukocytosis trending down, currently 20.05. DON resolved. Blood cultures remain with no growth to date.     As of 02/12 CT RLE negative for abscess formation. WBC count decreased slightly, now 19.83 from 20.05. Remains afebrile. Blood cultures NGTD. Will consult ID for further recommendations.     As of 02/13 WBC count trending down, now 17.28K. DM uncontrolled. Uncontrolled prior to admit (A1C 9) but now complicated by infection. Will increase Detemir to 20 units BID and continue moderate dose SSI. ID consulted -- etiology is likely from strep. IV abx changed to IV Zyvox and Clindamycin. He has a had prior episode of severe cellulitis and develop purulence. Will be vigilant about possible necrotizing fascitis    As of 02/14 WBC count trending down, now 12.75K from 17.28K. Blood cultures remain no growth to date. Remains afebrile. LLE with increasing yellow drainage. LLE continues with erythema and 2+ pitting edema. Wound care following. CT RLE with contrast negative for abscess formation and US negative for DVT. ID following. Continue IV Zyvox and Clindamycin at this time. Pt expresses concern about being discharged too early but assured patient that he will be discharged when medically stable.  02/15: Discussed treatment options, including LTAC placement, SNF placement, Home Health and patient expressed desire to be discharged as soon as possible so he can return to work. He  understands that he will have to do OP PT if he is working as he must be home bound for home health. Pt is insistent saying they do not have a  and he needs to get back to the kids. WBC WNL, BC with NGTD X 5 days. Discussed with ID who recommended IV dalvance X1 as OP. Case management contacted [facility] who will administer 1 dose of IV Dalvance at patient's home this evening. Patient elected OP PT and asked for note to return to work on Monday. Discussed need for weight loss in detail - he refuses referral for bariatric surgery saying he has limited his PO intake over the past 3 months and has lost 10 LBS. Pain medication sent patients pharmacy. Patient was seen and examined today and deemed stable for discharge home.

## 2019-02-12 PROBLEM — N17.9 AKI (ACUTE KIDNEY INJURY): Status: RESOLVED | Noted: 2019-02-09 | Resolved: 2019-02-12

## 2019-02-12 LAB
ANION GAP SERPL CALC-SCNC: 10 MMOL/L
BASOPHILS # BLD AUTO: 0.02 K/UL
BASOPHILS NFR BLD: 0.1 %
BUN SERPL-MCNC: 15 MG/DL
CALCIUM SERPL-MCNC: 9.2 MG/DL
CHLORIDE SERPL-SCNC: 98 MMOL/L
CO2 SERPL-SCNC: 27 MMOL/L
CREAT SERPL-MCNC: 1.1 MG/DL
DIFFERENTIAL METHOD: ABNORMAL
EOSINOPHIL # BLD AUTO: 0.1 K/UL
EOSINOPHIL NFR BLD: 0.5 %
ERYTHROCYTE [DISTWIDTH] IN BLOOD BY AUTOMATED COUNT: 14 %
EST. GFR  (AFRICAN AMERICAN): >60 ML/MIN/1.73 M^2
EST. GFR  (NON AFRICAN AMERICAN): >60 ML/MIN/1.73 M^2
GLUCOSE SERPL-MCNC: 237 MG/DL
HCT VFR BLD AUTO: 37.4 %
HGB BLD-MCNC: 12.1 G/DL
LYMPHOCYTES # BLD AUTO: 1.1 K/UL
LYMPHOCYTES NFR BLD: 5.7 %
MCH RBC QN AUTO: 30.3 PG
MCHC RBC AUTO-ENTMCNC: 32.4 G/DL
MCV RBC AUTO: 94 FL
MONOCYTES # BLD AUTO: 1 K/UL
MONOCYTES NFR BLD: 4.8 %
NEUTROPHILS # BLD AUTO: 17.6 K/UL
NEUTROPHILS NFR BLD: 88.9 %
PLATELET # BLD AUTO: 214 K/UL
PMV BLD AUTO: 9.9 FL
POCT GLUCOSE: 234 MG/DL (ref 70–110)
POCT GLUCOSE: 234 MG/DL (ref 70–110)
POCT GLUCOSE: 283 MG/DL (ref 70–110)
POTASSIUM SERPL-SCNC: 4 MMOL/L
RBC # BLD AUTO: 4 M/UL
SODIUM SERPL-SCNC: 135 MMOL/L
WBC # BLD AUTO: 19.83 K/UL

## 2019-02-12 PROCEDURE — 63600175 PHARM REV CODE 636 W HCPCS: Performed by: INTERNAL MEDICINE

## 2019-02-12 PROCEDURE — 25000003 PHARM REV CODE 250: Performed by: INTERNAL MEDICINE

## 2019-02-12 PROCEDURE — 80048 BASIC METABOLIC PNL TOTAL CA: CPT

## 2019-02-12 PROCEDURE — 96372 THER/PROPH/DIAG INJ SC/IM: CPT

## 2019-02-12 PROCEDURE — 85025 COMPLETE CBC W/AUTO DIFF WBC: CPT

## 2019-02-12 PROCEDURE — S0077 INJECTION, CLINDAMYCIN PHOSP: HCPCS | Performed by: INTERNAL MEDICINE

## 2019-02-12 PROCEDURE — 25000003 PHARM REV CODE 250: Performed by: NURSE PRACTITIONER

## 2019-02-12 PROCEDURE — 21400001 HC TELEMETRY ROOM

## 2019-02-12 PROCEDURE — 36415 COLL VENOUS BLD VENIPUNCTURE: CPT

## 2019-02-12 RX ORDER — LINEZOLID 2 MG/ML
600 INJECTION, SOLUTION INTRAVENOUS
Status: DISCONTINUED | OUTPATIENT
Start: 2019-02-12 | End: 2019-02-16 | Stop reason: HOSPADM

## 2019-02-12 RX ORDER — PANTOPRAZOLE SODIUM 40 MG/1
40 TABLET, DELAYED RELEASE ORAL DAILY
Status: DISCONTINUED | OUTPATIENT
Start: 2019-02-12 | End: 2019-02-16 | Stop reason: HOSPADM

## 2019-02-12 RX ORDER — CLINDAMYCIN PHOSPHATE 900 MG/50ML
900 INJECTION, SOLUTION INTRAVENOUS
Status: DISCONTINUED | OUTPATIENT
Start: 2019-02-12 | End: 2019-02-16 | Stop reason: HOSPADM

## 2019-02-12 RX ORDER — FUROSEMIDE 20 MG/1
20 TABLET ORAL DAILY
Status: DISCONTINUED | OUTPATIENT
Start: 2019-02-13 | End: 2019-02-14

## 2019-02-12 RX ORDER — LISINOPRIL 5 MG/1
5 TABLET ORAL DAILY
Status: DISCONTINUED | OUTPATIENT
Start: 2019-02-13 | End: 2019-02-16 | Stop reason: HOSPADM

## 2019-02-12 RX ADMIN — INSULIN ASPART 4 UNITS: 100 INJECTION, SOLUTION INTRAVENOUS; SUBCUTANEOUS at 07:02

## 2019-02-12 RX ADMIN — ASPIRIN 81 MG: 81 TABLET, COATED ORAL at 08:02

## 2019-02-12 RX ADMIN — VANCOMYCIN HYDROCHLORIDE 1500 MG: 10 INJECTION, POWDER, LYOPHILIZED, FOR SOLUTION INTRAVENOUS at 06:02

## 2019-02-12 RX ADMIN — INSULIN ASPART 4 UNITS: 100 INJECTION, SOLUTION INTRAVENOUS; SUBCUTANEOUS at 04:02

## 2019-02-12 RX ADMIN — PANTOPRAZOLE SODIUM 40 MG: 40 TABLET, DELAYED RELEASE ORAL at 03:02

## 2019-02-12 RX ADMIN — PIPERACILLIN AND TAZOBACTAM 4.5 G: 4; .5 INJECTION, POWDER, LYOPHILIZED, FOR SOLUTION INTRAVENOUS; PARENTERAL at 03:02

## 2019-02-12 RX ADMIN — LINEZOLID 600 MG: 600 INJECTION, SOLUTION INTRAVENOUS at 09:02

## 2019-02-12 RX ADMIN — OXYCODONE HYDROCHLORIDE AND ACETAMINOPHEN 1 TABLET: 10; 325 TABLET ORAL at 03:02

## 2019-02-12 RX ADMIN — CLINDAMYCIN IN 5 PERCENT DEXTROSE 900 MG: 18 INJECTION, SOLUTION INTRAVENOUS at 07:02

## 2019-02-12 RX ADMIN — ENOXAPARIN SODIUM 40 MG: 100 INJECTION SUBCUTANEOUS at 04:02

## 2019-02-12 RX ADMIN — INSULIN ASPART 6 UNITS: 100 INJECTION, SOLUTION INTRAVENOUS; SUBCUTANEOUS at 11:02

## 2019-02-12 RX ADMIN — PIPERACILLIN AND TAZOBACTAM 4.5 G: 4; .5 INJECTION, POWDER, LYOPHILIZED, FOR SOLUTION INTRAVENOUS; PARENTERAL at 08:02

## 2019-02-12 RX ADMIN — ATORVASTATIN CALCIUM 40 MG: 40 TABLET, FILM COATED ORAL at 08:02

## 2019-02-12 RX ADMIN — OXYCODONE HYDROCHLORIDE AND ACETAMINOPHEN 1 TABLET: 10; 325 TABLET ORAL at 06:02

## 2019-02-12 RX ADMIN — INSULIN ASPART 3 UNITS: 100 INJECTION, SOLUTION INTRAVENOUS; SUBCUTANEOUS at 09:02

## 2019-02-12 NOTE — ASSESSMENT & PLAN NOTE
- Non insulin-dependent type 2 diabetic  - Hold metformin and glipizide  - Continue moderate dose insulin sliding scale  - Change Detemir to 15 units SQ BID  - Monitor blood sugars every 6 hr and make adjustments as needed

## 2019-02-12 NOTE — PROGRESS NOTES
Vancomycin Progress Notes:  Dx: Cellulitis  Estimated Creatinine Clearance: 106.4 mL/min (based on SCr of 1.1 mg/dL).  white blood cell count = 19.8  Tmax/24h = 98.8 F  Cultures -Blood -ngtd x 4 days  Serum creatinine = 1.1 decreased from 1.2 yesterday  Adjusted trough time to 2300 tonight prior to 3rd dose   /Mimi Parker [facility] 2/12/2019 11:24 AM

## 2019-02-12 NOTE — PLAN OF CARE
Problem: Adult Inpatient Plan of Care  Goal: Plan of Care Review  Outcome: Ongoing (interventions implemented as appropriate)  Plan of care reviewed with patient; verbalized understanding. Fall precautions maintained, patient remains free from injury. Pain being managed appropriately. Medications being administered as ordered. Vital signs stable with no signs of distress noted. Severe swelling and redness to RLE, wrapped lightly with kerlex. Bed low and locked with call light in reach. Will continue monitor.

## 2019-02-12 NOTE — ASSESSMENT & PLAN NOTE
- WBC count 22.72>>23.68>>20.05>>19.83   - Cellulitic area marked in the ED  - Continue IV vancomycin, IV Zosyn empirically  - BC NGTD  - Spouse showed pictures of prior infection 2015, history of MRSA and Pseudomonas requiring three week hospitalization at that time  - Unable to obtain MRI of RLE due to weight limit  - CT RLE with contrast pending for suspected abscess -- negative  - ID consulted, awaiting input

## 2019-02-12 NOTE — ASSESSMENT & PLAN NOTE
- Blood pressure on the low side systolic 90s upon admit -- improved but still borderline hypotensive  - Continue to hold antihypertensive agents  - Continue IV hydration  - Monitor closely and make adjustments as needed    02/12  - BP improved, will restart home medications including ACEI and Lasix

## 2019-02-12 NOTE — SUBJECTIVE & OBJECTIVE
Review of Systems   Constitutional: Positive for chills and fatigue. Negative for appetite change and fever.   HENT: Negative.  Negative for congestion, nosebleeds and sore throat.    Eyes: Negative.  Negative for visual disturbance.   Respiratory: Negative.  Negative for cough, shortness of breath and wheezing.    Cardiovascular: Positive for leg swelling. Negative for chest pain and palpitations.   Gastrointestinal: Negative.  Negative for abdominal pain, constipation, diarrhea, nausea and vomiting.   Endocrine: Negative.  Negative for polyuria.   Genitourinary: Negative.  Negative for dysuria, flank pain, frequency and urgency.   Musculoskeletal: Negative.  Negative for arthralgias, back pain and joint swelling.   Skin: Positive for color change (redness right lower leg). Negative for pallor and rash.   Allergic/Immunologic: Negative.  Negative for immunocompromised state.   Neurological: Negative.  Negative for dizziness, syncope, weakness, light-headedness, numbness and headaches.   Hematological: Negative.    Psychiatric/Behavioral: Negative.  Negative for confusion and hallucinations. The patient is not nervous/anxious.    All other systems reviewed and are negative.    Objective:     Vital Signs (Most Recent):  Temp: 98.1 °F (36.7 °C) (02/12/19 1102)  Pulse: 84 (02/12/19 1102)  Resp: 16 (02/12/19 1102)  BP: 122/68 (02/12/19 1102)  SpO2: (!) 94 % (02/12/19 1102) Vital Signs (24h Range):  Temp:  [98.1 °F (36.7 °C)-98.8 °F (37.1 °C)] 98.1 °F (36.7 °C)  Pulse:  [76-84] 84  Resp:  [16-20] 16  SpO2:  [91 %-96 %] 94 %  BP: (111-128)/(52-68) 122/68     Weight: (!) 164.7 kg (363 lb)  Body mass index is 49.23 kg/m².    Intake/Output Summary (Last 24 hours) at 2/12/2019 1634  Last data filed at 2/12/2019 0835  Gross per 24 hour   Intake 600 ml   Output 1400 ml   Net -800 ml      Physical Exam   Constitutional: He is oriented to person, place, and time. No distress.   Morbidly obese  male, appears  uncomfortable, but in no distress.    HENT:   Head: Normocephalic and atraumatic.   Eyes: Conjunctivae and EOM are normal. Pupils are equal, round, and reactive to light. No scleral icterus.   Neck: Normal range of motion. Neck supple. No thyromegaly present.   Cardiovascular: Normal rate, regular rhythm, normal heart sounds and intact distal pulses.   No murmur heard.  Pulmonary/Chest: Effort normal and breath sounds normal. No stridor. No respiratory distress. He has no wheezes. He has no rales. He exhibits no tenderness.   Abdominal: Soft. Bowel sounds are normal. He exhibits no distension. There is no tenderness. There is no rebound and no guarding.   Obese abdomen   Musculoskeletal: Normal range of motion. He exhibits edema (mild). He exhibits no tenderness.   Lymphadenopathy:     He has no cervical adenopathy.   Neurological: He is alert and oriented to person, place, and time. No cranial nerve deficit. He exhibits normal muscle tone. Coordination normal.   Skin: Skin is warm and dry. Capillary refill takes 2 to 3 seconds. He is not diaphoretic. There is erythema (Erythema noted and marked on the right lower extremity, with small 1 cm x 1 cm discrete superficial ulcer on the right mid tib fib region.).   Psychiatric: He has a normal mood and affect. His behavior is normal. Judgment and thought content normal.   Nursing note and vitals reviewed.      Significant Labs:   BMP:   Recent Labs   Lab 02/12/19  0916   *   *   K 4.0   CL 98   CO2 27   BUN 15   CREATININE 1.1   CALCIUM 9.2     CBC:   Recent Labs   Lab 02/11/19  1405 02/12/19  0916   WBC 20.05* 19.83*   HGB 12.4* 12.1*   HCT 37.4* 37.4*    214     POCT Glucose:   Recent Labs   Lab 02/11/19  2156 02/12/19  0725 02/12/19  1141   POCTGLUCOSE 261* 234* 283*       Significant Imaging: I have reviewed all pertinent imaging results/findings within the past 24 hours.

## 2019-02-12 NOTE — PROGRESS NOTES
[facility] Hospital Medicine  Progress Note    Patient Name: Santhosh Goff  MRN: [id number]  Patient Class: IP- Inpatient   Admission Date: 2/9/2019  Length of Stay: 3 days  Attending Physician: Baudilio Bryant MD  Primary Care Provider: Jacek Mohr MD        Subjective:     Principal Problem:Cellulitis of right leg without foot    HPI:  Mr. Goff is a 64-year-old morbidly obese  male with PMH significant for non-insulin-dependent DM, HTN, prior right leg wound/cellulitis in 2015, was in his usual state of health until two days ago.  He started complaining of not feeling well, with worsening right lower extremity redness, progressing to fever today, chills, nausea, and increasing redness of the right lower extremity.  In the ED, he is afebrile, heart rate less than 90, blood pressure 95/68, WBC 45374, 0% bands, lactic acid 4.0, creatinine 1.9 (baseline less than 1.2).  Patient was ordered 2 L normal saline boluses (will add three more L to make it a total of 5 L boluses, 30 mL/kilogram).  Blood cultures were obtained.  Started on IV vancomycin, IV Zosyn.    Hospital Course:  Santhosh Goff is a 64 year old male admitted for Septic shock secondary to LLE. RLE US negative for DVT. Septic shock now resolved -- lactic acid has normalized. BP stabilized. WBC count remains elevated at 23.68K. BC NGTD. Remains afebrile. Will continue IV abx and IVF. MRI RLE pending to r/o abscess. As of 02/11 max temp of 100.7 overnight. Unable to obtain MRI due to weight limit of 350 lbs. Will get CT RLE to r/o abscess (weight limit 450 lbs). RLE cellulitis improving but has area of increased erythema and swelling to back of leg. Leukocytosis trending down, currently 20.05. DON resolved. Blood cultures remain with no growth to date.   As of 02/12 CT RLE negative for abscess formation. WBC count decreased slightly, now 19.83 from 20.05. Remains afebrile. Blood cultures NGTD. Will consult ID for  further recommendations.     Review of Systems   Constitutional: Positive for chills and fatigue. Negative for appetite change and fever.   HENT: Negative.  Negative for congestion, nosebleeds and sore throat.    Eyes: Negative.  Negative for visual disturbance.   Respiratory: Negative.  Negative for cough, shortness of breath and wheezing.    Cardiovascular: Positive for leg swelling. Negative for chest pain and palpitations.   Gastrointestinal: Negative.  Negative for abdominal pain, constipation, diarrhea, nausea and vomiting.   Endocrine: Negative.  Negative for polyuria.   Genitourinary: Negative.  Negative for dysuria, flank pain, frequency and urgency.   Musculoskeletal: Negative.  Negative for arthralgias, back pain and joint swelling.   Skin: Positive for color change (redness right lower leg). Negative for pallor and rash.   Allergic/Immunologic: Negative.  Negative for immunocompromised state.   Neurological: Negative.  Negative for dizziness, syncope, weakness, light-headedness, numbness and headaches.   Hematological: Negative.    Psychiatric/Behavioral: Negative.  Negative for confusion and hallucinations. The patient is not nervous/anxious.    All other systems reviewed and are negative.    Objective:     Vital Signs (Most Recent):  Temp: 98.1 °F (36.7 °C) (02/12/19 1102)  Pulse: 84 (02/12/19 1102)  Resp: 16 (02/12/19 1102)  BP: 122/68 (02/12/19 1102)  SpO2: (!) 94 % (02/12/19 1102) Vital Signs (24h Range):  Temp:  [98.1 °F (36.7 °C)-98.8 °F (37.1 °C)] 98.1 °F (36.7 °C)  Pulse:  [76-84] 84  Resp:  [16-20] 16  SpO2:  [91 %-96 %] 94 %  BP: (111-128)/(52-68) 122/68     Weight: (!) 164.7 kg (363 lb)  Body mass index is 49.23 kg/m².    Intake/Output Summary (Last 24 hours) at 2/12/2019 1634  Last data filed at 2/12/2019 0835  Gross per 24 hour   Intake 600 ml   Output 1400 ml   Net -800 ml      Physical Exam   Constitutional: He is oriented to person, place, and time. No distress.   Morbidly obese   male, appears uncomfortable, but in no distress.    HENT:   Head: Normocephalic and atraumatic.   Eyes: Conjunctivae and EOM are normal. Pupils are equal, round, and reactive to light. No scleral icterus.   Neck: Normal range of motion. Neck supple. No thyromegaly present.   Cardiovascular: Normal rate, regular rhythm, normal heart sounds and intact distal pulses.   No murmur heard.  Pulmonary/Chest: Effort normal and breath sounds normal. No stridor. No respiratory distress. He has no wheezes. He has no rales. He exhibits no tenderness.   Abdominal: Soft. Bowel sounds are normal. He exhibits no distension. There is no tenderness. There is no rebound and no guarding.   Obese abdomen   Musculoskeletal: Normal range of motion. He exhibits edema (mild). He exhibits no tenderness.   Lymphadenopathy:     He has no cervical adenopathy.   Neurological: He is alert and oriented to person, place, and time. No cranial nerve deficit. He exhibits normal muscle tone. Coordination normal.   Skin: Skin is warm and dry. Capillary refill takes 2 to 3 seconds. He is not diaphoretic. There is erythema (Erythema noted and marked on the right lower extremity, with small 1 cm x 1 cm discrete superficial ulcer on the right mid tib fib region.).   Psychiatric: He has a normal mood and affect. His behavior is normal. Judgment and thought content normal.   Nursing note and vitals reviewed.      Significant Labs:   BMP:   Recent Labs   Lab 02/12/19  0916   *   *   K 4.0   CL 98   CO2 27   BUN 15   CREATININE 1.1   CALCIUM 9.2     CBC:   Recent Labs   Lab 02/11/19  1405 02/12/19  0916   WBC 20.05* 19.83*   HGB 12.4* 12.1*   HCT 37.4* 37.4*    214     POCT Glucose:   Recent Labs   Lab 02/11/19  2156 02/12/19  0725 02/12/19  1141   POCTGLUCOSE 261* 234* 283*       Significant Imaging: I have reviewed all pertinent imaging results/findings within the past 24 hours.    Assessment/Plan:      * Cellulitis of right leg  without foot    - WBC count 22.72>>23.68>>20.05>>19.83   - Cellulitic area marked in the ED  - Continue IV vancomycin, IV Zosyn empirically  - BC NGTD  - Spouse showed pictures of prior infection 2015, history of MRSA and Pseudomonas requiring three week hospitalization at that time  - Unable to obtain MRI of RLE due to weight limit  - CT RLE with contrast pending for suspected abscess -- negative  - ID consulted, awaiting input     Morbid obesity with BMI of 50.0-59.9, adult    - BMI 50  - Pt counseled on lifestyle modifications including diet and exercise     Hypertension associated with diabetes    - Blood pressure on the low side systolic 90s upon admit -- improved but still borderline hypotensive  - Continue to hold antihypertensive agents  - Continue IV hydration  - Monitor closely and make adjustments as needed    02/12  - BP improved, will restart home medications including ACEI and Lasix     Type 2 diabetes mellitus without complication, without long-term current use of insulin    - Non insulin-dependent type 2 diabetic  - Hold metformin and glipizide  - Continue moderate dose insulin sliding scale  - Change Detemir to 15 units SQ BID  - Monitor blood sugars every 6 hr and make adjustments as needed         VTE Risk Mitigation (From admission, onward)        Ordered     enoxaparin injection 40 mg  Daily      02/09/19 2059     IP VTE HIGH RISK PATIENT  Once      02/09/19 2052     Place sequential compression device  Until discontinued      02/09/19 2052              MUNIRA Villar  Department of Hospital Medicine   [facility]

## 2019-02-12 NOTE — PLAN OF CARE
Problem: Adult Inpatient Plan of Care  Goal: Plan of Care Review  Reviewed POC,  Including indications and possible side effects of administer medications. Pt. Verbalized understanding and teach back.  Remain free from injury. Bed low and lock, SRx2 up and call bell within reach. Patient doing well this shift.  IVF and antibiotics infusing as ordered.  Vital signs stable.  Pain well controlled.  No acute distress noted.  Will continue to monitor. 24 hour chart check performed.      Chart Check complete.

## 2019-02-13 LAB
ALBUMIN SERPL BCP-MCNC: 2 G/DL
ALP SERPL-CCNC: 173 U/L
ALT SERPL W/O P-5'-P-CCNC: 38 U/L
ANION GAP SERPL CALC-SCNC: 6 MMOL/L
AST SERPL-CCNC: 27 U/L
BASOPHILS # BLD AUTO: 0.03 K/UL
BASOPHILS NFR BLD: 0.2 %
BILIRUB SERPL-MCNC: 0.6 MG/DL
BUN SERPL-MCNC: 15 MG/DL
CALCIUM SERPL-MCNC: 9.2 MG/DL
CHLORIDE SERPL-SCNC: 100 MMOL/L
CK SERPL-CCNC: 224 U/L
CO2 SERPL-SCNC: 30 MMOL/L
CREAT SERPL-MCNC: 1 MG/DL
DIFFERENTIAL METHOD: ABNORMAL
EOSINOPHIL # BLD AUTO: 0.2 K/UL
EOSINOPHIL NFR BLD: 0.9 %
ERYTHROCYTE [DISTWIDTH] IN BLOOD BY AUTOMATED COUNT: 14 %
EST. GFR  (AFRICAN AMERICAN): >60 ML/MIN/1.73 M^2
EST. GFR  (NON AFRICAN AMERICAN): >60 ML/MIN/1.73 M^2
GLUCOSE SERPL-MCNC: 193 MG/DL
HCT VFR BLD AUTO: 35.4 %
HGB BLD-MCNC: 11.7 G/DL
LYMPHOCYTES # BLD AUTO: 1.1 K/UL
LYMPHOCYTES NFR BLD: 6.5 %
MCH RBC QN AUTO: 31 PG
MCHC RBC AUTO-ENTMCNC: 33.1 G/DL
MCV RBC AUTO: 94 FL
MONOCYTES # BLD AUTO: 1.2 K/UL
MONOCYTES NFR BLD: 7.1 %
NEUTROPHILS # BLD AUTO: 14.8 K/UL
NEUTROPHILS NFR BLD: 85.3 %
PLATELET # BLD AUTO: 205 K/UL
PMV BLD AUTO: 9.9 FL
POCT GLUCOSE: 203 MG/DL (ref 70–110)
POCT GLUCOSE: 244 MG/DL (ref 70–110)
POCT GLUCOSE: 256 MG/DL (ref 70–110)
POCT GLUCOSE: 320 MG/DL (ref 70–110)
POCT GLUCOSE: 329 MG/DL (ref 70–110)
POTASSIUM SERPL-SCNC: 4 MMOL/L
PROT SERPL-MCNC: 6.7 G/DL
RBC # BLD AUTO: 3.78 M/UL
SODIUM SERPL-SCNC: 136 MMOL/L
WBC # BLD AUTO: 17.28 K/UL

## 2019-02-13 PROCEDURE — 25000003 PHARM REV CODE 250: Performed by: INTERNAL MEDICINE

## 2019-02-13 PROCEDURE — S0077 INJECTION, CLINDAMYCIN PHOSP: HCPCS | Performed by: INTERNAL MEDICINE

## 2019-02-13 PROCEDURE — 21400001 HC TELEMETRY ROOM

## 2019-02-13 PROCEDURE — 63600175 PHARM REV CODE 636 W HCPCS: Performed by: INTERNAL MEDICINE

## 2019-02-13 PROCEDURE — 96372 THER/PROPH/DIAG INJ SC/IM: CPT

## 2019-02-13 PROCEDURE — 85025 COMPLETE CBC W/AUTO DIFF WBC: CPT

## 2019-02-13 PROCEDURE — 82550 ASSAY OF CK (CPK): CPT

## 2019-02-13 PROCEDURE — 80053 COMPREHEN METABOLIC PANEL: CPT

## 2019-02-13 PROCEDURE — 36415 COLL VENOUS BLD VENIPUNCTURE: CPT

## 2019-02-13 PROCEDURE — 25000003 PHARM REV CODE 250: Performed by: NURSE PRACTITIONER

## 2019-02-13 RX ADMIN — INSULIN ASPART 4 UNITS: 100 INJECTION, SOLUTION INTRAVENOUS; SUBCUTANEOUS at 04:02

## 2019-02-13 RX ADMIN — LISINOPRIL 5 MG: 5 TABLET ORAL at 08:02

## 2019-02-13 RX ADMIN — INSULIN ASPART 8 UNITS: 100 INJECTION, SOLUTION INTRAVENOUS; SUBCUTANEOUS at 11:02

## 2019-02-13 RX ADMIN — FUROSEMIDE 20 MG: 20 TABLET ORAL at 08:02

## 2019-02-13 RX ADMIN — PANTOPRAZOLE SODIUM 40 MG: 40 TABLET, DELAYED RELEASE ORAL at 08:02

## 2019-02-13 RX ADMIN — CLINDAMYCIN IN 5 PERCENT DEXTROSE 900 MG: 18 INJECTION, SOLUTION INTRAVENOUS at 08:02

## 2019-02-13 RX ADMIN — ATORVASTATIN CALCIUM 40 MG: 40 TABLET, FILM COATED ORAL at 08:02

## 2019-02-13 RX ADMIN — OXYCODONE HYDROCHLORIDE AND ACETAMINOPHEN 1 TABLET: 10; 325 TABLET ORAL at 04:02

## 2019-02-13 RX ADMIN — OXYCODONE HYDROCHLORIDE AND ACETAMINOPHEN 1 TABLET: 10; 325 TABLET ORAL at 12:02

## 2019-02-13 RX ADMIN — LINEZOLID 600 MG: 600 INJECTION, SOLUTION INTRAVENOUS at 08:02

## 2019-02-13 RX ADMIN — CLINDAMYCIN IN 5 PERCENT DEXTROSE 900 MG: 18 INJECTION, SOLUTION INTRAVENOUS at 11:02

## 2019-02-13 RX ADMIN — ASPIRIN 81 MG: 81 TABLET, COATED ORAL at 08:02

## 2019-02-13 RX ADMIN — INSULIN ASPART 4 UNITS: 100 INJECTION, SOLUTION INTRAVENOUS; SUBCUTANEOUS at 09:02

## 2019-02-13 RX ADMIN — CLINDAMYCIN IN 5 PERCENT DEXTROSE 900 MG: 18 INJECTION, SOLUTION INTRAVENOUS at 03:02

## 2019-02-13 RX ADMIN — OXYCODONE HYDROCHLORIDE AND ACETAMINOPHEN 1 TABLET: 10; 325 TABLET ORAL at 08:02

## 2019-02-13 RX ADMIN — INSULIN ASPART 4 UNITS: 100 INJECTION, SOLUTION INTRAVENOUS; SUBCUTANEOUS at 05:02

## 2019-02-13 RX ADMIN — ENOXAPARIN SODIUM 40 MG: 100 INJECTION SUBCUTANEOUS at 04:02

## 2019-02-13 NOTE — ASSESSMENT & PLAN NOTE
- Non insulin-dependent type 2 diabetic  - Hold metformin and glipizide  - Continue moderate dose insulin sliding scale  - Increase Detemir to 20 units SQ BID  - Monitor blood sugars every 6 hr and make adjustments as needed

## 2019-02-13 NOTE — SUBJECTIVE & OBJECTIVE
Review of Systems   Constitutional: Positive for chills and fatigue. Negative for appetite change and fever.   HENT: Negative.  Negative for congestion, nosebleeds and sore throat.    Eyes: Negative.  Negative for visual disturbance.   Respiratory: Negative.  Negative for cough, shortness of breath and wheezing.    Cardiovascular: Positive for leg swelling. Negative for chest pain and palpitations.   Gastrointestinal: Negative.  Negative for abdominal pain, constipation, diarrhea, nausea and vomiting.   Endocrine: Negative.  Negative for polyuria.   Genitourinary: Negative.  Negative for dysuria, flank pain, frequency and urgency.   Musculoskeletal: Negative.  Negative for arthralgias, back pain and joint swelling.   Skin: Positive for color change (redness right lower leg). Negative for pallor and rash.   Allergic/Immunologic: Negative.  Negative for immunocompromised state.   Neurological: Negative.  Negative for dizziness, syncope, weakness, light-headedness, numbness and headaches.   Hematological: Negative.    Psychiatric/Behavioral: Negative.  Negative for confusion and hallucinations. The patient is not nervous/anxious.    All other systems reviewed and are negative.    Objective:     Vital Signs (Most Recent):  Temp: 98.2 °F (36.8 °C) (02/13/19 1557)  Pulse: 69 (02/13/19 1557)  Resp: 18 (02/13/19 1557)  BP: (!) 106/57 (02/13/19 1557)  SpO2: 95 % (02/13/19 1557) Vital Signs (24h Range):  Temp:  [97.6 °F (36.4 °C)-98.6 °F (37 °C)] 98.2 °F (36.8 °C)  Pulse:  [67-79] 69  Resp:  [16-19] 18  SpO2:  [92 %-96 %] 95 %  BP: (106-137)/(57-87) 106/57     Weight: (!) 160.8 kg (354 lb 8 oz)  Body mass index is 48.08 kg/m².    Intake/Output Summary (Last 24 hours) at 2/13/2019 1621  Last data filed at 2/13/2019 1220  Gross per 24 hour   Intake 5720 ml   Output 2550 ml   Net 3170 ml      Physical Exam   Constitutional: He is oriented to person, place, and time. No distress.   Morbidly obese  male, appears  uncomfortable, but in no distress.    HENT:   Head: Normocephalic and atraumatic.   Eyes: Conjunctivae and EOM are normal. Pupils are equal, round, and reactive to light. No scleral icterus.   Neck: Normal range of motion. Neck supple. No thyromegaly present.   Cardiovascular: Normal rate, regular rhythm, normal heart sounds and intact distal pulses.   No murmur heard.  Pulmonary/Chest: Effort normal and breath sounds normal. No stridor. No respiratory distress. He has no wheezes. He has no rales. He exhibits no tenderness.   Abdominal: Soft. Bowel sounds are normal. He exhibits no distension. There is no tenderness. There is no rebound and no guarding.   Obese abdomen   Musculoskeletal: Normal range of motion. He exhibits edema (moderate ). He exhibits no tenderness.   Lymphadenopathy:     He has no cervical adenopathy.   Neurological: He is alert and oriented to person, place, and time. No cranial nerve deficit. He exhibits normal muscle tone. Coordination normal.   Skin: Skin is warm and dry. Capillary refill takes 2 to 3 seconds. He is not diaphoretic. There is erythema (Erythema noted and marked on the right lower extremity, with small 1 cm x 1 cm discrete superficial ulcer on the right mid tib fib region.).   Psychiatric: He has a normal mood and affect. His behavior is normal. Judgment and thought content normal.   Nursing note and vitals reviewed.      Significant Labs:   CBC:   Recent Labs   Lab 02/12/19  0916 02/13/19  0431   WBC 19.83* 17.28*   HGB 12.1* 11.7*   HCT 37.4* 35.4*    205     CMP:   Recent Labs   Lab 02/12/19  0916 02/13/19  0431   * 136   K 4.0 4.0   CL 98 100   CO2 27 30*   * 193*   BUN 15 15   CREATININE 1.1 1.0   CALCIUM 9.2 9.2   PROT  --  6.7   ALBUMIN  --  2.0*   BILITOT  --  0.6   ALKPHOS  --  173*   AST  --  27   ALT  --  38   ANIONGAP 10 6*   EGFRNONAA >60 >60     POCT Glucose:   Recent Labs   Lab 02/12/19  2135 02/13/19  0536 02/13/19  1113   POCTGLUCOSE 256*  203* 329*       Significant Imaging: I have reviewed all pertinent imaging results/findings within the past 24 hours.

## 2019-02-13 NOTE — ASSESSMENT & PLAN NOTE
- Blood pressure on the low side systolic 90s upon admit -- improved but still borderline hypotensive  - Continue to hold antihypertensive agents  - Continue IV hydration  - Monitor closely and make adjustments as needed    02/12  - BP improved, will restart home medications including ACEI and Lasix    02/13  - BP stable. Continue ACEI and Lasix

## 2019-02-13 NOTE — ASSESSMENT & PLAN NOTE
- WBC count 22.72>>23.68>>20.05>>19.83>>17.28  - Cellulitic area marked in the ED  - Started IV Vancomycin and Zosyn but ID changed to Zyvox and Clindamycin for possible strep  - BC NGTD  - Spouse showed pictures of prior infection 2015, history of MRSA and Pseudomonas requiring three week hospitalization at that time  - Unable to obtain MRI of RLE due to weight limit  - CT RLE with contrast pending for suspected abscess -- negative for abscess formation

## 2019-02-13 NOTE — CONSULTS
"Consulted on this 65 y/o M patient for ulceration, cellulitis to RLE.  Patient is awake and alert, he c/o pain to RLE with movement.  He does have PMH significant for cellulitis to this same leg. He states he has compression "sleeves" that he used to wear, but they are too hard to get on, so he doesn't wear them anymore.  Due to acute cellulitis, compression therapy is contraindicated, however, I encouraged patient to be compliant with Physician ordered compression once infection clears.  Gauze and ACE dressing removed from RLE, saturated with serous fluid.  RLE 4+ edema, erythema, weeping serous fluid.  Several small Raised sites noted to shin that appear to be forming blisters. Small dry scabs noted to upper shin and lateral upper calf area.  Right medial shin extending to posterior calf has large area ([id number]) that appears to have superficial leakage of lymphatic fluid with overlying intact skin, and is weeping clear yellow fluid from that site, moderate to large amount.  Skin is translucent, edematous, and white underneath, but does not appear to be grossly purulent.  Patient denies history of lymphedema, however states it was discussed in the past.  No open ulcerations noted at this time.  Cleansed with bath wipes.  Thin layer critic aid paste moisture barrier applied to RLE to protect intact skin from weeping.  RLE then elevated on pillow with blue moisture wicking pad underneath, and left WIL.  Discussed care with patient.  Will follow, and if open ulcerations develop, will adjust care.    "

## 2019-02-13 NOTE — HPI
"64-year-old morbidly obese  male with PMH significant for non-insulin-dependent DM, HTN, prior right leg wound/cellulitis in 2015, who was admitted with another episode of right lower extremity cellulitis  Since admission -  CT scan of the lower extremity showed-  "Large amount of subcutaneous edema throughout the tibia and fibula, most prominent along the anterolateral and posterior portions.  No focal fluid collections are appreciated to indicate abscess.  No focal solid soft tissue mass.  "  Lab data showed-  CBC  Component      Latest Ref Rng & Units 2/12/2019 2/11/2019 2/10/2019 2/9/2019   WBC      3.90 - 12.70 K/uL 19.83 (H) 20.05 (H) 23.68 (H) 22.72 (H)     Previous consult note -2/10/2015-  60 y.o. who presented to the ED on 2/4 with reports of erythema, pain and swelling to right lower leg that started a day prior to admission. He was noted to be hypotensive in the ED with vitals of   B/P 80's systolic, fever 100.0 and he was admitted to the ICU for sepsis .   CT scan of the lower extremity showed subcutaneous edema/soft tissue swelling in the pelvis and bilateral lower extremities, right greater than left without evidence of abscess or necrotizing fasciitis.     Blood cultures has remained negative till date and leucocytosis is improving .I was asked to see him for antibiotic management "    He was seen at bedside with his wife .        "

## 2019-02-13 NOTE — ASSESSMENT & PLAN NOTE
Etiology is likely from strep -will use zyvox/clindamycin and will closely monitor for progression of the lesion.  He had prior episode of severe cellulitis and develop purulence , will check CK levels and will be vigilant about possible necrotizing fascitis

## 2019-02-13 NOTE — PLAN OF CARE
Problem: Adult Inpatient Plan of Care  Goal: Plan of Care Review  Outcome: Ongoing (interventions implemented as appropriate)  Fall prevention precautions maintained, pt remained free of falls throughout shift, call bell and personal items within reach, pt's pain adequately controlled by prn pain medication. 24 hour chart check completed. Will continue to monitor

## 2019-02-13 NOTE — PROGRESS NOTES
[facility] Hospital Medicine  Progress Note    Patient Name: Santhosh Goff  MRN: [id number]  Patient Class: IP- Inpatient   Admission Date: 2/9/2019  Length of Stay: 4 days  Attending Physician: Sheela Malcolm MD  Primary Care Provider: Jacek Mohr MD        Subjective:     Principal Problem:Cellulitis of right leg without foot    HPI:  Mr. Goff is a 64-year-old morbidly obese  male with PMH significant for non-insulin-dependent DM, HTN, prior right leg wound/cellulitis in 2015, was in his usual state of health until two days ago.  He started complaining of not feeling well, with worsening right lower extremity redness, progressing to fever today, chills, nausea, and increasing redness of the right lower extremity.  In the ED, he is afebrile, heart rate less than 90, blood pressure 95/68, WBC 75242, 0% bands, lactic acid 4.0, creatinine 1.9 (baseline less than 1.2).  Patient was ordered 2 L normal saline boluses (will add three more L to make it a total of 5 L boluses, 30 mL/kilogram).  Blood cultures were obtained.  Started on IV vancomycin, IV Zosyn.    Hospital Course:  Santhosh Goff is a 64 year old male admitted for Septic shock secondary to LLE. RLE US negative for DVT. Septic shock now resolved -- lactic acid has normalized. BP stabilized. WBC count remains elevated at 23.68K. BC NGTD. Remains afebrile. Will continue IV abx and IVF. MRI RLE pending to r/o abscess. As of 02/11 max temp of 100.7 overnight. Unable to obtain MRI due to weight limit of 350 lbs. Will get CT RLE to r/o abscess (weight limit 450 lbs). RLE cellulitis improving but has area of increased erythema and swelling to back of leg. Leukocytosis trending down, currently 20.05. DON resolved. Blood cultures remain with no growth to date.     As of 02/12 CT RLE negative for abscess formation. WBC count decreased slightly, now 19.83 from 20.05. Remains afebrile. Blood cultures NGTD. Will consult ID for  further recommendations.     As of 02/13 WBC count trending down, now 17.28K. DM uncontrolled. Uncontrolled prior to admit (A1C 9) but now complicated by infection. Will increase Detemir to 20 units BID and continue moderate dose SSI. ID consulted -- etiology is likely from strep. IV abx changed to IV Zyvox and Clindamycin. He has a had prior episode of severe cellulitis and develop purulence. Will be vigilant about possible necrotizing fascitis            Review of Systems   Constitutional: Positive for chills and fatigue. Negative for appetite change and fever.   HENT: Negative.  Negative for congestion, nosebleeds and sore throat.    Eyes: Negative.  Negative for visual disturbance.   Respiratory: Negative.  Negative for cough, shortness of breath and wheezing.    Cardiovascular: Positive for leg swelling. Negative for chest pain and palpitations.   Gastrointestinal: Negative.  Negative for abdominal pain, constipation, diarrhea, nausea and vomiting.   Endocrine: Negative.  Negative for polyuria.   Genitourinary: Negative.  Negative for dysuria, flank pain, frequency and urgency.   Musculoskeletal: Negative.  Negative for arthralgias, back pain and joint swelling.   Skin: Positive for color change (redness right lower leg). Negative for pallor and rash.   Allergic/Immunologic: Negative.  Negative for immunocompromised state.   Neurological: Negative.  Negative for dizziness, syncope, weakness, light-headedness, numbness and headaches.   Hematological: Negative.    Psychiatric/Behavioral: Negative.  Negative for confusion and hallucinations. The patient is not nervous/anxious.    All other systems reviewed and are negative.    Objective:     Vital Signs (Most Recent):  Temp: 98.2 °F (36.8 °C) (02/13/19 1557)  Pulse: 69 (02/13/19 1557)  Resp: 18 (02/13/19 1557)  BP: (!) 106/57 (02/13/19 1557)  SpO2: 95 % (02/13/19 1557) Vital Signs (24h Range):  Temp:  [97.6 °F (36.4 °C)-98.6 °F (37 °C)] 98.2 °F (36.8 °C)  Pulse:   [67-79] 69  Resp:  [16-19] 18  SpO2:  [92 %-96 %] 95 %  BP: (106-137)/(57-87) 106/57     Weight: (!) 160.8 kg (354 lb 8 oz)  Body mass index is 48.08 kg/m².    Intake/Output Summary (Last 24 hours) at 2/13/2019 1621  Last data filed at 2/13/2019 1220  Gross per 24 hour   Intake 5720 ml   Output 2550 ml   Net 3170 ml      Physical Exam   Constitutional: He is oriented to person, place, and time. No distress.   Morbidly obese  male, appears uncomfortable, but in no distress.    HENT:   Head: Normocephalic and atraumatic.   Eyes: Conjunctivae and EOM are normal. Pupils are equal, round, and reactive to light. No scleral icterus.   Neck: Normal range of motion. Neck supple. No thyromegaly present.   Cardiovascular: Normal rate, regular rhythm, normal heart sounds and intact distal pulses.   No murmur heard.  Pulmonary/Chest: Effort normal and breath sounds normal. No stridor. No respiratory distress. He has no wheezes. He has no rales. He exhibits no tenderness.   Abdominal: Soft. Bowel sounds are normal. He exhibits no distension. There is no tenderness. There is no rebound and no guarding.   Obese abdomen   Musculoskeletal: Normal range of motion. He exhibits edema (moderate ). He exhibits no tenderness.   Lymphadenopathy:     He has no cervical adenopathy.   Neurological: He is alert and oriented to person, place, and time. No cranial nerve deficit. He exhibits normal muscle tone. Coordination normal.   Skin: Skin is warm and dry. Capillary refill takes 2 to 3 seconds. He is not diaphoretic. There is erythema (Erythema noted and marked on the right lower extremity, with small 1 cm x 1 cm discrete superficial ulcer on the right mid tib fib region.).   Psychiatric: He has a normal mood and affect. His behavior is normal. Judgment and thought content normal.   Nursing note and vitals reviewed.      Significant Labs:   CBC:   Recent Labs   Lab 02/12/19  0916 02/13/19  0431   WBC 19.83* 17.28*   HGB 12.1* 11.7*    HCT 37.4* 35.4*    205     CMP:   Recent Labs   Lab 02/12/19  0916 02/13/19  0431   * 136   K 4.0 4.0   CL 98 100   CO2 27 30*   * 193*   BUN 15 15   CREATININE 1.1 1.0   CALCIUM 9.2 9.2   PROT  --  6.7   ALBUMIN  --  2.0*   BILITOT  --  0.6   ALKPHOS  --  173*   AST  --  27   ALT  --  38   ANIONGAP 10 6*   EGFRNONAA >60 >60     POCT Glucose:   Recent Labs   Lab 02/12/19  2135 02/13/19  0536 02/13/19  1113   POCTGLUCOSE 256* 203* 329*       Significant Imaging: I have reviewed all pertinent imaging results/findings within the past 24 hours.    Assessment/Plan:      * Cellulitis of right leg without foot    - WBC count 22.72>>23.68>>20.05>>19.83>>17.28  - Cellulitic area marked in the ED  - Started IV Vancomycin and Zosyn but ID changed to Zyvox and Clindamycin for possible strep  - BC NGTD  - Spouse showed pictures of prior infection 2015, history of MRSA and Pseudomonas requiring three week hospitalization at that time  - Unable to obtain MRI of RLE due to weight limit  - CT RLE with contrast pending for suspected abscess -- negative for abscess formation     Morbid obesity with BMI of 50.0-59.9, adult    - BMI 50  - Pt counseled on lifestyle modifications including diet and exercise     Hypertension associated with diabetes    - Blood pressure on the low side systolic 90s upon admit -- improved but still borderline hypotensive  - Continue to hold antihypertensive agents  - Continue IV hydration  - Monitor closely and make adjustments as needed    02/12  - BP improved, will restart home medications including ACEI and Lasix    02/13  - BP stable. Continue ACEI and Lasix     Type 2 diabetes mellitus without complication, without long-term current use of insulin    - Non insulin-dependent type 2 diabetic  - Hold metformin and glipizide  - Continue moderate dose insulin sliding scale  - Increase Detemir to 20 units SQ BID  - Monitor blood sugars every 6 hr and make adjustments as needed          VTE Risk Mitigation (From admission, onward)        Ordered     enoxaparin injection 40 mg  Daily      02/09/19 2059     IP VTE HIGH RISK PATIENT  Once      02/09/19 2052     Place sequential compression device  Until discontinued      02/09/19 2052              MUNIRA Villar  Department of Hospital Medicine   [facility]

## 2019-02-13 NOTE — CONSULTS
"[facility]  Infectious Disease  Consult Note    Patient Name: Santhosh Goff  MRN: [id number]  Admission Date: 2/9/2019  Hospital Length of Stay: 4 days  Attending Physician: Baudilio Bryant MD  Primary Care Provider: Jacek Mohr MD     Isolation Status: No active isolations    Patient information was obtained from patient, past medical records and ER records.      Consults  Assessment/Plan:     * Cellulitis of right leg without foot    Etiology is likely from strep -will use zyvox/clindamycin and will closely monitor for progression of the lesion.  He had prior episode of severe cellulitis and develop purulence , will check CK levels and will be vigilant about possible necrotizing fascitis          Sepsis    From severe cellulitis -will continue zyvox and clindamycin -needs close monitoring .     Hypertension associated with diabetes    2/12- will continue lisinopril      Type 2 diabetes mellitus without complication, without long-term current use of insulin    2/12- will continue insulin sliding scale          Thank you for your consult. I will follow-up with patient. Please contact us if you have any additional questions.    Pierre Petersen MD  Infectious Disease  [facility]    Subjective:     Principal Problem: Cellulitis of right leg without foot    HPI:   64-year-old morbidly obese  male with PMH significant for non-insulin-dependent DM, HTN, prior right leg wound/cellulitis in 2015, who was admitted with another episode of right lower extremity cellulitis  Since admission -  CT scan of the lower extremity showed-  "Large amount of subcutaneous edema throughout the tibia and fibula, most prominent along the anterolateral and posterior portions.  No focal fluid collections are appreciated to indicate abscess.  No focal solid soft tissue mass.  "  Lab data showed-  CBC  Component      Latest Ref Rng & Units 2/12/2019 2/11/2019 2/10/2019 2/9/2019   WBC      3.90 - " "12.70 K/uL 19.83 (H) 20.05 (H) 23.68 (H) 22.72 (H)     Previous consult note -2/10/2015-  60 y.o. who presented to the ED on 2/4 with reports of erythema, pain and swelling to right lower leg that started a day prior to admission. He was noted to be hypotensive in the ED with vitals of   B/P 80's systolic, fever 100.0 and he was admitted to the ICU for sepsis .   CT scan of the lower extremity showed subcutaneous edema/soft tissue swelling in the pelvis and bilateral lower extremities, right greater than left without evidence of abscess or necrotizing fasciitis.     Blood cultures has remained negative till date and leucocytosis is improving .I was asked to see him for antibiotic management "    He was seen at bedside with his wife .          Past Medical History:   Diagnosis Date    Arthritis     Diabetes mellitus, type 2 2014     x 1 week ago 10/10/2018    Hyperlipidemia     Hypertension     Morbid obesity with BMI of 45.0-49.9, adult     Staphylococcus aureus infection, multiple-resistant (MRSA) 2009       Past Surgical History:   Procedure Laterality Date    COLONOSCOPY N/A 6/3/2014    Performed [facility] ENDO    EVACUATION-CLOT Right 8/21/2014    Performed by Ildefonso Hern[facility]dez MD at [facility] OR    LUNG SURGERY      THORACOSCOPY Right 8/21/2014    Performed by [facility] at [facility] OR       Review of patient's allergies indicates:   Allergen Reactions    Cephalosporins Nausea And Vomiting       Medications:  Medications Prior to Admission   Medication Sig    atorvastatin (LIPITOR) 40 MG tablet TAKE ONE TABLET BY MOUTH ONCE DAILY    furosemide (LASIX) 20 MG tablet TAKE ONE TABLET BY MOUTH ONCE DAILY    glimepiride (AMARYL) 4 MG tablet TAKE ONE TABLET BY MOUTH ONCE DAILY WITH BREAKFAST    lisinopril (PRINIVIL,ZESTRIL) 5 MG tablet TAKE ONE TABLET BY MOUTH ONCE DAILY    metFORMIN (GLUCOPHAGE) 1000 MG tablet Take 1 tablet (1,000 mg total) by mouth 2 (two) times daily " with meals.    potassium chloride SA (K-DUR,KLOR-CON) 20 MEQ tablet TAKE ONE TABLET BY MOUTH ONCE DAILY    albuterol 90 mcg/actuation inhaler Inhale 2 puffs into the lungs every 6 (six) hours as needed for Wheezing. Rescue    aspirin (ECOTRIN) 81 MG EC tablet Take 81 mg by mouth once daily.    chromium-brindal berry (GARCINIA CAMBOGIA) 200-500 mcg-mg Tab Take by mouth.     Antibiotics (From admission, onward)    Start     Stop Route Frequency Ordered    02/12/19 2015  linezolid 600mg/300ml 600 mg/300 mL IVPB 600 mg      02/22 2014 IV Every 12 hours (non-standard times) 02/12/19 1910 02/12/19 2015  clindamycin 900 MG/50 ML D5W 900 mg/50 mL IVPB 900 mg      02/22 2014 IV Every 8 hours (non-standard times) 02/12/19 1911        Antifungals (From admission, onward)    None        Antivirals (From admission, onward)    None           Immunization History   Administered Date(s) Administered    Influenza - Quadrivalent 11/04/2014    Influenza - Quadrivalent - PF 02/12/2018, 11/16/2018    Influenza - Trivalent - PF (ADULT) 02/01/2014    Pneumococcal Polysaccharide - 23 Valent 04/01/2014    Tdap 02/12/2018       Family History     Problem Relation (Age of Onset)    Early death Father        Social History     Socioeconomic History    Marital status:      Spouse name: None    Number of children: 3    Years of education: None    Highest education level: None   Social Needs    Financial resource strain: None    Food insecurity - worry: None    Food insecurity - inability: None    Transportation needs - medical: None    Transportation needs - non-medical: None   Occupational History    Occupation:      Employer: [employer]   Tobacco Use    Smoking status: Never Smoker    Smokeless tobacco: Never Used   Substance and Sexual Activity    Alcohol use: No    Drug use: No    Sexual activity: Yes     Partners: Female   Other Topics Concern    None    Social History Narrative    None     Review of Systems   Constitutional: Positive for chills and fatigue. Negative for appetite change and fever.   HENT: Negative.  Negative for congestion, nosebleeds and sore throat.    Eyes: Negative.  Negative for visual disturbance.   Respiratory: Negative.  Negative for cough, shortness of breath and wheezing.    Cardiovascular: Positive for leg swelling. Negative for chest pain and palpitations.   Gastrointestinal: Negative.  Negative for abdominal pain, constipation, diarrhea, nausea and vomiting.   Endocrine: Negative.  Negative for polyuria.   Genitourinary: Negative.  Negative for dysuria, flank pain, frequency and urgency.   Musculoskeletal: Negative.  Negative for arthralgias, back pain and joint swelling.   Skin: Positive for color change (redness right lower leg). Negative for pallor and rash.   Allergic/Immunologic: Negative.  Negative for immunocompromised state.   Neurological: Negative.  Negative for dizziness, syncope, weakness, light-headedness, numbness and headaches.   Hematological: Negative.    Psychiatric/Behavioral: Negative.  Negative for confusion and hallucinations. The patient is not nervous/anxious.    All other systems reviewed and are negative.    Objective:     Vital Signs (Most Recent):  Temp: 97.6 °F (36.4 °C) (02/13/19 0732)  Pulse: 74 (02/13/19 0902)  Resp: 18 (02/13/19 0732)  BP: 137/87 (02/13/19 0732)  SpO2: 95 % (02/13/19 0732) Vital Signs (24h Range):  Temp:  [97.6 °F (36.4 °C)-98.6 °F (37 °C)] 97.6 °F (36.4 °C)  Pulse:  [67-84] 74  Resp:  [16-19] 18  SpO2:  [92 %-95 %] 95 %  BP: (116-137)/(57-87) 137/87     Weight: (!) 164.7 kg (363 lb)  Body mass index is 49.23 kg/m².    Estimated Creatinine Clearance: 117.1 mL/min (based on SCr of 1 mg/dL).    Physical Exam   Constitutional: He is oriented to person, place, and time. No distress.   Morbidly obese  male, appears uncomfortable, but in no distress.    HENT:   Head: Normocephalic  and atraumatic.   Eyes: Conjunctivae and EOM are normal. Pupils are equal, round, and reactive to light. No scleral icterus.   Neck: Normal range of motion. Neck supple. No thyromegaly present.   Cardiovascular: Normal rate, regular rhythm, normal heart sounds and intact distal pulses.   No murmur heard.  Pulmonary/Chest: Effort normal and breath sounds normal. No stridor. No respiratory distress. He has no wheezes. He has no rales. He exhibits no tenderness.   Abdominal: Soft. Bowel sounds are normal. He exhibits no distension. There is no tenderness. There is no rebound and no guarding.   Obese abdomen   Musculoskeletal: Normal range of motion. He exhibits edema (mild). He exhibits no tenderness.   Lymphadenopathy:     He has no cervical adenopathy.   Neurological: He is alert and oriented to person, place, and time. No cranial nerve deficit. He exhibits normal muscle tone. Coordination normal.   Skin: Skin is warm and dry. Capillary refill takes 2 to 3 seconds. He is not diaphoretic. There is erythema (Erythema noted and marked on the right lower extremity, with small 1 cm x 1 cm discrete superficial ulcer on the right mid tib fib region.).   Psychiatric: He has a normal mood and affect. His behavior is normal. Judgment and thought content normal.   Nursing note and vitals reviewed.                      -Previous imaging - in 2015        Significant Labs:   Blood Culture:   Recent Labs   Lab 02/09/19  1848 02/09/19  1904   LABBLOO No Growth to date  No Growth to date  No Growth to date  No Growth to date No Growth to date  No Growth to date  No Growth to date  No Growth to date     CBC:   Recent Labs   Lab 02/11/19  1405 02/12/19  0916 02/13/19  0431   WBC 20.05* 19.83* 17.28*   HGB 12.4* 12.1* 11.7*   HCT 37.4* 37.4* 35.4*    214 205     All pertinent labs within the past 24 hours have been reviewed.    Significant Imaging: I have reviewed all pertinent imaging results/findings within the past 24  hours.

## 2019-02-13 NOTE — PLAN OF CARE
Problem: Adult Inpatient Plan of Care  Goal: Plan of Care Review  Outcome: Ongoing (interventions implemented as appropriate)  Patient remains free of falls and safety precautions maintained. Afebrile. Pain controlled. Wound care per order. Encouraged pt to elevate RLE. NSR. IV abx per order. Monitoring BG. Will continue to monitor. 12 hour chart check.

## 2019-02-13 NOTE — SUBJECTIVE & OBJECTIVE
Past Medical History:   Diagnosis Date    Arthritis     Diabetes mellitus, type 2 2014     x 1 week ago 10/10/2018    Hyperlipidemia     Hypertension     Morbid obesity with BMI of 45.0-49.9, adult     Staphylococcus aureus infection, multiple-resistant (MRSA) 2009       Past Surgical History:   Procedure Laterality Date    COLONOSCOPY N/A 6/3/2014    [facility]    EVACUATION-CLOT Right 8/21/2014    Per[facility] at [facility] OR    LUNG SURGERY      THORACOSCOPY Right 8/21/2014    Performed by [facility] at [facility] OR       Review of patient's allergies indicates:   Allergen Reactions    Cephalosporins Nausea And Vomiting       Medications:  Medications Prior to Admission   Medication Sig    atorvastatin (LIPITOR) 40 MG tablet TAKE ONE TABLET BY MOUTH ONCE DAILY    furosemide (LASIX) 20 MG tablet TAKE ONE TABLET BY MOUTH ONCE DAILY    glimepiride (AMARYL) 4 MG tablet TAKE ONE TABLET BY MOUTH ONCE DAILY WITH BREAKFAST    lisinopril (PRINIVIL,ZESTRIL) 5 MG tablet TAKE ONE TABLET BY MOUTH ONCE DAILY    metFORMIN (GLUCOPHAGE) 1000 MG tablet Take 1 tablet (1,000 mg total) by mouth 2 (two) times daily with meals.    potassium chloride SA (K-DUR,KLOR-CON) 20 MEQ tablet TAKE ONE TABLET BY MOUTH ONCE DAILY    albuterol 90 mcg/actuation inhaler Inhale 2 puffs into the lungs every 6 (six) hours as needed for Wheezing. Rescue    aspirin (ECOTRIN) 81 MG EC tablet Take 81 mg by mouth once daily.    chromium-brindal berry (GARCINIA CAMBOGIA) 200-500 mcg-mg Tab Take by mouth.     Antibiotics (From admission, onward)    Start     Stop Route Frequency Ordered    02/12/19 2015  linezolid 600mg/300ml 600 mg/300 mL IVPB 600 mg      02/22 2014 IV Every 12 hours (non-standard times) 02/12/19 1910 02/12/19 2015  clindamycin 900 MG/50 ML D5W 900 mg/50 mL IVPB 900 mg      02/22 2014 IV Every 8 hours (non-standard times) 02/12/19 1911        Antifungals  (From admission, onward)    None        Antivirals (From admission, onward)    None           Immunization History   Administered Date(s) Administered    Influenza - Quadrivalent 11/04/2014    Influenza - Quadrivalent - PF 02/12/2018, 11/16/2018    Influenza - Trivalent - PF (ADULT) 02/01/2014    Pneumococcal Polysaccharide - 23 Valent 04/01/2014    Tdap 02/12/2018       Family History     Problem Relation (Age of Onset)    Early death Father        Social History     Socioeconomic History    Marital status:      Spouse name: None    Number of children: 3    Years of education: None    Highest education level: None   Social Needs    Financial resource strain: None    Food insecurity - worry: None    Food insecurity - inability: None    Transportation needs - medical: None    Transportation needs - non-medical: None   Occupational History    Occupation:      Employer: [employer]   Tobacco Use    Smoking status: Never Smoker    Smokeless tobacco: Never Used   Substance and Sexual Activity    Alcohol use: No    Drug use: No    Sexual activity: Yes     Partners: Female   Other Topics Concern    None   Social History Narrative    None     Review of Systems   Constitutional: Positive for chills and fatigue. Negative for appetite change and fever.   HENT: Negative.  Negative for congestion, nosebleeds and sore throat.    Eyes: Negative.  Negative for visual disturbance.   Respiratory: Negative.  Negative for cough, shortness of breath and wheezing.    Cardiovascular: Positive for leg swelling. Negative for chest pain and palpitations.   Gastrointestinal: Negative.  Negative for abdominal pain, constipation, diarrhea, nausea and vomiting.   Endocrine: Negative.  Negative for polyuria.   Genitourinary: Negative.  Negative for dysuria, flank pain, frequency and urgency.   Musculoskeletal: Negative.  Negative for arthralgias, back pain and joint  swelling.   Skin: Positive for color change (redness right lower leg). Negative for pallor and rash.   Allergic/Immunologic: Negative.  Negative for immunocompromised state.   Neurological: Negative.  Negative for dizziness, syncope, weakness, light-headedness, numbness and headaches.   Hematological: Negative.    Psychiatric/Behavioral: Negative.  Negative for confusion and hallucinations. The patient is not nervous/anxious.    All other systems reviewed and are negative.    Objective:     Vital Signs (Most Recent):  Temp: 97.6 °F (36.4 °C) (02/13/19 0732)  Pulse: 74 (02/13/19 0902)  Resp: 18 (02/13/19 0732)  BP: 137/87 (02/13/19 0732)  SpO2: 95 % (02/13/19 0732) Vital Signs (24h Range):  Temp:  [97.6 °F (36.4 °C)-98.6 °F (37 °C)] 97.6 °F (36.4 °C)  Pulse:  [67-84] 74  Resp:  [16-19] 18  SpO2:  [92 %-95 %] 95 %  BP: (116-137)/(57-87) 137/87     Weight: (!) 164.7 kg (363 lb)  Body mass index is 49.23 kg/m².    Estimated Creatinine Clearance: 117.1 mL/min (based on SCr of 1 mg/dL).    Physical Exam   Constitutional: He is oriented to person, place, and time. No distress.   Morbidly obese  male, appears uncomfortable, but in no distress.    HENT:   Head: Normocephalic and atraumatic.   Eyes: Conjunctivae and EOM are normal. Pupils are equal, round, and reactive to light. No scleral icterus.   Neck: Normal range of motion. Neck supple. No thyromegaly present.   Cardiovascular: Normal rate, regular rhythm, normal heart sounds and intact distal pulses.   No murmur heard.  Pulmonary/Chest: Effort normal and breath sounds normal. No stridor. No respiratory distress. He has no wheezes. He has no rales. He exhibits no tenderness.   Abdominal: Soft. Bowel sounds are normal. He exhibits no distension. There is no tenderness. There is no rebound and no guarding.   Obese abdomen   Musculoskeletal: Normal range of motion. He exhibits edema (mild). He exhibits no tenderness.   Lymphadenopathy:     He has no cervical  adenopathy.   Neurological: He is alert and oriented to person, place, and time. No cranial nerve deficit. He exhibits normal muscle tone. Coordination normal.   Skin: Skin is warm and dry. Capillary refill takes 2 to 3 seconds. He is not diaphoretic. There is erythema (Erythema noted and marked on the right lower extremity, with small 1 cm x 1 cm discrete superficial ulcer on the right mid tib fib region.).   Psychiatric: He has a normal mood and affect. His behavior is normal. Judgment and thought content normal.   Nursing note and vitals reviewed.                      -Previous imaging - in 2015        Significant Labs:   Blood Culture:   Recent Labs   Lab 02/09/19  1848 02/09/19  1904   LABBLOO No Growth to date  No Growth to date  No Growth to date  No Growth to date No Growth to date  No Growth to date  No Growth to date  No Growth to date     CBC:   Recent Labs   Lab 02/11/19  1405 02/12/19  0916 02/13/19  0431   WBC 20.05* 19.83* 17.28*   HGB 12.4* 12.1* 11.7*   HCT 37.4* 37.4* 35.4*    214 205     All pertinent labs within the past 24 hours have been reviewed.    Significant Imaging: I have reviewed all pertinent imaging results/findings within the past 24 hours.

## 2019-02-14 LAB
ANION GAP SERPL CALC-SCNC: 8 MMOL/L
BASOPHILS # BLD AUTO: 0.03 K/UL
BASOPHILS NFR BLD: 0.2 %
BUN SERPL-MCNC: 16 MG/DL
CALCIUM SERPL-MCNC: 8.9 MG/DL
CHLORIDE SERPL-SCNC: 99 MMOL/L
CO2 SERPL-SCNC: 30 MMOL/L
CREAT SERPL-MCNC: 1.1 MG/DL
DIFFERENTIAL METHOD: ABNORMAL
EOSINOPHIL # BLD AUTO: 0.2 K/UL
EOSINOPHIL NFR BLD: 1.6 %
ERYTHROCYTE [DISTWIDTH] IN BLOOD BY AUTOMATED COUNT: 14.4 %
EST. GFR  (AFRICAN AMERICAN): >60 ML/MIN/1.73 M^2
EST. GFR  (NON AFRICAN AMERICAN): >60 ML/MIN/1.73 M^2
GLUCOSE SERPL-MCNC: 213 MG/DL
HCT VFR BLD AUTO: 34.8 %
HGB BLD-MCNC: 11.6 G/DL
LYMPHOCYTES # BLD AUTO: 1.1 K/UL
LYMPHOCYTES NFR BLD: 8.8 %
MCH RBC QN AUTO: 31.2 PG
MCHC RBC AUTO-ENTMCNC: 33.3 G/DL
MCV RBC AUTO: 94 FL
MONOCYTES # BLD AUTO: 1.4 K/UL
MONOCYTES NFR BLD: 10.7 %
NEUTROPHILS # BLD AUTO: 10 K/UL
NEUTROPHILS NFR BLD: 78.7 %
PLATELET # BLD AUTO: 230 K/UL
PMV BLD AUTO: 10.2 FL
POCT GLUCOSE: 219 MG/DL (ref 70–110)
POCT GLUCOSE: 251 MG/DL (ref 70–110)
POCT GLUCOSE: 291 MG/DL (ref 70–110)
POTASSIUM SERPL-SCNC: 4 MMOL/L
RBC # BLD AUTO: 3.72 M/UL
SODIUM SERPL-SCNC: 137 MMOL/L
WBC # BLD AUTO: 12.75 K/UL

## 2019-02-14 PROCEDURE — 99900037 HC PT THERAPY SCREENING (STAT)

## 2019-02-14 PROCEDURE — 80048 BASIC METABOLIC PNL TOTAL CA: CPT

## 2019-02-14 PROCEDURE — 63600175 PHARM REV CODE 636 W HCPCS: Performed by: NURSE PRACTITIONER

## 2019-02-14 PROCEDURE — 63600175 PHARM REV CODE 636 W HCPCS: Performed by: INTERNAL MEDICINE

## 2019-02-14 PROCEDURE — 25000003 PHARM REV CODE 250: Performed by: INTERNAL MEDICINE

## 2019-02-14 PROCEDURE — 85025 COMPLETE CBC W/AUTO DIFF WBC: CPT

## 2019-02-14 PROCEDURE — 25000003 PHARM REV CODE 250: Performed by: NURSE PRACTITIONER

## 2019-02-14 PROCEDURE — 96372 THER/PROPH/DIAG INJ SC/IM: CPT

## 2019-02-14 PROCEDURE — 21400001 HC TELEMETRY ROOM

## 2019-02-14 PROCEDURE — S0077 INJECTION, CLINDAMYCIN PHOSP: HCPCS | Performed by: INTERNAL MEDICINE

## 2019-02-14 PROCEDURE — 36415 COLL VENOUS BLD VENIPUNCTURE: CPT

## 2019-02-14 RX ORDER — SODIUM CHLORIDE 9 MG/ML
INJECTION, SOLUTION INTRAVENOUS ONCE
Status: COMPLETED | OUTPATIENT
Start: 2019-02-14 | End: 2019-02-14

## 2019-02-14 RX ADMIN — LINEZOLID 600 MG: 600 INJECTION, SOLUTION INTRAVENOUS at 08:02

## 2019-02-14 RX ADMIN — CLINDAMYCIN IN 5 PERCENT DEXTROSE 900 MG: 18 INJECTION, SOLUTION INTRAVENOUS at 05:02

## 2019-02-14 RX ADMIN — SODIUM CHLORIDE: 0.9 INJECTION, SOLUTION INTRAVENOUS at 05:02

## 2019-02-14 RX ADMIN — ASPIRIN 81 MG: 81 TABLET, COATED ORAL at 08:02

## 2019-02-14 RX ADMIN — MORPHINE SULFATE 4 MG: 4 INJECTION INTRAVENOUS at 08:02

## 2019-02-14 RX ADMIN — LINEZOLID 600 MG: 600 INJECTION, SOLUTION INTRAVENOUS at 07:02

## 2019-02-14 RX ADMIN — INSULIN ASPART 3 UNITS: 100 INJECTION, SOLUTION INTRAVENOUS; SUBCUTANEOUS at 09:02

## 2019-02-14 RX ADMIN — CLINDAMYCIN IN 5 PERCENT DEXTROSE 900 MG: 18 INJECTION, SOLUTION INTRAVENOUS at 12:02

## 2019-02-14 RX ADMIN — INSULIN ASPART 4 UNITS: 100 INJECTION, SOLUTION INTRAVENOUS; SUBCUTANEOUS at 05:02

## 2019-02-14 RX ADMIN — PANTOPRAZOLE SODIUM 40 MG: 40 TABLET, DELAYED RELEASE ORAL at 08:02

## 2019-02-14 RX ADMIN — ATORVASTATIN CALCIUM 40 MG: 40 TABLET, FILM COATED ORAL at 08:02

## 2019-02-14 RX ADMIN — ENOXAPARIN SODIUM 40 MG: 100 INJECTION SUBCUTANEOUS at 05:02

## 2019-02-14 RX ADMIN — LISINOPRIL 5 MG: 5 TABLET ORAL at 08:02

## 2019-02-14 RX ADMIN — CLINDAMYCIN IN 5 PERCENT DEXTROSE 900 MG: 18 INJECTION, SOLUTION INTRAVENOUS at 07:02

## 2019-02-14 RX ADMIN — FUROSEMIDE 20 MG: 20 TABLET ORAL at 08:02

## 2019-02-14 NOTE — PROGRESS NOTES
[facility] Hospital Medicine  Progress Note    Patient Name: Santhosh Goff  MRN: [id number]  Patient Class: IP- Inpatient   Admission Date: 2/9/2019  Length of Stay: 5 days  Attending Physician: Sheela Malcolm MD  Primary Care Provider: Jacek Mohr MD        Subjective:     Principal Problem:Cellulitis of right leg without foot    HPI:  Mr. Goff is a 64-year-old morbidly obese  male with PMH significant for non-insulin-dependent DM, HTN, prior right leg wound/cellulitis in 2015, was in his usual state of health until two days ago.  He started complaining of not feeling well, with worsening right lower extremity redness, progressing to fever today, chills, nausea, and increasing redness of the right lower extremity.  In the ED, he is afebrile, heart rate less than 90, blood pressure 95/68, WBC 34192, 0% bands, lactic acid 4.0, creatinine 1.9 (baseline less than 1.2).  Patient was ordered 2 L normal saline boluses (will add three more L to make it a total of 5 L boluses, 30 mL/kilogram).  Blood cultures were obtained.  Started on IV vancomycin, IV Zosyn.    Hospital Course:  Santhosh Goff is a 64 year old male admitted for Septic shock secondary to LLE. RLE US negative for DVT. Septic shock now resolved -- lactic acid has normalized. BP stabilized. WBC count remains elevated at 23.68K. BC NGTD. Remains afebrile. Will continue IV abx and IVF. MRI RLE pending to r/o abscess. As of 02/11 max temp of 100.7 overnight. Unable to obtain MRI due to weight limit of 350 lbs. Will get CT RLE to r/o abscess (weight limit 450 lbs). RLE cellulitis improving but has area of increased erythema and swelling to back of leg. Leukocytosis trending down, currently 20.05. DON resolved. Blood cultures remain with no growth to date.     As of 02/12 CT RLE negative for abscess formation. WBC count decreased slightly, now 19.83 from 20.05. Remains afebrile. Blood cultures NGTD. Will consult ID for  further recommendations.     As of 02/13 WBC count trending down, now 17.28K. DM uncontrolled. Uncontrolled prior to admit (A1C 9) but now complicated by infection. Will increase Detemir to 20 units BID and continue moderate dose SSI. ID consulted -- etiology is likely from strep. IV abx changed to IV Zyvox and Clindamycin. He has a had prior episode of severe cellulitis and develop purulence. Will be vigilant about possible necrotizing fascitis    As of 02/14 WBC count trending down, now 12.75K from 17.28K. Blood cultures remain no growth to date. Remains afebrile. LLE with increasing yellow drainage. LLE continues with erythema and 2+ pitting edema. Wound care following. CT RLE with contrast negative for abscess formation and US negative for DVT. ID following. Continue IV Zyvox and Clindamycin at this time. Pt expresses concern about being discharged too early but assured patient that he will be discharged when medically stable     Review of Systems   Constitutional: Positive for chills and fatigue. Negative for appetite change and fever.   HENT: Negative.  Negative for congestion, nosebleeds and sore throat.    Eyes: Negative.  Negative for visual disturbance.   Respiratory: Negative.  Negative for cough, shortness of breath and wheezing.    Cardiovascular: Positive for leg swelling. Negative for chest pain and palpitations.   Gastrointestinal: Negative.  Negative for abdominal pain, constipation, diarrhea, nausea and vomiting.   Endocrine: Negative.  Negative for polyuria.   Genitourinary: Negative.  Negative for dysuria, flank pain, frequency and urgency.   Musculoskeletal: Negative.  Negative for arthralgias, back pain and joint swelling.   Skin: Positive for color change (redness right lower leg). Negative for pallor and rash.   Allergic/Immunologic: Negative.  Negative for immunocompromised state.   Neurological: Negative.  Negative for dizziness, syncope, weakness, light-headedness, numbness and headaches.    Hematological: Negative.    Psychiatric/Behavioral: Negative.  Negative for confusion and hallucinations. The patient is not nervous/anxious.    All other systems reviewed and are negative.    Objective:     Vital Signs (Most Recent):  Temp: 98.1 °F (36.7 °C) (02/14/19 1515)  Pulse: 80 (02/14/19 1515)  Resp: 18 (02/14/19 1515)  BP: 134/78 (02/14/19 1515)  SpO2: 98 % (02/14/19 1515) Vital Signs (24h Range):  Temp:  [98 °F (36.7 °C)-98.7 °F (37.1 °C)] 98.1 °F (36.7 °C)  Pulse:  [72-81] 80  Resp:  [16-20] 18  SpO2:  [92 %-99 %] 98 %  BP: (112-134)/(54-78) 134/78     Weight: (!) 160.8 kg (354 lb 8 oz)  Body mass index is 48.08 kg/m².    Intake/Output Summary (Last 24 hours) at 2/14/2019 1704  Last data filed at 2/14/2019 1600  Gross per 24 hour   Intake 1354 ml   Output 4825 ml   Net -3471 ml      Physical Exam   Constitutional: He is oriented to person, place, and time. No distress.   Morbidly obese  male, appears uncomfortable, but in no distress.    HENT:   Head: Normocephalic and atraumatic.   Eyes: Conjunctivae and EOM are normal. Pupils are equal, round, and reactive to light. No scleral icterus.   Neck: Normal range of motion. Neck supple. No thyromegaly present.   Cardiovascular: Normal rate, regular rhythm, normal heart sounds and intact distal pulses.   No murmur heard.  Pulmonary/Chest: Effort normal and breath sounds normal. No stridor. No respiratory distress. He has no wheezes. He has no rales. He exhibits no tenderness.   Abdominal: Soft. Bowel sounds are normal. He exhibits no distension. There is no tenderness. There is no rebound and no guarding.   Obese abdomen   Musculoskeletal: Normal range of motion. He exhibits edema (2+ pitting edema to RLE). He exhibits no tenderness.   Lymphadenopathy:     He has no cervical adenopathy.   Neurological: He is alert and oriented to person, place, and time. No cranial nerve deficit. He exhibits normal muscle tone. Coordination normal.   Skin: Skin is  warm and dry. Capillary refill takes 2 to 3 seconds. He is not diaphoretic. There is erythema (Erythema noted and marked on the right lower extremity, with small 1 cm x 1 cm discrete superficial ulcer on the right mid tib fib region.).   Skin sloughing noted   Psychiatric: He has a normal mood and affect. His behavior is normal. Judgment and thought content normal.   Nursing note and vitals reviewed.      Significant Labs:   BMP:   Recent Labs   Lab 02/14/19  0428   *      K 4.0   CL 99   CO2 30*   BUN 16   CREATININE 1.1   CALCIUM 8.9     CBC:   Recent Labs   Lab 02/13/19  0431 02/14/19  0428   WBC 17.28* 12.75*   HGB 11.7* 11.6*   HCT 35.4* 34.8*    230     POCT Glucose:   Recent Labs   Lab 02/13/19  1620 02/13/19  2123 02/14/19  0525   POCTGLUCOSE 244* 320* 219*       Significant Imaging: I have reviewed all pertinent imaging results/findings within the past 24 hours.    Assessment/Plan:      * Cellulitis of right leg without foot    - WBC count 22.72>>23.68>>20.05>>19.83>>17.28>>12.75  - Cellulitic area marked in the ED  - Started IV Vancomycin and Zosyn but ID changed to Zyvox and Clindamycin for possible strep  - BC NGTD  - Spouse showed pictures of prior infection 2015, history of MRSA and Pseudomonas requiring three week hospitalization at that time  - Unable to obtain MRI of RLE due to weight limit  - CT RLE with contrast -- negative for abscess formation     Morbid obesity with BMI of 50.0-59.9, adult    - BMI 50  - Pt counseled on lifestyle modifications including diet and exercise     Hypertension associated with diabetes    - Blood pressure on the low side systolic 90s upon admit -- improved but still borderline hypotensive  - Continue to hold antihypertensive agents  - Continue IV hydration  - Monitor closely and make adjustments as needed    02/12  - BP improved, will restart home medications including ACEI and Lasix    02/13  - BP stable. Continue ACEI and Lasix    02/14  -  Continue ACEI. Hold Lasix for now. Will gently hydrate due to infection     Type 2 diabetes mellitus without complication, without long-term current use of insulin    - Non insulin-dependent type 2 diabetic  - Hold metformin and glipizide  - Continue moderate dose insulin sliding scale  - Continue Detemir to 20 units SQ BID  - Monitor blood sugars every 6 hr and make adjustments as needed         VTE Risk Mitigation (From admission, onward)        Ordered     enoxaparin injection 40 mg  Daily      02/09/19 2059     IP VTE HIGH RISK PATIENT  Once      02/09/19 2052     Place sequential compression device  Until discontinued      02/09/19 2052              MUNIRA Villar  Department of Hospital Medicine   [facility]

## 2019-02-14 NOTE — PT/OT/SLP PROGRESS
Physical Therapy      Patient Name:  Santhosh Goff   MRN:  [id number]    1130 P.T. COMPLETED CHART REVIEW. PT MET IN  AND COMPLAINS OF 12/10 PAIN AND UNABLE TO TOLERATE EVAL AT THIS TIME. P.T. TO COMPLETE EVAL NEXT VISIT. THANK YOU   Mailna Gupta, PT,2/14/2019

## 2019-02-14 NOTE — ASSESSMENT & PLAN NOTE
Etiology is likely from strep -will use zyvox/clindamycin and will closely monitor for progression of the lesion.  He had prior episode of severe cellulitis and develop purulence , will check CK levels and will be vigilant about possible necrotizing fascitis         2/13- will continue zyvox/clindamycin and will closely follow wbc to adjust therapy

## 2019-02-14 NOTE — PLAN OF CARE
02/14/19 1544   Discharge Reassessment   Assessment Type Discharge Planning Reassessment   Provided patient/caregiver education on the expected discharge date and the discharge plan Yes   Do you have any problems affording any of your prescribed medications? No   Discharge Plan A Home;Home with family   Discharge Plan B Home;Home with family;Home Health   DME Needed Upon Discharge  none   Patient choice form signed by patient/caregiver N/A   Anticipated Discharge Disposition Home-Health   Can the patient answer the patient profile reliably? Yes, cognitively intact   How does the patient rate their overall health at the present time? Good   Describe the patient's ability to walk at the present time. Minor restrictions or changes   How often would a person be available to care for the patient? Whenever needed   Number of comorbid conditions (as recorded on the chart) Five or more

## 2019-02-14 NOTE — PLAN OF CARE
Problem: Adult Inpatient Plan of Care  Goal: Plan of Care Review  Outcome: Ongoing (interventions implemented as appropriate)  Remains free from injury. Denies pain. POCT performed with coverage given. NSR on heart monitor. ABx administered as ordered. Vital signs stable. Chart reviewed. Will continue to monitor.

## 2019-02-14 NOTE — PROGRESS NOTES
"[facility]  Infectious Disease  Progress Note    Patient Name: Santhosh Goff  MRN: [id number]  Admission Date: 2/9/2019  Length of Stay: 5 days  Attending Physician: Sheela Malcolm MD  Primary Care Provider: Jacek Mohr MD    Isolation Status: No active isolations  Assessment/Plan:      * Cellulitis of right leg without foot    Etiology is likely from strep -will use zyvox/clindamycin and will closely monitor for progression of the lesion.  He had prior episode of severe cellulitis and develop purulence , will check CK levels and will be vigilant about possible necrotizing fascitis         2/13- will continue zyvox/clindamycin and will closely follow wbc to adjust therapy      Morbid obesity with BMI of 50.0-59.9, adult    Out patient follow up for structured weight loss program     Type 2 diabetes mellitus without complication, without long-term current use of insulin    2/12- will continue insulin sliding scale          Anticipated Disposition:     Thank you for your consult. I will follow-up with patient. Please contact us if you have any additional questions.    iPerre Petersen MD  Infectious Disease  [facility]    Subjective:     Principal Problem:Cellulitis of right leg without foot    HPI:   64-year-old morbidly obese  male with PMH significant for non-insulin-dependent DM, HTN, prior right leg wound/cellulitis in 2015, who was admitted with another episode of right lower extremity cellulitis  Since admission -  CT scan of the lower extremity showed-  "Large amount of subcutaneous edema throughout the tibia and fibula, most prominent along the anterolateral and posterior portions.  No focal fluid collections are appreciated to indicate abscess.  No focal solid soft tissue mass.  "  Lab data showed-  CBC  Component      Latest Ref Rng & Units 2/12/2019 2/11/2019 2/10/2019 2/9/2019   WBC      3.90 - 12.70 K/uL 19.83 (H) 20.05 (H) 23.68 (H) 22.72 (H)     Previous " "consult note -2/10/2015-  60 y.o. who presented to the ED on 2/4 with reports of erythema, pain and swelling to right lower leg that started a day prior to admission. He was noted to be hypotensive in the ED with vitals of   B/P 80's systolic, fever 100.0 and he was admitted to the ICU for sepsis .   CT scan of the lower extremity showed subcutaneous edema/soft tissue swelling in the pelvis and bilateral lower extremities, right greater than left without evidence of abscess or necrotizing fasciitis.     Blood cultures has remained negative till date and leucocytosis is improving .I was asked to see him for antibiotic management "    He was seen at bedside with his wife .        Interval History:   64 year old man with left lower  Cellulitis .  WBC IS 17.28  He remains afebrile.  Review of Systems   Constitutional: Negative for appetite change, chills, fatigue and fever.   HENT: Negative.  Negative for congestion, nosebleeds and sore throat.    Eyes: Negative.  Negative for visual disturbance.   Respiratory: Negative.  Negative for cough, shortness of breath and wheezing.    Cardiovascular: Positive for leg swelling. Negative for chest pain and palpitations.   Gastrointestinal: Negative.  Negative for abdominal pain, constipation, diarrhea, nausea and vomiting.   Endocrine: Negative.  Negative for polyuria.   Genitourinary: Negative.  Negative for dysuria, flank pain, frequency and urgency.   Musculoskeletal: Negative.  Negative for arthralgias, back pain and joint swelling.   Skin: Positive for color change (redness right lower leg). Negative for pallor and rash.   Allergic/Immunologic: Negative.  Negative for immunocompromised state.   Neurological: Negative.  Negative for dizziness, syncope, weakness, light-headedness, numbness and headaches.   Hematological: Negative.    Psychiatric/Behavioral: Negative.  Negative for confusion and hallucinations. The patient is not nervous/anxious.    All other systems reviewed " and are negative.    Objective:     Vital Signs (Most Recent):  Temp: 98.5 °F (36.9 °C) (02/14/19 0810)  Pulse: 73 (02/14/19 0810)  Resp: 18 (02/14/19 0810)  BP: (!) 118/56 (02/14/19 0810)  SpO2: (!) 92 % (02/14/19 0810) Vital Signs (24h Range):  Temp:  [98 °F (36.7 °C)-98.7 °F (37.1 °C)] 98.5 °F (36.9 °C)  Pulse:  [69-81] 73  Resp:  [16-20] 18  SpO2:  [92 %-97 %] 92 %  BP: (106-122)/(54-70) 118/56     Weight: (!) 160.8 kg (354 lb 8 oz)  Body mass index is 48.08 kg/m².    Estimated Creatinine Clearance: 105 mL/min (based on SCr of 1.1 mg/dL).    Physical Exam   Constitutional: He is oriented to person, place, and time. No distress.   Morbidly obese  male, appears uncomfortable, but in no distress.    HENT:   Head: Normocephalic and atraumatic.   Eyes: Conjunctivae and EOM are normal. Pupils are equal, round, and reactive to light. No scleral icterus.   Neck: Normal range of motion. Neck supple. No thyromegaly present.   Cardiovascular: Normal rate, regular rhythm, normal heart sounds and intact distal pulses.   No murmur heard.  Pulmonary/Chest: Effort normal and breath sounds normal. No stridor. No respiratory distress. He has no wheezes. He has no rales. He exhibits no tenderness.   Abdominal: Soft. Bowel sounds are normal. He exhibits no distension. There is no tenderness. There is no rebound and no guarding.   Obese abdomen   Musculoskeletal: Normal range of motion. He exhibits edema (mild). He exhibits no tenderness.   Lymphadenopathy:     He has no cervical adenopathy.   Neurological: He is alert and oriented to person, place, and time. No cranial nerve deficit. He exhibits normal muscle tone. Coordination normal.   Skin: Skin is warm and dry. Capillary refill takes 2 to 3 seconds. He is not diaphoretic. There is erythema (Erythema noted and marked on the right lower extremity, with small 1 cm x 1 cm discrete superficial ulcer on the right mid tib fib region.).   Psychiatric: He has a normal mood and  affect. His behavior is normal. Judgment and thought content normal.   Nursing note and vitals reviewed.      Significant Labs: All pertinent labs within the past 24 hours have been reviewed.    Significant Imaging: I have reviewed all pertinent imaging results/findings within the past 24 hours.

## 2019-02-14 NOTE — SUBJECTIVE & OBJECTIVE
Interval History:   64 year old man with left lower  Cellulitis .  WBC IS 17.28  He remains afebrile.  Review of Systems   Constitutional: Negative for appetite change, chills, fatigue and fever.   HENT: Negative.  Negative for congestion, nosebleeds and sore throat.    Eyes: Negative.  Negative for visual disturbance.   Respiratory: Negative.  Negative for cough, shortness of breath and wheezing.    Cardiovascular: Positive for leg swelling. Negative for chest pain and palpitations.   Gastrointestinal: Negative.  Negative for abdominal pain, constipation, diarrhea, nausea and vomiting.   Endocrine: Negative.  Negative for polyuria.   Genitourinary: Negative.  Negative for dysuria, flank pain, frequency and urgency.   Musculoskeletal: Negative.  Negative for arthralgias, back pain and joint swelling.   Skin: Positive for color change (redness right lower leg). Negative for pallor and rash.   Allergic/Immunologic: Negative.  Negative for immunocompromised state.   Neurological: Negative.  Negative for dizziness, syncope, weakness, light-headedness, numbness and headaches.   Hematological: Negative.    Psychiatric/Behavioral: Negative.  Negative for confusion and hallucinations. The patient is not nervous/anxious.    All other systems reviewed and are negative.    Objective:     Vital Signs (Most Recent):  Temp: 98.5 °F (36.9 °C) (02/14/19 0810)  Pulse: 73 (02/14/19 0810)  Resp: 18 (02/14/19 0810)  BP: (!) 118/56 (02/14/19 0810)  SpO2: (!) 92 % (02/14/19 0810) Vital Signs (24h Range):  Temp:  [98 °F (36.7 °C)-98.7 °F (37.1 °C)] 98.5 °F (36.9 °C)  Pulse:  [69-81] 73  Resp:  [16-20] 18  SpO2:  [92 %-97 %] 92 %  BP: (106-122)/(54-70) 118/56     Weight: (!) 160.8 kg (354 lb 8 oz)  Body mass index is 48.08 kg/m².    Estimated Creatinine Clearance: 105 mL/min (based on SCr of 1.1 mg/dL).    Physical Exam   Constitutional: He is oriented to person, place, and time. No distress.   Morbidly obese  male, appears  uncomfortable, but in no distress.    HENT:   Head: Normocephalic and atraumatic.   Eyes: Conjunctivae and EOM are normal. Pupils are equal, round, and reactive to light. No scleral icterus.   Neck: Normal range of motion. Neck supple. No thyromegaly present.   Cardiovascular: Normal rate, regular rhythm, normal heart sounds and intact distal pulses.   No murmur heard.  Pulmonary/Chest: Effort normal and breath sounds normal. No stridor. No respiratory distress. He has no wheezes. He has no rales. He exhibits no tenderness.   Abdominal: Soft. Bowel sounds are normal. He exhibits no distension. There is no tenderness. There is no rebound and no guarding.   Obese abdomen   Musculoskeletal: Normal range of motion. He exhibits edema (mild). He exhibits no tenderness.   Lymphadenopathy:     He has no cervical adenopathy.   Neurological: He is alert and oriented to person, place, and time. No cranial nerve deficit. He exhibits normal muscle tone. Coordination normal.   Skin: Skin is warm and dry. Capillary refill takes 2 to 3 seconds. He is not diaphoretic. There is erythema (Erythema noted and marked on the right lower extremity, with small 1 cm x 1 cm discrete superficial ulcer on the right mid tib fib region.).   Psychiatric: He has a normal mood and affect. His behavior is normal. Judgment and thought content normal.   Nursing note and vitals reviewed.      Significant Labs: All pertinent labs within the past 24 hours have been reviewed.    Significant Imaging: I have reviewed all pertinent imaging results/findings within the past 24 hours.

## 2019-02-14 NOTE — PROGRESS NOTES
Follow up visit with Mr. Goff for reassessment of RLE weeping cellulitis. Cleansed RLE gently with bath wipes to remove areas with caking of barrier paste.  Scattered areas of skin sloughing noted to medial calf, and worsening of blistering to anterior shin noted, with mild overall improvement of maceration.  Reapplied thin layer of critic aid paste barrier. Patient refuses to elevate RLE with pillows. Placed on moisture wicking pad.  Recommend continued care per orders. Will follow.

## 2019-02-14 NOTE — ASSESSMENT & PLAN NOTE
- Non insulin-dependent type 2 diabetic  - Hold metformin and glipizide  - Continue moderate dose insulin sliding scale  - Continue Detemir to 20 units SQ BID  - Monitor blood sugars every 6 hr and make adjustments as needed

## 2019-02-14 NOTE — ASSESSMENT & PLAN NOTE
- Blood pressure on the low side systolic 90s upon admit -- improved but still borderline hypotensive  - Continue to hold antihypertensive agents  - Continue IV hydration  - Monitor closely and make adjustments as needed    02/12  - BP improved, will restart home medications including ACEI and Lasix    02/13  - BP stable. Continue ACEI and Lasix    02/14  - Continue ACEI. Hold Lasix for now. Will gently hydrate due to infection

## 2019-02-14 NOTE — SUBJECTIVE & OBJECTIVE
Review of Systems   Constitutional: Positive for chills and fatigue. Negative for appetite change and fever.   HENT: Negative.  Negative for congestion, nosebleeds and sore throat.    Eyes: Negative.  Negative for visual disturbance.   Respiratory: Negative.  Negative for cough, shortness of breath and wheezing.    Cardiovascular: Positive for leg swelling. Negative for chest pain and palpitations.   Gastrointestinal: Negative.  Negative for abdominal pain, constipation, diarrhea, nausea and vomiting.   Endocrine: Negative.  Negative for polyuria.   Genitourinary: Negative.  Negative for dysuria, flank pain, frequency and urgency.   Musculoskeletal: Negative.  Negative for arthralgias, back pain and joint swelling.   Skin: Positive for color change (redness right lower leg). Negative for pallor and rash.   Allergic/Immunologic: Negative.  Negative for immunocompromised state.   Neurological: Negative.  Negative for dizziness, syncope, weakness, light-headedness, numbness and headaches.   Hematological: Negative.    Psychiatric/Behavioral: Negative.  Negative for confusion and hallucinations. The patient is not nervous/anxious.    All other systems reviewed and are negative.    Objective:     Vital Signs (Most Recent):  Temp: 98.1 °F (36.7 °C) (02/14/19 1515)  Pulse: 80 (02/14/19 1515)  Resp: 18 (02/14/19 1515)  BP: 134/78 (02/14/19 1515)  SpO2: 98 % (02/14/19 1515) Vital Signs (24h Range):  Temp:  [98 °F (36.7 °C)-98.7 °F (37.1 °C)] 98.1 °F (36.7 °C)  Pulse:  [72-81] 80  Resp:  [16-20] 18  SpO2:  [92 %-99 %] 98 %  BP: (112-134)/(54-78) 134/78     Weight: (!) 160.8 kg (354 lb 8 oz)  Body mass index is 48.08 kg/m².    Intake/Output Summary (Last 24 hours) at 2/14/2019 1704  Last data filed at 2/14/2019 1600  Gross per 24 hour   Intake 1354 ml   Output 4825 ml   Net -3471 ml      Physical Exam   Constitutional: He is oriented to person, place, and time. No distress.   Morbidly obese  male, appears  uncomfortable, but in no distress.    HENT:   Head: Normocephalic and atraumatic.   Eyes: Conjunctivae and EOM are normal. Pupils are equal, round, and reactive to light. No scleral icterus.   Neck: Normal range of motion. Neck supple. No thyromegaly present.   Cardiovascular: Normal rate, regular rhythm, normal heart sounds and intact distal pulses.   No murmur heard.  Pulmonary/Chest: Effort normal and breath sounds normal. No stridor. No respiratory distress. He has no wheezes. He has no rales. He exhibits no tenderness.   Abdominal: Soft. Bowel sounds are normal. He exhibits no distension. There is no tenderness. There is no rebound and no guarding.   Obese abdomen   Musculoskeletal: Normal range of motion. He exhibits edema (2+ pitting edema to RLE). He exhibits no tenderness.   Lymphadenopathy:     He has no cervical adenopathy.   Neurological: He is alert and oriented to person, place, and time. No cranial nerve deficit. He exhibits normal muscle tone. Coordination normal.   Skin: Skin is warm and dry. Capillary refill takes 2 to 3 seconds. He is not diaphoretic. There is erythema (Erythema noted and marked on the right lower extremity, with small 1 cm x 1 cm discrete superficial ulcer on the right mid tib fib region.).   Skin sloughing noted   Psychiatric: He has a normal mood and affect. His behavior is normal. Judgment and thought content normal.   Nursing note and vitals reviewed.      Significant Labs:   BMP:   Recent Labs   Lab 02/14/19  0428   *      K 4.0   CL 99   CO2 30*   BUN 16   CREATININE 1.1   CALCIUM 8.9     CBC:   Recent Labs   Lab 02/13/19  0431 02/14/19  0428   WBC 17.28* 12.75*   HGB 11.7* 11.6*   HCT 35.4* 34.8*    230     POCT Glucose:   Recent Labs   Lab 02/13/19  1620 02/13/19  2123 02/14/19  0525   POCTGLUCOSE 244* 320* 219*       Significant Imaging: I have reviewed all pertinent imaging results/findings within the past 24 hours.

## 2019-02-15 VITALS
BODY MASS INDEX: 42.66 KG/M2 | TEMPERATURE: 98 F | OXYGEN SATURATION: 98 % | RESPIRATION RATE: 20 BRPM | SYSTOLIC BLOOD PRESSURE: 146 MMHG | WEIGHT: 315 LBS | HEART RATE: 67 BPM | HEIGHT: 72 IN | DIASTOLIC BLOOD PRESSURE: 75 MMHG

## 2019-02-15 LAB
ANION GAP SERPL CALC-SCNC: 8 MMOL/L
BACTERIA BLD CULT: NORMAL
BACTERIA BLD CULT: NORMAL
BASOPHILS # BLD AUTO: 0.03 K/UL
BASOPHILS NFR BLD: 0.2 %
BUN SERPL-MCNC: 13 MG/DL
CALCIUM SERPL-MCNC: 9.1 MG/DL
CHLORIDE SERPL-SCNC: 99 MMOL/L
CO2 SERPL-SCNC: 31 MMOL/L
CREAT SERPL-MCNC: 1 MG/DL
DIFFERENTIAL METHOD: ABNORMAL
EOSINOPHIL # BLD AUTO: 0.1 K/UL
EOSINOPHIL NFR BLD: 1.1 %
ERYTHROCYTE [DISTWIDTH] IN BLOOD BY AUTOMATED COUNT: 14.5 %
EST. GFR  (AFRICAN AMERICAN): >60 ML/MIN/1.73 M^2
EST. GFR  (NON AFRICAN AMERICAN): >60 ML/MIN/1.73 M^2
GLUCOSE SERPL-MCNC: 180 MG/DL
HCT VFR BLD AUTO: 34.8 %
HGB BLD-MCNC: 11.7 G/DL
LYMPHOCYTES # BLD AUTO: 1.5 K/UL
LYMPHOCYTES NFR BLD: 12.6 %
MCH RBC QN AUTO: 30.9 PG
MCHC RBC AUTO-ENTMCNC: 33.6 G/DL
MCV RBC AUTO: 92 FL
MONOCYTES # BLD AUTO: 1.3 K/UL
MONOCYTES NFR BLD: 10.2 %
NEUTROPHILS # BLD AUTO: 9.3 K/UL
NEUTROPHILS NFR BLD: 75.9 %
PLATELET # BLD AUTO: 259 K/UL
PMV BLD AUTO: 9.8 FL
POCT GLUCOSE: 207 MG/DL (ref 70–110)
POCT GLUCOSE: 274 MG/DL (ref 70–110)
POTASSIUM SERPL-SCNC: 4.1 MMOL/L
RBC # BLD AUTO: 3.79 M/UL
SODIUM SERPL-SCNC: 138 MMOL/L
WBC # BLD AUTO: 12.25 K/UL

## 2019-02-15 PROCEDURE — 97161 PT EVAL LOW COMPLEX 20 MIN: CPT

## 2019-02-15 PROCEDURE — 25000003 PHARM REV CODE 250: Performed by: INTERNAL MEDICINE

## 2019-02-15 PROCEDURE — S0077 INJECTION, CLINDAMYCIN PHOSP: HCPCS | Performed by: INTERNAL MEDICINE

## 2019-02-15 PROCEDURE — 97116 GAIT TRAINING THERAPY: CPT

## 2019-02-15 PROCEDURE — 80048 BASIC METABOLIC PNL TOTAL CA: CPT

## 2019-02-15 PROCEDURE — 25000003 PHARM REV CODE 250: Performed by: NURSE PRACTITIONER

## 2019-02-15 PROCEDURE — 36415 COLL VENOUS BLD VENIPUNCTURE: CPT

## 2019-02-15 PROCEDURE — 63600175 PHARM REV CODE 636 W HCPCS: Performed by: INTERNAL MEDICINE

## 2019-02-15 PROCEDURE — 85025 COMPLETE CBC W/AUTO DIFF WBC: CPT

## 2019-02-15 RX ORDER — OXYCODONE AND ACETAMINOPHEN 10; 325 MG/1; MG/1
1 TABLET ORAL EVERY 4 HOURS PRN
Qty: 30 TABLET | Refills: 0 | Status: SHIPPED | OUTPATIENT
Start: 2019-02-15 | End: 2019-02-15

## 2019-02-15 RX ORDER — LINEZOLID 600 MG/1
600 TABLET, FILM COATED ORAL EVERY 12 HOURS
Qty: 20 TABLET | Refills: 0 | Status: SHIPPED | OUTPATIENT
Start: 2019-02-15 | End: 2019-02-15 | Stop reason: HOSPADM

## 2019-02-15 RX ORDER — OXYCODONE AND ACETAMINOPHEN 10; 325 MG/1; MG/1
1 TABLET ORAL EVERY 8 HOURS PRN
Qty: 21 TABLET | Refills: 0 | Status: SHIPPED | OUTPATIENT
Start: 2019-02-15 | End: 2019-02-22

## 2019-02-15 RX ADMIN — INSULIN ASPART 4 UNITS: 100 INJECTION, SOLUTION INTRAVENOUS; SUBCUTANEOUS at 05:02

## 2019-02-15 RX ADMIN — OXYCODONE HYDROCHLORIDE AND ACETAMINOPHEN 1 TABLET: 10; 325 TABLET ORAL at 08:02

## 2019-02-15 RX ADMIN — LISINOPRIL 5 MG: 5 TABLET ORAL at 08:02

## 2019-02-15 RX ADMIN — ASPIRIN 81 MG: 81 TABLET, COATED ORAL at 08:02

## 2019-02-15 RX ADMIN — CLINDAMYCIN IN 5 PERCENT DEXTROSE 900 MG: 18 INJECTION, SOLUTION INTRAVENOUS at 04:02

## 2019-02-15 RX ADMIN — PANTOPRAZOLE SODIUM 40 MG: 40 TABLET, DELAYED RELEASE ORAL at 08:02

## 2019-02-15 RX ADMIN — ATORVASTATIN CALCIUM 40 MG: 40 TABLET, FILM COATED ORAL at 08:02

## 2019-02-15 RX ADMIN — CLINDAMYCIN IN 5 PERCENT DEXTROSE 900 MG: 18 INJECTION, SOLUTION INTRAVENOUS at 11:02

## 2019-02-15 RX ADMIN — LINEZOLID 600 MG: 600 INJECTION, SOLUTION INTRAVENOUS at 08:02

## 2019-02-15 RX ADMIN — INSULIN ASPART 6 UNITS: 100 INJECTION, SOLUTION INTRAVENOUS; SUBCUTANEOUS at 11:02

## 2019-02-15 RX ADMIN — INSULIN ASPART 4 UNITS: 100 INJECTION, SOLUTION INTRAVENOUS; SUBCUTANEOUS at 04:02

## 2019-02-15 NOTE — PROGRESS NOTES
"[facility]  Infectious Disease  Progress Note    Patient Name: Santhosh Goff  MRN: [id number]  Admission Date: 2/9/2019  Length of Stay: 6 days  Attending Physician: Sheela Malcolm MD  Primary Care Provider: Jacek Mohr MD    Isolation Status: No active isolations  Assessment/Plan:      * Cellulitis of right leg without foot    Etiology is likely from strep -will use zyvox/clindamycin and will closely monitor for progression of the lesion.  He had prior episode of severe cellulitis and develop purulence , will check CK levels and will be vigilant about possible necrotizing fascitis         2/13- will continue zyvox/clindamycin and will closely follow wbc to adjust therapy   2/14- will plan to switch to Po in am , another option will be IV dalvance in the outpatient setting   Will discuss with primary team     Morbid obesity with BMI of 50.0-59.9, adult    Out patient follow up for structured weight loss program     Type 2 diabetes mellitus without complication, without long-term current use of insulin    2/12- will continue insulin sliding scale          Anticipated Disposition:     Thank you for your consult. I will follow-up with patient. Please contact us if you have any additional questions.    Pierre Petersen MD  Infectious Disease  [facility]    Subjective:     Principal Problem:Cellulitis of right leg without foot    HPI:   64-year-old morbidly obese  male with PMH significant for non-insulin-dependent DM, HTN, prior right leg wound/cellulitis in 2015, who was admitted with another episode of right lower extremity cellulitis  Since admission -  CT scan of the lower extremity showed-  "Large amount of subcutaneous edema throughout the tibia and fibula, most prominent along the anterolateral and posterior portions.  No focal fluid collections are appreciated to indicate abscess.  No focal solid soft tissue mass.  "  Lab data showed-  CBC  Component      Latest " "Ref Rng & Units 2/12/2019 2/11/2019 2/10/2019 2/9/2019   WBC      3.90 - 12.70 K/uL 19.83 (H) 20.05 (H) 23.68 (H) 22.72 (H)     Previous consult note -2/10/2015-  60 y.o. who presented to the ED on 2/4 with reports of erythema, pain and swelling to right lower leg that started a day prior to admission. He was noted to be hypotensive in the ED with vitals of   B/P 80's systolic, fever 100.0 and he was admitted to the ICU for sepsis .   CT scan of the lower extremity showed subcutaneous edema/soft tissue swelling in the pelvis and bilateral lower extremities, right greater than left without evidence of abscess or necrotizing fasciitis.     Blood cultures has remained negative till date and leucocytosis is improving .I was asked to see him for antibiotic management "    He was seen at bedside with his wife .        Interval History:   64 year old man with left lower  Cellulitis .  WBC IS 17.28  He remains afebrile.  2/14- wbc is 12, remains afebrile  Review of Systems   Constitutional: Negative for appetite change, chills, fatigue and fever.   HENT: Negative.  Negative for congestion, nosebleeds and sore throat.    Eyes: Negative.  Negative for visual disturbance.   Respiratory: Negative.  Negative for cough, shortness of breath and wheezing.    Cardiovascular: Positive for leg swelling. Negative for chest pain and palpitations.   Gastrointestinal: Negative.  Negative for abdominal pain, constipation, diarrhea, nausea and vomiting.   Endocrine: Negative.  Negative for polyuria.   Genitourinary: Negative.  Negative for dysuria, flank pain, frequency and urgency.   Musculoskeletal: Negative.  Negative for arthralgias, back pain and joint swelling.   Skin: Positive for color change (redness right lower leg). Negative for pallor and rash.   Allergic/Immunologic: Negative.  Negative for immunocompromised state.   Neurological: Negative.  Negative for dizziness, syncope, weakness, light-headedness, numbness and headaches. "   Hematological: Negative.    Psychiatric/Behavioral: Negative.  Negative for confusion and hallucinations. The patient is not nervous/anxious.    All other systems reviewed and are negative.    Objective:     Vital Signs (Most Recent):  Temp: 98.4 °F (36.9 °C) (02/15/19 0737)  Pulse: 74 (02/15/19 0737)  Resp: 18 (02/15/19 0737)  BP: (!) 172/85 (02/15/19 0737)  SpO2: (!) 94 % (02/15/19 0737) Vital Signs (24h Range):  Temp:  [98.1 °F (36.7 °C)-99.5 °F (37.5 °C)] 98.4 °F (36.9 °C)  Pulse:  [73-88] 74  Resp:  [16-18] 18  SpO2:  [92 %-99 %] 94 %  BP: (118-172)/(55-85) 172/85     Weight: (!) 160.8 kg (354 lb 8 oz)  Body mass index is 48.08 kg/m².    Estimated Creatinine Clearance: 115.5 mL/min (based on SCr of 1 mg/dL).    Physical Exam   Constitutional: He is oriented to person, place, and time. No distress.   Morbidly obese  male, appears uncomfortable, but in no distress.    HENT:   Head: Normocephalic and atraumatic.   Eyes: Conjunctivae and EOM are normal. Pupils are equal, round, and reactive to light. No scleral icterus.   Neck: Normal range of motion. Neck supple. No thyromegaly present.   Cardiovascular: Normal rate, regular rhythm, normal heart sounds and intact distal pulses.   No murmur heard.  Pulmonary/Chest: Effort normal and breath sounds normal. No stridor. No respiratory distress. He has no wheezes. He has no rales. He exhibits no tenderness.   Abdominal: Soft. Bowel sounds are normal. He exhibits no distension. There is no tenderness. There is no rebound and no guarding.   Obese abdomen   Musculoskeletal: Normal range of motion. He exhibits edema (mild). He exhibits no tenderness.   Lymphadenopathy:     He has no cervical adenopathy.   Neurological: He is alert and oriented to person, place, and time. No cranial nerve deficit. He exhibits normal muscle tone. Coordination normal.   Skin: Skin is warm and dry. Capillary refill takes 2 to 3 seconds. He is not diaphoretic. There is erythema  (Erythema noted and marked on the right lower extremity, with small 1 cm x 1 cm discrete superficial ulcer on the right mid tib fib region.).   Psychiatric: He has a normal mood and affect. His behavior is normal. Judgment and thought content normal.   Nursing note and vitals reviewed.      Significant Labs: All pertinent labs within the past 24 hours have been reviewed.    Significant Imaging: I have reviewed all pertinent imaging results/findings within the past 24 hours.

## 2019-02-15 NOTE — PROGRESS NOTES
Discharge instructions given, patient verbalized understanding. IV removed, tip intact. Patient to receive walker at bedside before leaving. [facility] set up for first visit tomorrow.

## 2019-02-15 NOTE — SUBJECTIVE & OBJECTIVE
Interval History:   64 year old man with left lower  Cellulitis .  WBC IS 17.28  He remains afebrile.  2/14- wbc is 12, remains afebrile  Review of Systems   Constitutional: Negative for appetite change, chills, fatigue and fever.   HENT: Negative.  Negative for congestion, nosebleeds and sore throat.    Eyes: Negative.  Negative for visual disturbance.   Respiratory: Negative.  Negative for cough, shortness of breath and wheezing.    Cardiovascular: Positive for leg swelling. Negative for chest pain and palpitations.   Gastrointestinal: Negative.  Negative for abdominal pain, constipation, diarrhea, nausea and vomiting.   Endocrine: Negative.  Negative for polyuria.   Genitourinary: Negative.  Negative for dysuria, flank pain, frequency and urgency.   Musculoskeletal: Negative.  Negative for arthralgias, back pain and joint swelling.   Skin: Positive for color change (redness right lower leg). Negative for pallor and rash.   Allergic/Immunologic: Negative.  Negative for immunocompromised state.   Neurological: Negative.  Negative for dizziness, syncope, weakness, light-headedness, numbness and headaches.   Hematological: Negative.    Psychiatric/Behavioral: Negative.  Negative for confusion and hallucinations. The patient is not nervous/anxious.    All other systems reviewed and are negative.    Objective:     Vital Signs (Most Recent):  Temp: 98.4 °F (36.9 °C) (02/15/19 0737)  Pulse: 74 (02/15/19 0737)  Resp: 18 (02/15/19 0737)  BP: (!) 172/85 (02/15/19 0737)  SpO2: (!) 94 % (02/15/19 0737) Vital Signs (24h Range):  Temp:  [98.1 °F (36.7 °C)-99.5 °F (37.5 °C)] 98.4 °F (36.9 °C)  Pulse:  [73-88] 74  Resp:  [16-18] 18  SpO2:  [92 %-99 %] 94 %  BP: (118-172)/(55-85) 172/85     Weight: (!) 160.8 kg (354 lb 8 oz)  Body mass index is 48.08 kg/m².    Estimated Creatinine Clearance: 115.5 mL/min (based on SCr of 1 mg/dL).    Physical Exam   Constitutional: He is oriented to person, place, and time. No distress.   Morbidly  obese  male, appears uncomfortable, but in no distress.    HENT:   Head: Normocephalic and atraumatic.   Eyes: Conjunctivae and EOM are normal. Pupils are equal, round, and reactive to light. No scleral icterus.   Neck: Normal range of motion. Neck supple. No thyromegaly present.   Cardiovascular: Normal rate, regular rhythm, normal heart sounds and intact distal pulses.   No murmur heard.  Pulmonary/Chest: Effort normal and breath sounds normal. No stridor. No respiratory distress. He has no wheezes. He has no rales. He exhibits no tenderness.   Abdominal: Soft. Bowel sounds are normal. He exhibits no distension. There is no tenderness. There is no rebound and no guarding.   Obese abdomen   Musculoskeletal: Normal range of motion. He exhibits edema (mild). He exhibits no tenderness.   Lymphadenopathy:     He has no cervical adenopathy.   Neurological: He is alert and oriented to person, place, and time. No cranial nerve deficit. He exhibits normal muscle tone. Coordination normal.   Skin: Skin is warm and dry. Capillary refill takes 2 to 3 seconds. He is not diaphoretic. There is erythema (Erythema noted and marked on the right lower extremity, with small 1 cm x 1 cm discrete superficial ulcer on the right mid tib fib region.).   Psychiatric: He has a normal mood and affect. His behavior is normal. Judgment and thought content normal.   Nursing note and vitals reviewed.      Significant Labs: All pertinent labs within the past 24 hours have been reviewed.    Significant Imaging: I have reviewed all pertinent imaging results/findings within the past 24 hours.

## 2019-02-15 NOTE — DISCHARGE SUMMARY
[facility] Hospital Medicine  Discharge Summary      Patient Name: Santhosh Goff  MRN: [id number]  Admission Date: 2/9/2019  Hospital Length of Stay: 6 days  Discharge Date and Time:  02/15/2019 5:46 PM  Attending Physician: Sheela Malcolm MD   Discharging Provider: ILDA ChristieC  Primary Care Provider: Jacek Mohr MD      HPI:   Mr. Goff is a 64-year-old morbidly obese  male with PMH significant for non-insulin-dependent DM, HTN, prior right leg wound/cellulitis in 2015, was in his usual state of health until two days ago.  He started complaining of not feeling well, with worsening right lower extremity redness, progressing to fever today, chills, nausea, and increasing redness of the right lower extremity.  In the ED, he is afebrile, heart rate less than 90, blood pressure 95/68, WBC 61260, 0% bands, lactic acid 4.0, creatinine 1.9 (baseline less than 1.2).  Patient was ordered 2 L normal saline boluses (will add three more L to make it a total of 5 L boluses, 30 mL/kilogram).  Blood cultures were obtained.  Started on IV vancomycin, IV Zosyn.    * No surgery found *      Hospital Course:   Santhosh Goff is a 64 year old male admitted for Septic shock secondary to LLE. RLE US negative for DVT. Septic shock now resolved -- lactic acid has normalized. BP stabilized. WBC count remains elevated at 23.68K. BC NGTD. Remains afebrile. Will continue IV abx and IVF. MRI RLE pending to r/o abscess. As of 02/11 max temp of 100.7 overnight. Unable to obtain MRI due to weight limit of 350 lbs. Will get CT RLE to r/o abscess (weight limit 450 lbs). RLE cellulitis improving but has area of increased erythema and swelling to back of leg. Leukocytosis trending down, currently 20.05. DON resolved. Blood cultures remain with no growth to date.     As of 02/12 CT RLE negative for abscess formation. WBC count decreased slightly, now 19.83 from 20.05. Remains afebrile. Blood  cultures NGTD. Will consult ID for further recommendations.     As of 02/13 WBC count trending down, now 17.28K. DM uncontrolled. Uncontrolled prior to admit (A1C 9) but now complicated by infection. Will increase Detemir to 20 units BID and continue moderate dose SSI. ID consulted -- etiology is likely from strep. IV abx changed to IV Zyvox and Clindamycin. He has a had prior episode of severe cellulitis and develop purulence. Will be vigilant about possible necrotizing fascitis    As of 02/14 WBC count trending down, now 12.75K from 17.28K. Blood cultures remain no growth to date. Remains afebrile. LLE with increasing yellow drainage. LLE continues with erythema and 2+ pitting edema. Wound care following. CT RLE with contrast negative for abscess formation and US negative for DVT. ID following. Continue IV Zyvox and Clindamycin at this time. Pt expresses concern about being discharged too early but assured patient that he will be discharged when medically stable.  02/15: Discussed treatment options, including LTAC placement, SNF placement, Home Health and patient expressed desire to be discharged as soon as possible so he can return to work. He understands that he will have to do OP PT if he is working as he must be home bound for home health. Pt is insistent saying they do not have a  and he needs to get back to the kids. WBC WNL, BC with NGTD X 5 days. Discussed with ID who recommended IV dalvance X1 as OP. Case management contacted [facility] who will administer 1 dose of IV Dalvance at patient's home this evening. Patient elected OP PT and asked for note to return to work on Monday. Discussed need for weight loss in detail - he refuses referral for bariatric surgery saying he has limited his PO intake over the past 3 months and has lost 10 LBS. Pain medication sent patients pharmacy. Patient was seen and examined today and deemed stable for discharge home.                    Consults:    Consults (From admission, onward)        Status Ordering Provider     Inpatient consult to Infectious Diseases  Once     Provider:  iPerre Petersen MD    Acknowledged MELONIE SCHMIDT          No new Assessment & Plan notes have been filed under this hospital service since the last note was generated.  Service: Hospital Medicine    Final Active Diagnoses:    Diagnosis Date Noted POA    PRINCIPAL PROBLEM:  Cellulitis of right leg without foot [L03.115] 02/09/2019 Yes    Sepsis [A41.9] 02/10/2019 Yes    Morbid obesity with BMI of 50.0-59.9, adult [E66.01, Z68.43]  Not Applicable    Type 2 diabetes mellitus without complication, without long-term current use of insulin [E11.9]  Yes    Hypertension associated with diabetes [E11.59, I10]  Yes      Problems Resolved During this Admission:    Diagnosis Date Noted Date Resolved POA    DON (acute kidney injury) [N17.9] 02/09/2019 02/12/2019 Yes    Septic shock [A41.9, R65.21] 02/05/2015 02/11/2019 Yes       Discharged Condition: stable    Disposition: Home or Self Care    Follow Up:  Follow-up Information     Jacek Mohr MD In 3 days.    Specialty:  Internal Medicine  Why:  earliest appointment made  Contact information:  [id number] [facility] [id number] [phone]                 Patient Instructions:      WALKER FOR HOME USE     Order Specific Question Answer Comments   Type of Walker: Heavy duty    With wheels? Yes    Height: 6' (1.829 m)    Weight: 160.8 kg (354 lb 8 oz)    Length of need (1-99 months): 99    Does patient have medical equipment at home? none    Please check all that apply: Patient is unable to safely ambulate without equipment.      Referral to OutPatient  Physical therapy   Referral Priority: Routine Referral Type: Physical Medicine   Referral Reason: Specialty Services Required   Requested Specialty: Physical Therapy   Number of Visits Requested: 1     Diet diabetic     Diet Cardiac     Notify your health care  provider if you experience any of the following:  temperature >100.4     Notify your health care provider if you experience any of the following:  redness, tenderness, or signs of infection (pain, swelling, redness, odor or green/yellow discharge around incision site)     Activity as tolerated       Significant Diagnostic Studies: Labs:   BMP:   Recent Labs   Lab 02/14/19  0428 02/15/19  0358   * 180*    138   K 4.0 4.1   CL 99 99   CO2 30* 31*   BUN 16 13   CREATININE 1.1 1.0   CALCIUM 8.9 9.1   , CBC   Recent Labs   Lab 02/14/19  0428 02/15/19  0358   WBC 12.75* 12.25   HGB 11.6* 11.7*   HCT 34.8* 34.8*    259    and All labs within the past 24 hours have been reviewed    Pending Diagnostic Studies:     None         Medications:  Reconciled Home Medications:      Medication List      START taking these medications    dalbavancin 500 mg Soln  Inject 500 mg into the vein once. for 1 dose     oxyCODONE-acetaminophen  mg per tablet  Commonly known as:  PERCOCET  Take 1 tablet by mouth every 4 (four) hours as needed for Pain.        CONTINUE taking these medications    albuterol 90 mcg/actuation inhaler  Commonly known as:  PROVENTIL/VENTOLIN HFA  Inhale 2 puffs into the lungs every 6 (six) hours as needed for Wheezing. Rescue     aspirin 81 MG EC tablet  Commonly known as:  ECOTRIN  Take 81 mg by mouth once daily.     atorvastatin 40 MG tablet  Commonly known as:  LIPITOR  TAKE ONE TABLET BY MOUTH ONCE DAILY     furosemide 20 MG tablet  Commonly known as:  LASIX  TAKE ONE TABLET BY MOUTH ONCE DAILY     GARCINIA CAMBOGIA 200-500 mcg-mg Tab  Generic drug:  chromium-brindal berry  Take by mouth.     glimepiride 4 MG tablet  Commonly known as:  AMARYL  TAKE ONE TABLET BY MOUTH ONCE DAILY WITH BREAKFAST     lisinopril 5 MG tablet  Commonly known as:  PRINIVIL,ZESTRIL  TAKE ONE TABLET BY MOUTH ONCE DAILY     metFORMIN 1000 MG tablet  Commonly known as:  GLUCOPHAGE  Take 1 tablet (1,000 mg total) by  mouth 2 (two) times daily with meals.     potassium chloride SA 20 MEQ tablet  Commonly known as:  K-DUR,KLOR-CON  TAKE ONE TABLET BY MOUTH ONCE DAILY            Indwelling Lines/Drains at time of discharge:   Lines/Drains/Airways          None          Time spent on the discharge of patient: 65 minutes  Patient was seen and examined on the date of discharge and determined to be suitable for discharge.         MUNIRA Christie-C  Department of Hospital Medicine  [facility]

## 2019-02-15 NOTE — PLAN OF CARE
Problem: Adult Inpatient Plan of Care  Goal: Plan of Care Review  Outcome: Ongoing (interventions implemented as appropriate)  Patient remained free from injury. C/o Right Leg pain. PRN pain meds administered as prescribed. Calm. Watching TV. No distress noted. Oriented x3. Respirations even and non labored. IV patent and infusing. Bed locked and low. Call light in reach. Safety measures in place. Will continue to monitor. Reviewed plan of care. Patient verbalized understanding and teach back.    12hr chart check complete.

## 2019-02-15 NOTE — ASSESSMENT & PLAN NOTE
Etiology is likely from strep -will use zyvox/clindamycin and will closely monitor for progression of the lesion.  He had prior episode of severe cellulitis and develop purulence , will check CK levels and will be vigilant about possible necrotizing fascitis         2/13- will continue zyvox/clindamycin and will closely follow wbc to adjust therapy   2/14- will plan to switch to Po in am , another option will be IV dalvance in the outpatient setting   Will discuss with primary team

## 2019-02-15 NOTE — PLAN OF CARE
Problem: Adult Inpatient Plan of Care  Goal: Plan of Care Review  Outcome: Ongoing (interventions implemented as appropriate)  Remains free from injury. Denies pain aside from during transitions/movement of RLE. POCT performed with coverage given. Abx administered to ordered. NSR on monitor. Vital signs stable. Chart reviewed. Will continue to monitor.

## 2019-02-15 NOTE — PT/OT/SLP EVAL
Physical Therapy Evaluation    Patient Name:  Santhosh Goff   MRN:  [id number]    Recommendations:     Discharge Recommendations:  other (see comments)(HOME)   Discharge Equipment Recommendations: walker, rolling   Barriers to discharge: None    Assessment:     Santhosh Goff is a 64 y.o. male admitted with a medical diagnosis of Cellulitis of right leg without foot.  He presents with the following impairments/functional limitations:  weakness, impaired endurance, impaired functional mobilty, decreased lower extremity function, pain, impaired balance, decreased ROM, edema, impaired skin, gait instability .    Rehab Prognosis: Good; patient would benefit from acute skilled PT services to address these deficits and reach maximum level of function.    Recent Surgery: * No surgery found *      Plan:     During this hospitalization, patient to be seen   to address the identified rehab impairments via gait training, therapeutic activities, therapeutic exercises and progress toward the following goals:    · Plan of Care Expires:  02/22/19    Subjective     Chief Complaint: PAIN  Patient/Family Comments/goals: DEC PAIN  Pain/Comfort:  · Pain Rating 1: 6/10  · Location - Side 1: Right  · Location 1: leg  · Pain Rating Post-Intervention 1: 6/10    Patients cultural, spiritual, Mosque conflicts given the current situation:      Living Environment:  PT LIVES AT HOME WITH WIFE AND HAS 10 STEPS TO ENTER   Prior to admission, patients level of function was IND.  Equipment used at home: none.  DME owned (not currently used): none.  Upon discharge, patient will have assistance from WIFE.    Objective:     Communicated with NURSE AND Epic CHART REVIEW  prior to session.  Patient found SUP IN BED peripheral IV  upon PT entry to room.    General Precautions: Standard,     Orthopedic Precautions:N/A   Braces: N/A     Exams:  · RLE ROM: LIMITED  · RLE Strength: NA D/T PAIN  · LLE ROM: WFL  · LLE Strength: WFL    Functional  Mobility:  PT MET IN RM SUP >SIT EOB WITH SBA. PT STOOD WITH RW AND SBA FOR GT X 120' WITH RW AND SBA. PT RETURNED TO RM T/F TO BED AND SUP IN BED WITH S. PT LEFT WITH ALL NEEDS MET AND EDUCATED ON ROLE OF P.T.     AM-PAC 6 CLICK MOBILITY  Total Score:21     Patient left SEATED IN BED with call button in reach.    GOALS:   Multidisciplinary Problems     Physical Therapy Goals        Problem: Physical Therapy Goal    Goal Priority Disciplines Outcome Goal Variances Interventions   Physical Therapy Goal     PT, PT/OT      Description:  PT WILL BE SEEN FOR P.T. FOR A MIN OF 5 OUT OF 7 DAYS A WEEK  LT19  1. PT WILL COMPLETE BED MOBILITY IND  2. PT WILL GT TRAIN X 250' WITH RW MOD I  3. PT WILL T/F TO CHAIR IND  4. PT WILL COMPLETE B LE TE X 20 REPS                    History:     Past Medical History:   Diagnosis Date    Arthritis     Diabetes mellitus, type 2      x 1 week ago 10/10/2018    Hyperlipidemia     Hypertension     Morbid obesity with BMI of 45.0-49.9, adult     Staphylococcus aureus infection, multiple-resistant (MRSA)        Past Surgical History:   Procedure Laterality Date    COLONOSCOPY N/A 6/3/2014    Performed by Aris Ellison MD at [facility] ENDO    EVACUATION-CLOT Right 2014    Performed by Ildefonso Hernández MD at [facility] OR    LUNG SURGERY      THORACOSCOPY Right 2014    Performed by Ildefonso Hernández MD at [facility] OR       Time Tracking:     PT Received On: 02/15/19  PT Start Time: 0936     PT Stop Time: 1000  PT Total Time (min): 24 min     Billable Minutes: Evaluation 14 and Gait Training 10      Malina Gupta, PT  02/15/2019

## 2019-02-16 LAB — POCT GLUCOSE: 242 MG/DL (ref 70–110)

## 2019-02-16 NOTE — ASSESSMENT & PLAN NOTE
Etiology is likely from strep -will use zyvox/clindamycin and will closely monitor for progression of the lesion.  He had prior episode of severe cellulitis and develop purulence , will check CK levels and will be vigilant about possible necrotizing fascitis         2/13- will continue zyvox/clindamycin and will closely follow wbc to adjust therapy   2/14- will plan to switch to Po in am , another option will be IV dalvance in the outpatient setting   Will discuss with primary team    2/15- will plan to use IV dalvance on discharge -will follow in the clinic

## 2019-02-16 NOTE — SUBJECTIVE & OBJECTIVE
Interval History:   64 year old man with left lower  Cellulitis .  WBC IS 17.28  He remains afebrile.  2/14- wbc is 12, remains afebrile    2/15- wbc is now 12, he is afebrile , erythema over right lower  extremity resolving .  Review of Systems   Constitutional: Negative for appetite change, chills, fatigue and fever.   HENT: Negative.  Negative for congestion, nosebleeds and sore throat.    Eyes: Negative.  Negative for visual disturbance.   Respiratory: Negative.  Negative for cough, shortness of breath and wheezing.    Cardiovascular: Positive for leg swelling. Negative for chest pain and palpitations.   Gastrointestinal: Negative.  Negative for abdominal pain, constipation, diarrhea, nausea and vomiting.   Endocrine: Negative.  Negative for polyuria.   Genitourinary: Negative.  Negative for dysuria, flank pain, frequency and urgency.   Musculoskeletal: Negative.  Negative for arthralgias, back pain and joint swelling.   Skin: Positive for color change (redness right lower leg). Negative for pallor and rash.   Allergic/Immunologic: Negative.  Negative for immunocompromised state.   Neurological: Negative.  Negative for dizziness, syncope, weakness, light-headedness, numbness and headaches.   Hematological: Negative.    Psychiatric/Behavioral: Negative.  Negative for confusion and hallucinations. The patient is not nervous/anxious.    All other systems reviewed and are negative.    Objective:     Vital Signs (Most Recent):  Temp: 98.3 °F (36.8 °C) (02/15/19 1525)  Pulse: 67 (02/15/19 1525)  Resp: 20 (02/15/19 1525)  BP: (!) 146/75 (02/15/19 1525)  SpO2: 98 % (02/15/19 1525) Vital Signs (24h Range):  Temp:  [98.1 °F (36.7 °C)-98.4 °F (36.9 °C)] 98.3 °F (36.8 °C)  Pulse:  [64-88] 67  Resp:  [18-20] 20  SpO2:  [92 %-98 %] 98 %  BP: (131-172)/(60-85) 146/75     Weight: (!) 160.8 kg (354 lb 8 oz)  Body mass index is 48.08 kg/m².    Estimated Creatinine Clearance: 115.5 mL/min (based on SCr of 1 mg/dL).    Physical  Exam   Constitutional: He is oriented to person, place, and time. No distress.   Morbidly obese  male, appears uncomfortable, but in no distress.    HENT:   Head: Normocephalic and atraumatic.   Eyes: Conjunctivae and EOM are normal. Pupils are equal, round, and reactive to light. No scleral icterus.   Neck: Normal range of motion. Neck supple. No thyromegaly present.   Cardiovascular: Normal rate, regular rhythm, normal heart sounds and intact distal pulses.   No murmur heard.  Pulmonary/Chest: Effort normal and breath sounds normal. No stridor. No respiratory distress. He has no wheezes. He has no rales. He exhibits no tenderness.   Abdominal: Soft. Bowel sounds are normal. He exhibits no distension. There is no tenderness. There is no rebound and no guarding.   Obese abdomen   Musculoskeletal: Normal range of motion. He exhibits edema (mild). He exhibits no tenderness.   Lymphadenopathy:     He has no cervical adenopathy.   Neurological: He is alert and oriented to person, place, and time. No cranial nerve deficit. He exhibits normal muscle tone. Coordination normal.   Skin: Skin is warm and dry. Capillary refill takes 2 to 3 seconds. He is not diaphoretic. There is erythema (Erythema noted and marked on the right lower extremity, with small 1 cm x 1 cm discrete superficial ulcer on the right mid tib fib region.).   Psychiatric: He has a normal mood and affect. His behavior is normal. Judgment and thought content normal.   Nursing note and vitals reviewed.      Significant Labs: All pertinent labs within the past 24 hours have been reviewed.    Significant Imaging: I have reviewed all pertinent imaging results/findings within the past 24 hours.

## 2019-02-16 NOTE — PROGRESS NOTES
"[facility]  Infectious Disease  Progress Note    Patient Name: Santhosh Goff  MRN: [id number]  Admission Date: 2/9/2019  Length of Stay: 6 days  Attending Physician: Sheela Malcolm MD  Primary Care Provider: Jacek Mohr MD    Isolation Status: No active isolations  Assessment/Plan:      * Cellulitis of right leg without foot    Etiology is likely from strep -will use zyvox/clindamycin and will closely monitor for progression of the lesion.  He had prior episode of severe cellulitis and develop purulence , will check CK levels and will be vigilant about possible necrotizing fascitis         2/13- will continue zyvox/clindamycin and will closely follow wbc to adjust therapy   2/14- will plan to switch to Po in am , another option will be IV dalvance in the outpatient setting   Will discuss with primary team    2/15- will plan to use IV dalvance on discharge -will follow in the clinic     Morbid obesity with BMI of 50.0-59.9, adult    Out patient follow up for structured weight loss program         Anticipated Disposition:     Thank you for your consult. I will follow-up with patient. Please contact us if you have any additional questions.    Pierre Petersen MD  Infectious Disease  [facility]    Subjective:     Principal Problem:Cellulitis of right leg without foot    HPI:   64-year-old morbidly obese  male with PMH significant for non-insulin-dependent DM, HTN, prior right leg wound/cellulitis in 2015, who was admitted with another episode of right lower extremity cellulitis  Since admission -  CT scan of the lower extremity showed-  "Large amount of subcutaneous edema throughout the tibia and fibula, most prominent along the anterolateral and posterior portions.  No focal fluid collections are appreciated to indicate abscess.  No focal solid soft tissue mass.  "  Lab data showed-  CBC  Component      Latest Ref Rng & Units 2/12/2019 2/11/2019 2/10/2019 2/9/2019 " "  WBC      3.90 - 12.70 K/uL 19.83 (H) 20.05 (H) 23.68 (H) 22.72 (H)     Previous consult note -2/10/2015-  60 y.o. who presented to the ED on 2/4 with reports of erythema, pain and swelling to right lower leg that started a day prior to admission. He was noted to be hypotensive in the ED with vitals of   B/P 80's systolic, fever 100.0 and he was admitted to the ICU for sepsis .   CT scan of the lower extremity showed subcutaneous edema/soft tissue swelling in the pelvis and bilateral lower extremities, right greater than left without evidence of abscess or necrotizing fasciitis.     Blood cultures has remained negative till date and leucocytosis is improving .I was asked to see him for antibiotic management "    He was seen at bedside with his wife .        Interval History:   64 year old man with left lower  Cellulitis .  WBC IS 17.28  He remains afebrile.  2/14- wbc is 12, remains afebrile    2/15- wbc is now 12, he is afebrile , erythema over right lower  extremity resolving .  Review of Systems   Constitutional: Negative for appetite change, chills, fatigue and fever.   HENT: Negative.  Negative for congestion, nosebleeds and sore throat.    Eyes: Negative.  Negative for visual disturbance.   Respiratory: Negative.  Negative for cough, shortness of breath and wheezing.    Cardiovascular: Positive for leg swelling. Negative for chest pain and palpitations.   Gastrointestinal: Negative.  Negative for abdominal pain, constipation, diarrhea, nausea and vomiting.   Endocrine: Negative.  Negative for polyuria.   Genitourinary: Negative.  Negative for dysuria, flank pain, frequency and urgency.   Musculoskeletal: Negative.  Negative for arthralgias, back pain and joint swelling.   Skin: Positive for color change (redness right lower leg). Negative for pallor and rash.   Allergic/Immunologic: Negative.  Negative for immunocompromised state.   Neurological: Negative.  Negative for dizziness, syncope, weakness, " light-headedness, numbness and headaches.   Hematological: Negative.    Psychiatric/Behavioral: Negative.  Negative for confusion and hallucinations. The patient is not nervous/anxious.    All other systems reviewed and are negative.    Objective:     Vital Signs (Most Recent):  Temp: 98.3 °F (36.8 °C) (02/15/19 1525)  Pulse: 67 (02/15/19 1525)  Resp: 20 (02/15/19 1525)  BP: (!) 146/75 (02/15/19 1525)  SpO2: 98 % (02/15/19 1525) Vital Signs (24h Range):  Temp:  [98.1 °F (36.7 °C)-98.4 °F (36.9 °C)] 98.3 °F (36.8 °C)  Pulse:  [64-88] 67  Resp:  [18-20] 20  SpO2:  [92 %-98 %] 98 %  BP: (131-172)/(60-85) 146/75     Weight: (!) 160.8 kg (354 lb 8 oz)  Body mass index is 48.08 kg/m².    Estimated Creatinine Clearance: 115.5 mL/min (based on SCr of 1 mg/dL).    Physical Exam   Constitutional: He is oriented to person, place, and time. No distress.   Morbidly obese  male, appears uncomfortable, but in no distress.    HENT:   Head: Normocephalic and atraumatic.   Eyes: Conjunctivae and EOM are normal. Pupils are equal, round, and reactive to light. No scleral icterus.   Neck: Normal range of motion. Neck supple. No thyromegaly present.   Cardiovascular: Normal rate, regular rhythm, normal heart sounds and intact distal pulses.   No murmur heard.  Pulmonary/Chest: Effort normal and breath sounds normal. No stridor. No respiratory distress. He has no wheezes. He has no rales. He exhibits no tenderness.   Abdominal: Soft. Bowel sounds are normal. He exhibits no distension. There is no tenderness. There is no rebound and no guarding.   Obese abdomen   Musculoskeletal: Normal range of motion. He exhibits edema (mild). He exhibits no tenderness.   Lymphadenopathy:     He has no cervical adenopathy.   Neurological: He is alert and oriented to person, place, and time. No cranial nerve deficit. He exhibits normal muscle tone. Coordination normal.   Skin: Skin is warm and dry. Capillary refill takes 2 to 3 seconds. He is not  diaphoretic. There is erythema (Erythema noted and marked on the right lower extremity, with small 1 cm x 1 cm discrete superficial ulcer on the right mid tib fib region.).   Psychiatric: He has a normal mood and affect. His behavior is normal. Judgment and thought content normal.   Nursing note and vitals reviewed.      Significant Labs: All pertinent labs within the past 24 hours have been reviewed.    Significant Imaging: I have reviewed all pertinent imaging results/findings within the past 24 hours.

## 2019-02-17 NOTE — PLAN OF CARE
02/17/19 1513   Final Note   Assessment Type Final Discharge Note   Anticipated Discharge Disposition Home   Right Care Referral Info   Post Acute Recommendation Home-care

## 2019-02-18 ENCOUNTER — OFFICE VISIT (OUTPATIENT)
Dept: INTERNAL MEDICINE | Facility: CLINIC | Age: 64
End: 2019-02-18
Payer: COMMERCIAL

## 2019-02-18 ENCOUNTER — PATIENT OUTREACH (OUTPATIENT)
Dept: ADMINISTRATIVE | Facility: CLINIC | Age: 64
End: 2019-02-18

## 2019-02-18 ENCOUNTER — TELEPHONE (OUTPATIENT)
Dept: INTERNAL MEDICINE | Facility: CLINIC | Age: 64
End: 2019-02-18

## 2019-02-18 VITALS
HEART RATE: 68 BPM | TEMPERATURE: 97 F | WEIGHT: 315 LBS | DIASTOLIC BLOOD PRESSURE: 92 MMHG | BODY MASS INDEX: 42.66 KG/M2 | HEIGHT: 72 IN | SYSTOLIC BLOOD PRESSURE: 142 MMHG

## 2019-02-18 DIAGNOSIS — A41.9 SEPSIS, DUE TO UNSPECIFIED ORGANISM: ICD-10-CM

## 2019-02-18 DIAGNOSIS — R73.9 HYPERGLYCEMIA: Primary | ICD-10-CM

## 2019-02-18 DIAGNOSIS — L03.115 CELLULITIS OF RIGHT ANTERIOR LOWER LEG: ICD-10-CM

## 2019-02-18 DIAGNOSIS — E11.9 TYPE 2 DIABETES MELLITUS WITHOUT COMPLICATION, WITHOUT LONG-TERM CURRENT USE OF INSULIN: ICD-10-CM

## 2019-02-18 DIAGNOSIS — E11.59 HYPERTENSION ASSOCIATED WITH DIABETES: ICD-10-CM

## 2019-02-18 DIAGNOSIS — I15.2 HYPERTENSION ASSOCIATED WITH DIABETES: ICD-10-CM

## 2019-02-18 LAB — GLUCOSE SERPL-MCNC: 175 MG/DL (ref 70–110)

## 2019-02-18 PROCEDURE — 99999 PR PBB SHADOW E&M-EST. PATIENT-LVL IV: CPT | Mod: PBBFAC,,, | Performed by: PHYSICIAN ASSISTANT

## 2019-02-18 PROCEDURE — 99999 PR PBB SHADOW E&M-EST. PATIENT-LVL IV: ICD-10-PCS | Mod: PBBFAC,,, | Performed by: PHYSICIAN ASSISTANT

## 2019-02-18 PROCEDURE — 99495 TCM SERVICES (MODERATE COMPLEXITY): ICD-10-PCS | Mod: S$GLB,,, | Performed by: PHYSICIAN ASSISTANT

## 2019-02-18 PROCEDURE — 82962 POCT GLUCOSE, HAND-HELD DEVICE: ICD-10-PCS | Mod: S$GLB,,, | Performed by: PHYSICIAN ASSISTANT

## 2019-02-18 PROCEDURE — 82962 GLUCOSE BLOOD TEST: CPT | Mod: S$GLB,,, | Performed by: PHYSICIAN ASSISTANT

## 2019-02-18 PROCEDURE — 99495 TRANSJ CARE MGMT MOD F2F 14D: CPT | Mod: S$GLB,,, | Performed by: PHYSICIAN ASSISTANT

## 2019-02-18 RX ORDER — PHENYLPROPANOLAMINE/CLEMASTINE 75-1.34MG
800 TABLET, EXTENDED RELEASE ORAL EVERY 6 HOURS PRN
COMMUNITY
End: 2020-09-03 | Stop reason: ALTCHOICE

## 2019-02-18 NOTE — TELEPHONE ENCOUNTER
[place].  Please help get this patient scheduled.     Patient needs an appt with Dr. Petersen.  Referral has been placed.    Patient came in for a hospital follow up from sepsis from a leg wound.  Wound does not look good per Dr. Mohr.  Please contact patient to get him in.

## 2019-02-18 NOTE — PATIENT INSTRUCTIONS
Discharge Instructions for Cellulitis  You have been diagnosed with cellulitis. This is an infection in the deepest layer of the skin. In some cases, the infection also affects the muscle. Cellulitis is caused by bacteria. The bacteria can enter the body through broken skin. This can happen with a cut, scratch, animal bite, or an insect bite that has been scratched. You may have been treated in the hospital with antibiotics and fluids. You will likely be given a prescription for antibiotics to take at home. This sheet will help you take care of yourself at home.  Home care  When you are home:  · Take the prescribed antibiotic medicine you are given as directed until it is gone. Take it even if you feel better. It treats the infection and stops it from returning. Not taking all the medicine can make future infections hard to treat.  · Keep the infected area clean.  · When possible, raise the infected area above the level of your heart. This helps keep swelling down.  · Talk with your healthcare provider if you are in pain. Ask what kind of over-the-counter medicine you can take for pain.  · Apply clean bandages as advised.  · Take your temperature once a day for a week.  · Wash your hands often to prevent spreading the infection.  In the future, wash your hands before and after you touch cuts, scratches, or bandages. This will help prevent infection.   When to call your healthcare provider  Call your healthcare provider immediately if you have any of the following:  · Difficulty or pain when moving the joints above or below the infected area  · Discharge or pus draining from the area  · Fever of 100.4°F (38°C) or higher, or as directed by your healthcare provider  · Pain that gets worse in or around the infected   · Redness that gets worse in or around the infected area, particularly if the area of redness expands to a wider area  · Shaking chills  · Swelling of the infected area  · Vomiting   Date Last Reviewed:  8/1/2016  © 5144-1031 [employer]. [address] [place] [address], [place] [address]. All rights reserved. This information is not intended as a substitute for professional medical care. Always follow your healthcare professional's instructions.

## 2019-02-18 NOTE — PROGRESS NOTES
"Subjective:       Patient ID: Santhosh Goff is a 64 y.o. male.    Chief Complaint: Hospital Follow Up    HPI   Patient comes in today for hospital follow-up.  He was hospitalized for 6 days recently due to cellulitis and infection of right lower leg.  This is the 2nd bout of this issue he has had.  He had the 1st about about 4 years ago when he was actually in the ICU.  He was discharged on February 15th.  He states the nurse did come out to give him an infusion of antibiotic that is post the last couple of weeks.  However he does not have home health.  He does not have follow-up with Infectious Disease.CT scan of the lower extremity showed-  "Large amount of subcutaneous edema throughout the tibia and fibula, most prominent along the anterolateral and posterior portions.  No focal fluid collections are appreciated to indicate abscess.  No focal solid soft tissue mass        He states his leg is slightly more swollen than it was a little bit of increased redness.  He is taking pain medication due to the pain from the leg.  He is also diabetic and recently is shown to be uncontrolled.  Per his wife he was missing a metformin dose for possibly 2 weeks prior to hosp stay     Patient is morbidly obese with a BMI of 48.  Blood pressure is also somewhat elevated today as well.    He denies fever chills or night sweats.      Health Maintenance Due   Topic Date Due    Pneumococcal Vaccine (65+ Low/Medium Risk) (1 of 2 - PCV13) 01/19/2019       Past Medical History:   Diagnosis Date    Arthritis     Diabetes mellitus, type 2 2014     x 1 week ago 10/10/2018    Hyperlipidemia     Hypertension     Morbid obesity with BMI of 45.0-49.9, adult     Staphylococcus aureus infection, multiple-resistant (MRSA) 2009       Current Outpatient Medications   Medication Sig Dispense Refill    albuterol 90 mcg/actuation inhaler Inhale 2 puffs into the lungs every 6 (six) hours as needed for Wheezing. Rescue 18 g 0    " aspirin (ECOTRIN) 81 MG EC tablet Take 81 mg by mouth once daily.      atorvastatin (LIPITOR) 40 MG tablet TAKE ONE TABLET BY MOUTH ONCE DAILY 90 tablet 3    furosemide (LASIX) 20 MG tablet TAKE ONE TABLET BY MOUTH ONCE DAILY 90 tablet 4    glimepiride (AMARYL) 4 MG tablet TAKE ONE TABLET BY MOUTH ONCE DAILY WITH BREAKFAST 90 tablet 4    lisinopril (PRINIVIL,ZESTRIL) 5 MG tablet TAKE ONE TABLET BY MOUTH ONCE DAILY 90 tablet 3    metFORMIN (GLUCOPHAGE) 1000 MG tablet Take 1 tablet (1,000 mg total) by mouth 2 (two) times daily with meals. 180 tablet 3    oxyCODONE-acetaminophen (PERCOCET)  mg per tablet Take 1 tablet by mouth every 8 (eight) hours as needed for Pain. 21 tablet 0    potassium chloride SA (K-DUR,KLOR-CON) 20 MEQ tablet TAKE ONE TABLET BY MOUTH ONCE DAILY 90 tablet 4    ibuprofen 200 mg Cap Take 800 mg by mouth every 6 (six) hours as needed.       No current facility-administered medications for this visit.        Review of Systems   Constitutional: Positive for activity change. Negative for fatigue, fever and unexpected weight change.   HENT: Negative for trouble swallowing and voice change.    Eyes: Negative for visual disturbance.   Respiratory: Negative for cough and shortness of breath.    Cardiovascular: Negative for chest pain and leg swelling.   Gastrointestinal: Negative for abdominal distention, abdominal pain and blood in stool.   Endocrine: Negative for polyuria.   Genitourinary: Negative for difficulty urinating and penile pain.   Musculoskeletal: Negative for arthralgias and back pain.   Skin: Positive for color change, rash and wound.   Allergic/Immunologic: Negative.  Negative for immunocompromised state.   Neurological: Negative for dizziness and speech difficulty.   Hematological: Negative for adenopathy. Does not bruise/bleed easily.   Psychiatric/Behavioral: Negative for agitation and suicidal ideas.       Objective:   BP (!) 142/92   Pulse 68   Temp 97.4 °F (36.3 °C)  (Tympanic)   Ht 6' (1.829 m)   Wt (!) 160.8 kg (354 lb 8 oz)   BMI 48.08 kg/m²      Physical Exam   Constitutional: He is oriented to person, place, and time. He appears well-developed.   Morbidly obese   HENT:   Head: Normocephalic and atraumatic.   Musculoskeletal:   Right lower extremity is swollen with marked area of erythema and ulcers.  There is no malodorous drainage.  Drainage is clear.  No eschar.   Neurological: He is alert and oriented to person, place, and time.     left lower extremity is normal.    Lab Results   Component Value Date    WBC 12.25 02/15/2019    HGB 11.7 (L) 02/15/2019    HCT 34.8 (L) 02/15/2019     02/15/2019    CHOL 129 08/06/2018    TRIG 103 08/06/2018    HDL 43 08/06/2018    ALT 38 02/13/2019    AST 27 02/13/2019     02/15/2019    K 4.1 02/15/2019    CL 99 02/15/2019    CREATININE 1.0 02/15/2019    BUN 13 02/15/2019    CO2 31 (H) 02/15/2019    TSH 1.284 02/09/2019    PSA 0.20 08/06/2018    INR 1.1 02/09/2019    HGBA1C 9.0 (H) 02/09/2019       Assessment:       1. Hyperglycemia    2. Cellulitis of right anterior lower leg    3. Sepsis, due to unspecified organism    4. Hypertension associated with diabetes    5. Type 2 diabetes mellitus without complication, without long-term current use of insulin        Plan:   Hyperglycemia  -     POCT Glucose, Hand-Held Device    Cellulitis of right anterior lower leg  -     Ambulatory referral to Home Health    Sepsis, due to unspecified organism    Hypertension associated with diabetes    Type 2 diabetes mellitus without complication, without long-term current use of insulin     Patient is in need of home health at this time for wound care.  Also 2 diabetes is uncontrolled so will have to have him follow up in clinic after his leg has improved.  He is not to return to work this week due to an inability to use his right leg.  Also will get appointment to follow up with Infectious Disease. Patient recently had dose of antibiotic so  we will not add that at this time.  Transitional Care Note    Family and/or Caretaker present at visit?  Yes.  Diagnostic tests reviewed/disposition: No diagnosic tests pending after this hospitalization.  Disease/illness education:  Cellulitis, uncontrolled diabetes  Home health/community services discussion/referrals: Patient does not have home health established from hospital visit.  They do need home health.  If needed, we will set up home health for the patient.   Establishment or re-establishment of referral orders for community resources: Patient needing to go to Infectious Disease for follow-up..   Discussion with other health care providers: Discussed this case with supervising provider Dr. Fani Forde did examine patient as well..

## 2019-02-21 ENCOUNTER — TELEPHONE (OUTPATIENT)
Dept: INTERNAL MEDICINE | Facility: CLINIC | Age: 64
End: 2019-02-21

## 2019-02-21 NOTE — TELEPHONE ENCOUNTER
----- Message from iLz Myers LPN sent at 2/21/2019  4:23 PM CST -----  Contact: kath       ----- Message -----  From: Branden Cr  Sent: 2/21/2019   4:16 PM  To: Fani DOLAN Staff    States been trying to get updated wound care orders for last couple of days. The current orders are not appropriate  for wound. Need call back with verbal orders.             Pt [phone]

## 2019-02-21 NOTE — PHYSICIAN QUERY
PT Name: Santhosh Goff  MR #: [id number]     Physician Query Form - Documentation Clarification      CDS: Mahogany Stock RN  Contact information:[email or web address]    This form is a permanent document in the medical record.     Query Date: February 21, 2019    By submitting this query, we are merely seeking further clarification of documentation. Please utilize your independent clinical judgment when addressing the question(s) below.    The Medical record reflects the following:    Supporting Clinical Findings Location in Medical Record   Hypertension associated with diabetes  - Blood pressure on the low side systolic 90s upon admit -- improved but still borderline hypotensive  - Continue to hold antihypertensive agents  - Continue IV hydration  - Monitor closely and make adjustments as needed  02/12  - BP improved, will restart home medications including ACEI and Lasix  02/13  - BP stable. Continue ACEI and Lasix  02/14  - Continue ACEI. Hold Lasix for now. Will gently hydrate due to infection    PN 2/14                                                                            Doctor, Please clarify the relationship between hypertension and diabetes.     Provider Use Only      [x  ] Hypertension is a manifestation of diabetes (secondary hypertension)      [  ] Hypertension not a manifestation of diabetes      [  ] Other:________________                                                                                                             [  ] Clinically Undetermined

## 2019-02-21 NOTE — PHYSICIAN QUERY
PT Name: Santohsh Goff  MR #: [id number]     Physician Query Form - Diabetic Condition Clarification     CDS: Mahogany Stock RN     Contact information:[email or web address]    This form is a permanent document in the medical record.     Query Date: February 21, 2019    By submitting this query, we are merely seeking further clarification of documentation to reflect the severity of illness of your patient. Please utilize your independent clinical judgment when addressing the question(s) below.    The Medical record reflects the following:     Indicators   Supporting Clinical Findings Location in Medical Record   x Diabetes uncontrolled documented As of 02/13 WBC count trending down, now 17.28K. DM uncontrolled. Uncontrolled prior to admit (A1C 9) but now complicated by infection. Will increase Detemir to 20 units BID and continue moderate dose SSI. ID consulted -- etiology is likely from strep. IV abx changed to IV Zyvox and Clindamycin. He has a had prior episode of severe cellulitis and develop purulence. Will be vigilant about possible necrotizing fasciitis HM PN 2/13   x Lab Value(s), POCT glucose value(s)  2/13 2/13 2/13 2/13 2/14 2/14 2/14   Glucose 203  329  244  320  219  251  291     POCT Labs   x Treatment                                 Medication  MAR   x Other Type 2 diabetes mellitus without complication, without long-term current use of insulin  - Non insulin-dependent type 2 diabetic  - Hold metformin and glipizide  - Continue moderate dose insulin sliding scale  - Continue Detemir to 20 units SQ BID  - Monitor blood sugars every 6 hr and make adjustments as needed  PN 2/14     Provider, please specify the meaning of the term uncontrolled:    [ x  ] Diabetes mellitus Type 2 with hyperglycemia   [  x ] Other diabetes complication (please specify): ___Cellulitis POA_________   [   ]  Clinically Undetermined       Please document in your progress notes daily for the duration of treatment until  resolved, and include in your discharge summary.

## 2019-02-22 ENCOUNTER — PATIENT MESSAGE (OUTPATIENT)
Dept: INTERNAL MEDICINE | Facility: CLINIC | Age: 64
End: 2019-02-22

## 2019-02-22 NOTE — TELEPHONE ENCOUNTER
Find out if they are able to debride tissue and dress the lower extremity with dressing changes throughout the week.  IF they can't do that we can refer him to the wound care clinic in [place].

## 2019-02-23 ENCOUNTER — PATIENT MESSAGE (OUTPATIENT)
Dept: INTERNAL MEDICINE | Facility: CLINIC | Age: 64
End: 2019-02-23

## 2019-02-23 RX ORDER — TRAMADOL HYDROCHLORIDE 50 MG/1
50 TABLET ORAL EVERY 8 HOURS PRN
Qty: 15 TABLET | Refills: 0 | Status: SHIPPED | OUTPATIENT
Start: 2019-02-23 | End: 2019-03-18

## 2019-02-24 ENCOUNTER — PATIENT MESSAGE (OUTPATIENT)
Dept: INTERNAL MEDICINE | Facility: CLINIC | Age: 64
End: 2019-02-24

## 2019-02-25 ENCOUNTER — OFFICE VISIT (OUTPATIENT)
Dept: INTERNAL MEDICINE | Facility: CLINIC | Age: 64
End: 2019-02-25
Payer: COMMERCIAL

## 2019-02-25 ENCOUNTER — LAB VISIT (OUTPATIENT)
Dept: LAB | Facility: HOSPITAL | Age: 64
End: 2019-02-25
Attending: INTERNAL MEDICINE
Payer: COMMERCIAL

## 2019-02-25 VITALS
WEIGHT: 315 LBS | DIASTOLIC BLOOD PRESSURE: 68 MMHG | TEMPERATURE: 97 F | BODY MASS INDEX: 42.66 KG/M2 | HEIGHT: 72 IN | SYSTOLIC BLOOD PRESSURE: 124 MMHG | RESPIRATION RATE: 18 BRPM | HEART RATE: 68 BPM

## 2019-02-25 DIAGNOSIS — E66.01 MORBID OBESITY WITH BMI OF 45.0-49.9, ADULT: ICD-10-CM

## 2019-02-25 DIAGNOSIS — E78.5 HYPERLIPIDEMIA ASSOCIATED WITH TYPE 2 DIABETES MELLITUS: ICD-10-CM

## 2019-02-25 DIAGNOSIS — I15.2 HYPERTENSION ASSOCIATED WITH DIABETES: ICD-10-CM

## 2019-02-25 DIAGNOSIS — E11.69 HYPERLIPIDEMIA ASSOCIATED WITH TYPE 2 DIABETES MELLITUS: ICD-10-CM

## 2019-02-25 DIAGNOSIS — E11.9 TYPE 2 DIABETES MELLITUS WITHOUT COMPLICATION, WITHOUT LONG-TERM CURRENT USE OF INSULIN: Primary | ICD-10-CM

## 2019-02-25 DIAGNOSIS — E11.59 HYPERTENSION ASSOCIATED WITH DIABETES: ICD-10-CM

## 2019-02-25 DIAGNOSIS — L03.115 CELLULITIS OF RIGHT LEG WITHOUT FOOT: ICD-10-CM

## 2019-02-25 LAB
CHOLEST SERPL-MCNC: 107 MG/DL
CHOLEST/HDLC SERPL: 2.9 {RATIO}
HDLC SERPL-MCNC: 37 MG/DL
HDLC SERPL: 34.6 %
LDLC SERPL CALC-MCNC: 44.2 MG/DL
NONHDLC SERPL-MCNC: 70 MG/DL
TRIGL SERPL-MCNC: 129 MG/DL

## 2019-02-25 PROCEDURE — 99214 PR OFFICE/OUTPT VISIT, EST, LEVL IV, 30-39 MIN: ICD-10-PCS | Mod: S$GLB,,, | Performed by: PHYSICIAN ASSISTANT

## 2019-02-25 PROCEDURE — 99999 PR PBB SHADOW E&M-EST. PATIENT-LVL IV: ICD-10-PCS | Mod: PBBFAC,,, | Performed by: PHYSICIAN ASSISTANT

## 2019-02-25 PROCEDURE — 99214 OFFICE O/P EST MOD 30 MIN: CPT | Mod: S$GLB,,, | Performed by: PHYSICIAN ASSISTANT

## 2019-02-25 PROCEDURE — 99999 PR PBB SHADOW E&M-EST. PATIENT-LVL IV: CPT | Mod: PBBFAC,,, | Performed by: PHYSICIAN ASSISTANT

## 2019-02-25 PROCEDURE — 36415 COLL VENOUS BLD VENIPUNCTURE: CPT | Mod: PO

## 2019-02-25 PROCEDURE — 80061 LIPID PANEL: CPT

## 2019-02-25 RX ORDER — OXYCODONE AND ACETAMINOPHEN 10; 325 MG/1; MG/1
1 TABLET ORAL EVERY 8 HOURS PRN
COMMUNITY
End: 2019-03-18

## 2019-02-25 NOTE — LETTER
February 25, 2019    Santhosh Goff  [id number] [address]         [facility]  [address] [facility] [address]  Phone: [phone]  Fax: [phone] February 25, 2019     Patient: Santhosh Goff   YOB: 1955   Date of Visit: 2/25/2019       To Whom It May Concern:    It is my medical opinion that Santhosh Goff may return to full duty immediately with no restrictions.    If you have any questions or concerns, please don't hesitate to call.    Sincerely,      VINCE Bradford LPN

## 2019-02-25 NOTE — PROGRESS NOTES
Subjective:       Patient ID: Santhosh Goff is a 64 y.o. male.    Chief Complaint: Follow-up    HPI     Patient comes in today for follow up leg along with other serious comorbid.     Was hospitalized for cellulitis   This is doing better   He has been watching his diet, and has lost 15lbs intentionally       Home health has been going over and looking at wound not doing much wound care, patient's wife states that they have lack of orders for this.  I did send in collagenase.  Wife states that she can do the wound care herself.  Expression least since leg is getting better.  There is no fever, pain is decreasing as well.  As far as his diabetes is going his sugars have still been up.  He states that his glucose this morning was 152 which is much too elevated.  The hope is with his weight loss if it continues that he will improve his sugars.    Most recent a1c 9     Health Maintenance Due   Topic Date Due    Pneumococcal Vaccine (65+ Low/Medium Risk) (1 of 2 - PCV13) 01/19/2019       Past Medical History:   Diagnosis Date    Arthritis     Diabetes mellitus, type 2 2014     x 1 week ago 10/10/2018    Hyperlipidemia     Hypertension     Morbid obesity with BMI of 45.0-49.9, adult     Staphylococcus aureus infection, multiple-resistant (MRSA) 2009       Current Outpatient Medications   Medication Sig Dispense Refill    aspirin (ECOTRIN) 81 MG EC tablet Take 81 mg by mouth once daily.      atorvastatin (LIPITOR) 40 MG tablet TAKE ONE TABLET BY MOUTH ONCE DAILY 90 tablet 3    collagenase (SANTYL) ointment Apply topically once daily. 30 g 2    furosemide (LASIX) 20 MG tablet TAKE ONE TABLET BY MOUTH ONCE DAILY 90 tablet 4    glimepiride (AMARYL) 4 MG tablet TAKE ONE TABLET BY MOUTH ONCE DAILY WITH BREAKFAST 90 tablet 4    ibuprofen 200 mg Cap Take 800 mg by mouth every 6 (six) hours as needed.      lisinopril (PRINIVIL,ZESTRIL) 5 MG tablet TAKE ONE TABLET BY MOUTH ONCE DAILY 90 tablet 3     metFORMIN (GLUCOPHAGE) 1000 MG tablet Take 1 tablet (1,000 mg total) by mouth 2 (two) times daily with meals. 180 tablet 3    oxyCODONE-acetaminophen (PERCOCET)  mg per tablet Take 1 tablet by mouth every 8 (eight) hours as needed for Pain.      potassium chloride SA (K-DUR,KLOR-CON) 20 MEQ tablet TAKE ONE TABLET BY MOUTH ONCE DAILY 90 tablet 4    traMADol (ULTRAM) 50 mg tablet Take 1 tablet (50 mg total) by mouth every 8 (eight) hours as needed for Pain. 15 tablet 0    albuterol 90 mcg/actuation inhaler Inhale 2 puffs into the lungs every 6 (six) hours as needed for Wheezing. Rescue 18 g 0     No current facility-administered medications for this visit.        Review of Systems   Constitutional: Positive for activity change and appetite change. Negative for unexpected weight change.   HENT: Negative for hearing loss, rhinorrhea and trouble swallowing.    Eyes: Negative for discharge and visual disturbance.   Respiratory: Negative for chest tightness and wheezing.    Cardiovascular: Negative for chest pain and palpitations.   Gastrointestinal: Negative for blood in stool, constipation, diarrhea and vomiting.   Endocrine: Negative for polydipsia and polyuria.   Genitourinary: Negative for difficulty urinating, hematuria and urgency.   Musculoskeletal: Negative for arthralgias, joint swelling and neck pain.   Neurological: Negative for weakness and headaches.   Psychiatric/Behavioral: Negative for confusion and dysphoric mood.       Objective:   /68 (BP Location: Right arm, Patient Position: Sitting, BP Method: Medium (Automatic))   Pulse 68   Temp 96.7 °F (35.9 °C) (Tympanic)   Resp 18   Ht 6' (1.829 m)   Wt (!) 153 kg (337 lb 4.9 oz)   BMI 45.75 kg/m²      Physical Exam   Constitutional: He is oriented to person, place, and time. He appears well-developed and well-nourished. No distress.   HENT:   Head: Normocephalic and atraumatic.   Right Ear: External ear normal.   Left Ear: External ear  normal.   Eyes: Conjunctivae and EOM are normal. Pupils are equal, round, and reactive to light.   Neck: Normal range of motion. Neck supple.   Cardiovascular: Normal rate, regular rhythm, normal heart sounds and intact distal pulses.   Pulmonary/Chest: Effort normal and breath sounds normal.   Neurological: He is alert and oriented to person, place, and time.   Skin: Skin is warm.   Psychiatric: He has a normal mood and affect. His behavior is normal. Judgment and thought content normal.         Lab Results   Component Value Date    WBC 12.25 02/15/2019    HGB 11.7 (L) 02/15/2019    HCT 34.8 (L) 02/15/2019     02/15/2019    CHOL 107 (L) 02/25/2019    TRIG 129 02/25/2019    HDL 37 (L) 02/25/2019    ALT 38 02/13/2019    AST 27 02/13/2019     02/15/2019    K 4.1 02/15/2019    CL 99 02/15/2019    CREATININE 1.0 02/15/2019    BUN 13 02/15/2019    CO2 31 (H) 02/15/2019    TSH 1.284 02/09/2019    PSA 0.20 08/06/2018    INR 1.1 02/09/2019    HGBA1C 9.0 (H) 02/09/2019       Assessment:       1. Type 2 diabetes mellitus without complication, without long-term current use of insulin    2. Cellulitis of right leg without foot    3. Morbid obesity with BMI of 45.0-49.9, adult    4. Hypertension associated with diabetes        Plan:   Type 2 diabetes mellitus without complication, without long-term current use of insulin  9.0 A1c   Losing weight so will check A1c at next visit   Cellulitis of right leg without foot  Improving, Santyl send to pharmacy   Use with wet gauze   Morbid obesity with BMI of 45.0-49.9, adult  15lbs loss intentionally     Hypertension associated with diabetes  BP controlled.     Follow up Dr. Mohr March 18th

## 2019-02-26 NOTE — PROGRESS NOTES
Here are the results of your recent labs.  We will review them in detail at your follow up visit.    Sincerely,    Jacek Mohr M.D.        If you would like to review your experience with Dr. Mohr or [facility], please follow the link below:    [email or web address]

## 2019-02-28 PROBLEM — E66.01 MORBID OBESITY WITH BMI OF 45.0-49.9, ADULT: Status: ACTIVE | Noted: 2019-02-28

## 2019-03-12 ENCOUNTER — PATIENT MESSAGE (OUTPATIENT)
Dept: INTERNAL MEDICINE | Facility: CLINIC | Age: 64
End: 2019-03-12

## 2019-03-18 ENCOUNTER — LAB VISIT (OUTPATIENT)
Dept: LAB | Facility: HOSPITAL | Age: 64
End: 2019-03-18
Attending: INTERNAL MEDICINE
Payer: COMMERCIAL

## 2019-03-18 ENCOUNTER — OFFICE VISIT (OUTPATIENT)
Dept: INTERNAL MEDICINE | Facility: CLINIC | Age: 64
End: 2019-03-18
Payer: COMMERCIAL

## 2019-03-18 VITALS
HEIGHT: 72 IN | TEMPERATURE: 98 F | HEART RATE: 70 BPM | WEIGHT: 315 LBS | BODY MASS INDEX: 42.66 KG/M2 | DIASTOLIC BLOOD PRESSURE: 86 MMHG | SYSTOLIC BLOOD PRESSURE: 136 MMHG

## 2019-03-18 DIAGNOSIS — E11.9 TYPE 2 DIABETES MELLITUS WITHOUT COMPLICATION, WITHOUT LONG-TERM CURRENT USE OF INSULIN: ICD-10-CM

## 2019-03-18 DIAGNOSIS — I15.2 HYPERTENSION ASSOCIATED WITH DIABETES: ICD-10-CM

## 2019-03-18 DIAGNOSIS — E11.59 HYPERTENSION ASSOCIATED WITH DIABETES: ICD-10-CM

## 2019-03-18 DIAGNOSIS — E66.01 MORBID OBESITY WITH BMI OF 45.0-49.9, ADULT: ICD-10-CM

## 2019-03-18 DIAGNOSIS — L03.115 CELLULITIS OF RIGHT LOWER EXTREMITY: Primary | ICD-10-CM

## 2019-03-18 PROCEDURE — 3079F DIAST BP 80-89 MM HG: CPT | Mod: CPTII,S$GLB,, | Performed by: INTERNAL MEDICINE

## 2019-03-18 PROCEDURE — 99999 PR PBB SHADOW E&M-EST. PATIENT-LVL IV: CPT | Mod: PBBFAC,,, | Performed by: INTERNAL MEDICINE

## 2019-03-18 PROCEDURE — 3075F SYST BP GE 130 - 139MM HG: CPT | Mod: CPTII,S$GLB,, | Performed by: INTERNAL MEDICINE

## 2019-03-18 PROCEDURE — 90471 PNEUMOCOCCAL CONJUGATE VACCINE 13-VALENT LESS THAN 5YO & GREATER THAN: ICD-10-PCS | Mod: S$GLB,,, | Performed by: INTERNAL MEDICINE

## 2019-03-18 PROCEDURE — 1101F PR PT FALLS ASSESS DOC 0-1 FALLS W/OUT INJ PAST YR: ICD-10-PCS | Mod: CPTII,S$GLB,, | Performed by: INTERNAL MEDICINE

## 2019-03-18 PROCEDURE — 99999 PR PBB SHADOW E&M-EST. PATIENT-LVL IV: ICD-10-PCS | Mod: PBBFAC,,, | Performed by: INTERNAL MEDICINE

## 2019-03-18 PROCEDURE — 36415 COLL VENOUS BLD VENIPUNCTURE: CPT | Mod: PO

## 2019-03-18 PROCEDURE — 3075F PR MOST RECENT SYSTOLIC BLOOD PRESS GE 130-139MM HG: ICD-10-PCS | Mod: CPTII,S$GLB,, | Performed by: INTERNAL MEDICINE

## 2019-03-18 PROCEDURE — 90670 PNEUMOCOCCAL CONJUGATE VACCINE 13-VALENT LESS THAN 5YO & GREATER THAN: ICD-10-PCS | Mod: S$GLB,,, | Performed by: INTERNAL MEDICINE

## 2019-03-18 PROCEDURE — 3079F PR MOST RECENT DIASTOLIC BLOOD PRESSURE 80-89 MM HG: ICD-10-PCS | Mod: CPTII,S$GLB,, | Performed by: INTERNAL MEDICINE

## 2019-03-18 PROCEDURE — 3008F PR BODY MASS INDEX (BMI) DOCUMENTED: ICD-10-PCS | Mod: CPTII,S$GLB,, | Performed by: INTERNAL MEDICINE

## 2019-03-18 PROCEDURE — 83036 HEMOGLOBIN GLYCOSYLATED A1C: CPT

## 2019-03-18 PROCEDURE — 3045F PR MOST RECENT HEMOGLOBIN A1C LEVEL 7.0-9.0%: CPT | Mod: CPTII,S$GLB,, | Performed by: INTERNAL MEDICINE

## 2019-03-18 PROCEDURE — 99214 PR OFFICE/OUTPT VISIT, EST, LEVL IV, 30-39 MIN: ICD-10-PCS | Mod: 25,S$GLB,, | Performed by: INTERNAL MEDICINE

## 2019-03-18 PROCEDURE — 90471 IMMUNIZATION ADMIN: CPT | Mod: S$GLB,,, | Performed by: INTERNAL MEDICINE

## 2019-03-18 PROCEDURE — 99214 OFFICE O/P EST MOD 30 MIN: CPT | Mod: 25,S$GLB,, | Performed by: INTERNAL MEDICINE

## 2019-03-18 PROCEDURE — 1101F PT FALLS ASSESS-DOCD LE1/YR: CPT | Mod: CPTII,S$GLB,, | Performed by: INTERNAL MEDICINE

## 2019-03-18 PROCEDURE — 3008F BODY MASS INDEX DOCD: CPT | Mod: CPTII,S$GLB,, | Performed by: INTERNAL MEDICINE

## 2019-03-18 PROCEDURE — 90670 PCV13 VACCINE IM: CPT | Mod: S$GLB,,, | Performed by: INTERNAL MEDICINE

## 2019-03-18 PROCEDURE — 3045F PR MOST RECENT HEMOGLOBIN A1C LEVEL 7.0-9.0%: ICD-10-PCS | Mod: CPTII,S$GLB,, | Performed by: INTERNAL MEDICINE

## 2019-03-18 RX ORDER — LOSARTAN POTASSIUM 25 MG/1
25 TABLET ORAL DAILY
Qty: 90 TABLET | Refills: 3 | Status: SHIPPED | OUTPATIENT
Start: 2019-03-18 | End: 2020-04-06

## 2019-03-18 NOTE — PROGRESS NOTES
Subjective:       Patient ID: Santhosh Goff is a 64 y.o. male.    Chief Complaint: Hospital Follow Up    HPI  Patient is a 64-year-old male coming today following up on his cellulitis in his right lower extremity.  At this time it is resolved.  He still has some chronic edema with some Woody changes to the skin.  There is no signs of active cellulitis at this time.  He continues take his Lasix.  He is tolerating it well.    He has gained a little bit of his weight back since his hospitalization.  He states that he is still working on eating better and trying to get a little better overall outcome with the weight.  His goal is to get off of his sleep apnea treatment and so he really does wish to lose some more weight.    His A1c had gone up significantly at his last check.  There are a lot a  0 main going issues that fiber driving that.  He has a short-term A1c being done today did to make sure he is tracking back in the other direction.  He feels like his sugars are doing a lot better even though he is not eating quite as good as he should be.  We will check that A1c today.    He has history of hypertension.  We discussed the issues around Ace inhibitors and some of the concerns with her safety this time.  We will make a change to his blood pressure medication.    Review of Systems   Constitutional: Negative for fever and unexpected weight change.   Respiratory: Negative for cough, shortness of breath and wheezing.    Cardiovascular: Negative for chest pain and palpitations.   Gastrointestinal: Negative for constipation, diarrhea, nausea and vomiting.   Genitourinary: Negative for dysuria and hematuria.       Objective:   /86   Pulse 70   Temp 97.6 °F (36.4 °C) (Tympanic)   Ht 6' (1.829 m)   Wt (!) 157.5 kg (347 lb 3.6 oz)   BMI 47.09 kg/m²      Physical Exam   Constitutional: He appears well-developed and well-nourished.   HENT:   Head: Normocephalic and atraumatic.   Eyes: Pupils are equal,  round, and reactive to light.   Neck: Neck supple. No thyromegaly present.   Cardiovascular: Normal rate, regular rhythm and normal heart sounds. Exam reveals no gallop and no friction rub.   No murmur heard.  Pulmonary/Chest: Breath sounds normal. He has no wheezes. He has no rales.   Abdominal: Soft. Bowel sounds are normal. He exhibits no distension. There is no tenderness.   Skin: Skin is warm and dry.   Chronic venous stasis changes in the lower extremities, right worse than left.  Woody edema.   Vitals reviewed.      No visits with results within 2 Week(s) from this visit.   Latest known visit with results is:   Lab Visit on 02/25/2019   Component Date Value    Cholesterol 02/25/2019 107*    Triglycerides 02/25/2019 129     HDL 02/25/2019 37*    LDL Cholesterol 02/25/2019 44.2*    HDL/Chol Ratio 02/25/2019 34.6     Total Cholesterol/HDL Ra* 02/25/2019 2.9     Non-HDL Cholesterol 02/25/2019 70        Assessment:       1. Cellulitis of right lower extremity    2. Morbid obesity with BMI of 45.0-49.9, adult    3. Type 2 diabetes mellitus without complication, without long-term current use of insulin    4. Hypertension associated with diabetes        Plan:   No problem-specific Assessment & Plan notes found for this encounter.    Cellulitis of right lower extremity  Comments:  improved.  No changes today. work on low salt diet.  Weight loss    Morbid obesity with BMI of 45.0-49.9, adult  Comments:  has gained back some of the weight he had lost.  Focus on lower calorie intake.  1800 calories a day target.    Type 2 diabetes mellitus without complication, without long-term current use of insulin  Comments:  Focus on low carbohydrate diet.  Weight loss, key.  Orders:  -     Hemoglobin A1c; Future; Expected date: 05/07/2019    Hypertension associated with diabetes  Comments:  change lisinopril to losartan 25 mg once daily  Orders:  -     losartan (COZAAR) 25 MG tablet; Take 1 tablet (25 mg total) by mouth once  daily.  Dispense: 90 tablet; Refill: 3    Other orders  -     (In Office Administered) Pneumococcal Conjugate Vaccine (13 Valent) (IM)      Due to recent findings in the [place] [employer] on Ace inhibitors and potential risk for lung cancer, discussions have been had with the patient.  At this time we will be switching from the Ace inhibitor to an ARB.  Patient is in the agreement with the plan.       Follow-up in about 8 weeks (around 5/15/2019) for DM, with Anika Steward PA-C.

## 2019-03-19 DIAGNOSIS — E11.9 TYPE 2 DIABETES MELLITUS WITHOUT COMPLICATION, WITHOUT LONG-TERM CURRENT USE OF INSULIN: ICD-10-CM

## 2019-03-19 LAB
ESTIMATED AVG GLUCOSE: 171 MG/DL
HBA1C MFR BLD HPLC: 7.6 %

## 2019-03-19 RX ORDER — FUROSEMIDE 20 MG/1
TABLET ORAL
Qty: 90 TABLET | Refills: 4 | Status: SHIPPED | OUTPATIENT
Start: 2019-03-19 | End: 2020-11-20 | Stop reason: SDUPTHER

## 2019-03-19 RX ORDER — GLIMEPIRIDE 4 MG/1
TABLET ORAL
Qty: 90 TABLET | Refills: 4 | Status: SHIPPED | OUTPATIENT
Start: 2019-03-19 | End: 2020-04-02 | Stop reason: SDUPTHER

## 2019-03-22 ENCOUNTER — PATIENT MESSAGE (OUTPATIENT)
Dept: INTERNAL MEDICINE | Facility: CLINIC | Age: 64
End: 2019-03-22

## 2019-04-15 ENCOUNTER — TELEPHONE (OUTPATIENT)
Dept: ADMINISTRATIVE | Facility: CLINIC | Age: 64
End: 2019-04-15

## 2019-05-06 ENCOUNTER — LAB VISIT (OUTPATIENT)
Dept: LAB | Facility: HOSPITAL | Age: 64
End: 2019-05-06
Attending: INTERNAL MEDICINE
Payer: COMMERCIAL

## 2019-05-06 DIAGNOSIS — E11.9 TYPE 2 DIABETES MELLITUS WITHOUT COMPLICATION, WITHOUT LONG-TERM CURRENT USE OF INSULIN: ICD-10-CM

## 2019-05-06 LAB
ESTIMATED AVG GLUCOSE: 140 MG/DL (ref 68–131)
HBA1C MFR BLD HPLC: 6.5 % (ref 4–5.6)

## 2019-05-06 PROCEDURE — 83036 HEMOGLOBIN GLYCOSYLATED A1C: CPT

## 2019-05-06 PROCEDURE — 36415 COLL VENOUS BLD VENIPUNCTURE: CPT | Mod: PO

## 2019-05-07 NOTE — PROGRESS NOTES
Here are the results of your recent labs.  We will review them in detail at your follow up visit.    Sincerely,    Jacek Mohr M.D.        If you would like to review your experience with Dr. Mohr or [facility], please follow the link below:    [email or web address]

## 2019-05-13 ENCOUNTER — OFFICE VISIT (OUTPATIENT)
Dept: INTERNAL MEDICINE | Facility: CLINIC | Age: 64
End: 2019-05-13
Payer: COMMERCIAL

## 2019-05-13 VITALS
HEIGHT: 72 IN | WEIGHT: 315 LBS | HEART RATE: 72 BPM | DIASTOLIC BLOOD PRESSURE: 88 MMHG | SYSTOLIC BLOOD PRESSURE: 142 MMHG | TEMPERATURE: 98 F | BODY MASS INDEX: 42.66 KG/M2

## 2019-05-13 DIAGNOSIS — J18.9 PNEUMONIA OF RIGHT LOWER LOBE DUE TO INFECTIOUS ORGANISM: ICD-10-CM

## 2019-05-13 DIAGNOSIS — E11.9 TYPE 2 DIABETES MELLITUS WITHOUT COMPLICATION, WITHOUT LONG-TERM CURRENT USE OF INSULIN: Primary | ICD-10-CM

## 2019-05-13 DIAGNOSIS — E66.01 MORBID OBESITY WITH BMI OF 45.0-49.9, ADULT: ICD-10-CM

## 2019-05-13 PROCEDURE — 99999 PR PBB SHADOW E&M-EST. PATIENT-LVL IV: CPT | Mod: PBBFAC,,, | Performed by: PHYSICIAN ASSISTANT

## 2019-05-13 PROCEDURE — 1101F PT FALLS ASSESS-DOCD LE1/YR: CPT | Mod: CPTII,S$GLB,, | Performed by: PHYSICIAN ASSISTANT

## 2019-05-13 PROCEDURE — 99999 PR PBB SHADOW E&M-EST. PATIENT-LVL IV: ICD-10-PCS | Mod: PBBFAC,,, | Performed by: PHYSICIAN ASSISTANT

## 2019-05-13 PROCEDURE — 3044F HG A1C LEVEL LT 7.0%: CPT | Mod: CPTII,S$GLB,, | Performed by: PHYSICIAN ASSISTANT

## 2019-05-13 PROCEDURE — 99214 PR OFFICE/OUTPT VISIT, EST, LEVL IV, 30-39 MIN: ICD-10-PCS | Mod: S$GLB,,, | Performed by: PHYSICIAN ASSISTANT

## 2019-05-13 PROCEDURE — 1101F PR PT FALLS ASSESS DOC 0-1 FALLS W/OUT INJ PAST YR: ICD-10-PCS | Mod: CPTII,S$GLB,, | Performed by: PHYSICIAN ASSISTANT

## 2019-05-13 PROCEDURE — 3044F PR MOST RECENT HEMOGLOBIN A1C LEVEL <7.0%: ICD-10-PCS | Mod: CPTII,S$GLB,, | Performed by: PHYSICIAN ASSISTANT

## 2019-05-13 PROCEDURE — 99214 OFFICE O/P EST MOD 30 MIN: CPT | Mod: S$GLB,,, | Performed by: PHYSICIAN ASSISTANT

## 2019-05-13 PROCEDURE — 3079F PR MOST RECENT DIASTOLIC BLOOD PRESSURE 80-89 MM HG: ICD-10-PCS | Mod: CPTII,S$GLB,, | Performed by: PHYSICIAN ASSISTANT

## 2019-05-13 PROCEDURE — 3008F BODY MASS INDEX DOCD: CPT | Mod: CPTII,S$GLB,, | Performed by: PHYSICIAN ASSISTANT

## 2019-05-13 PROCEDURE — 3077F SYST BP >= 140 MM HG: CPT | Mod: CPTII,S$GLB,, | Performed by: PHYSICIAN ASSISTANT

## 2019-05-13 PROCEDURE — 3008F PR BODY MASS INDEX (BMI) DOCUMENTED: ICD-10-PCS | Mod: CPTII,S$GLB,, | Performed by: PHYSICIAN ASSISTANT

## 2019-05-13 PROCEDURE — 3079F DIAST BP 80-89 MM HG: CPT | Mod: CPTII,S$GLB,, | Performed by: PHYSICIAN ASSISTANT

## 2019-05-13 PROCEDURE — 3077F PR MOST RECENT SYSTOLIC BLOOD PRESSURE >= 140 MM HG: ICD-10-PCS | Mod: CPTII,S$GLB,, | Performed by: PHYSICIAN ASSISTANT

## 2019-05-13 RX ORDER — ALBUTEROL SULFATE 90 UG/1
2 AEROSOL, METERED RESPIRATORY (INHALATION) EVERY 6 HOURS PRN
Qty: 18 G | Refills: 0 | Status: SHIPPED | OUTPATIENT
Start: 2019-05-13 | End: 2019-11-16 | Stop reason: SDUPTHER

## 2019-05-13 NOTE — PROGRESS NOTES
Subjective:       Patient ID: Santhosh Goff is a 64 y.o. male.    Chief Complaint: Follow-up    HPI     Patient comes in today for follow up DM, A1c has done well however his weight continues to climb.  He denies over eating and does not count his calories.  Of recently however his A1c has shown better control.  He did lose several lb after being hospitalized his cane at back.  He has significant co morbidities including recurrent cellulitis       He admits to taking his medications daily.  His blood pressure is borderline elevated as well, he is on 25 mg losartan    He does has have significant edema but he does take 20 mg of Lasix concerning this.  He does have ALIREZA treated with BiPAP.  There are no preventive care reminders to display for this patient.    Past Medical History:   Diagnosis Date    Arthritis     Diabetes mellitus, type 2 2014     x 1 week ago 10/10/2018    Hyperlipidemia     Hypertension     Morbid obesity with BMI of 45.0-49.9, adult     Staphylococcus aureus infection, multiple-resistant (MRSA) 2009       Current Outpatient Medications   Medication Sig Dispense Refill    aspirin (ECOTRIN) 81 MG EC tablet Take 81 mg by mouth once daily.      atorvastatin (LIPITOR) 40 MG tablet TAKE ONE TABLET BY MOUTH ONCE DAILY 90 tablet 3    furosemide (LASIX) 20 MG tablet TAKE ONE TABLET BY MOUTH ONCE DAILY 90 tablet 4    glimepiride (AMARYL) 4 MG tablet TAKE ONE TABLET BY MOUTH ONCE DAILY WITH BREAKFAST 90 tablet 4    ibuprofen 200 mg Cap Take 800 mg by mouth every 6 (six) hours as needed.      losartan (COZAAR) 25 MG tablet Take 1 tablet (25 mg total) by mouth once daily. 90 tablet 3    metFORMIN (GLUCOPHAGE) 1000 MG tablet Take 1 tablet (1,000 mg total) by mouth 2 (two) times daily with meals. 180 tablet 3    potassium chloride SA (K-DUR,KLOR-CON) 20 MEQ tablet TAKE ONE TABLET BY MOUTH ONCE DAILY 90 tablet 4    albuterol (PROVENTIL/VENTOLIN HFA) 90 mcg/actuation inhaler Inhale 2 puffs  into the lungs every 6 (six) hours as needed for Wheezing. Rescue 18 g 0     No current facility-administered medications for this visit.        Review of Systems    Objective:   BP (!) 142/88   Pulse 72   Temp 98.1 °F (36.7 °C) (Tympanic)   Ht 6' (1.829 m)   Wt (!) 161.3 kg (355 lb 9.6 oz)   BMI 48.23 kg/m²      Physical Exam   Constitutional: He is oriented to person, place, and time. He appears well-developed. No distress.   MO   HENT:   Head: Normocephalic and atraumatic.   Right Ear: External ear normal.   Left Ear: External ear normal.   Nose: Nose normal.   Mouth/Throat: Oropharynx is clear and moist. No oropharyngeal exudate.   TMs normal    Eyes: Pupils are equal, round, and reactive to light. Conjunctivae and EOM are normal.   Neck: Normal range of motion. Neck supple.   Cardiovascular: Normal rate, regular rhythm and normal heart sounds.   Pulmonary/Chest: Effort normal and breath sounds normal.   Abdominal: Soft. Bowel sounds are normal.   Genitourinary:   Genitourinary Comments: Exam not done    Musculoskeletal: Normal range of motion.   Neurological: He is alert and oriented to person, place, and time. He has normal reflexes.   Skin: Skin is warm.   Psychiatric: He has a normal mood and affect. His behavior is normal. Judgment and thought content normal.   Vitals reviewed.        Lab Results   Component Value Date    WBC 12.25 02/15/2019    HGB 11.7 (L) 02/15/2019    HCT 34.8 (L) 02/15/2019     02/15/2019    CHOL 107 (L) 02/25/2019    TRIG 129 02/25/2019    HDL 37 (L) 02/25/2019    ALT 38 02/13/2019    AST 27 02/13/2019     02/15/2019    K 4.1 02/15/2019    CL 99 02/15/2019    CREATININE 1.0 02/15/2019    BUN 13 02/15/2019    CO2 31 (H) 02/15/2019    TSH 1.284 02/09/2019    PSA 0.20 08/06/2018    INR 1.1 02/09/2019    HGBA1C 6.5 (H) 05/06/2019       Assessment:       1. Type 2 diabetes mellitus without complication, without long-term current use of insulin    2. Morbid obesity with  BMI of 45.0-49.9, adult    3. Pneumonia of right lower lobe due to infectious organism        Plan:   Type 2 diabetes mellitus without complication, without long-term current use of insulin  A1c better control   Morbid obesity with BMI of 45.0-49.9, adult  Unfortunately has severe morbid obesity does complicate his other conditions.  Discussed in detail with him diet as well as avenues he could take with his smart phone to calorie count and use for 3 abscess.  I am unsure if he is going to be compliant with this however we will have him follow up within 3 months with PCP for his annual indicating do weight check as well as diabetic check at that time.  reqest refill of inhaler   Has prior history of PNA  -     albuterol (PROVENTIL/VENTOLIN HFA) 90 mcg/actuation inhaler; Inhale 2 puffs into the lungs every 6 (six) hours as needed for Wheezing. Rescue  Dispense: 18 g; Refill: 0

## 2019-05-29 RX ORDER — POTASSIUM CHLORIDE 20 MEQ/1
TABLET, EXTENDED RELEASE ORAL
Qty: 90 TABLET | Refills: 4 | Status: SHIPPED | OUTPATIENT
Start: 2019-05-29 | End: 2020-09-20

## 2019-06-18 ENCOUNTER — TELEPHONE (OUTPATIENT)
Dept: INTERNAL MEDICINE | Facility: CLINIC | Age: 64
End: 2019-06-18

## 2019-06-18 NOTE — TELEPHONE ENCOUNTER
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  Thank you.

## 2019-06-26 RX ORDER — METFORMIN HYDROCHLORIDE 1000 MG/1
TABLET ORAL
Qty: 180 TABLET | Refills: 3 | Status: SHIPPED | OUTPATIENT
Start: 2019-06-26 | End: 2020-04-02 | Stop reason: SDUPTHER

## 2019-08-13 ENCOUNTER — PATIENT MESSAGE (OUTPATIENT)
Dept: INTERNAL MEDICINE | Facility: CLINIC | Age: 64
End: 2019-08-13

## 2019-08-13 DIAGNOSIS — E11.9 TYPE 2 DIABETES MELLITUS WITHOUT COMPLICATION, WITHOUT LONG-TERM CURRENT USE OF INSULIN: ICD-10-CM

## 2019-08-13 DIAGNOSIS — G47.33 OSA TREATED WITH BIPAP: ICD-10-CM

## 2019-08-13 DIAGNOSIS — Z00.00 ROUTINE GENERAL MEDICAL EXAMINATION AT HEALTH CARE FACILITY: Primary | ICD-10-CM

## 2019-08-22 ENCOUNTER — LAB VISIT (OUTPATIENT)
Dept: LAB | Facility: HOSPITAL | Age: 64
End: 2019-08-22
Attending: INTERNAL MEDICINE
Payer: COMMERCIAL

## 2019-08-22 DIAGNOSIS — Z00.00 ROUTINE GENERAL MEDICAL EXAMINATION AT HEALTH CARE FACILITY: ICD-10-CM

## 2019-08-22 DIAGNOSIS — G47.33 OSA TREATED WITH BIPAP: ICD-10-CM

## 2019-08-22 DIAGNOSIS — E11.9 TYPE 2 DIABETES MELLITUS WITHOUT COMPLICATION, WITHOUT LONG-TERM CURRENT USE OF INSULIN: ICD-10-CM

## 2019-08-22 LAB
ALBUMIN SERPL BCP-MCNC: 3.8 G/DL (ref 3.5–5.2)
ALP SERPL-CCNC: 61 U/L (ref 55–135)
ALT SERPL W/O P-5'-P-CCNC: 44 U/L (ref 10–44)
ANION GAP SERPL CALC-SCNC: 9 MMOL/L (ref 8–16)
AST SERPL-CCNC: 29 U/L (ref 10–40)
BASOPHILS # BLD AUTO: 0.04 K/UL (ref 0–0.2)
BASOPHILS NFR BLD: 0.5 % (ref 0–1.9)
BILIRUB SERPL-MCNC: 0.4 MG/DL (ref 0.1–1)
BUN SERPL-MCNC: 23 MG/DL (ref 8–23)
CALCIUM SERPL-MCNC: 9.4 MG/DL (ref 8.7–10.5)
CHLORIDE SERPL-SCNC: 106 MMOL/L (ref 95–110)
CHOLEST SERPL-MCNC: 133 MG/DL (ref 120–199)
CHOLEST/HDLC SERPL: 3 {RATIO} (ref 2–5)
CO2 SERPL-SCNC: 27 MMOL/L (ref 23–29)
COMPLEXED PSA SERPL-MCNC: 0.23 NG/ML (ref 0–4)
CREAT SERPL-MCNC: 1.1 MG/DL (ref 0.5–1.4)
DIFFERENTIAL METHOD: ABNORMAL
EOSINOPHIL # BLD AUTO: 0.1 K/UL (ref 0–0.5)
EOSINOPHIL NFR BLD: 1.4 % (ref 0–8)
ERYTHROCYTE [DISTWIDTH] IN BLOOD BY AUTOMATED COUNT: 13.5 % (ref 11.5–14.5)
EST. GFR  (AFRICAN AMERICAN): >60 ML/MIN/1.73 M^2
EST. GFR  (NON AFRICAN AMERICAN): >60 ML/MIN/1.73 M^2
ESTIMATED AVG GLUCOSE: 134 MG/DL (ref 68–131)
GLUCOSE SERPL-MCNC: 89 MG/DL (ref 70–110)
HBA1C MFR BLD HPLC: 6.3 % (ref 4–5.6)
HCT VFR BLD AUTO: 45.5 % (ref 40–54)
HDLC SERPL-MCNC: 45 MG/DL (ref 40–75)
HDLC SERPL: 33.8 % (ref 20–50)
HGB BLD-MCNC: 14 G/DL (ref 14–18)
IMM GRANULOCYTES # BLD AUTO: 0.02 K/UL (ref 0–0.04)
IMM GRANULOCYTES NFR BLD AUTO: 0.3 % (ref 0–0.5)
LDLC SERPL CALC-MCNC: 69.4 MG/DL (ref 63–159)
LYMPHOCYTES # BLD AUTO: 2.5 K/UL (ref 1–4.8)
LYMPHOCYTES NFR BLD: 31.8 % (ref 18–48)
MCH RBC QN AUTO: 30.5 PG (ref 27–31)
MCHC RBC AUTO-ENTMCNC: 30.8 G/DL (ref 32–36)
MCV RBC AUTO: 99 FL (ref 82–98)
MONOCYTES # BLD AUTO: 0.7 K/UL (ref 0.3–1)
MONOCYTES NFR BLD: 9.4 % (ref 4–15)
NEUTROPHILS # BLD AUTO: 4.4 K/UL (ref 1.8–7.7)
NEUTROPHILS NFR BLD: 56.6 % (ref 38–73)
NONHDLC SERPL-MCNC: 88 MG/DL
NRBC BLD-RTO: 0 /100 WBC
PLATELET # BLD AUTO: 252 K/UL (ref 150–350)
PMV BLD AUTO: 10.2 FL (ref 9.2–12.9)
POTASSIUM SERPL-SCNC: 4.4 MMOL/L (ref 3.5–5.1)
PROT SERPL-MCNC: 7.1 G/DL (ref 6–8.4)
RBC # BLD AUTO: 4.59 M/UL (ref 4.6–6.2)
SODIUM SERPL-SCNC: 142 MMOL/L (ref 136–145)
TRIGL SERPL-MCNC: 93 MG/DL (ref 30–150)
WBC # BLD AUTO: 7.84 K/UL (ref 3.9–12.7)

## 2019-08-22 PROCEDURE — 83036 HEMOGLOBIN GLYCOSYLATED A1C: CPT

## 2019-08-22 PROCEDURE — 36415 COLL VENOUS BLD VENIPUNCTURE: CPT | Mod: PO

## 2019-08-22 PROCEDURE — 80053 COMPREHEN METABOLIC PANEL: CPT

## 2019-08-22 PROCEDURE — 80061 LIPID PANEL: CPT

## 2019-08-22 PROCEDURE — 85025 COMPLETE CBC W/AUTO DIFF WBC: CPT

## 2019-08-22 PROCEDURE — 84153 ASSAY OF PSA TOTAL: CPT

## 2019-08-23 NOTE — PROGRESS NOTES
Here are the results of your recent labs.  We will review them in detail at your follow up visit.    Sincerely,    Jacek Mohr M.D.        If you would like to review your experience with Dr. Mohr or [facility], please follow the link below:    [email or web address]

## 2019-08-29 ENCOUNTER — OFFICE VISIT (OUTPATIENT)
Dept: INTERNAL MEDICINE | Facility: CLINIC | Age: 64
End: 2019-08-29
Payer: COMMERCIAL

## 2019-08-29 VITALS
BODY MASS INDEX: 42.66 KG/M2 | SYSTOLIC BLOOD PRESSURE: 130 MMHG | DIASTOLIC BLOOD PRESSURE: 70 MMHG | WEIGHT: 315 LBS | HEART RATE: 80 BPM | HEIGHT: 72 IN | TEMPERATURE: 99 F

## 2019-08-29 DIAGNOSIS — E11.69 HYPERLIPIDEMIA ASSOCIATED WITH TYPE 2 DIABETES MELLITUS: ICD-10-CM

## 2019-08-29 DIAGNOSIS — G47.33 OSA TREATED WITH BIPAP: ICD-10-CM

## 2019-08-29 DIAGNOSIS — E66.01 MORBID OBESITY WITH BMI OF 45.0-49.9, ADULT: ICD-10-CM

## 2019-08-29 DIAGNOSIS — Z00.00 ROUTINE GENERAL MEDICAL EXAMINATION AT HEALTH CARE FACILITY: Primary | ICD-10-CM

## 2019-08-29 DIAGNOSIS — I15.2 HYPERTENSION ASSOCIATED WITH DIABETES: ICD-10-CM

## 2019-08-29 DIAGNOSIS — E11.59 HYPERTENSION ASSOCIATED WITH DIABETES: ICD-10-CM

## 2019-08-29 DIAGNOSIS — E78.5 HYPERLIPIDEMIA ASSOCIATED WITH TYPE 2 DIABETES MELLITUS: ICD-10-CM

## 2019-08-29 DIAGNOSIS — Z12.11 COLON CANCER SCREENING: ICD-10-CM

## 2019-08-29 DIAGNOSIS — E11.9 TYPE 2 DIABETES MELLITUS WITHOUT COMPLICATION, WITHOUT LONG-TERM CURRENT USE OF INSULIN: ICD-10-CM

## 2019-08-29 PROBLEM — L03.115 CELLULITIS OF RIGHT LEG WITHOUT FOOT: Status: RESOLVED | Noted: 2019-02-09 | Resolved: 2019-08-29

## 2019-08-29 PROBLEM — A41.9 SEPSIS: Status: RESOLVED | Noted: 2019-02-10 | Resolved: 2019-08-29

## 2019-08-29 PROCEDURE — 99397 PER PM REEVAL EST PAT 65+ YR: CPT | Mod: S$GLB,,, | Performed by: INTERNAL MEDICINE

## 2019-08-29 PROCEDURE — 99999 PR PBB SHADOW E&M-EST. PATIENT-LVL III: CPT | Mod: PBBFAC,,, | Performed by: INTERNAL MEDICINE

## 2019-08-29 PROCEDURE — 3075F SYST BP GE 130 - 139MM HG: CPT | Mod: CPTII,S$GLB,, | Performed by: INTERNAL MEDICINE

## 2019-08-29 PROCEDURE — 3044F HG A1C LEVEL LT 7.0%: CPT | Mod: CPTII,S$GLB,, | Performed by: INTERNAL MEDICINE

## 2019-08-29 PROCEDURE — 3044F PR MOST RECENT HEMOGLOBIN A1C LEVEL <7.0%: ICD-10-PCS | Mod: CPTII,S$GLB,, | Performed by: INTERNAL MEDICINE

## 2019-08-29 PROCEDURE — 99397 PR PREVENTIVE VISIT,EST,65 & OVER: ICD-10-PCS | Mod: S$GLB,,, | Performed by: INTERNAL MEDICINE

## 2019-08-29 PROCEDURE — 99999 PR PBB SHADOW E&M-EST. PATIENT-LVL III: ICD-10-PCS | Mod: PBBFAC,,, | Performed by: INTERNAL MEDICINE

## 2019-08-29 PROCEDURE — 3078F PR MOST RECENT DIASTOLIC BLOOD PRESSURE < 80 MM HG: ICD-10-PCS | Mod: CPTII,S$GLB,, | Performed by: INTERNAL MEDICINE

## 2019-08-29 PROCEDURE — 3078F DIAST BP <80 MM HG: CPT | Mod: CPTII,S$GLB,, | Performed by: INTERNAL MEDICINE

## 2019-08-29 PROCEDURE — 3075F PR MOST RECENT SYSTOLIC BLOOD PRESS GE 130-139MM HG: ICD-10-PCS | Mod: CPTII,S$GLB,, | Performed by: INTERNAL MEDICINE

## 2019-08-29 NOTE — ASSESSMENT & PLAN NOTE
Diabetes continues to do well at this time.  Most recent a1c is     Lab Results   Component Value Date    HGBA1C 6.3 (H) 08/22/2019    .      We will continue the current regimen.  Focus on a low carbohydrate diet and consistent exercise.

## 2019-08-29 NOTE — PROGRESS NOTES
Subjective:       Patient ID: Santhosh Goff is a 64 y.o. male.    Chief Complaint: Follow-up    HPI Patient is a 64-year-old male presenting today for updated physical exam review of chronic health issues.  Patient has history of type 2 diabetes, hypertension, hyperlipidemia, sleep apnea, morbid obesity.  Overall his numbers are all looking pretty good at this time.  His general control of his chronic does orders has been good.  He continues to have issues are very significant in regards to his obesity.    We spent some time today discussing his weight issues.  I have strongly stressed the importance of weight loss this point I truly believe that he probably needs to strongly consider bariatric surgery at this juncture.  At his age with his comorbidities if he does not lose significant weight and next year so he is really going to be at risk for some significant complications.  He is aware of this expressed understanding of that concern.  He is hesitant to pull the trigger on bariatric at this point.  He would like to try to really focus on lifestyle changes over the next few months and see how that goes.    Review of Systems   Constitutional: Negative for fever and unexpected weight change.   HENT: Negative for hearing loss, postnasal drip and rhinorrhea.    Eyes: Negative for pain and visual disturbance.   Respiratory: Negative for cough, shortness of breath and wheezing.    Cardiovascular: Negative for chest pain and palpitations.   Gastrointestinal: Negative for constipation, diarrhea, nausea and vomiting.   Genitourinary: Negative for dysuria and hematuria.   Musculoskeletal: Negative for arthralgias, back pain, myalgias and neck stiffness.   Skin: Negative for pallor and rash.   Neurological: Negative for seizures, syncope and headaches.   Hematological: Negative for adenopathy.   Psychiatric/Behavioral: Negative for dysphoric mood. The patient is not nervous/anxious.        Objective:   /70   Pulse  80   Temp 98.8 °F (37.1 °C) (Tympanic)   Ht 6' (1.829 m)   Wt (!) 161.1 kg (355 lb 2.6 oz)   BMI 48.17 kg/m²      Physical Exam   Constitutional: He is oriented to person, place, and time. He appears well-developed and well-nourished. No distress.   HENT:   Head: Normocephalic and atraumatic.   Mouth/Throat: Oropharynx is clear and moist.   Eyes: Pupils are equal, round, and reactive to light. EOM are normal.   Neck: Normal range of motion. Neck supple. No JVD present. No thyromegaly present.   Cardiovascular: Normal rate, regular rhythm, normal heart sounds and intact distal pulses. Exam reveals no gallop and no friction rub.   No murmur heard.  Pulmonary/Chest: Effort normal and breath sounds normal. He has no wheezes. He has no rales.   Abdominal: Soft. Bowel sounds are normal. He exhibits no distension. There is no tenderness. There is no rebound and no guarding.   Musculoskeletal: Normal range of motion. He exhibits no edema.   Lymphadenopathy:     He has no cervical adenopathy.   Neurological: He is alert and oriented to person, place, and time. He has normal reflexes. No cranial nerve deficit.   Skin: Skin is warm and dry. No rash noted.   Psychiatric: He has a normal mood and affect. Judgment normal.   Vitals reviewed.      Lab Visit on 08/22/2019   Component Date Value    Microalbum.,U,Random 08/22/2019 4.0     Creatinine, Random Ur 08/22/2019 46.0     Microalb Creat Ratio 08/22/2019 8.7    Lab Visit on 08/22/2019   Component Date Value    WBC 08/22/2019 7.84     RBC 08/22/2019 4.59*    Hemoglobin 08/22/2019 14.0     Hematocrit 08/22/2019 45.5     Mean Corpuscular Volume 08/22/2019 99*    Mean Corpuscular Hemoglo* 08/22/2019 30.5     Mean Corpuscular Hemoglo* 08/22/2019 30.8*    RDW 08/22/2019 13.5     Platelets 08/22/2019 252     MPV 08/22/2019 10.2     Immature Granulocytes 08/22/2019 0.3     Gran # (ANC) 08/22/2019 4.4     Immature Grans (Abs) 08/22/2019 0.02     Lymph # 08/22/2019  2.5     Mono # 08/22/2019 0.7     Eos # 08/22/2019 0.1     Baso # 08/22/2019 0.04     nRBC 08/22/2019 0     Gran% 08/22/2019 56.6     Lymph% 08/22/2019 31.8     Mono% 08/22/2019 9.4     Eosinophil% 08/22/2019 1.4     Basophil% 08/22/2019 0.5     Differential Method 08/22/2019 Automated     PSA, SCREEN 08/22/2019 0.23     Cholesterol 08/22/2019 133     Triglycerides 08/22/2019 93     HDL 08/22/2019 45     LDL Cholesterol 08/22/2019 69.4     Hdl/Cholesterol Ratio 08/22/2019 33.8     Total Cholesterol/HDL Ra* 08/22/2019 3.0     Non-HDL Cholesterol 08/22/2019 88     Sodium 08/22/2019 142     Potassium 08/22/2019 4.4     Chloride 08/22/2019 106     CO2 08/22/2019 27     Glucose 08/22/2019 89     BUN, Bld 08/22/2019 23     Creatinine 08/22/2019 1.1     Calcium 08/22/2019 9.4     Total Protein 08/22/2019 7.1     Albumin 08/22/2019 3.8     Total Bilirubin 08/22/2019 0.4     Alkaline Phosphatase 08/22/2019 61     AST 08/22/2019 29     ALT 08/22/2019 44     Anion Gap 08/22/2019 9     eGFR if African American 08/22/2019 >60.0     eGFR if non African Amer* 08/22/2019 >60.0     Hemoglobin A1C 08/22/2019 6.3*    Estimated Avg Glucose 08/22/2019 134*       Assessment:       1. Routine general medical examination at health care facility    2. Type 2 diabetes mellitus without complication, without long-term current use of insulin    3. Hypertension associated with diabetes    4. Hyperlipidemia associated with type 2 diabetes mellitus    5. ALIREZA treated with AVAPS    6. Morbid obesity with BMI of 45.0-49.9, adult    7. Colon cancer screening        Plan:   Type 2 diabetes mellitus without complication, without long-term current use of insulin  Diabetes continues to do well at this time.  Most recent a1c is     Lab Results   Component Value Date    HGBA1C 6.3 (H) 08/22/2019    .      We will continue the current regimen.  Focus on a low carbohydrate diet and consistent exercise.        Hypertension  associated with diabetes  Blood pressure is under good control.  We will continue the current regimen.  Will work on regular aerobic exercise and a low salt diet.        Hyperlipidemia associated with type 2 diabetes mellitus  Cholesterol numbers look good.  We will continue the current regimen at this time.  Remain focused on low fat diet and high dietary fiber intake.      ALIREZA treated with AVAPS  Patient is compliant with use of cpap, using it the majority of nights.  Benefit from the use of the device is noted.      Routine general medical examination at health care facility  Comments:  Focus on good health habits, low fat diet, regular exercise, seatbelt use, sunscreen use    Type 2 diabetes mellitus without complication, without long-term current use of insulin  -     Hemoglobin A1c; Future; Expected date: 11/27/2019    Hypertension associated with diabetes    Hyperlipidemia associated with type 2 diabetes mellitus    ALIREZA treated with AVAPS    Morbid obesity with BMI of 45.0-49.9, adult  Comments:  Focus on low calorie diet.  Strive for calories of 0986-4694 daily.  Regular exercise.  Discussed bariatrics.  Wants to try diet first.    Colon cancer screening  Comments:  referral for colonoscopy  Orders:  -     Case request GI: COLONOSCOPY          Follow up in about 3 months (around 11/29/2019).

## 2019-08-30 ENCOUNTER — TELEPHONE (OUTPATIENT)
Dept: ENDOSCOPY | Facility: HOSPITAL | Age: 64
End: 2019-08-30

## 2019-08-30 NOTE — TELEPHONE ENCOUNTER
Attempted to schedule procedure with patient, no answer.  Left message to call office, number provided.

## 2019-10-10 DIAGNOSIS — E78.5 HYPERLIPIDEMIA, UNSPECIFIED HYPERLIPIDEMIA TYPE: ICD-10-CM

## 2019-10-10 RX ORDER — ATORVASTATIN CALCIUM 40 MG/1
TABLET, FILM COATED ORAL
Qty: 90 TABLET | Refills: 3 | Status: SHIPPED | OUTPATIENT
Start: 2019-10-10 | End: 2021-01-26

## 2019-10-16 ENCOUNTER — OFFICE VISIT (OUTPATIENT)
Dept: OPHTHALMOLOGY | Facility: CLINIC | Age: 64
End: 2019-10-16
Payer: COMMERCIAL

## 2019-10-16 DIAGNOSIS — H25.13 CATARACT, NUCLEAR SCLEROTIC SENILE, BILATERAL: ICD-10-CM

## 2019-10-16 DIAGNOSIS — E11.9 TYPE 2 DIABETES MELLITUS WITHOUT COMPLICATION, WITHOUT LONG-TERM CURRENT USE OF INSULIN: Primary | ICD-10-CM

## 2019-10-16 DIAGNOSIS — H52.4 BILATERAL PRESBYOPIA: ICD-10-CM

## 2019-10-16 DIAGNOSIS — H52.03 HYPEROPIA, BILATERAL: ICD-10-CM

## 2019-10-16 PROCEDURE — 92014 COMPRE OPH EXAM EST PT 1/>: CPT | Mod: S$GLB,,, | Performed by: OPTOMETRIST

## 2019-10-16 PROCEDURE — 92014 PR EYE EXAM, EST PATIENT,COMPREHESV: ICD-10-PCS | Mod: S$GLB,,, | Performed by: OPTOMETRIST

## 2019-10-16 PROCEDURE — 99999 PR PBB SHADOW E&M-EST. PATIENT-LVL II: ICD-10-PCS | Mod: PBBFAC,,, | Performed by: OPTOMETRIST

## 2019-10-16 PROCEDURE — 92015 PR REFRACTION: ICD-10-PCS | Mod: S$GLB,,, | Performed by: OPTOMETRIST

## 2019-10-16 PROCEDURE — 92015 DETERMINE REFRACTIVE STATE: CPT | Mod: S$GLB,,, | Performed by: OPTOMETRIST

## 2019-10-16 PROCEDURE — 99999 PR PBB SHADOW E&M-EST. PATIENT-LVL II: CPT | Mod: PBBFAC,,, | Performed by: OPTOMETRIST

## 2019-10-16 NOTE — PROGRESS NOTES
HPI     LAST SAW 10/10/19  +DM X 2-4 YEARS  Lab Results       Component                Value               Date                       HGBA1C                   6.3 (H)             08/22/2019                Last edited by Antonio Waldrop, OD on 10/16/2019 10:53 AM. (History)            Assessment /Plan     For exam results, see Encounter Report.    Type 2 diabetes mellitus without complication, without long-term current use of insulin    Cataract, nuclear sclerotic senile, bilateral    Hyperopia, bilateral    Bilateral presbyopia      No Background Diabetic Retinopathy    Minimal cataracts OU, not surgical    Dispense Final Rx for glasses.  RTC 1 year  Discussed above and answered questions.

## 2019-11-16 DIAGNOSIS — J18.9 PNEUMONIA OF RIGHT LOWER LOBE DUE TO INFECTIOUS ORGANISM: ICD-10-CM

## 2019-11-18 RX ORDER — ALBUTEROL SULFATE 90 UG/1
AEROSOL, METERED RESPIRATORY (INHALATION)
Qty: 18 EACH | Refills: 0 | Status: SHIPPED | OUTPATIENT
Start: 2019-11-18 | End: 2021-08-19

## 2019-11-22 ENCOUNTER — LAB VISIT (OUTPATIENT)
Dept: LAB | Facility: HOSPITAL | Age: 64
End: 2019-11-22
Attending: INTERNAL MEDICINE
Payer: COMMERCIAL

## 2019-11-22 DIAGNOSIS — E11.9 TYPE 2 DIABETES MELLITUS WITHOUT COMPLICATION, WITHOUT LONG-TERM CURRENT USE OF INSULIN: ICD-10-CM

## 2019-11-22 LAB
ESTIMATED AVG GLUCOSE: 146 MG/DL (ref 68–131)
HBA1C MFR BLD HPLC: 6.7 % (ref 4–5.6)

## 2019-11-22 PROCEDURE — 83036 HEMOGLOBIN GLYCOSYLATED A1C: CPT

## 2019-11-22 PROCEDURE — 36415 COLL VENOUS BLD VENIPUNCTURE: CPT | Mod: PO

## 2019-11-25 ENCOUNTER — OFFICE VISIT (OUTPATIENT)
Dept: INTERNAL MEDICINE | Facility: CLINIC | Age: 64
End: 2019-11-25
Payer: COMMERCIAL

## 2019-11-25 VITALS
HEART RATE: 82 BPM | BODY MASS INDEX: 42.66 KG/M2 | HEIGHT: 72 IN | SYSTOLIC BLOOD PRESSURE: 120 MMHG | WEIGHT: 315 LBS | TEMPERATURE: 98 F | DIASTOLIC BLOOD PRESSURE: 80 MMHG

## 2019-11-25 DIAGNOSIS — Z12.5 ENCOUNTER FOR SCREENING FOR MALIGNANT NEOPLASM OF PROSTATE: ICD-10-CM

## 2019-11-25 DIAGNOSIS — I15.2 HYPERTENSION ASSOCIATED WITH DIABETES: ICD-10-CM

## 2019-11-25 DIAGNOSIS — E11.69 HYPERLIPIDEMIA ASSOCIATED WITH TYPE 2 DIABETES MELLITUS: ICD-10-CM

## 2019-11-25 DIAGNOSIS — E11.59 HYPERTENSION ASSOCIATED WITH DIABETES: ICD-10-CM

## 2019-11-25 DIAGNOSIS — Z12.11 COLON CANCER SCREENING: ICD-10-CM

## 2019-11-25 DIAGNOSIS — E78.5 HYPERLIPIDEMIA ASSOCIATED WITH TYPE 2 DIABETES MELLITUS: ICD-10-CM

## 2019-11-25 DIAGNOSIS — E11.9 TYPE 2 DIABETES MELLITUS WITHOUT COMPLICATION, WITHOUT LONG-TERM CURRENT USE OF INSULIN: Primary | ICD-10-CM

## 2019-11-25 PROCEDURE — 90662 IIV NO PRSV INCREASED AG IM: CPT | Mod: S$GLB,,, | Performed by: INTERNAL MEDICINE

## 2019-11-25 PROCEDURE — 3079F PR MOST RECENT DIASTOLIC BLOOD PRESSURE 80-89 MM HG: ICD-10-PCS | Mod: CPTII,S$GLB,, | Performed by: INTERNAL MEDICINE

## 2019-11-25 PROCEDURE — 99214 PR OFFICE/OUTPT VISIT, EST, LEVL IV, 30-39 MIN: ICD-10-PCS | Mod: 25,S$GLB,, | Performed by: INTERNAL MEDICINE

## 2019-11-25 PROCEDURE — 90662 FLU VACCINE - HIGH DOSE (65+) PRESERVATIVE FREE IM: ICD-10-PCS | Mod: S$GLB,,, | Performed by: INTERNAL MEDICINE

## 2019-11-25 PROCEDURE — 3008F BODY MASS INDEX DOCD: CPT | Mod: CPTII,S$GLB,, | Performed by: INTERNAL MEDICINE

## 2019-11-25 PROCEDURE — 3074F PR MOST RECENT SYSTOLIC BLOOD PRESSURE < 130 MM HG: ICD-10-PCS | Mod: CPTII,S$GLB,, | Performed by: INTERNAL MEDICINE

## 2019-11-25 PROCEDURE — 90471 FLU VACCINE - HIGH DOSE (65+) PRESERVATIVE FREE IM: ICD-10-PCS | Mod: S$GLB,,, | Performed by: INTERNAL MEDICINE

## 2019-11-25 PROCEDURE — 90471 IMMUNIZATION ADMIN: CPT | Mod: S$GLB,,, | Performed by: INTERNAL MEDICINE

## 2019-11-25 PROCEDURE — 3044F PR MOST RECENT HEMOGLOBIN A1C LEVEL <7.0%: ICD-10-PCS | Mod: CPTII,S$GLB,, | Performed by: INTERNAL MEDICINE

## 2019-11-25 PROCEDURE — 3074F SYST BP LT 130 MM HG: CPT | Mod: CPTII,S$GLB,, | Performed by: INTERNAL MEDICINE

## 2019-11-25 PROCEDURE — 90472 IMMUNIZATION ADMIN EACH ADD: CPT | Mod: S$GLB,,, | Performed by: INTERNAL MEDICINE

## 2019-11-25 PROCEDURE — 99214 OFFICE O/P EST MOD 30 MIN: CPT | Mod: 25,S$GLB,, | Performed by: INTERNAL MEDICINE

## 2019-11-25 PROCEDURE — 99999 PR PBB SHADOW E&M-EST. PATIENT-LVL IV: ICD-10-PCS | Mod: PBBFAC,,, | Performed by: INTERNAL MEDICINE

## 2019-11-25 PROCEDURE — 1101F PT FALLS ASSESS-DOCD LE1/YR: CPT | Mod: CPTII,S$GLB,, | Performed by: INTERNAL MEDICINE

## 2019-11-25 PROCEDURE — 99999 PR PBB SHADOW E&M-EST. PATIENT-LVL IV: CPT | Mod: PBBFAC,,, | Performed by: INTERNAL MEDICINE

## 2019-11-25 PROCEDURE — 3079F DIAST BP 80-89 MM HG: CPT | Mod: CPTII,S$GLB,, | Performed by: INTERNAL MEDICINE

## 2019-11-25 PROCEDURE — 90732 PPSV23 VACC 2 YRS+ SUBQ/IM: CPT | Mod: S$GLB,,, | Performed by: INTERNAL MEDICINE

## 2019-11-25 PROCEDURE — 90472 PNEUMOCOCCAL POLYSACCHARIDE VACCINE 23-VALENT =>2YO SQ IM: ICD-10-PCS | Mod: S$GLB,,, | Performed by: INTERNAL MEDICINE

## 2019-11-25 PROCEDURE — 1101F PR PT FALLS ASSESS DOC 0-1 FALLS W/OUT INJ PAST YR: ICD-10-PCS | Mod: CPTII,S$GLB,, | Performed by: INTERNAL MEDICINE

## 2019-11-25 PROCEDURE — 3008F PR BODY MASS INDEX (BMI) DOCUMENTED: ICD-10-PCS | Mod: CPTII,S$GLB,, | Performed by: INTERNAL MEDICINE

## 2019-11-25 PROCEDURE — 3044F HG A1C LEVEL LT 7.0%: CPT | Mod: CPTII,S$GLB,, | Performed by: INTERNAL MEDICINE

## 2019-11-25 PROCEDURE — 90732 PNEUMOCOCCAL POLYSACCHARIDE VACCINE 23-VALENT =>2YO SQ IM: ICD-10-PCS | Mod: S$GLB,,, | Performed by: INTERNAL MEDICINE

## 2019-11-25 NOTE — PROGRESS NOTES
Here are the results of your recent labs.  We will review them in detail at your follow up visit.    Sincerely,    Jacek Mohr M.D.        If you would like to review your experience with Dr. Mohr or [facility], please follow the link below:    [email or web address]

## 2019-11-25 NOTE — ASSESSMENT & PLAN NOTE
Diabetes continues to do well at this time.  Most recent a1c is     Lab Results   Component Value Date    HGBA1C 6.7 (H) 11/22/2019    .      We will continue the current regimen.  Focus on a low carbohydrate diet and consistent exercise.  ]

## 2019-11-25 NOTE — PROGRESS NOTES
Subjective:       Patient ID: Santhosh Goff is a 64 y.o. male.    Chief Complaint: Follow-up    HPI Patient is a 64-year-old male presenting today for updated follow-up on his diabetes, hypertension, hyperlipidemia.  He continues to have decent control of his sugars is A1c is 6.7.  This did go up a bit from his last check.  He relates that he is just not been is focused around his diet.  He does see that the number went up and he does plan on working more focused to get that back down.    Blood pressure remains good at this time.  He is tolerating the medication well.  He has no adverse effects.    Cholesterol numbers have been stable over time he continues take atorvastatin daily.  No signs or symptoms of cardiac decompensation.    He is due for colon cancer screening.  He is also due for his Pneumovax.    Review of Systems   Constitutional: Negative for fever and unexpected weight change.   HENT: Negative for hearing loss, postnasal drip and rhinorrhea.    Eyes: Negative for pain and visual disturbance.   Respiratory: Negative for cough, shortness of breath and wheezing.    Cardiovascular: Negative for chest pain and palpitations.   Gastrointestinal: Negative for constipation, diarrhea, nausea and vomiting.   Genitourinary: Negative for dysuria and hematuria.   Musculoskeletal: Negative for arthralgias, back pain, myalgias and neck stiffness.   Skin: Negative for pallor and rash.   Neurological: Negative for seizures, syncope and headaches.   Hematological: Negative for adenopathy.   Psychiatric/Behavioral: Negative for dysphoric mood. The patient is not nervous/anxious.        Objective:   /80   Pulse 82   Temp 98.4 °F (36.9 °C) (Oral)   Ht 6' (1.829 m)   Wt (!) 157.8 kg (347 lb 14.2 oz)   BMI 47.18 kg/m²      Physical Exam   Constitutional: He appears well-developed and well-nourished.   HENT:   Head: Normocephalic and atraumatic.   Eyes: Pupils are equal, round, and reactive to light.   Neck:  Neck supple. No thyromegaly present.   Cardiovascular: Normal rate, regular rhythm and normal heart sounds. Exam reveals no gallop and no friction rub.   No murmur heard.  Pulmonary/Chest: Breath sounds normal. He has no wheezes. He has no rales.   Abdominal: Soft. Bowel sounds are normal. He exhibits no distension. There is no tenderness.   Vitals reviewed.      Lab Visit on 11/22/2019   Component Date Value    Hemoglobin A1C 11/22/2019 6.7*    Estimated Avg Glucose 11/22/2019 146*       Assessment:       1. Type 2 diabetes mellitus without complication, without long-term current use of insulin    2. Hypertension associated with diabetes    3. Hyperlipidemia associated with type 2 diabetes mellitus    4. Encounter for screening for malignant neoplasm of prostate    5. Colon cancer screening        Plan:   Type 2 diabetes mellitus without complication, without long-term current use of insulin  Diabetes continues to do well at this time.  Most recent a1c is     Lab Results   Component Value Date    HGBA1C 6.7 (H) 11/22/2019    .      We will continue the current regimen.  Focus on a low carbohydrate diet and consistent exercise.  ]      Hypertension associated with diabetes  Blood pressure is under good control.  We will continue the current regimen.  Will work on regular aerobic exercise and a low salt diet.        Hyperlipidemia associated with type 2 diabetes mellitus  Cholesterol numbers look good.  We will continue the current regimen at this time.  Remain focused on low fat diet and high dietary fiber intake.      Type 2 diabetes mellitus without complication, without long-term current use of insulin  -     Comprehensive metabolic panel; Future; Expected date: 03/24/2020  -     Hemoglobin A1c; Future; Expected date: 03/24/2020  -     CBC auto differential; Future; Expected date: 03/24/2020  -     Lipid panel; Future; Expected date: 03/24/2020  -     Microalbumin/creatinine urine ratio; Future; Expected date:  03/24/2020    Hypertension associated with diabetes    Hyperlipidemia associated with type 2 diabetes mellitus    Encounter for screening for malignant neoplasm of prostate  -     PSA, Screening; Future; Expected date: 03/24/2020    Colon cancer screening  -     Case request GI: COLONOSCOPY    Other orders  -     (In Office Administered) Pneumococcal Polysaccharide Vaccine (23 Valent) (SQ/IM)  -     Influenza - High Dose (65+) (PF) (IM)          Follow up in about 4 months (around 3/25/2020).

## 2019-11-26 ENCOUNTER — TELEPHONE (OUTPATIENT)
Dept: ENDOSCOPY | Facility: HOSPITAL | Age: 64
End: 2019-11-26

## 2019-11-26 NOTE — TELEPHONE ENCOUNTER
Attempted to schedule procedure, no answer. Left message to return call number provider. juanita

## 2019-12-05 ENCOUNTER — TELEPHONE (OUTPATIENT)
Dept: ENDOSCOPY | Facility: HOSPITAL | Age: 64
End: 2019-12-05

## 2019-12-05 RX ORDER — SODIUM, POTASSIUM,MAG SULFATES 17.5-3.13G
1 SOLUTION, RECONSTITUTED, ORAL ORAL DAILY
Qty: 1 KIT | Refills: 0 | Status: SHIPPED | OUTPATIENT
Start: 2019-12-05 | End: 2019-12-07

## 2019-12-05 NOTE — TELEPHONE ENCOUNTER
Endoscopy Scheduling Questionnaire:  Per wife:     1. Have you been admitted overnight to the hospital in the past 3 months? no  2. Have you had a cardiac stent placed in the past 12 months? no  3. Have you had a stroke or heart attack in the past 6 months? no  4. Have you had any chest pain in the past 3 months? no      If so, have you been evaluated by your PCP or Cardiologist? no  5. Do you take prescription weight loss medication? no  6. Have you been diagnosed with Diverticulitis within the past 3 months? no  7. Are you on dialysis? no  8. Are you diabetic? Yes. Advised to hold diabetes medication the morning of procedure. Do you have an insulin pump? no  9. Do you have any other health issues that you feel might limit your ability to safely have the procedure and/or sedation? no  10. Is the patient over 81 yo? no        If so, has the patient been seen by their PCP or GI in the last 6 months? N/A       -I have reviewed the last colonoscopy for recommendations regarding surveillance and bowel prep  Yes  -I have reviewed the patient's medications and allergies. He is not on high risk medication, A clearance request NA  -I have verified the pharmacy information. The prep being used is Suprep. The patient's prep instructions were sent via [facility] patient portal..    Date Endoscopy Scheduled: Date: 2/24/20

## 2019-12-05 NOTE — TELEPHONE ENCOUNTER
Attempted to schedule procedure, no answer. Left message to return call number provider. 2nd attempt. juanita

## 2019-12-16 ENCOUNTER — OFFICE VISIT (OUTPATIENT)
Dept: PULMONOLOGY | Facility: CLINIC | Age: 64
End: 2019-12-16
Payer: COMMERCIAL

## 2019-12-16 VITALS
WEIGHT: 315 LBS | HEART RATE: 81 BPM | RESPIRATION RATE: 22 BRPM | SYSTOLIC BLOOD PRESSURE: 148 MMHG | BODY MASS INDEX: 42.66 KG/M2 | OXYGEN SATURATION: 97 % | DIASTOLIC BLOOD PRESSURE: 78 MMHG | HEIGHT: 72 IN

## 2019-12-16 DIAGNOSIS — E66.01 MORBID OBESITY WITH BMI OF 45.0-49.9, ADULT: Chronic | ICD-10-CM

## 2019-12-16 DIAGNOSIS — G47.33 OSA TREATED WITH BIPAP: Primary | Chronic | ICD-10-CM

## 2019-12-16 DIAGNOSIS — E66.2 HYPOVENTILATION ASSOCIATED WITH OBESITY: Chronic | ICD-10-CM

## 2019-12-16 PROCEDURE — 99214 OFFICE O/P EST MOD 30 MIN: CPT | Mod: S$GLB,,, | Performed by: INTERNAL MEDICINE

## 2019-12-16 PROCEDURE — 99999 PR PBB SHADOW E&M-EST. PATIENT-LVL III: CPT | Mod: PBBFAC,,, | Performed by: INTERNAL MEDICINE

## 2019-12-16 PROCEDURE — 3077F PR MOST RECENT SYSTOLIC BLOOD PRESSURE >= 140 MM HG: ICD-10-PCS | Mod: CPTII,S$GLB,, | Performed by: INTERNAL MEDICINE

## 2019-12-16 PROCEDURE — 3008F BODY MASS INDEX DOCD: CPT | Mod: CPTII,S$GLB,, | Performed by: INTERNAL MEDICINE

## 2019-12-16 PROCEDURE — 3078F PR MOST RECENT DIASTOLIC BLOOD PRESSURE < 80 MM HG: ICD-10-PCS | Mod: CPTII,S$GLB,, | Performed by: INTERNAL MEDICINE

## 2019-12-16 PROCEDURE — 99214 PR OFFICE/OUTPT VISIT, EST, LEVL IV, 30-39 MIN: ICD-10-PCS | Mod: S$GLB,,, | Performed by: INTERNAL MEDICINE

## 2019-12-16 PROCEDURE — 3077F SYST BP >= 140 MM HG: CPT | Mod: CPTII,S$GLB,, | Performed by: INTERNAL MEDICINE

## 2019-12-16 PROCEDURE — 3008F PR BODY MASS INDEX (BMI) DOCUMENTED: ICD-10-PCS | Mod: CPTII,S$GLB,, | Performed by: INTERNAL MEDICINE

## 2019-12-16 PROCEDURE — 3078F DIAST BP <80 MM HG: CPT | Mod: CPTII,S$GLB,, | Performed by: INTERNAL MEDICINE

## 2019-12-16 PROCEDURE — 1101F PT FALLS ASSESS-DOCD LE1/YR: CPT | Mod: CPTII,S$GLB,, | Performed by: INTERNAL MEDICINE

## 2019-12-16 PROCEDURE — 99999 PR PBB SHADOW E&M-EST. PATIENT-LVL III: ICD-10-PCS | Mod: PBBFAC,,, | Performed by: INTERNAL MEDICINE

## 2019-12-16 PROCEDURE — 1101F PR PT FALLS ASSESS DOC 0-1 FALLS W/OUT INJ PAST YR: ICD-10-PCS | Mod: CPTII,S$GLB,, | Performed by: INTERNAL MEDICINE

## 2019-12-16 NOTE — PROGRESS NOTES
Subjective:      Patient ID: Santhosh Goff is a 64 y.o. male.    Patient Active Problem List   Diagnosis    Type 2 diabetes mellitus without complication, without long-term current use of insulin    Hyperlipidemia associated with type 2 diabetes mellitus    Hypertension associated with diabetes    Hypoventilation associated with obesity    Need for influenza vaccination    ALIREZA treated with AVAPS    Morbid obesity with BMI of 45.0-49.9, adult     Problem list has been reviewed.    Chief Complaint: Sleep Apnea    HPI    He is a here for follow up for ALIREZA.     He is on TRIOLOGY 100 Ventilator. AVAPS /5/22:14/6/8:8. He uses his TRIOLOGY religiously with the exception of when his has sinus problems for an average of 5 hours per night.     Complaince download reveals  85% days with greater than 4 hours of device use.     Patient denies snoring, headaches, excessive daytime sleepiness.      He definitely thinks PAP is beneficial to his health and he wants to continue with PAP therapy.     [place] Sleepiness Scale   EPWORTH SLEEPINESS SCALE 12/17/2018 12/22/2017 12/23/2016 6/23/2016 12/23/2015 6/22/2015   Sitting and reading 0 0 1 0 1 0   Watching TV 1 1 1 1 1 3   Sitting, inactive in a public place (e.g. a theatre or a meeting) 0 0 0 0 1 0   As a passenger in a car for an hour without a break 0 0 0 0 0 2   Lying down to rest in the afternoon when circumstances permit 0 1 0 1 0 1   Sitting and talking to someone 0 0 0 0 0 0   Sitting quietly after a lunch without alcohol 0 0 1 0 0 3   In a car, while stopped for a few minutes in traffic 0 0 0 0 0 0   Total score 1 2 3 2 3 9       He reports that he is recovering from a recent URI.   Immunization status reviewed and up to date.  He is trying to loose weight. He has lost a few pounds.      A full  review of systems, past , family  and social histories was performed except as mentioned in the note above, these are non contributory to the main issues discussed  today.     Previous Report Reviewed: office notes     The following portions of the patient's history were reviewed and updated as appropriate: He  has a past medical history of Arthritis, Diabetes mellitus, type 2 (2014), Hyperlipidemia, Hypertension, Morbid obesity with BMI of 45.0-49.9, adult, and Staphylococcus aureus infection, multiple-resistant (MRSA) (2009).  He  has a past surgical history that includes Lung surgery.  His family history includes Early death in his father.  He  reports that he has never smoked. He has never used smokeless tobacco. He reports that he does not drink alcohol or use drugs.  He has a current medication list which includes the following prescription(s): aspirin, atorvastatin, furosemide, glimepiride, ibuprofen, losartan, metformin, multivit-min/ferrous fumarate, potassium chloride sa, pravastatin, and ventolin hfa.  He is allergic to cephalosporins..    Review of Systems   Constitutional: Positive for fatigue. Negative for fever and chills.   HENT: Negative for nosebleeds and congestion.    Eyes: Negative for itching.   Respiratory: Positive for dyspnea on extertion. Negative for cough and shortness of breath.    Cardiovascular: Negative for chest pain and leg swelling.   Genitourinary: Negative for difficulty urinating and hematuria.   Skin: Negative for rash.   Gastrointestinal: Negative for nausea and vomiting.   Hematological: Does not bruise/bleed easily.   All other systems reviewed and are negative.     Objective:     BP (!) 148/78   Pulse 81   Resp (!) 22   Ht 6' (1.829 m)   Wt (!) 159.9 kg (352 lb 10 oz)   SpO2 97%   BMI 47.82 kg/m²   Body mass index is 47.82 kg/m².    Physical Exam   Constitutional: He is oriented to person, place, and time. He appears well-developed and well-nourished.   Morbidly obese   HENT:   Head: Normocephalic and atraumatic.   Eyes: Pupils are equal, round, and reactive to light. Conjunctivae are normal.   Neck: Normal range of motion. Neck  supple.   Cardiovascular: Normal rate.   Pulmonary/Chest: Effort normal and breath sounds normal.   Abdominal: Soft. Bowel sounds are normal.   Musculoskeletal: Normal range of motion. He exhibits edema.   Neurological: He is alert and oriented to person, place, and time.   Skin: Skin is warm and dry.   Psychiatric: He has a normal mood and affect.   Nursing note and vitals reviewed.      Personal Diagnostic Review  none pertinent    Assessment / plan:       Discussed diagnosis, its evaluation, treatment and usual course. All questions answered.     Problem List Items Addressed This Visit        Endocrine    Morbid obesity with BMI of 45.0-49.9, adult    Current Assessment & Plan     General weight loss/lifestyle modification strategies discussed (elicit support from others; identify saboteurs; non-food rewards, etc).  Diet interventions: low calorie (1000 kCal/d) deficit diet.            Other    Hypoventilation associated with obesity (Chronic)    Current Assessment & Plan     General weight loss/lifestyle modification strategies discussed (elicit support from others; identify saboteurs; non-food rewards, etc).  Diet interventions: low calorie (1000 kCal/d) deficit diet.         ALIREZA treated with AVAPS - Primary    Current Assessment & Plan     Continue AVAPS AE: AVAPS /5/22:14/6/8:8. (DME -  [facility])     Discussed therapeutic goals for positive airway pressure therapy(CPAP or BiPAP): Ideal is usage 100% of nights for 6 - 8 hours per night. Minimum usage is 70% of night for at least 4 hours per night used. Pateint expressed understanding. All Questions answered.    SUPPLIES RENEWED.          Relevant Orders    CPAP/BIPAP SUPPLIES        TIME SPENT WITH PATIENT: Time spent: 25 minutes in face to face  discussion concerning diagnosis, prognosis, review of lab and test results, benefits of treatment as well as management of disease, counseling of patient and coordination of care between various health  care  providers . Greater than half the time spent was used for coordination of care and counseling of patient.          Follow up in about 1 year (around 12/16/2020) for ALIREZA, Morbid Obesity.

## 2020-02-24 ENCOUNTER — HOSPITAL ENCOUNTER (OUTPATIENT)
Facility: HOSPITAL | Age: 65
Discharge: HOME | End: 2020-02-24
Attending: INTERNAL MEDICINE | Admitting: INTERNAL MEDICINE
Payer: COMMERCIAL

## 2020-02-24 ENCOUNTER — ANESTHESIA (OUTPATIENT)
Dept: ENDOSCOPY | Facility: HOSPITAL | Age: 65
End: 2020-02-24
Payer: COMMERCIAL

## 2020-02-24 ENCOUNTER — ANESTHESIA EVENT (OUTPATIENT)
Dept: ENDOSCOPY | Facility: HOSPITAL | Age: 65
End: 2020-02-24
Payer: COMMERCIAL

## 2020-02-24 VITALS
HEIGHT: 72 IN | WEIGHT: 315 LBS | DIASTOLIC BLOOD PRESSURE: 65 MMHG | SYSTOLIC BLOOD PRESSURE: 118 MMHG | TEMPERATURE: 98 F | OXYGEN SATURATION: 95 % | BODY MASS INDEX: 42.66 KG/M2 | HEART RATE: 62 BPM | RESPIRATION RATE: 18 BRPM

## 2020-02-24 DIAGNOSIS — Z12.11 ENCOUNTER FOR SCREENING COLONOSCOPY: Primary | ICD-10-CM

## 2020-02-24 LAB — POCT GLUCOSE: 144 MG/DL (ref 70–110)

## 2020-02-24 PROCEDURE — 45380 COLONOSCOPY AND BIOPSY: CPT | Mod: 33,,, | Performed by: INTERNAL MEDICINE

## 2020-02-24 PROCEDURE — 63600175 PHARM REV CODE 636 W HCPCS: Performed by: INTERNAL MEDICINE

## 2020-02-24 PROCEDURE — 88305 TISSUE EXAM BY PATHOLOGIST: CPT | Mod: 59 | Performed by: PATHOLOGY

## 2020-02-24 PROCEDURE — 88305 TISSUE EXAM BY PATHOLOGIST: CPT | Mod: 26,,, | Performed by: PATHOLOGY

## 2020-02-24 PROCEDURE — 45380 COLONOSCOPY AND BIOPSY: CPT | Performed by: INTERNAL MEDICINE

## 2020-02-24 PROCEDURE — 37000008 HC ANESTHESIA 1ST 15 MINUTES: Performed by: INTERNAL MEDICINE

## 2020-02-24 PROCEDURE — 88305 TISSUE EXAM BY PATHOLOGIST: ICD-10-PCS | Mod: 26,,, | Performed by: PATHOLOGY

## 2020-02-24 PROCEDURE — 25000003 PHARM REV CODE 250: Performed by: NURSE ANESTHETIST, CERTIFIED REGISTERED

## 2020-02-24 PROCEDURE — 82962 GLUCOSE BLOOD TEST: CPT | Performed by: INTERNAL MEDICINE

## 2020-02-24 PROCEDURE — 27201012 HC FORCEPS, HOT/COLD, DISP: Performed by: INTERNAL MEDICINE

## 2020-02-24 PROCEDURE — 45380 PR COLONOSCOPY,BIOPSY: ICD-10-PCS | Mod: 33,,, | Performed by: INTERNAL MEDICINE

## 2020-02-24 PROCEDURE — 63600175 PHARM REV CODE 636 W HCPCS: Performed by: NURSE ANESTHETIST, CERTIFIED REGISTERED

## 2020-02-24 PROCEDURE — 37000009 HC ANESTHESIA EA ADD 15 MINS: Performed by: INTERNAL MEDICINE

## 2020-02-24 RX ORDER — LIDOCAINE HYDROCHLORIDE 10 MG/ML
INJECTION, SOLUTION EPIDURAL; INFILTRATION; INTRACAUDAL; PERINEURAL
Status: DISCONTINUED | OUTPATIENT
Start: 2020-02-24 | End: 2020-02-24

## 2020-02-24 RX ORDER — SODIUM CHLORIDE 0.9 % (FLUSH) 0.9 %
10 SYRINGE (ML) INJECTION
Status: DISCONTINUED | OUTPATIENT
Start: 2020-02-24 | End: 2020-02-24 | Stop reason: HOSPADM

## 2020-02-24 RX ORDER — SODIUM CHLORIDE, SODIUM LACTATE, POTASSIUM CHLORIDE, CALCIUM CHLORIDE 600; 310; 30; 20 MG/100ML; MG/100ML; MG/100ML; MG/100ML
INJECTION, SOLUTION INTRAVENOUS CONTINUOUS
Status: DISCONTINUED | OUTPATIENT
Start: 2020-02-24 | End: 2020-02-24 | Stop reason: HOSPADM

## 2020-02-24 RX ORDER — PROPOFOL 10 MG/ML
VIAL (ML) INTRAVENOUS
Status: DISCONTINUED | OUTPATIENT
Start: 2020-02-24 | End: 2020-02-24

## 2020-02-24 RX ADMIN — PROPOFOL 50 MG: 10 INJECTION, EMULSION INTRAVENOUS at 10:02

## 2020-02-24 RX ADMIN — PROPOFOL 100 MG: 10 INJECTION, EMULSION INTRAVENOUS at 10:02

## 2020-02-24 RX ADMIN — PROPOFOL 20 MG: 10 INJECTION, EMULSION INTRAVENOUS at 10:02

## 2020-02-24 RX ADMIN — LIDOCAINE HYDROCHLORIDE 50 MG: 10 INJECTION, SOLUTION EPIDURAL; INFILTRATION; INTRACAUDAL; PERINEURAL at 10:02

## 2020-02-24 RX ADMIN — SODIUM CHLORIDE, SODIUM LACTATE, POTASSIUM CHLORIDE, AND CALCIUM CHLORIDE: 600; 310; 30; 20 INJECTION, SOLUTION INTRAVENOUS at 10:02

## 2020-02-24 RX ADMIN — PROPOFOL 30 MG: 10 INJECTION, EMULSION INTRAVENOUS at 10:02

## 2020-02-24 NOTE — TRANSFER OF CARE
Anesthesia Transfer of Care Note    Patient: Santhosh Goff    Procedure(s) Performed: Procedure(s) (LRB):  COLONOSCOPY (N/A)    Patient location: GI    Anesthesia Type: MAC    Transport from OR: Transported from OR on room air with adequate spontaneous ventilation    Post pain: adequate analgesia    Post assessment: no apparent anesthetic complications    Post vital signs: stable    Level of consciousness: awake, alert and oriented    Nausea/Vomiting: no nausea/vomiting    Complications: none    Transfer of care protocol was followed      Last vitals:   Visit Vitals  BP (!) 181/99 (BP Location: Left arm, Patient Position: Lying)   Temp 36.8 °C (98.2 °F) (Temporal)   Resp 18   Ht 6' (1.829 m)   Wt (!) 157.7 kg (347 lb 10.7 oz)   SpO2 95%   BMI 47.15 kg/m²

## 2020-02-24 NOTE — [employer] PATIENT INSTRUCTIONS
Discharge Summary/Instructions after an Endoscopic Procedure  Patient Name: Santhosh Goff  Patient MRN: [id number]  Patient YOB: 1955 Monday, February 24, 2020 Bela Vaughn MD  RESTRICTIONS:  During your procedure today, you received medications for sedation.  These   medications may affect your judgment, balance and coordination.  Therefore,   for 24 hours, you have the following restrictions:   - DO NOT drive a car, operate machinery, make legal/financial decisions,   sign important papers or drink alcohol.    ACTIVITY:  Today: no heavy lifting, straining or running due to procedural   sedation/anesthesia.  The following day: return to full activity including work.  DIET:  Eat and drink normally unless instructed otherwise.     TREATMENT FOR COMMON SIDE EFFECTS:  - Mild abdominal pain, nausea, belching, bloating or excessive gas:  rest,   eat lightly and use a heating pad.  - Sore Throat: treat with throat lozenges and/or gargle with warm salt   water.  - Because air was used during the procedure, expelling large amounts of air   from your rectum or belching is normal.  - If a bowel prep was taken, you may not have a bowel movement for 1-3 days.    This is normal.  SYMPTOMS TO WATCH FOR AND REPORT TO YOUR PHYSICIAN:  1. Abdominal pain or bloating, other than gas cramps.  2. Chest pain.  3. Back pain.  4. Signs of infection such as: chills or fever occurring within 24 hours   after the procedure.  5. Rectal bleeding, which would show as bright red, maroon, or black stools.   (A tablespoon of blood from the rectum is not serious, especially if   hemorrhoids are present.)  6. Vomiting.  7. Weakness or dizziness.  GO DIRECTLY TO THE NEAREST EMERGENCY ROOM IF YOU HAVE ANY OF THE FOLLOWING:      Difficulty breathing              Chills and/or fever over 101 F   Persistent vomiting and/or vomiting blood   Severe abdominal pain   Severe chest pain   Black, tarry stools   Bleeding- more than one  tablespoon   Any other symptom or condition that you feel may need urgent attention  Your doctor recommends these additional instructions:  If any biopsies were taken, your doctors clinic will contact you in 1 to 2   weeks with any results.  - Discharge patient to home (via wheelchair).   - High fiber diet.   - Continue present medications.   - Await pathology results.   - Repeat colonoscopy in 3 years for surveillance.   - Telephone GI clinic for pathology results in 2 weeks.   - Patient has a contact number available for emergencies.  The signs and   symptoms of potential delayed complications were discussed with the   patient.  Return to normal activities tomorrow.  Written discharge   instructions were provided to the patient.  For questions, problems or results please call your physician Bela Vaughn MD at Work:  [phone]  If you have any questions about the above instructions, call the GI   department at [phone] or call the endoscopy unit at [phone]   from 7am until 3 pm.  [facility] [place], EMERGENCY ROOM PHONE NUMBER:   [phone]  IF A COMPLICATION OR EMERGENCY SITUATION ARISES AND YOU ARE UNABLE TO REACH   YOUR PHYSICIAN - GO DIRECTLY TO THE EMERGENCY ROOM.  I have read or have had read to me these discharge instructions for my   procedure and have received a written copy.  I understand these   instructions and will follow-up with my physician if I have any questions.     __________________________________       _____________________________________  Nurse Signature                                          Patient/Designated   Responsible Party Signature  MD Bela Valentine MD  2/24/2020 10:40:48 AM  [employer]

## 2020-02-24 NOTE — DISCHARGE SUMMARY
Endoscopy Discharge Summary      Admit Date: 2/24/2020    Discharge Date and Time:  2/24/2020 10:42 AM    Attending Physician: Bela Vaughn MD     Discharge Physician: Bela Vaughn MD     Principal Admitting Diagnoses: Encounter for screening colonoscopy         Discharge Diagnosis: The encounter diagnosis was Encounter for screening colonoscopy.     Discharged Condition: Good    Indication for Admission: Encounter for screening colonoscopy     Hospital Course: Patient was admitted for an inpatient procedure and tolerated the procedure well with no complications.    Significant Diagnostic Studies: Colonoscopy with polypectomy    Pathology (if any): Transverse and descending colon polyps    Estimated Blood Loss: 1 ml.    Discussed with: patient.    Disposition: Home.    Follow Up/Patient Instructions:   Current Discharge Medication List      CONTINUE these medications which have NOT CHANGED    Details   aspirin (ECOTRIN) 81 MG EC tablet Take 81 mg by mouth once daily.      atorvastatin (LIPITOR) 40 MG tablet TAKE 1 TABLET BY MOUTH ONCE DAILY  Qty: 90 tablet, Refills: 3    Associated Diagnoses: Hyperlipidemia, unspecified hyperlipidemia type      furosemide (LASIX) 20 MG tablet TAKE ONE TABLET BY MOUTH ONCE DAILY  Qty: 90 tablet, Refills: 4      glimepiride (AMARYL) 4 MG tablet TAKE ONE TABLET BY MOUTH ONCE DAILY WITH BREAKFAST  Qty: 90 tablet, Refills: 4    Associated Diagnoses: Type 2 diabetes mellitus without complication, without long-term current use of insulin      ibuprofen 200 mg Cap Take 800 mg by mouth every 6 (six) hours as needed.      losartan (COZAAR) 25 MG tablet Take 1 tablet (25 mg total) by mouth once daily.  Qty: 90 tablet, Refills: 3    Associated Diagnoses: Hypertension associated with diabetes      metFORMIN (GLUCOPHAGE) 1000 MG tablet TAKE 1 TABLET BY MOUTH TWICE DAILY WITH MEALS  Qty: 180 tablet, Refills: 3      multivit-min/ferrous fumarate (MULTI VITAMIN ORAL)       potassium  chloride SA (K-DUR,KLOR-CON) 20 MEQ tablet TAKE ONE TABLET BY MOUTH ONCE DAILY  Qty: 90 tablet, Refills: 4      pravastatin (PRAVACHOL) 10 MG/ ML suspension Take by mouth.      VENTOLIN HFA 90 mcg/actuation inhaler INHALE 2 PUFFS BY MOUTH EVERY 6 HOURS AS NEEDED FOR WHEEZING  Qty: 18 each, Refills: 0    Comments: Please consider 90 day supplies to promote better adherence  Associated Diagnoses: Pneumonia of right lower lobe due to infectious organism             No discharge procedures on file.        Bela Vaughn MD  Gastroenterology and Hepatology

## 2020-02-24 NOTE — H&P
Short Stay Endoscopy History and Physical    PCP - Jacek Mohr MD    Procedure - Colonoscopy  ASA - 2  Mallampati - per anesthesia  History of Anesthesia problems - no  Family history Anesthesia problems -  no     HPI:  This is a 65 y.o. male here for evaluation of :   Active Hospital Problems    Diagnosis  POA    *Encounter for screening colonoscopy [Z12.11]  Not Applicable      Resolved Hospital Problems   No resolved problems to display.         Health Maintenance       Date Due Completion Date    Shingles Vaccine (1 of 2) 01/19/2004 ---    Colonoscopy 06/03/2019 6/3/2014    Foot Exam 08/13/2019 8/13/2018 (Done)    Override on 8/13/2018: Done    Override on 7/14/2016: Done    Hemoglobin A1c 05/22/2020 11/22/2019    Lipid Panel 08/22/2020 8/22/2019    Urine Microalbumin 08/22/2020 8/22/2019    Eye Exam 10/16/2020 10/16/2019    Override on 10/16/2019: Done    Override on 10/10/2018: Done    Override on 10/2/2017: Done    Override on 9/2/2016: Done    Override on 9/2/2016: Done    Override on 8/17/2015: Done    Override on 8/17/2015: Done (Diabetes with no diabetic retinopathy on dilated exam.)    Low Dose Statin 02/24/2021 2/24/2020    TETANUS VACCINE 02/12/2028 2/12/2018          Screening - yes  History of polyps - no  Diarrhea - no  Anemia - no  Blood in stools - no  Abdominal pain - no  Family History of Colon Cancer- no  Other - no    ROS:  CONSTITUTIONAL: Denies weight change,  fatigue, fevers, chills, night sweats.  CARDIOVASCULAR: Denies chest pain, shortness of breath, orthopnea and edema.  RESPIRATORY: Denies cough, hemoptysis, dyspnea, and wheezing.  GI: See HPI.    Medical History:   Past Medical History:   Diagnosis Date    Arthritis     Diabetes mellitus, type 2 2014     x 1 week ago 10/10/2018    Hyperlipidemia     Hypertension     Morbid obesity with BMI of 45.0-49.9, adult     Staphylococcus aureus infection, multiple-resistant (MRSA) 2009       Surgical History:   Past Surgical  History:   Procedure Laterality Date    LUNG SURGERY      RIB FX         Family History:   Family History   Problem Relation Age of Onset    Early death Father        Social History:   Social History     Tobacco Use    Smoking status: Never Smoker    Smokeless tobacco: Never Used   Substance Use Topics    Alcohol use: No    Drug use: No       Allergies:   Review of patient's allergies indicates:   Allergen Reactions    Cephalosporins Nausea And Vomiting       Medications:   No current facility-administered medications on file prior to encounter.      Current Outpatient Medications on File Prior to Encounter   Medication Sig Dispense Refill    aspirin (ECOTRIN) 81 MG EC tablet Take 81 mg by mouth once daily.      atorvastatin (LIPITOR) 40 MG tablet TAKE 1 TABLET BY MOUTH ONCE DAILY 90 tablet 3    furosemide (LASIX) 20 MG tablet TAKE ONE TABLET BY MOUTH ONCE DAILY 90 tablet 4    glimepiride (AMARYL) 4 MG tablet TAKE ONE TABLET BY MOUTH ONCE DAILY WITH BREAKFAST 90 tablet 4    ibuprofen 200 mg Cap Take 800 mg by mouth every 6 (six) hours as needed.      losartan (COZAAR) 25 MG tablet Take 1 tablet (25 mg total) by mouth once daily. 90 tablet 3    metFORMIN (GLUCOPHAGE) 1000 MG tablet TAKE 1 TABLET BY MOUTH TWICE DAILY WITH MEALS 180 tablet 3    multivit-min/ferrous fumarate (MULTI VITAMIN ORAL)       potassium chloride SA (K-DUR,KLOR-CON) 20 MEQ tablet TAKE ONE TABLET BY MOUTH ONCE DAILY 90 tablet 4    pravastatin (PRAVACHOL) 10 MG/ ML suspension Take by mouth.      VENTOLIN HFA 90 mcg/actuation inhaler INHALE 2 PUFFS BY MOUTH EVERY 6 HOURS AS NEEDED FOR WHEEZING 18 each 0       Physical Exam:  Vital Signs: There were no vitals filed for this visit.  General Appearance: Well appearing in no acute distress  ENT: OP clear  Chest: CTA B  CV: RRR, no m/r/g  Abd: s/nt/nd/nabs  Ext: no edema    Labs:Reviewed    IMP:  Active Hospital Problems    Diagnosis  POA    *Encounter for screening colonoscopy  [Z12.11]  Not Applicable      Resolved Hospital Problems   No resolved problems to display.         Plan:   I have explained the risks and benefits of colonoscopy to the patient including but not limited to bleeding, perforation, infection, and death. The patient wishes to proceed.

## 2020-02-24 NOTE — DISCHARGE INSTRUCTIONS
Diverticulosis    Diverticulosis means that small pouches have formed in the wall of your large intestine (colon). Most often, this problem causes no symptoms and is common as people age. But the pouches in the colon are at risk of becoming infected. When this happens, the condition is called diverticulitis. Although most people with diverticulosis never develop diverticulitis, it is still not uncommon. Rectal bleeding can also occur and in less common situations, a type of colon inflammation called colitis.  While most people do not have symptoms, some people with diverticulosis may have:  · Abdominal cramps and pain  · Bloating  · Constipation  · Change in bowel habits  Causes  The exact cause of diverticulosis (and diverticulitis) has not been proved, but a few things are associated with the condition:  · Low-fiber diet  · Constipation  · Lack of exercise  Your healthcare provider will talk with you about how to manage your condition. Diet changes may be all that are needed to help control diverticulosis and prevent progression to diverticulitis. If you develop diverticulitis, you will likely need other treatments.  Home care  You may be told to take fiber supplements daily. Fiber adds bulk to the stool so that it passes through the colon more easily. Stool softeners may be recommended. You may also be given medications for pain relief. Be sure to take all medications as directed.  In the past, people were told to avoid corn, nuts, and seeds. This is no longer necessary.  Follow these guidelines when caring for yourself at home:  · Eat unprocessed foods that are high in fiber. Whole grains, fruits, and vegetables are good choices.  · Drink 6 to 8 glasses of water every day unless your healthcare provider has you limit how much fluid you should have.  · Watch for changes in your bowel movements. Tell your provider if you notice any changes.  · Begin an exercise program. Ask your provider how to get started.  Generally, walking is the best.  · Get plenty of rest and sleep.  Follow-up care  Follow up with your healthcare provider, or as advised. Regular visits may be needed to check on your health. Sometimes special procedures such as colonoscopy, are needed after an episode of diverticulitis or blooding. Be sure to keep all your appointments.  If a stool sample was taken, or cultures were done, you should be told if they are positive, or if your treatment needs to be changed. You can call as directed for the results.  If X-rays were done, a radiologist will look at them. You will be told if there is a change in your treatment.  If antibiotics were prescribed, be sure to finish them all.  When to seek medical advice  Call your healthcare provider right away if any of these occur:  · Fever of 100.4°F (38°C) or higher, or as directed by your healthcare provider  · Severe cramps in the lower left side of the abdomen or pain that is getting worse  · Tenderness in the lower left side of the abdomen or worsening pain throughout the abdomen  · Diarrhea or constipation that doesn't get better within 24 hours  · Nausea and vomiting  · Bleeding from the rectum  Call 911  Call emergency services if any of the following occur:  · Trouble breathing  · Confusion  · Very drowsy or trouble awakening  · Fainting or loss of consciousness  · Rapid heart rate  · Chest pain  Date Last Reviewed: 12/30/2015 © 2000-2017 [employer]. [address] [place] [address]. All rights reserved. This information is not intended as a substitute for professional medical care. Always follow your healthcare professional's instructions.        Understanding Colon and Rectal Polyps    The colon (also called the large intestine) is a muscular tube that forms the last part of the digestive tract. It absorbs water and stores food waste. The colon is about 4 to 6 feet long. The rectum is the last 6 inches of the colon. The colon and rectum  have a smooth lining composed of millions of cells. Changes in these cells can lead to growths in the colon that can become cancerous and should be removed. Multiple tests are available to screen for colon cancer, but the colonoscopy is the most recommended test. During colonoscopy, these polyps can be removed. How often you need this test depends on many things including your condition, your family history, symptoms, and what the findings were at the previous colonoscopy.   When the colon lining changes  Changes that happen in the cells that line the colon or rectum can lead to growths called polyps. Over a period of years, polyps can turn cancerous. Removing polyps early may prevent cancer from ever forming.  Polyps  Polyps are fleshy clumps of tissue that form on the lining of the colon or rectum. Small polyps are usually benign (not cancerous). However, over time, cells in a polyp can change and become cancerous. Certain types of polyps known as adenomatous polyps are premalignant. The risk for invasive cancer increases with the size of the polyp and certain cell and gene features. This means that they can become cancerous if they're not removed. Hyperplastic polyps are benign. They can grow quite large and not turn cancerous.   Cancer  Almost all colorectal cancers start when polyp cells begin growing abnormally. As a cancerous tumor grows, it may involve more and more of the colon or rectum. In time, cancer can also grow beyond the colon or rectum and spread to nearby organs or to glands called lymph nodes. The cells can also travel to other parts of the body. This is known as metastasis. The earlier a cancerous tumor is removed, the better the chance of preventing its spread.    Date Last Reviewed: 8/1/2016  © 6707-1438 [employer]. [address] [place] [address], [place] [address]. All rights reserved. This information is not intended as a substitute for professional medical care. Always follow your  healthcare professional's instructions.

## 2020-02-24 NOTE — ANESTHESIA PREPROCEDURE EVALUATION
02/24/2020  Santhosh Goff is a 65 y.o., male.    Pre-op Assessment    I have reviewed the Patient Summary Reports.     I have reviewed the Nursing Notes.   I have reviewed the Medications.     Review of Systems  Anesthesia Hx:  No problems with previous Anesthesia    Social:  Non-Smoker, No Alcohol Use    Hematology/Oncology:  Hematology Normal   Oncology Normal     EENT/Dental:EENT/Dental Normal   Cardiovascular:   Exercise tolerance: good Hypertension hyperlipidemia ECG has been reviewed.  Functional Capacity good / => 4 METS  Hypertension, Essential Hypertension , stage 1 hypertension, systolic 140 - 159 or diastolic 90 - 99, Fairly Controlled on RX    Pulmonary:   Sleep Apnea, CPAP  Denies Obstructive Sleep Apnea (ALIREZA) Acute Lung Injury: Chest Trauma, Pleural Effusion, Pulmonary Collapse   Renal/:  Renal/ Normal     Hepatic/GI:  Hepatic/GI Normal    Musculoskeletal:   Arthritis     Neurological:  Neurology Normal    Endocrine:   Diabetes, well controlled, type 2  Diabetes, Type 2 Diabetes , controlled by diet, oral hypoglycemics.    Dermatological:  Skin Normal    Psych:  Psychiatric Normal           Physical Exam  General:  Well nourished, Morbid Obesity    Airway/Jaw/Neck:  Airway Findings: Mouth Opening: Normal Tongue: Normal  General Airway Assessment: Adult, Good  Mallampati: II  Improves to II with phonation.  TM Distance: Normal, at least 6 cm  Jaw/Neck Findings:     Neck ROM: Normal ROM      Dental:  Dental Findings: In tact   Chest/Lungs:  Chest/Lungs Findings: Clear to auscultation, Normal Respiratory Rate     Heart/Vascular:  Heart Findings: Rate: Normal  Rhythm: Regular Rhythm  Sounds: Normal     Abdomen:  Abdomen Findings:  Normal, Soft, Nontender     Musculoskeletal:  Musculoskeletal Findings:     Mental Status:  Mental Status Findings:  Cooperative, Alert and Oriented          Anesthesia Plan  Type of Anesthesia, risks & benefits discussed:  Anesthesia Type:  MAC  Patient's Preference:   Intra-op Monitoring Plan: standard ASA monitors  Intra-op Monitoring Plan Comments:   Post Op Pain Control Plan: multimodal analgesia  Post Op Pain Control Plan Comments:   Induction:   IV  Beta Blocker:  Patient is not currently on a Beta-Blocker (No further documentation required).       Informed Consent: Patient understands risks and agrees with Anesthesia plan.  Questions answered. Anesthesia consent signed with patient.  ASA Score: 3     Day of Surgery Review of History & Physical:  There are no significant changes.          Ready For Surgery From Anesthesia Perspective.

## 2020-02-24 NOTE — ANESTHESIA POSTPROCEDURE EVALUATION
Anesthesia Post Evaluation    Patient: Santhosh Goff    Procedure(s) Performed: Procedure(s) (LRB):  COLONOSCOPY (N/A)    Final Anesthesia Type: MAC    Patient location during evaluation: GI PACU  Patient participation: Yes- Able to Participate  Level of consciousness: awake and alert  Post-procedure vital signs: reviewed and stable  Pain management: adequate  Airway patency: patent    PONV status at discharge: No PONV  Anesthetic complications: no      Cardiovascular status: blood pressure returned to baseline  Respiratory status: unassisted and spontaneous ventilation  Hydration status: euvolemic  Follow-up not needed.          Vitals Value Taken Time   /65 2/24/2020 10:58 AM   Temp 36.8 °C (98.2 °F) 2/24/2020 10:05 AM   Pulse 62 2/24/2020 10:58 AM   Resp 18 2/24/2020 10:58 AM   SpO2 95 % 2/24/2020 10:58 AM         Event Time     Out of Recovery 10:59:52          Pain/Delmi Score: Delmi Score: 10 (2/24/2020 10:59 AM)

## 2020-03-03 ENCOUNTER — TELEPHONE (OUTPATIENT)
Dept: INTERNAL MEDICINE | Facility: CLINIC | Age: 65
End: 2020-03-03

## 2020-03-03 NOTE — TELEPHONE ENCOUNTER
----- Message from Gertrude Shanks sent at 3/3/2020 12:29 PM CST -----  Contact: patient's wife  Would like to consult with nurse regarding follow up visit for colonoscopy. Patient's wife stated she would like to know if he needs to have a urine specimen done and fasting lab prior to the appointment. Please call back at [phone]

## 2020-03-06 LAB
FINAL PATHOLOGIC DIAGNOSIS: NORMAL
GROSS: NORMAL

## 2020-03-19 ENCOUNTER — LAB VISIT (OUTPATIENT)
Dept: LAB | Facility: HOSPITAL | Age: 65
End: 2020-03-19
Attending: INTERNAL MEDICINE
Payer: COMMERCIAL

## 2020-03-19 DIAGNOSIS — E11.9 TYPE 2 DIABETES MELLITUS WITHOUT COMPLICATION, WITHOUT LONG-TERM CURRENT USE OF INSULIN: ICD-10-CM

## 2020-03-19 DIAGNOSIS — Z12.5 ENCOUNTER FOR SCREENING FOR MALIGNANT NEOPLASM OF PROSTATE: ICD-10-CM

## 2020-03-19 LAB
ALBUMIN SERPL BCP-MCNC: 3.8 G/DL (ref 3.5–5.2)
ALP SERPL-CCNC: 69 U/L (ref 55–135)
ALT SERPL W/O P-5'-P-CCNC: 54 U/L (ref 10–44)
ANION GAP SERPL CALC-SCNC: 9 MMOL/L (ref 8–16)
AST SERPL-CCNC: 31 U/L (ref 10–40)
BASOPHILS # BLD AUTO: 0.04 K/UL (ref 0–0.2)
BASOPHILS NFR BLD: 0.5 % (ref 0–1.9)
BILIRUB SERPL-MCNC: 0.4 MG/DL (ref 0.1–1)
BUN SERPL-MCNC: 22 MG/DL (ref 8–23)
CALCIUM SERPL-MCNC: 9.3 MG/DL (ref 8.7–10.5)
CHLORIDE SERPL-SCNC: 104 MMOL/L (ref 95–110)
CHOLEST SERPL-MCNC: 124 MG/DL (ref 120–199)
CHOLEST/HDLC SERPL: 2.9 {RATIO} (ref 2–5)
CO2 SERPL-SCNC: 25 MMOL/L (ref 23–29)
COMPLEXED PSA SERPL-MCNC: 0.21 NG/ML (ref 0–4)
CREAT SERPL-MCNC: 1.1 MG/DL (ref 0.5–1.4)
DIFFERENTIAL METHOD: ABNORMAL
EOSINOPHIL # BLD AUTO: 0.2 K/UL (ref 0–0.5)
EOSINOPHIL NFR BLD: 2.1 % (ref 0–8)
ERYTHROCYTE [DISTWIDTH] IN BLOOD BY AUTOMATED COUNT: 13.2 % (ref 11.5–14.5)
EST. GFR  (AFRICAN AMERICAN): >60 ML/MIN/1.73 M^2
EST. GFR  (NON AFRICAN AMERICAN): >60 ML/MIN/1.73 M^2
ESTIMATED AVG GLUCOSE: 163 MG/DL (ref 68–131)
GLUCOSE SERPL-MCNC: 135 MG/DL (ref 70–110)
HBA1C MFR BLD HPLC: 7.3 % (ref 4–5.6)
HCT VFR BLD AUTO: 46.1 % (ref 40–54)
HDLC SERPL-MCNC: 43 MG/DL (ref 40–75)
HDLC SERPL: 34.7 % (ref 20–50)
HGB BLD-MCNC: 14.2 G/DL (ref 14–18)
IMM GRANULOCYTES # BLD AUTO: 0.02 K/UL (ref 0–0.04)
IMM GRANULOCYTES NFR BLD AUTO: 0.2 % (ref 0–0.5)
LDLC SERPL CALC-MCNC: 56.4 MG/DL (ref 63–159)
LYMPHOCYTES # BLD AUTO: 2.5 K/UL (ref 1–4.8)
LYMPHOCYTES NFR BLD: 29.2 % (ref 18–48)
MCH RBC QN AUTO: 29.9 PG (ref 27–31)
MCHC RBC AUTO-ENTMCNC: 30.8 G/DL (ref 32–36)
MCV RBC AUTO: 97 FL (ref 82–98)
MONOCYTES # BLD AUTO: 0.8 K/UL (ref 0.3–1)
MONOCYTES NFR BLD: 9.1 % (ref 4–15)
NEUTROPHILS # BLD AUTO: 5.1 K/UL (ref 1.8–7.7)
NEUTROPHILS NFR BLD: 58.9 % (ref 38–73)
NONHDLC SERPL-MCNC: 81 MG/DL
NRBC BLD-RTO: 0 /100 WBC
PLATELET # BLD AUTO: 280 K/UL (ref 150–350)
PMV BLD AUTO: 10.2 FL (ref 9.2–12.9)
POTASSIUM SERPL-SCNC: 4.5 MMOL/L (ref 3.5–5.1)
PROT SERPL-MCNC: 7.3 G/DL (ref 6–8.4)
RBC # BLD AUTO: 4.75 M/UL (ref 4.6–6.2)
SODIUM SERPL-SCNC: 138 MMOL/L (ref 136–145)
TRIGL SERPL-MCNC: 123 MG/DL (ref 30–150)
WBC # BLD AUTO: 8.67 K/UL (ref 3.9–12.7)

## 2020-03-19 PROCEDURE — 80053 COMPREHEN METABOLIC PANEL: CPT

## 2020-03-19 PROCEDURE — 85025 COMPLETE CBC W/AUTO DIFF WBC: CPT

## 2020-03-19 PROCEDURE — 36415 COLL VENOUS BLD VENIPUNCTURE: CPT | Mod: PO

## 2020-03-19 PROCEDURE — 80061 LIPID PANEL: CPT

## 2020-03-19 PROCEDURE — 84153 ASSAY OF PSA TOTAL: CPT

## 2020-03-19 PROCEDURE — 83036 HEMOGLOBIN GLYCOSYLATED A1C: CPT

## 2020-03-20 ENCOUNTER — PATIENT MESSAGE (OUTPATIENT)
Dept: INTERNAL MEDICINE | Facility: CLINIC | Age: 65
End: 2020-03-20

## 2020-03-24 ENCOUNTER — PATIENT MESSAGE (OUTPATIENT)
Dept: INTERNAL MEDICINE | Facility: CLINIC | Age: 65
End: 2020-03-24

## 2020-03-26 ENCOUNTER — PATIENT MESSAGE (OUTPATIENT)
Dept: INTERNAL MEDICINE | Facility: CLINIC | Age: 65
End: 2020-03-26

## 2020-04-02 ENCOUNTER — PATIENT MESSAGE (OUTPATIENT)
Dept: INTERNAL MEDICINE | Facility: CLINIC | Age: 65
End: 2020-04-02

## 2020-04-02 ENCOUNTER — OFFICE VISIT (OUTPATIENT)
Dept: INTERNAL MEDICINE | Facility: CLINIC | Age: 65
End: 2020-04-02
Payer: COMMERCIAL

## 2020-04-02 DIAGNOSIS — G47.33 OSA TREATED WITH BIPAP: ICD-10-CM

## 2020-04-02 DIAGNOSIS — E11.9 TYPE 2 DIABETES MELLITUS WITHOUT COMPLICATION, WITHOUT LONG-TERM CURRENT USE OF INSULIN: Primary | ICD-10-CM

## 2020-04-02 DIAGNOSIS — E78.5 HYPERLIPIDEMIA ASSOCIATED WITH TYPE 2 DIABETES MELLITUS: ICD-10-CM

## 2020-04-02 DIAGNOSIS — I15.2 HYPERTENSION ASSOCIATED WITH DIABETES: ICD-10-CM

## 2020-04-02 DIAGNOSIS — E11.59 HYPERTENSION ASSOCIATED WITH DIABETES: ICD-10-CM

## 2020-04-02 DIAGNOSIS — E11.69 HYPERLIPIDEMIA ASSOCIATED WITH TYPE 2 DIABETES MELLITUS: ICD-10-CM

## 2020-04-02 DIAGNOSIS — E66.01 MORBID OBESITY WITH BMI OF 45.0-49.9, ADULT: ICD-10-CM

## 2020-04-02 PROCEDURE — 99214 PR OFFICE/OUTPT VISIT, EST, LEVL IV, 30-39 MIN: ICD-10-PCS | Mod: 95,,, | Performed by: INTERNAL MEDICINE

## 2020-04-02 PROCEDURE — 3051F HG A1C>EQUAL 7.0%<8.0%: CPT | Mod: CPTII,,, | Performed by: INTERNAL MEDICINE

## 2020-04-02 PROCEDURE — 99214 OFFICE O/P EST MOD 30 MIN: CPT | Mod: 95,,, | Performed by: INTERNAL MEDICINE

## 2020-04-02 PROCEDURE — 1101F PR PT FALLS ASSESS DOC 0-1 FALLS W/OUT INJ PAST YR: ICD-10-PCS | Mod: CPTII,,, | Performed by: INTERNAL MEDICINE

## 2020-04-02 PROCEDURE — 3051F PR MOST RECENT HEMOGLOBIN A1C LEVEL 7.0 - < 8.0%: ICD-10-PCS | Mod: CPTII,,, | Performed by: INTERNAL MEDICINE

## 2020-04-02 PROCEDURE — 1101F PT FALLS ASSESS-DOCD LE1/YR: CPT | Mod: CPTII,,, | Performed by: INTERNAL MEDICINE

## 2020-04-02 PROCEDURE — 1159F MED LIST DOCD IN RCRD: CPT | Mod: ,,, | Performed by: INTERNAL MEDICINE

## 2020-04-02 PROCEDURE — 1159F PR MEDICATION LIST DOCUMENTED IN MEDICAL RECORD: ICD-10-PCS | Mod: ,,, | Performed by: INTERNAL MEDICINE

## 2020-04-02 RX ORDER — METFORMIN HYDROCHLORIDE 1000 MG/1
1000 TABLET ORAL 2 TIMES DAILY WITH MEALS
Qty: 180 TABLET | Refills: 3 | Status: SHIPPED | OUTPATIENT
Start: 2020-04-02 | End: 2021-05-28

## 2020-04-02 RX ORDER — GLIMEPIRIDE 4 MG/1
TABLET ORAL
Qty: 90 TABLET | Refills: 4 | Status: SHIPPED | OUTPATIENT
Start: 2020-04-02 | End: 2021-05-10

## 2020-04-02 NOTE — PROGRESS NOTES
Subjective:       Patient ID: Santhosh Goff is a 65 y.o. male.    Chief Complaint: No chief complaint on file.    Diabetes   He presents for his follow-up diabetic visit. He has type 2 diabetes mellitus. His disease course has been stable. There are no hypoglycemic associated symptoms. Pertinent negatives for hypoglycemia include no confusion or headaches. There are no diabetic associated symptoms. Pertinent negatives for diabetes include no chest pain, no polydipsia, no polyuria and no weakness. There are no hypoglycemic complications. Symptoms are stable. There are no diabetic complications. Risk factors for coronary artery disease include diabetes mellitus, dyslipidemia, obesity and male sex. Current diabetic treatment includes diet and oral agent (dual therapy). He is compliant with treatment most of the time. There is no change in his home blood glucose trend. Eye exam is current.   Hypertension   This is a chronic problem. The current episode started more than 1 year ago. The problem is unchanged. The problem is controlled. Pertinent negatives include no chest pain, headaches, neck pain or palpitations. There are no associated agents to hypertension. Risk factors for coronary artery disease include dyslipidemia, diabetes mellitus, male gender and obesity. There are no compliance problems.  There is no history of angina.   Hyperlipidemia   This is a chronic problem. The current episode started more than 1 year ago. The problem is controlled. Recent lipid tests were reviewed and are low. Exacerbating diseases include diabetes and obesity. There are no known factors aggravating his hyperlipidemia. Pertinent negatives include no chest pain. Current antihyperlipidemic treatment includes statins. The current treatment provides significant improvement of lipids. There are no compliance problems.  Risk factors for coronary artery disease include diabetes mellitus, dyslipidemia, hypertension, male sex and obesity.      Uses CPAP equipment as prescribed.    The patient location is: home     The chief complaint leading to consultation is: DM  Visit type: Virtual visit with synchronous audio and video  Total time spent with patient: 15  Each patient to whom he or she provides medical services by telemedicine is:  (1) informed of the relationship between the physician and patient and the respective role of any other health care provider with respect to management of the patient; and (2) notified that he or she may decline to receive medical services by telemedicine and may withdraw from such care at any time.      Review of Systems   Constitutional: Negative for activity change and unexpected weight change.   HENT: Negative for hearing loss, rhinorrhea and trouble swallowing.    Eyes: Negative for discharge and visual disturbance.   Respiratory: Negative for chest tightness and wheezing.    Cardiovascular: Negative for chest pain and palpitations.   Gastrointestinal: Negative for blood in stool, constipation, diarrhea and vomiting.   Endocrine: Negative for polydipsia and polyuria.   Genitourinary: Negative for difficulty urinating, hematuria and urgency.   Musculoskeletal: Negative for arthralgias, joint swelling and neck pain.   Neurological: Negative for weakness and headaches.   Psychiatric/Behavioral: Negative for confusion and dysphoric mood.       Objective:   There were no vitals taken for this visit.     Physical Exam   Constitutional: He is oriented to person, place, and time. He appears well-developed and well-nourished. No distress.   HENT:   Head: Normocephalic and atraumatic.   Eyes: EOM are normal.   Neck: Normal range of motion.   Pulmonary/Chest: Effort normal. No respiratory distress.   Neurological: He is alert and oriented to person, place, and time. No cranial nerve deficit.   Skin: No rash noted.   Psychiatric: He has a normal mood and affect. His behavior is normal. Thought content normal.       No visits  with results within 2 Week(s) from this visit.   Latest known visit with results is:   Lab Visit on 03/19/2020   Component Date Value    Sodium 03/19/2020 138     Potassium 03/19/2020 4.5     Chloride 03/19/2020 104     CO2 03/19/2020 25     Glucose 03/19/2020 135*    BUN, Bld 03/19/2020 22     Creatinine 03/19/2020 1.1     Calcium 03/19/2020 9.3     Total Protein 03/19/2020 7.3     Albumin 03/19/2020 3.8     Total Bilirubin 03/19/2020 0.4     Alkaline Phosphatase 03/19/2020 69     AST 03/19/2020 31     ALT 03/19/2020 54*    Anion Gap 03/19/2020 9     eGFR if African American 03/19/2020 >60.0     eGFR if non African Amer* 03/19/2020 >60.0     Hemoglobin A1C 03/19/2020 7.3*    Estimated Avg Glucose 03/19/2020 163*    WBC 03/19/2020 8.67     RBC 03/19/2020 4.75     Hemoglobin 03/19/2020 14.2     Hematocrit 03/19/2020 46.1     Mean Corpuscular Volume 03/19/2020 97     Mean Corpuscular Hemoglo* 03/19/2020 29.9     Mean Corpuscular Hemoglo* 03/19/2020 30.8*    RDW 03/19/2020 13.2     Platelets 03/19/2020 280     MPV 03/19/2020 10.2     Immature Granulocytes 03/19/2020 0.2     Gran # (ANC) 03/19/2020 5.1     Immature Grans (Abs) 03/19/2020 0.02     Lymph # 03/19/2020 2.5     Mono # 03/19/2020 0.8     Eos # 03/19/2020 0.2     Baso # 03/19/2020 0.04     nRBC 03/19/2020 0     Gran% 03/19/2020 58.9     Lymph% 03/19/2020 29.2     Mono% 03/19/2020 9.1     Eosinophil% 03/19/2020 2.1     Basophil% 03/19/2020 0.5     Differential Method 03/19/2020 Automated     Cholesterol 03/19/2020 124     Triglycerides 03/19/2020 123     HDL 03/19/2020 43     LDL Cholesterol 03/19/2020 56.4*    Hdl/Cholesterol Ratio 03/19/2020 34.7     Total Cholesterol/HDL Ra* 03/19/2020 2.9     Non-HDL Cholesterol 03/19/2020 81     PSA, SCREEN 03/19/2020 0.21        Assessment:       1. Type 2 diabetes mellitus without complication, without long-term current use of insulin    2. Hypertension associated with  diabetes    3. Hyperlipidemia associated with type 2 diabetes mellitus    4. ALIREZA treated with AVAPS    5. Morbid obesity with BMI of 45.0-49.9, adult        Plan:   ALIREZA treated with AVAPS  Patient is compliant with use of cpap, using it the majority of nights.  Benefit from the use of the device is noted.      Hyperlipidemia associated with type 2 diabetes mellitus  Cholesterol numbers look good.  We will continue the current regimen at this time.  Remain focused on low fat diet and high dietary fiber intake.      Type 2 diabetes mellitus without complication, without long-term current use of insulin  Diabetes is not under good control at this time.  Most recent a1c is 7.3.      We will make the following adjustments to the medications: No changes today.      We need to see continued focus on low carbohydrate diet and consistent exercise.  Patient needs to refocus on lifestyle.     Recheck labs in 3 months      Hypertension associated with diabetes  Blood pressure is under good control.  We will continue the current regimen.  Will work on regular aerobic exercise and a low salt diet.        Type 2 diabetes mellitus without complication, without long-term current use of insulin  -     glimepiride (AMARYL) 4 MG tablet; TAKE ONE TABLET BY MOUTH ONCE DAILY WITH BREAKFAST  Dispense: 90 tablet; Refill: 4  -     metFORMIN (GLUCOPHAGE) 1000 MG tablet; Take 1 tablet (1,000 mg total) by mouth 2 (two) times daily with meals.  Dispense: 180 tablet; Refill: 3  -     Hemoglobin A1c; Future; Expected date: 07/01/2020    Hypertension associated with diabetes    Hyperlipidemia associated with type 2 diabetes mellitus    ALIREZA treated with AVAPS    Morbid obesity with BMI of 45.0-49.9, adult  Comments:  Focus on weight loss and exercise.  With social isolation focus make time for exercise and meal planning          Follow up in about 3 months (around 7/3/2020).

## 2020-04-02 NOTE — ASSESSMENT & PLAN NOTE
Diabetes is not under good control at this time.  Most recent a1c is 7.3.      We will make the following adjustments to the medications: No changes today.      We need to see continued focus on low carbohydrate diet and consistent exercise.  Patient needs to refocus on lifestyle.     Recheck labs in 3 months

## 2020-04-06 DIAGNOSIS — E11.59 HYPERTENSION ASSOCIATED WITH DIABETES: ICD-10-CM

## 2020-04-06 DIAGNOSIS — I15.2 HYPERTENSION ASSOCIATED WITH DIABETES: ICD-10-CM

## 2020-04-06 RX ORDER — LOSARTAN POTASSIUM 25 MG/1
TABLET ORAL
Qty: 90 TABLET | Refills: 3 | Status: SHIPPED | OUTPATIENT
Start: 2020-04-06 | End: 2021-03-24

## 2020-06-12 ENCOUNTER — PATIENT MESSAGE (OUTPATIENT)
Dept: INTERNAL MEDICINE | Facility: CLINIC | Age: 65
End: 2020-06-12

## 2020-06-18 ENCOUNTER — PATIENT OUTREACH (OUTPATIENT)
Dept: ADMINISTRATIVE | Facility: HOSPITAL | Age: 65
End: 2020-06-18

## 2020-06-18 DIAGNOSIS — K63.5 POLYP OF COLON, UNSPECIFIED PART OF COLON, UNSPECIFIED TYPE: ICD-10-CM

## 2020-06-18 NOTE — PROGRESS NOTES
Added GI to care teams. Pt has labs scheduled 6-.  HM reviewed and updated. Immunizations abstracted.Care Everywhere abstracted. CC note updated.    Health Maintenance Due   Topic    Foot Exam      Previsit chart audit completed.  *KDL*

## 2020-06-25 ENCOUNTER — LAB VISIT (OUTPATIENT)
Dept: LAB | Facility: HOSPITAL | Age: 65
End: 2020-06-25
Attending: INTERNAL MEDICINE
Payer: COMMERCIAL

## 2020-06-25 DIAGNOSIS — E11.9 TYPE 2 DIABETES MELLITUS WITHOUT COMPLICATION, WITHOUT LONG-TERM CURRENT USE OF INSULIN: ICD-10-CM

## 2020-06-25 PROCEDURE — 83036 HEMOGLOBIN GLYCOSYLATED A1C: CPT

## 2020-06-25 PROCEDURE — 36415 COLL VENOUS BLD VENIPUNCTURE: CPT | Mod: PO

## 2020-06-26 LAB
ESTIMATED AVG GLUCOSE: 151 MG/DL (ref 68–131)
HBA1C MFR BLD HPLC: 6.9 % (ref 4–5.6)

## 2020-06-26 NOTE — PROGRESS NOTES
Here are the results of your recent labs.  We will review them in detail at your follow up visit.    Sincerely,    Jacek Mohr M.D.        If you would like to review your experience with Dr. Mohr or [facility], please follow the link below:    [email or web address]

## 2020-07-02 ENCOUNTER — OFFICE VISIT (OUTPATIENT)
Dept: INTERNAL MEDICINE | Facility: CLINIC | Age: 65
End: 2020-07-02
Payer: COMMERCIAL

## 2020-07-02 VITALS
BODY MASS INDEX: 47.2 KG/M2 | WEIGHT: 315 LBS | DIASTOLIC BLOOD PRESSURE: 82 MMHG | RESPIRATION RATE: 16 BRPM | SYSTOLIC BLOOD PRESSURE: 138 MMHG

## 2020-07-02 DIAGNOSIS — E11.59 HYPERTENSION ASSOCIATED WITH DIABETES: ICD-10-CM

## 2020-07-02 DIAGNOSIS — E11.9 TYPE 2 DIABETES MELLITUS WITHOUT COMPLICATION, WITHOUT LONG-TERM CURRENT USE OF INSULIN: Primary | ICD-10-CM

## 2020-07-02 DIAGNOSIS — G47.33 OSA TREATED WITH BIPAP: ICD-10-CM

## 2020-07-02 DIAGNOSIS — E11.69 HYPERLIPIDEMIA ASSOCIATED WITH TYPE 2 DIABETES MELLITUS: ICD-10-CM

## 2020-07-02 DIAGNOSIS — I15.2 HYPERTENSION ASSOCIATED WITH DIABETES: ICD-10-CM

## 2020-07-02 DIAGNOSIS — E66.01 MORBID OBESITY WITH BMI OF 45.0-49.9, ADULT: ICD-10-CM

## 2020-07-02 DIAGNOSIS — E78.5 HYPERLIPIDEMIA ASSOCIATED WITH TYPE 2 DIABETES MELLITUS: ICD-10-CM

## 2020-07-02 PROCEDURE — 3079F PR MOST RECENT DIASTOLIC BLOOD PRESSURE 80-89 MM HG: ICD-10-PCS | Mod: CPTII,,, | Performed by: INTERNAL MEDICINE

## 2020-07-02 PROCEDURE — 1101F PT FALLS ASSESS-DOCD LE1/YR: CPT | Mod: CPTII,,, | Performed by: INTERNAL MEDICINE

## 2020-07-02 PROCEDURE — 3008F PR BODY MASS INDEX (BMI) DOCUMENTED: ICD-10-PCS | Mod: CPTII,,, | Performed by: INTERNAL MEDICINE

## 2020-07-02 PROCEDURE — 3044F PR MOST RECENT HEMOGLOBIN A1C LEVEL <7.0%: ICD-10-PCS | Mod: CPTII,,, | Performed by: INTERNAL MEDICINE

## 2020-07-02 PROCEDURE — 3075F PR MOST RECENT SYSTOLIC BLOOD PRESS GE 130-139MM HG: ICD-10-PCS | Mod: CPTII,,, | Performed by: INTERNAL MEDICINE

## 2020-07-02 PROCEDURE — 3044F HG A1C LEVEL LT 7.0%: CPT | Mod: CPTII,,, | Performed by: INTERNAL MEDICINE

## 2020-07-02 PROCEDURE — 1101F PR PT FALLS ASSESS DOC 0-1 FALLS W/OUT INJ PAST YR: ICD-10-PCS | Mod: CPTII,,, | Performed by: INTERNAL MEDICINE

## 2020-07-02 PROCEDURE — 1159F MED LIST DOCD IN RCRD: CPT | Mod: ,,, | Performed by: INTERNAL MEDICINE

## 2020-07-02 PROCEDURE — 99214 PR OFFICE/OUTPT VISIT, EST, LEVL IV, 30-39 MIN: ICD-10-PCS | Mod: 95,,, | Performed by: INTERNAL MEDICINE

## 2020-07-02 PROCEDURE — 99214 OFFICE O/P EST MOD 30 MIN: CPT | Mod: 95,,, | Performed by: INTERNAL MEDICINE

## 2020-07-02 PROCEDURE — 3079F DIAST BP 80-89 MM HG: CPT | Mod: CPTII,,, | Performed by: INTERNAL MEDICINE

## 2020-07-02 PROCEDURE — 3008F BODY MASS INDEX DOCD: CPT | Mod: CPTII,,, | Performed by: INTERNAL MEDICINE

## 2020-07-02 PROCEDURE — 3075F SYST BP GE 130 - 139MM HG: CPT | Mod: CPTII,,, | Performed by: INTERNAL MEDICINE

## 2020-07-02 PROCEDURE — 1159F PR MEDICATION LIST DOCUMENTED IN MEDICAL RECORD: ICD-10-PCS | Mod: ,,, | Performed by: INTERNAL MEDICINE

## 2020-07-02 NOTE — PATIENT INSTRUCTIONS
Diet: Diabetes  Food is an important tool that you can use to control diabetes and stay healthy. Eating well-balanced meals in the correct amounts will help you control your blood glucose levels and prevent low blood sugar reactions. It will also help you reduce the health risks of diabetes. There is no one specific diet that is right for everyone with diabetes. But there are general guidelines to follow. A registered dietitian (RD) will create a tailored diet approach thats just right for you. He or she will also help you plan healthy meals and snacks. If you have any questions, call your dietitian for advice.     Guidelines for success  Talk with your healthcare provider before starting a diabetes diet or weight loss program. If you haven't talked with a dietitian yet, ask your provider for a referral. The following guidelines can help you succeed:  · Select foods from the 6 food groups below. Your dietitian will help you find food choices within each group. He or she will also show you serving sizes and how many servings you can have at each meal.  ¨ Grains, beans, and starchy vegetables  ¨ Vegetables  ¨ Fruit  ¨ Milk or yogurt  ¨ Meat, poultry, fish, or tofu  ¨ Healthy fats  · Check your blood sugar levels as directed by your provider. Take any medicine as prescribed by your provider.  · Learn to read food labels and pick the right portion sizes.  · Eat only the amount of food in your meal plan. Eat about the same amount of food at regular times each day. Dont skip meals. Eat meals 4 to 5 hours apart, with snacks in between.  · Limit alcohol. It raises blood sugar levels. Drink water or calorie-free diet drinks that use safe sweeteners.  · Eat less fat to help lower your risk of heart disease. Use nonfat or low-fat dairy products and lean meats. Avoid fried foods. Use cooking oils that are unsaturated, such as olive, canola, or peanut oil.  · Talk with your dietitian about safe sugar substitutes.  · Avoid  added salt. It can contribute to high blood pressure, which can cause heart disease. People with diabetes already have a risk of high blood pressure and heart disease.  · Stay at a healthy weight. If you need to lose weight, cut down on your portion sizes. But dont skip meals. Exercise is an important part of any weight management program. Talk with your provider about an exercise program thats right for you.  · For more information about the best diet plan for you, talk with a registered dietitian (RD). To find an RD in your area, contact:  ¨ Academy of Nutrition and Dietetics www.eatright.org  ¨ The American Diabetes Association [phone] www.diabetes.org  Date Last Reviewed: 8/1/2016 © 2000-2017 [employer]. [address] [place] [address], [place] [address]. All rights reserved. This information is not intended as a substitute for professional medical care. Always follow your healthcare professional's instructions.

## 2020-07-02 NOTE — PROGRESS NOTES
Subjective:       Patient ID: Santhosh Goff is a 65 y.o. male.    Chief Complaint: No chief complaint on file.    HPI patient is a 65-year-old male presenting today for updated follow-up on his diabetes, hypertension, hyperlipidemia sleep apnea and morbid obesity.  In regards to his diabetes he is doing better his A1c was 7.3 at his last check it is improved to 6.9.  He acknowledges working on diet and exercise.  He has not had any major issues with any significant lows.  No hypoglycemic symptoms.    Blood pressure and cholesterol have been stable over time.  He continues take his medications as prescribed.  No chest pain or palpitations no signs or symptoms of cardiac decompensation or noted.    His sleep apnea is was doing well with treatment.  He wears his device or nightly and does well with it.  He is not experiencing any significant problems from it.  He is hopeful that with further focus on weight loss the issue will improve and he will eventually be able to get off the equipment.    In regards to his weight loss he is continue to focus he feels like he is down a couple lb over the last couple weeks.  He relates a peak weight that was probably greater than 380 lb and he is now down by his scale at home this morning to 348.  He is continuing to work on it and continue to try to do we can to lose weight.  We will continue to support that.  At this point we have not pursued bariatric step.  This would be an option if he is so inclined.      The patient location is: home  The chief complaint leading to consultation is: dm,hlp,dena  Visit type: Audiovisual  Each patient to whom he or she provides medical services by telemedicine is:  (1) informed of the relationship between the physician and patient and the respective role of any other health care provider with respect to management of the patient; and (2) notified that he or she may decline to receive medical services by telemedicine and may withdraw from such  care at any time.      Review of Systems   Constitutional: Negative for activity change and unexpected weight change.   HENT: Negative for hearing loss, rhinorrhea and trouble swallowing.    Eyes: Negative for discharge and visual disturbance.   Respiratory: Negative for chest tightness and wheezing.    Cardiovascular: Negative for chest pain and palpitations.   Gastrointestinal: Negative for blood in stool, constipation, diarrhea and vomiting.   Endocrine: Negative for polydipsia and polyuria.   Genitourinary: Negative for difficulty urinating, hematuria and urgency.   Musculoskeletal: Negative for arthralgias, joint swelling and neck pain.   Neurological: Negative for weakness and headaches.   Psychiatric/Behavioral: Negative for confusion and dysphoric mood.       Objective:   /82   Resp 16   Wt (!) 157.9 kg (348 lb)   BMI 47.20 kg/m²      Physical Exam  Constitutional:       General: He is not in acute distress.     Appearance: He is well-developed.   HENT:      Head: Normocephalic and atraumatic.   Neck:      Musculoskeletal: Normal range of motion.   Pulmonary:      Effort: Pulmonary effort is normal. No respiratory distress.   Skin:     Findings: No rash.   Neurological:      Mental Status: He is alert and oriented to person, place, and time.      Cranial Nerves: No cranial nerve deficit.   Psychiatric:         Behavior: Behavior normal.         Thought Content: Thought content normal.         Lab Visit on 06/25/2020   Component Date Value    Hemoglobin A1C 06/25/2020 6.9*    Estimated Avg Glucose 06/25/2020 151*       Assessment:       1. Type 2 diabetes mellitus without complication, without long-term current use of insulin    2. Hypertension associated with diabetes    3. Hyperlipidemia associated with type 2 diabetes mellitus    4. ALIREZA treated with AVAPS    5. Morbid obesity with BMI of 45.0-49.9, adult        Plan:   Type 2 diabetes mellitus without complication, without long-term current use  of insulin  Diabetes continues to do well at this time.  Most recent a1c is     Lab Results   Component Value Date    HGBA1C 6.9 (H) 06/25/2020    .      We will continue the current regimen.  Focus on a low carbohydrate diet and consistent exercise.        Hypertension associated with diabetes  Continue current meds, stable. Recent bp check showed good numbers.    Hyperlipidemia associated with type 2 diabetes mellitus  Cholesterol numbers look good.  We will continue the current regimen at this time.  Remain focused on low fat diet and high dietary fiber intake.      ALIREZA treated with AVAPS  Patient is compliant with use of cpap, using it the majority of nights.  Benefit from the use of the device is noted.      Morbid obesity with BMI of 45.0-49.9, adult  Has been working on trying to lose weight.  He has lost a little bit of weight but not a lot.      Type 2 diabetes mellitus without complication, without long-term current use of insulin  -     Hemoglobin A1C; Future; Expected date: 09/30/2020    Hypertension associated with diabetes    Hyperlipidemia associated with type 2 diabetes mellitus  -     Lipid Panel; Future; Expected date: 09/30/2020    ALIREZA treated with AVAPS    Morbid obesity with BMI of 45.0-49.9, adult          Follow up in about 3 months (around 10/2/2020) for DM, HTN, HLP, with Anika Steward PA-C.

## 2020-07-02 NOTE — ASSESSMENT & PLAN NOTE
Diabetes continues to do well at this time.  Most recent a1c is     Lab Results   Component Value Date    HGBA1C 6.9 (H) 06/25/2020    .      We will continue the current regimen.  Focus on a low carbohydrate diet and consistent exercise.

## 2020-07-03 NOTE — ASSESSMENT & PLAN NOTE
Patient is compliant with use of cpap, using it the majority of nights.  Benefit from the use of the device is noted.     [place]

## 2020-08-28 ENCOUNTER — LAB VISIT (OUTPATIENT)
Dept: LAB | Facility: HOSPITAL | Age: 65
End: 2020-08-28
Attending: INTERNAL MEDICINE
Payer: COMMERCIAL

## 2020-08-28 DIAGNOSIS — E78.5 HYPERLIPIDEMIA ASSOCIATED WITH TYPE 2 DIABETES MELLITUS: ICD-10-CM

## 2020-08-28 DIAGNOSIS — E11.69 HYPERLIPIDEMIA ASSOCIATED WITH TYPE 2 DIABETES MELLITUS: ICD-10-CM

## 2020-08-28 DIAGNOSIS — E11.9 TYPE 2 DIABETES MELLITUS WITHOUT COMPLICATION, WITHOUT LONG-TERM CURRENT USE OF INSULIN: ICD-10-CM

## 2020-08-28 LAB
CHOLEST SERPL-MCNC: 133 MG/DL (ref 120–199)
CHOLEST/HDLC SERPL: 3 {RATIO} (ref 2–5)
ESTIMATED AVG GLUCOSE: 206 MG/DL (ref 68–131)
HBA1C MFR BLD HPLC: 8.8 % (ref 4–5.6)
HDLC SERPL-MCNC: 45 MG/DL (ref 40–75)
HDLC SERPL: 33.8 % (ref 20–50)
LDLC SERPL CALC-MCNC: 53.4 MG/DL (ref 63–159)
NONHDLC SERPL-MCNC: 88 MG/DL
TRIGL SERPL-MCNC: 173 MG/DL (ref 30–150)

## 2020-08-28 PROCEDURE — 80061 LIPID PANEL: CPT

## 2020-08-28 PROCEDURE — 36415 COLL VENOUS BLD VENIPUNCTURE: CPT | Mod: PO

## 2020-08-28 PROCEDURE — 83036 HEMOGLOBIN GLYCOSYLATED A1C: CPT

## 2020-08-29 NOTE — PROGRESS NOTES
Here are the results of your recent labs.  We will review them in detail at your follow up visit.    Sincerely,    Jacek Mohr M.D.        If you would like to review your experience with Dr. Mohr or [facility], please follow the link below:    [email or web address]

## 2020-09-03 ENCOUNTER — OFFICE VISIT (OUTPATIENT)
Dept: INTERNAL MEDICINE | Facility: CLINIC | Age: 65
End: 2020-09-03
Payer: COMMERCIAL

## 2020-09-03 ENCOUNTER — TELEPHONE (OUTPATIENT)
Dept: INTERNAL MEDICINE | Facility: CLINIC | Age: 65
End: 2020-09-03

## 2020-09-03 VITALS
WEIGHT: 315 LBS | TEMPERATURE: 99 F | HEART RATE: 64 BPM | BODY MASS INDEX: 42.66 KG/M2 | SYSTOLIC BLOOD PRESSURE: 110 MMHG | HEIGHT: 72 IN | DIASTOLIC BLOOD PRESSURE: 60 MMHG

## 2020-09-03 DIAGNOSIS — I15.2 HYPERTENSION ASSOCIATED WITH DIABETES: ICD-10-CM

## 2020-09-03 DIAGNOSIS — E66.01 MORBID OBESITY WITH BMI OF 45.0-49.9, ADULT: ICD-10-CM

## 2020-09-03 DIAGNOSIS — E11.59 HYPERTENSION ASSOCIATED WITH DIABETES: ICD-10-CM

## 2020-09-03 DIAGNOSIS — E11.9 TYPE 2 DIABETES MELLITUS WITHOUT COMPLICATION, WITHOUT LONG-TERM CURRENT USE OF INSULIN: Primary | ICD-10-CM

## 2020-09-03 PROCEDURE — 3008F BODY MASS INDEX DOCD: CPT | Mod: CPTII,S$GLB,, | Performed by: PHYSICIAN ASSISTANT

## 2020-09-03 PROCEDURE — 3074F SYST BP LT 130 MM HG: CPT | Mod: CPTII,S$GLB,, | Performed by: PHYSICIAN ASSISTANT

## 2020-09-03 PROCEDURE — 1126F PR PAIN SEVERITY QUANTIFIED, NO PAIN PRESENT: ICD-10-PCS | Mod: S$GLB,,, | Performed by: PHYSICIAN ASSISTANT

## 2020-09-03 PROCEDURE — 99999 PR PBB SHADOW E&M-EST. PATIENT-LVL IV: ICD-10-PCS | Mod: PBBFAC,,, | Performed by: PHYSICIAN ASSISTANT

## 2020-09-03 PROCEDURE — 1159F MED LIST DOCD IN RCRD: CPT | Mod: S$GLB,,, | Performed by: PHYSICIAN ASSISTANT

## 2020-09-03 PROCEDURE — 3008F PR BODY MASS INDEX (BMI) DOCUMENTED: ICD-10-PCS | Mod: CPTII,S$GLB,, | Performed by: PHYSICIAN ASSISTANT

## 2020-09-03 PROCEDURE — 1159F PR MEDICATION LIST DOCUMENTED IN MEDICAL RECORD: ICD-10-PCS | Mod: S$GLB,,, | Performed by: PHYSICIAN ASSISTANT

## 2020-09-03 PROCEDURE — 1101F PT FALLS ASSESS-DOCD LE1/YR: CPT | Mod: CPTII,S$GLB,, | Performed by: PHYSICIAN ASSISTANT

## 2020-09-03 PROCEDURE — 3078F PR MOST RECENT DIASTOLIC BLOOD PRESSURE < 80 MM HG: ICD-10-PCS | Mod: CPTII,S$GLB,, | Performed by: PHYSICIAN ASSISTANT

## 2020-09-03 PROCEDURE — 99214 OFFICE O/P EST MOD 30 MIN: CPT | Mod: S$GLB,,, | Performed by: PHYSICIAN ASSISTANT

## 2020-09-03 PROCEDURE — 1126F AMNT PAIN NOTED NONE PRSNT: CPT | Mod: S$GLB,,, | Performed by: PHYSICIAN ASSISTANT

## 2020-09-03 PROCEDURE — 3078F DIAST BP <80 MM HG: CPT | Mod: CPTII,S$GLB,, | Performed by: PHYSICIAN ASSISTANT

## 2020-09-03 PROCEDURE — 1101F PR PT FALLS ASSESS DOC 0-1 FALLS W/OUT INJ PAST YR: ICD-10-PCS | Mod: CPTII,S$GLB,, | Performed by: PHYSICIAN ASSISTANT

## 2020-09-03 PROCEDURE — 99999 PR PBB SHADOW E&M-EST. PATIENT-LVL IV: CPT | Mod: PBBFAC,,, | Performed by: PHYSICIAN ASSISTANT

## 2020-09-03 PROCEDURE — 99214 PR OFFICE/OUTPT VISIT, EST, LEVL IV, 30-39 MIN: ICD-10-PCS | Mod: S$GLB,,, | Performed by: PHYSICIAN ASSISTANT

## 2020-09-03 PROCEDURE — 3074F PR MOST RECENT SYSTOLIC BLOOD PRESSURE < 130 MM HG: ICD-10-PCS | Mod: CPTII,S$GLB,, | Performed by: PHYSICIAN ASSISTANT

## 2020-09-03 PROCEDURE — 3052F HG A1C>EQUAL 8.0%<EQUAL 9.0%: CPT | Mod: CPTII,S$GLB,, | Performed by: PHYSICIAN ASSISTANT

## 2020-09-03 PROCEDURE — 3052F PR MOST RECENT HEMOGLOBIN A1C LEVEL 8.0 - < 9.0%: ICD-10-PCS | Mod: CPTII,S$GLB,, | Performed by: PHYSICIAN ASSISTANT

## 2020-09-03 RX ORDER — DAPAGLIFLOZIN 5 MG/1
5 TABLET, FILM COATED ORAL DAILY
Qty: 30 TABLET | Refills: 5 | Status: SHIPPED | OUTPATIENT
Start: 2020-09-03 | End: 2020-10-03

## 2020-09-03 NOTE — TELEPHONE ENCOUNTER
Patient will need to see podiatry for evaluation prior to being able to get the shoes.  Several issues have to be documented in the chart to get the shoes approved.    Referral to podiatry placed.

## 2020-09-03 NOTE — PROGRESS NOTES
Subjective:       Patient ID: Santhosh Goff is a 65 y.o. male.    Chief Complaint: Follow-up    HPI    Patient comes in today for follow up DM, weight, hypertension     He is researching diets and supplements for weight loss and has a good knowledge of what he should do to lose weight     His sugars are much higher than prior    He is willing to try a new medication as well     Due for foot exam today     Health Maintenance Due   Topic Date Due    Shingles Vaccine (1 of 2) 01/19/2004    Influenza Vaccine (1) 08/01/2020       Past Medical History:   Diagnosis Date    Arthritis     Diabetes mellitus, type 2 2014     x 1 week ago 10/10/2018    Hyperlipidemia     Hypertension     Morbid obesity with BMI of 45.0-49.9, adult     Staphylococcus aureus infection, multiple-resistant (MRSA) 2009       Current Outpatient Medications   Medication Sig Dispense Refill    aspirin (ECOTRIN) 81 MG EC tablet Take 81 mg by mouth once daily.      atorvastatin (LIPITOR) 40 MG tablet TAKE 1 TABLET BY MOUTH ONCE DAILY 90 tablet 3    furosemide (LASIX) 20 MG tablet TAKE ONE TABLET BY MOUTH ONCE DAILY 90 tablet 4    glimepiride (AMARYL) 4 MG tablet TAKE ONE TABLET BY MOUTH ONCE DAILY WITH BREAKFAST 90 tablet 4    losartan (COZAAR) 25 MG tablet Take 1 tablet by mouth once daily 90 tablet 3    metFORMIN (GLUCOPHAGE) 1000 MG tablet Take 1 tablet (1,000 mg total) by mouth 2 (two) times daily with meals. 180 tablet 3    multivit-min/ferrous fumarate (MULTI VITAMIN ORAL)       potassium chloride SA (K-DUR,KLOR-CON) 20 MEQ tablet TAKE ONE TABLET BY MOUTH ONCE DAILY 90 tablet 4    VENTOLIN HFA 90 mcg/actuation inhaler INHALE 2 PUFFS BY MOUTH EVERY 6 HOURS AS NEEDED FOR WHEEZING 18 each 0    dapagliflozin (FARXIGA) 5 mg Tab tablet Take 1 tablet (5 mg total) by mouth once daily. 30 tablet 5     No current facility-administered medications for this visit.        Review of Systems   Constitutional: Negative for activity  change and unexpected weight change.   HENT: Negative for hearing loss, rhinorrhea and trouble swallowing.    Eyes: Negative for discharge and visual disturbance.   Respiratory: Negative for chest tightness and wheezing.    Cardiovascular: Negative for chest pain and palpitations.   Gastrointestinal: Negative for blood in stool, constipation, diarrhea and vomiting.   Endocrine: Negative for polydipsia and polyuria.   Genitourinary: Negative for difficulty urinating, hematuria and urgency.   Musculoskeletal: Negative for arthralgias, joint swelling and neck pain.   Neurological: Negative for weakness and headaches.   Psychiatric/Behavioral: Negative for confusion and dysphoric mood.       Objective:   /60   Pulse 64   Temp 99 °F (37.2 °C) (Temporal)   Ht 6' (1.829 m)   Wt (!) 155.2 kg (342 lb 2.5 oz)   BMI 46.40 kg/m²      Physical Exam  Constitutional:       General: He is not in acute distress.     Appearance: Normal appearance. He is obese.   Cardiovascular:      Rate and Rhythm: Normal rate and regular rhythm.      Pulses:           Dorsalis pedis pulses are 2+ on the right side and 2+ on the left side.        Posterior tibial pulses are 2+ on the right side and 2+ on the left side.   Pulmonary:      Effort: Pulmonary effort is normal.      Breath sounds: Normal breath sounds.   Musculoskeletal:      Right foot: Normal range of motion. No deformity.      Left foot: Normal range of motion. No deformity.   Feet:      Right foot:      Protective Sensation: 10 sites tested. 10 sites sensed.      Skin integrity: Skin integrity normal.      Toenail Condition: Right toenails are normal.      Left foot:      Protective Sensation: 10 sites tested. 10 sites sensed.      Skin integrity: Skin integrity normal.      Toenail Condition: Left toenails are normal.   Neurological:      Mental Status: He is alert.           Lab Results   Component Value Date    WBC 8.67 03/19/2020    HGB 14.2 03/19/2020    HCT 46.1  03/19/2020     03/19/2020    CHOL 133 08/28/2020    TRIG 173 (H) 08/28/2020    HDL 45 08/28/2020    ALT 54 (H) 03/19/2020    AST 31 03/19/2020     03/19/2020    K 4.5 03/19/2020     03/19/2020    CREATININE 1.1 03/19/2020    BUN 22 03/19/2020    CO2 25 03/19/2020    TSH 1.284 02/09/2019    PSA 0.21 03/19/2020    INR 1.1 02/09/2019    HGBA1C 8.8 (H) 08/28/2020       Assessment:       1. Type 2 diabetes mellitus without complication, without long-term current use of insulin    2. Morbid obesity with BMI of 45.0-49.9, adult    3. Hypertension associated with diabetes        Plan:   Type 2 diabetes mellitus without complication, without long-term current use of insulin  -     Hemoglobin A1C; Future; Expected date: 12/03/2020    Morbid obesity with BMI of 45.0-49.9, adult    Hypertension associated with diabetes    Other orders  -     dapagliflozin (FARXIGA) 5 mg Tab tablet; Take 1 tablet (5 mg total) by mouth once daily.  Dispense: 30 tablet; Refill: 5    BP is doing well   Patient has goals for weight loss   Start farxiga, coupon online     Follow up for 3 months A1c with PCP.

## 2020-09-03 NOTE — TELEPHONE ENCOUNTER
Pt is requesting a prescription for diabetic shoes. He would like prescription to go to [facility].

## 2020-10-01 ENCOUNTER — PATIENT OUTREACH (OUTPATIENT)
Dept: ADMINISTRATIVE | Facility: OTHER | Age: 65
End: 2020-10-01

## 2020-10-02 ENCOUNTER — OFFICE VISIT (OUTPATIENT)
Dept: PODIATRY | Facility: CLINIC | Age: 65
End: 2020-10-02
Payer: COMMERCIAL

## 2020-10-02 VITALS — HEIGHT: 72 IN | BODY MASS INDEX: 42.66 KG/M2 | WEIGHT: 315 LBS

## 2020-10-02 DIAGNOSIS — E11.9 ENCOUNTER FOR COMPREHENSIVE DIABETIC FOOT EXAMINATION, TYPE 2 DIABETES MELLITUS: Primary | ICD-10-CM

## 2020-10-02 DIAGNOSIS — I87.2 VENOUS INSUFFICIENCY OF BOTH LOWER EXTREMITIES: ICD-10-CM

## 2020-10-02 DIAGNOSIS — E66.01 MORBID OBESITY WITH BMI OF 45.0-49.9, ADULT: ICD-10-CM

## 2020-10-02 DIAGNOSIS — M21.6X1 ACQUIRED BILATERAL PES CAVUS: ICD-10-CM

## 2020-10-02 DIAGNOSIS — M21.6X2 ACQUIRED BILATERAL PES CAVUS: ICD-10-CM

## 2020-10-02 DIAGNOSIS — R60.9 2+ PITTING EDEMA: ICD-10-CM

## 2020-10-02 DIAGNOSIS — E11.69 TYPE 2 DIABETES MELLITUS WITH OTHER SPECIFIED COMPLICATION, WITHOUT LONG-TERM CURRENT USE OF INSULIN: ICD-10-CM

## 2020-10-02 PROCEDURE — 99999 PR PBB SHADOW E&M-EST. PATIENT-LVL III: CPT | Mod: PBBFAC,,, | Performed by: PODIATRIST

## 2020-10-02 PROCEDURE — 99999 PR PBB SHADOW E&M-EST. PATIENT-LVL III: ICD-10-PCS | Mod: PBBFAC,,, | Performed by: PODIATRIST

## 2020-10-02 PROCEDURE — 99243 OFF/OP CNSLTJ NEW/EST LOW 30: CPT | Mod: S$GLB,,, | Performed by: PODIATRIST

## 2020-10-02 PROCEDURE — 99243 PR OFFICE CONSULTATION,LEVEL III: ICD-10-PCS | Mod: S$GLB,,, | Performed by: PODIATRIST

## 2020-10-02 NOTE — LETTER
October 2, 2020      Jacek Mohr MD  [address] [facility] [address]  [place] [address]           [place] [facility]  [address]  [facility] [place] [address]  Phone: [phone]          Patient: Santhosh Goff   MR Number: [id number]   YOB: 1955   Date of Visit: 10/2/2020       Dear Dr. Jacek Mohr:    Thank you for referring Santhosh Goff to me for evaluation. Attached you will find relevant portions of my assessment and plan of care.    If you have questions, please do not hesitate to call me. I look forward to following Santhosh Goff along with you.    Sincerely,    Alison Anderson DPM    Enclosure  CC:  No Recipients    If you would like to receive this communication electronically, please contact [email or web address] or [phone] to request more information on [employer] access.    For providers and/or their staff who would like to refer a patient to [facility], please contact us through our one-stop-shop provider referral line, [facility], at [phone].    If you feel you have received this communication in error or would no longer like to receive these types of communications, please e-mail [email or web address]

## 2020-10-02 NOTE — PROGRESS NOTES
Subjective:       Patient ID: Santhosh Goff is a 65 y.o. male.    Chief Complaint: Diabetic Foot Exam (Pt presents for foot exam. Pt states needing new diabetic shoes. No c/o pain at present. Wears crocs w/ socks. Last seen on 9/3/2020 by Anika Gamble)      HPI: Santhosh Goff presents to the office today, under referral by their Primary Care Provider, Jacek Mohr MD, for his annual diabetic foot assessment and risk evaluation.  Patient is a DMII with history of bilateral lower leg edema secondary to venous insufficiency.  Also does have a pes cavus foot deformity as well.  States 0/10 pain.  Relates that last A1c was elevated compared to previous numbers.  A1c currently 8.8.  This patient last saw his/her primary care provider on 09/03/2020.     Hemoglobin A1C   Date Value Ref Range Status   08/28/2020 8.8 (H) 4.0 - 5.6 % Final     Comment:     ADA Screening Guidelines:  5.7-6.4%  Consistent with prediabetes  >or=6.5%  Consistent with diabetes  High levels of fetal hemoglobin interfere with the HbA1C  assay. Heterozygous hemoglobin variants (HbS, HgC, etc)do  not significantly interfere with this assay.   However, presence of multiple variants may affect accuracy.     06/25/2020 6.9 (H) 4.0 - 5.6 % Final     Comment:     ADA Screening Guidelines:  5.7-6.4%  Consistent with prediabetes  >or=6.5%  Consistent with diabetes  High levels of fetal hemoglobin interfere with the HbA1C  assay. Heterozygous hemoglobin variants (HbS, HgC, etc)do  not significantly interfere with this assay.   However, presence of multiple variants may affect accuracy.     03/19/2020 7.3 (H) 4.0 - 5.6 % Final     Comment:     ADA Screening Guidelines:  5.7-6.4%  Consistent with prediabetes  >or=6.5%  Consistent with diabetes  High levels of fetal hemoglobin interfere with the HbA1C  assay. Heterozygous hemoglobin variants (HbS, HgC, etc)do  not significantly interfere with this assay.   However, presence of multiple variants may  affect accuracy.     .    Review of patient's allergies indicates:   Allergen Reactions    Cephalosporins Nausea And Vomiting       Past Medical History:   Diagnosis Date    Arthritis     Diabetes mellitus, type 2 2014     x 1 week ago 10/10/2018    Hyperlipidemia     Hypertension     Morbid obesity with BMI of 45.0-49.9, adult     Staphylococcus aureus infection, multiple-resistant (MRSA) 2009       Family History   Problem Relation Age of Onset    Early death Father        Social History     Socioeconomic History    Marital status:      Spouse name: Not on file    Number of children: 3    Years of education: Not on file    Highest education level: Not on file   Occupational History    Occupation:      Employer: [employer]   Social Needs    Financial resource strain: Not hard at all    Food insecurity     Worry: Never true     Inability: Never true    Transportation needs     Medical: No     Non-medical: No   Tobacco Use    Smoking status: Never Smoker    Smokeless tobacco: Never Used   Substance and Sexual Activity    Alcohol use: No     Frequency: Monthly or less     Drinks per session: 1 or 2     Binge frequency: Never    Drug use: No    Sexual activity: Yes     Partners: Female   Lifestyle    Physical activity     Days per week: 5 days     Minutes per session: 60 min    Stress: Not at all   Relationships    Social connections     Talks on phone: More than three times a week     Gets together: More than three times a week     Attends [employer] service: Not on file     Active member of club or organization: No     Attends meetings of clubs or organizations: Not on file     Relationship status:    Other Topics Concern    Not on file   Social History Narrative    Not on file       Past Surgical History:   Procedure Laterality Date    COLONOSCOPY N/A 2/24/2020    Procedure: COLONOSCOPY;  Surgeon: Bela Vaughn,  MD;  Location: [facility];  Service: Endoscopy;  Laterality: N/A;    LUNG SURGERY      RIB FX         Review of Systems   Constitutional: Negative for activity change, appetite change, chills and fever.   HENT: Negative for sinus pain, sore throat and voice change.    Eyes: Negative for pain, redness and visual disturbance.   Respiratory: Negative for cough and shortness of breath.    Cardiovascular: Negative for chest pain and palpitations.   Gastrointestinal: Negative for diarrhea, nausea and vomiting.   Musculoskeletal: Negative for back pain and joint swelling.   Skin: Negative for color change and wound.   Neurological: Negative for dizziness, weakness and numbness.   Psychiatric/Behavioral: The patient is not nervous/anxious.          Objective:   Ht 6' (1.829 m)   Wt (!) 158.9 kg (350 lb 5 oz)   BMI 47.51 kg/m²     Physical Exam  LOWER EXTREMITY PHYSICAL EXAMINATION    ORTHOPEDIC:   No pain on palpation of the foot or ankle.  Pes cavus foot deformity is noted.  There is elevation of the medial longitudinal arch.  Range of motion within normal limits of the ankle joint, rearfoot, and forefoot.  Manual muscle strength testing is 5/5 with dorsiflexion, plantar flexion, abduction and abduction of the lower extremity.  No pain with or without resistance.  The patient is a full to ambulate without pain or discomfort.  The patient's gait is mildly antalgic.  The patient does not utilize any assistive device for ambulation.    VASCULAR: Capillary refill time is greater than 3s. Edema is 3+ pitting and moderate. The left dorsalis pedis pulse is 2/4 and the right dorsalis pedis pulse is 2/4. The left posterior tibial pulse is 1/4 and the right posterior tibial pulse is 1/4. Varicosities are there are. Spider veins and telangiectasias are present. Hair growth is sparse to absent. Skin appearance is thin and shiny. Proximal to distal warm to cool to touch. Elevation palor is noted. Dependent rubor is  decreased.    NEUROLOGY: Proprioception is intact. Sensation to light touch is intact. Sensation to pin prick is intact. Vibratory sensation is diminished to the left and right lower extremity. Examination with 5.07 [place] Twin monofilament reveals that protective sensation is intact to the left and right plantar surfaces of the foot and digits    DERMATOLOGY: Skin is thin, shiny, atrophic.  There is +3 pitting edema noted to the pretibial regions the bilateral lower extremity.  There is no callusing or ulceration present.  There is some brawny discoloration to the pretibial region on the right lower leg, however this is absent on the left.  Does have a history of right lower extremity cellulitis and venous wounds.  Currently no wounds, ulceration, or other lesions are identified.  The nails are normal color, thickness, and texture.    Assessment:     1. Encounter for comprehensive diabetic foot examination, type 2 diabetes mellitus    2. Type 2 diabetes mellitus with other specified complication, without long-term current use of insulin    3. Acquired bilateral pes cavus    4. Venous insufficiency of both lower extremities    5. 2+ pitting edema    6. Morbid obesity with BMI of 45.0-49.9, adult        Plan:     Encounter for comprehensive diabetic foot examination, type 2 diabetes mellitus    Type 2 diabetes mellitus with other specified complication, without long-term current use of insulin  -     Ambulatory referral/consult to Podiatry  -     DIABETIC SHOES FOR HOME USE  I counseled the patient on his/her Diabetic Mellitus regarding today's clinical examination and objection findings. We did also discuss recent medication changes, pertinent labs and imaging evaluations and other medical consultation notes and progress notes. Greater than 50% of this visit was spent on counseling and coordination of care. Greater than 20 minutes of this appt. was spent on education about the diabetic foot, in relation to PVD  and/or neuropathy, and the prevention of limb loss.     Shoe gear is inspected and wear and proper fit/type. Patient is reminded of the importance of good nutrition and blood sugar control to help prevent podiatric complications of diabetes. Patient instructed on proper foot hygeine. We discussed wearing proper shoe gear, daily foot inspections, never walking without protective shoe gear, never putting sharp instruments to feet.  Patient  will continue to monitor the areas daily, inspect feet, wear protective shoe gear when ambulatory, moisturizer to maintain skin integrity.     Patient's DMI/DMII is managed by Primary Care Provider and/or Endocrinology Advanced Practice Provider.    Acquired bilateral pes cavus  -     DIABETIC SHOES FOR HOME USE    Venous insufficiency of both lower extremities  -     DIABETIC SHOES FOR HOME USE  -     HME - OTHER  Recommend bilateral lower leg compression.  Discussed with patient and wife that if he is unable to tolerate 20-30 mmHg compression, he should be able to tolerate 15-20mmHg of compression.  Also discussed there are zipper socks as well which may be easier to place on.  2+ pitting edema  -     HME - OTHER  Recommend bilateral lower leg elevation.  Discussed the importance of elevating the lower extremities above the hips as this will prevent gravity from pulling fluid down towards though bilateral lower extremities.  Morbid obesity with BMI of 45.0-49.9, adult  Patient is counseled and reminded concerning the importance of good nutrition and healthy eating habits, which may include blood sugar control, to prevent and/or help podiatric foot and ankle complications.

## 2020-10-20 ENCOUNTER — OFFICE VISIT (OUTPATIENT)
Dept: OPHTHALMOLOGY | Facility: CLINIC | Age: 65
End: 2020-10-20
Payer: COMMERCIAL

## 2020-10-20 DIAGNOSIS — H52.03 HYPEROPIA, BILATERAL: ICD-10-CM

## 2020-10-20 DIAGNOSIS — E11.9 TYPE 2 DIABETES MELLITUS WITHOUT COMPLICATION, WITHOUT LONG-TERM CURRENT USE OF INSULIN: Primary | ICD-10-CM

## 2020-10-20 DIAGNOSIS — H52.4 BILATERAL PRESBYOPIA: ICD-10-CM

## 2020-10-20 DIAGNOSIS — E11.36 DIABETIC CATARACT: ICD-10-CM

## 2020-10-20 LAB
LEFT EYE DM RETINOPATHY: NEGATIVE
RIGHT EYE DM RETINOPATHY: NEGATIVE

## 2020-10-20 PROCEDURE — 99999 PR PBB SHADOW E&M-EST. PATIENT-LVL II: ICD-10-PCS | Mod: PBBFAC,,, | Performed by: OPTOMETRIST

## 2020-10-20 PROCEDURE — 92014 PR EYE EXAM, EST PATIENT,COMPREHESV: ICD-10-PCS | Mod: S$GLB,,, | Performed by: OPTOMETRIST

## 2020-10-20 PROCEDURE — 99999 PR PBB SHADOW E&M-EST. PATIENT-LVL II: CPT | Mod: PBBFAC,,, | Performed by: OPTOMETRIST

## 2020-10-20 PROCEDURE — 92015 DETERMINE REFRACTIVE STATE: CPT | Mod: S$GLB,,, | Performed by: OPTOMETRIST

## 2020-10-20 PROCEDURE — 92014 COMPRE OPH EXAM EST PT 1/>: CPT | Mod: S$GLB,,, | Performed by: OPTOMETRIST

## 2020-10-20 PROCEDURE — 92015 PR REFRACTION: ICD-10-PCS | Mod: S$GLB,,, | Performed by: OPTOMETRIST

## 2020-10-20 NOTE — PROGRESS NOTES
HPI     Yearly diabetic exam  Dm since 2008  No sx's  Lab Results       Component                Value               Date                       HGBA1C                   8.8 (H)             08/28/2020                Last edited by Antonio Waldrop, OD on 10/20/2020 10:49 AM. (History)            Assessment /Plan     For exam results, see Encounter Report.    Type 2 diabetes mellitus without complication, without long-term current use of insulin    Diabetic cataract    Hyperopia, bilateral    Bilateral presbyopia      No Background Diabetic Retinopathy    Moderate cataracts OU, not surgical    Dispense Final Rx for glasses.  RTC 1 year  Discussed above and answered questions.

## 2020-11-09 ENCOUNTER — PATIENT MESSAGE (OUTPATIENT)
Dept: PODIATRY | Facility: CLINIC | Age: 65
End: 2020-11-09

## 2020-11-20 ENCOUNTER — PATIENT MESSAGE (OUTPATIENT)
Dept: INTERNAL MEDICINE | Facility: CLINIC | Age: 65
End: 2020-11-20

## 2020-11-20 RX ORDER — FUROSEMIDE 20 MG/1
20 TABLET ORAL DAILY
Qty: 90 TABLET | Refills: 4 | Status: SHIPPED | OUTPATIENT
Start: 2020-11-20 | End: 2023-12-18

## 2020-12-03 ENCOUNTER — LAB VISIT (OUTPATIENT)
Dept: LAB | Facility: HOSPITAL | Age: 65
End: 2020-12-03
Attending: PHYSICIAN ASSISTANT
Payer: COMMERCIAL

## 2020-12-03 DIAGNOSIS — E11.9 TYPE 2 DIABETES MELLITUS WITHOUT COMPLICATION, WITHOUT LONG-TERM CURRENT USE OF INSULIN: ICD-10-CM

## 2020-12-03 PROCEDURE — 36415 COLL VENOUS BLD VENIPUNCTURE: CPT | Mod: PO

## 2020-12-03 PROCEDURE — 83036 HEMOGLOBIN GLYCOSYLATED A1C: CPT

## 2020-12-04 LAB
ESTIMATED AVG GLUCOSE: 157 MG/DL (ref 68–131)
HBA1C MFR BLD HPLC: 7.1 % (ref 4–5.6)

## 2020-12-08 ENCOUNTER — PATIENT MESSAGE (OUTPATIENT)
Dept: OPHTHALMOLOGY | Facility: CLINIC | Age: 65
End: 2020-12-08

## 2020-12-10 ENCOUNTER — OFFICE VISIT (OUTPATIENT)
Dept: INTERNAL MEDICINE | Facility: CLINIC | Age: 65
End: 2020-12-10
Payer: COMMERCIAL

## 2020-12-10 VITALS
TEMPERATURE: 98 F | SYSTOLIC BLOOD PRESSURE: 132 MMHG | WEIGHT: 315 LBS | DIASTOLIC BLOOD PRESSURE: 82 MMHG | HEART RATE: 58 BPM | HEIGHT: 72 IN | BODY MASS INDEX: 42.66 KG/M2

## 2020-12-10 DIAGNOSIS — E78.5 HYPERLIPIDEMIA ASSOCIATED WITH TYPE 2 DIABETES MELLITUS: ICD-10-CM

## 2020-12-10 DIAGNOSIS — E11.69 HYPERLIPIDEMIA ASSOCIATED WITH TYPE 2 DIABETES MELLITUS: ICD-10-CM

## 2020-12-10 DIAGNOSIS — Z12.5 ENCOUNTER FOR SCREENING FOR MALIGNANT NEOPLASM OF PROSTATE: ICD-10-CM

## 2020-12-10 DIAGNOSIS — E11.59 HYPERTENSION ASSOCIATED WITH DIABETES: ICD-10-CM

## 2020-12-10 DIAGNOSIS — E11.9 TYPE 2 DIABETES MELLITUS WITHOUT COMPLICATION, WITHOUT LONG-TERM CURRENT USE OF INSULIN: Primary | ICD-10-CM

## 2020-12-10 DIAGNOSIS — I15.2 HYPERTENSION ASSOCIATED WITH DIABETES: ICD-10-CM

## 2020-12-10 PROCEDURE — 99214 PR OFFICE/OUTPT VISIT, EST, LEVL IV, 30-39 MIN: ICD-10-PCS | Mod: 25,S$GLB,, | Performed by: INTERNAL MEDICINE

## 2020-12-10 PROCEDURE — 1159F MED LIST DOCD IN RCRD: CPT | Mod: S$GLB,,, | Performed by: INTERNAL MEDICINE

## 2020-12-10 PROCEDURE — 3079F DIAST BP 80-89 MM HG: CPT | Mod: CPTII,S$GLB,, | Performed by: INTERNAL MEDICINE

## 2020-12-10 PROCEDURE — 3288F FALL RISK ASSESSMENT DOCD: CPT | Mod: CPTII,S$GLB,, | Performed by: INTERNAL MEDICINE

## 2020-12-10 PROCEDURE — 3008F PR BODY MASS INDEX (BMI) DOCUMENTED: ICD-10-PCS | Mod: CPTII,S$GLB,, | Performed by: INTERNAL MEDICINE

## 2020-12-10 PROCEDURE — 99214 OFFICE O/P EST MOD 30 MIN: CPT | Mod: 25,S$GLB,, | Performed by: INTERNAL MEDICINE

## 2020-12-10 PROCEDURE — 1126F AMNT PAIN NOTED NONE PRSNT: CPT | Mod: S$GLB,,, | Performed by: INTERNAL MEDICINE

## 2020-12-10 PROCEDURE — 3051F HG A1C>EQUAL 7.0%<8.0%: CPT | Mod: CPTII,S$GLB,, | Performed by: INTERNAL MEDICINE

## 2020-12-10 PROCEDURE — 90694 FLU VACCINE - QUADRIVALENT - ADJUVANTED: ICD-10-PCS | Mod: S$GLB,,, | Performed by: INTERNAL MEDICINE

## 2020-12-10 PROCEDURE — 1101F PR PT FALLS ASSESS DOC 0-1 FALLS W/OUT INJ PAST YR: ICD-10-PCS | Mod: CPTII,S$GLB,, | Performed by: INTERNAL MEDICINE

## 2020-12-10 PROCEDURE — 3051F PR MOST RECENT HEMOGLOBIN A1C LEVEL 7.0 - < 8.0%: ICD-10-PCS | Mod: CPTII,S$GLB,, | Performed by: INTERNAL MEDICINE

## 2020-12-10 PROCEDURE — 3079F PR MOST RECENT DIASTOLIC BLOOD PRESSURE 80-89 MM HG: ICD-10-PCS | Mod: CPTII,S$GLB,, | Performed by: INTERNAL MEDICINE

## 2020-12-10 PROCEDURE — 90694 VACC AIIV4 NO PRSRV 0.5ML IM: CPT | Mod: S$GLB,,, | Performed by: INTERNAL MEDICINE

## 2020-12-10 PROCEDURE — 3008F BODY MASS INDEX DOCD: CPT | Mod: CPTII,S$GLB,, | Performed by: INTERNAL MEDICINE

## 2020-12-10 PROCEDURE — 1126F PR PAIN SEVERITY QUANTIFIED, NO PAIN PRESENT: ICD-10-PCS | Mod: S$GLB,,, | Performed by: INTERNAL MEDICINE

## 2020-12-10 PROCEDURE — 1159F PR MEDICATION LIST DOCUMENTED IN MEDICAL RECORD: ICD-10-PCS | Mod: S$GLB,,, | Performed by: INTERNAL MEDICINE

## 2020-12-10 PROCEDURE — 99999 PR PBB SHADOW E&M-EST. PATIENT-LVL IV: CPT | Mod: PBBFAC,,, | Performed by: INTERNAL MEDICINE

## 2020-12-10 PROCEDURE — 3288F PR FALLS RISK ASSESSMENT DOCUMENTED: ICD-10-PCS | Mod: CPTII,S$GLB,, | Performed by: INTERNAL MEDICINE

## 2020-12-10 PROCEDURE — 3075F SYST BP GE 130 - 139MM HG: CPT | Mod: CPTII,S$GLB,, | Performed by: INTERNAL MEDICINE

## 2020-12-10 PROCEDURE — 99999 PR PBB SHADOW E&M-EST. PATIENT-LVL IV: ICD-10-PCS | Mod: PBBFAC,,, | Performed by: INTERNAL MEDICINE

## 2020-12-10 PROCEDURE — 90471 FLU VACCINE - QUADRIVALENT - ADJUVANTED: ICD-10-PCS | Mod: S$GLB,,, | Performed by: INTERNAL MEDICINE

## 2020-12-10 PROCEDURE — 3075F PR MOST RECENT SYSTOLIC BLOOD PRESS GE 130-139MM HG: ICD-10-PCS | Mod: CPTII,S$GLB,, | Performed by: INTERNAL MEDICINE

## 2020-12-10 PROCEDURE — 1101F PT FALLS ASSESS-DOCD LE1/YR: CPT | Mod: CPTII,S$GLB,, | Performed by: INTERNAL MEDICINE

## 2020-12-10 PROCEDURE — 90471 IMMUNIZATION ADMIN: CPT | Mod: S$GLB,,, | Performed by: INTERNAL MEDICINE

## 2020-12-10 RX ORDER — DAPAGLIFLOZIN 5 MG/1
5 TABLET, FILM COATED ORAL DAILY
COMMUNITY
Start: 2020-12-01 | End: 2021-03-01

## 2020-12-10 NOTE — PATIENT INSTRUCTIONS
Diet: Diabetes  Food is an important tool that you can use to control diabetes and stay healthy. Eating well-balanced meals in the correct amounts will help you control your blood glucose levels and prevent low blood sugar reactions. It will also help you reduce the health risks of diabetes. There is no one specific diet that is right for everyone with diabetes. But there are general guidelines to follow. A registered dietitian (RD) will create a tailored diet approach thats just right for you. He or she will also help you plan healthy meals and snacks. If you have any questions, call your dietitian for advice.     Guidelines for success  Talk with your healthcare provider before starting a diabetes diet or weight loss program. If you haven't talked with a dietitian yet, ask your provider for a referral. The following guidelines can help you succeed:  · Select foods from the 6 food groups below. Your dietitian will help you find food choices within each group. He or she will also show you serving sizes and how many servings you can have at each meal.  ¨ Grains, beans, and starchy vegetables  ¨ Vegetables  ¨ Fruit  ¨ Milk or yogurt  ¨ Meat, poultry, fish, or tofu  ¨ Healthy fats  · Check your blood sugar levels as directed by your provider. Take any medicine as prescribed by your provider.  · Learn to read food labels and pick the right portion sizes.  · Eat only the amount of food in your meal plan. Eat about the same amount of food at regular times each day. Dont skip meals. Eat meals 4 to 5 hours apart, with snacks in between.  · Limit alcohol. It raises blood sugar levels. Drink water or calorie-free diet drinks that use safe sweeteners.  · Eat less fat to help lower your risk of heart disease. Use nonfat or low-fat dairy products and lean meats. Avoid fried foods. Use cooking oils that are unsaturated, such as olive, canola, or peanut oil.  · Talk with your dietitian about safe sugar substitutes.  · Avoid  added salt. It can contribute to high blood pressure, which can cause heart disease. People with diabetes already have a risk of high blood pressure and heart disease.  · Stay at a healthy weight. If you need to lose weight, cut down on your portion sizes. But dont skip meals. Exercise is an important part of any weight management program. Talk with your provider about an exercise program thats right for you.  · For more information about the best diet plan for you, talk with a registered dietitian (RD). To find an RD in your area, contact:  ¨ Academy of Nutrition and Dietetics www.eatright.org  ¨ The American Diabetes Association [phone] www.diabetes.org  Date Last Reviewed: 8/1/2016 © 2000-2017 [employer]. [address] [place] [address], [place] [address]. All rights reserved. This information is not intended as a substitute for professional medical care. Always follow your healthcare professional's instructions.

## 2020-12-10 NOTE — PROGRESS NOTES
Subjective:       Patient ID: Santhosh Goff is a 65 y.o. male.    Chief Complaint: Follow-up    HPI Patient is a 65-year-old male presenting today following up on his diabetes, hypertension, hyperlipidemia.  His most recent A1c was 7.1 demonstrating less than ideal control.  I added an SG LT 2 to his regimen.  He has been tolerating it well.  We will have follow-up lab work in a few months to sort of see how he is doing.    His blood pressure remains under good control he is having no issues with it.  He is tolerating the medication without adverse effects.    Lipids have been stable over time.  He continues take statin medication without adverse effects.  No myalgias or arthralgias are noted.    Education was given around the COVID vaccine.    Review of Systems   Constitutional: Negative for fever and unexpected weight change.   Respiratory: Negative for cough, shortness of breath and wheezing.    Cardiovascular: Negative for chest pain and palpitations.   Gastrointestinal: Negative for constipation, diarrhea, nausea and vomiting.   Genitourinary: Negative for dysuria and hematuria.       Objective:   /82   Pulse (!) 58   Temp 98.1 °F (36.7 °C) (Temporal)   Ht 6' (1.829 m)   Wt (!) 158.8 kg (350 lb 1.5 oz)   BMI 47.48 kg/m²      Physical Exam  Vitals signs reviewed.   Constitutional:       Appearance: He is well-developed.   HENT:      Head: Normocephalic and atraumatic.      Right Ear: Tympanic membrane, ear canal and external ear normal.      Left Ear: Tympanic membrane, ear canal and external ear normal.   Eyes:      Pupils: Pupils are equal, round, and reactive to light.   Neck:      Musculoskeletal: Neck supple.      Thyroid: No thyromegaly.   Cardiovascular:      Rate and Rhythm: Normal rate and regular rhythm.      Heart sounds: Normal heart sounds. No murmur. No friction rub. No gallop.    Pulmonary:      Effort: Pulmonary effort is normal.      Breath sounds: Normal breath sounds. No  wheezing or rales.   Abdominal:      General: Bowel sounds are normal. There is no distension.      Palpations: Abdomen is soft.      Tenderness: There is no abdominal tenderness.   Psychiatric:         Mood and Affect: Mood normal.         Lab Visit on 12/03/2020   Component Date Value    Hemoglobin A1C 12/03/2020 7.1*    Estimated Avg Glucose 12/03/2020 157*       Assessment:       1. Type 2 diabetes mellitus without complication, without long-term current use of insulin    2. Hypertension associated with diabetes    3. Hyperlipidemia associated with type 2 diabetes mellitus    4. Encounter for screening for malignant neoplasm of prostate        Plan:   Type 2 diabetes mellitus without complication, without long-term current use of insulin  Diabetes continues to do well at this time.  Most recent a1c is     Lab Results   Component Value Date    HGBA1C 7.1 (H) 12/03/2020    .      We will continue the current regimen.  Focus on a low carbohydrate diet and consistent exercise.        Hypertension associated with diabetes  Blood pressure is under good control.  We will continue the current regimen.  Will work on regular aerobic exercise and a low salt diet.        Hyperlipidemia associated with type 2 diabetes mellitus  Cholesterol numbers look good.  We will continue the current regimen at this time.  Remain focused on low fat diet and high dietary fiber intake.      Type 2 diabetes mellitus without complication, without long-term current use of insulin  -     Hemoglobin A1C; Future; Expected date: 03/10/2021  -     Microalbumin/Creatinine Ratio, Urine; Future; Expected date: 03/10/2021  -     CBC Auto Differential; Future; Expected date: 03/12/2021  -     Comprehensive Metabolic Panel; Future; Expected date: 03/12/2021  -     Lipid Panel; Future; Expected date: 03/10/2021    Hypertension associated with diabetes    Hyperlipidemia associated with type 2 diabetes mellitus    Encounter for screening for malignant  neoplasm of prostate  -     PSA, Screening; Future; Expected date: 03/12/2021    Other orders  -     Influenza - Quadrivalent (Adjuvanted)          Follow up in about 3 months (around 3/10/2021).

## 2020-12-10 NOTE — ASSESSMENT & PLAN NOTE
Diabetes continues to do well at this time.  Most recent a1c is     Lab Results   Component Value Date    HGBA1C 7.1 (H) 12/03/2020    .      We will continue the current regimen.  Focus on a low carbohydrate diet and consistent exercise.

## 2020-12-28 ENCOUNTER — OFFICE VISIT (OUTPATIENT)
Dept: PULMONOLOGY | Facility: CLINIC | Age: 65
End: 2020-12-28
Payer: COMMERCIAL

## 2020-12-28 VITALS
OXYGEN SATURATION: 97 % | WEIGHT: 315 LBS | HEART RATE: 72 BPM | BODY MASS INDEX: 42.66 KG/M2 | SYSTOLIC BLOOD PRESSURE: 158 MMHG | RESPIRATION RATE: 20 BRPM | DIASTOLIC BLOOD PRESSURE: 90 MMHG | HEIGHT: 72 IN

## 2020-12-28 DIAGNOSIS — E66.01 MORBID OBESITY WITH BMI OF 45.0-49.9, ADULT: Chronic | ICD-10-CM

## 2020-12-28 DIAGNOSIS — G47.33 OSA TREATED WITH BIPAP: Primary | Chronic | ICD-10-CM

## 2020-12-28 DIAGNOSIS — E66.2 HYPOVENTILATION ASSOCIATED WITH OBESITY: Chronic | ICD-10-CM

## 2020-12-28 PROCEDURE — 1159F MED LIST DOCD IN RCRD: CPT | Mod: S$GLB,,, | Performed by: INTERNAL MEDICINE

## 2020-12-28 PROCEDURE — 99999 PR PBB SHADOW E&M-EST. PATIENT-LVL IV: CPT | Mod: PBBFAC,,, | Performed by: INTERNAL MEDICINE

## 2020-12-28 PROCEDURE — 3077F PR MOST RECENT SYSTOLIC BLOOD PRESSURE >= 140 MM HG: ICD-10-PCS | Mod: CPTII,S$GLB,, | Performed by: INTERNAL MEDICINE

## 2020-12-28 PROCEDURE — 1101F PR PT FALLS ASSESS DOC 0-1 FALLS W/OUT INJ PAST YR: ICD-10-PCS | Mod: CPTII,S$GLB,, | Performed by: INTERNAL MEDICINE

## 2020-12-28 PROCEDURE — 3080F DIAST BP >= 90 MM HG: CPT | Mod: CPTII,S$GLB,, | Performed by: INTERNAL MEDICINE

## 2020-12-28 PROCEDURE — 3288F PR FALLS RISK ASSESSMENT DOCUMENTED: ICD-10-PCS | Mod: CPTII,S$GLB,, | Performed by: INTERNAL MEDICINE

## 2020-12-28 PROCEDURE — 1101F PT FALLS ASSESS-DOCD LE1/YR: CPT | Mod: CPTII,S$GLB,, | Performed by: INTERNAL MEDICINE

## 2020-12-28 PROCEDURE — 99213 OFFICE O/P EST LOW 20 MIN: CPT | Mod: S$GLB,,, | Performed by: INTERNAL MEDICINE

## 2020-12-28 PROCEDURE — 99999 PR PBB SHADOW E&M-EST. PATIENT-LVL IV: ICD-10-PCS | Mod: PBBFAC,,, | Performed by: INTERNAL MEDICINE

## 2020-12-28 PROCEDURE — 3288F FALL RISK ASSESSMENT DOCD: CPT | Mod: CPTII,S$GLB,, | Performed by: INTERNAL MEDICINE

## 2020-12-28 PROCEDURE — 3080F PR MOST RECENT DIASTOLIC BLOOD PRESSURE >= 90 MM HG: ICD-10-PCS | Mod: CPTII,S$GLB,, | Performed by: INTERNAL MEDICINE

## 2020-12-28 PROCEDURE — 1159F PR MEDICATION LIST DOCUMENTED IN MEDICAL RECORD: ICD-10-PCS | Mod: S$GLB,,, | Performed by: INTERNAL MEDICINE

## 2020-12-28 PROCEDURE — 3008F BODY MASS INDEX DOCD: CPT | Mod: CPTII,S$GLB,, | Performed by: INTERNAL MEDICINE

## 2020-12-28 PROCEDURE — 99213 PR OFFICE/OUTPT VISIT, EST, LEVL III, 20-29 MIN: ICD-10-PCS | Mod: S$GLB,,, | Performed by: INTERNAL MEDICINE

## 2020-12-28 PROCEDURE — 3077F SYST BP >= 140 MM HG: CPT | Mod: CPTII,S$GLB,, | Performed by: INTERNAL MEDICINE

## 2020-12-28 PROCEDURE — 3008F PR BODY MASS INDEX (BMI) DOCUMENTED: ICD-10-PCS | Mod: CPTII,S$GLB,, | Performed by: INTERNAL MEDICINE

## 2020-12-28 NOTE — PATIENT INSTRUCTIONS
Obstructive Sleep Apnea  Obstructive sleep apnea is a condition that causes your air passages to become narrowed or blocked during sleep. As a result, breathing stops for short periods. Your body wakes up enough for breathing to begin again, though you don't remember it. The cycle of stopped breathing and brief awakenings can repeat dozens of times a night. This prevents the body from getting to the deeper stages of sleep that are needed for good rest and may cause your body's oxygen level to fall.  Signs of sleep apnea include loud snoring, noisy breathing, and gasping sounds during sleep. Daytime symptoms include waking up tired after a full night's sleep, waking up with headaches, feeling very sleepy or falling asleep during the day, and having problems with memory or concentration.  Risk factors for sleep apnea include:  · Being overweight  · Being a man, or a woman in menopause  · Smoking  · Using alcohol or sedating medicines  · Having enlarged structures in the nose or throat  Home care  Lifestyle changes that can help treat snoring and sleep apnea include the following:  · If you are overweight, lose weight. Talk to your healthcare provider about a weight-loss plan for you.  · Avoid alcohol for 3 to 4 hours before bedtime. Avoid sedating medications. Ask your healthcare provider about the medicines you take.  · If you smoke, talk to your healthcare provider about ways to quit.  · Sleep on your side. This can help prevent gravity from pulling relaxed throat tissues into your breathing passages.  · If you have allergies or sinus problems that block your nose, ask your healthcare provider for help.  Follow-up care  Follow up with your healthcare provider, or as advised. A diagnosis of sleep apnea is made with a sleep study. Your healthcare provider can tell you more about this test.  When to seek medical advice  Sleep apnea can make you more likely to have certain health problems. These include high blood  pressure, heart attack, stroke, and sexual dysfunction. If you have sleep apnea, talk to your healthcare provider about the best treatments for you.  Date Last Reviewed: 4/1/2017  © 8111-8558 [employer]. [address] [place] [address], [place] [address]. All rights reserved. This information is not intended as a substitute for professional medical care. Always follow your healthcare professional's instructions.

## 2020-12-28 NOTE — PROGRESS NOTES
Subjective:      Patient ID: Santhosh Goff is a 65 y.o. male.    Patient Active Problem List   Diagnosis    Type 2 diabetes mellitus without complication, without long-term current use of insulin    Hyperlipidemia associated with type 2 diabetes mellitus    Hypertension associated with diabetes    Hypoventilation associated with obesity    ALIREZA treated with AVAPS    Morbid obesity with BMI of 45.0-49.9, adult    Encounter for screening colonoscopy    Colon polyp     Problem list has been reviewed.    Chief Complaint: Sleep Apnea    HPI    He is a here for follow up for ALIREZA.     He is on TRIOLOGY 100 Ventilator. AVAPS /5/22:14/6/8/8/ AUTO. He uses his TRIOLOGY religiously with the exception of when his has sinus problems for an average of 5 hours per night.     He is complaint with his AVAPS.  Complaince download reveals  73.7 % days with greater than 4 hours of device use.     Patient denies snoring, headaches, excessive daytime sleepiness.      He definitely thinks PAP is beneficial to his health and he wants to continue with PAP therapy.     [place] Sleepiness Scale   EPWORTH SLEEPINESS SCALE 12/28/2020 12/16/2019 12/17/2018 12/22/2017 12/23/2016 6/23/2016 12/23/2015   Sitting and reading 0 0 0 0 1 0 1   Watching TV 0 0 1 1 1 1 1   Sitting, inactive in a public place (e.g. a theatre or a meeting) 0 1 0 0 0 0 1   As a passenger in a car for an hour without a break 0 0 0 0 0 0 0   Lying down to rest in the afternoon when circumstances permit 0 1 0 1 0 1 0   Sitting and talking to someone 0 0 0 0 0 0 0   Sitting quietly after a lunch without alcohol 0 0 0 0 1 0 0   In a car, while stopped for a few minutes in traffic 0 0 0 0 0 0 0   Total score 0 2 1 2 3 2 3       Immunization status reviewed and up to date.    He is trying to loose weight.       A full  review of systems, past , family  and social histories was performed except as mentioned in the note above, these are non contributory to the main  issues discussed today.     Previous Report Reviewed: office notes     The following portions of the patient's history were reviewed and updated as appropriate: He  has a past medical history of Arthritis, Diabetes mellitus, type 2 (2014), Hyperlipidemia, Hypertension, Morbid obesity with BMI of 45.0-49.9, adult, and Staphylococcus aureus infection, multiple-resistant (MRSA) (2009).  He  has a past surgical history that includes Lung surgery; RIB FX; and Colonoscopy (N/A, 2/24/2020).  His family history includes Early death in his father.  He  reports that he has never smoked. He has never used smokeless tobacco. He reports that he does not drink alcohol or use drugs.  He has a current medication list which includes the following prescription(s): aspirin, atorvastatin, farxiga, furosemide, glimepiride, losartan, metformin, multivit-min/ferrous fumarate, potassium chloride sa, and ventolin hfa.  He is allergic to cephalosporins..    Review of Systems   Constitutional: Positive for fatigue. Negative for fever and chills.   HENT: Negative for nosebleeds and congestion.    Eyes: Negative for itching.   Respiratory: Positive for dyspnea on extertion. Negative for cough and shortness of breath.    Cardiovascular: Negative for chest pain and leg swelling.   Genitourinary: Negative for difficulty urinating and hematuria.   Skin: Negative for rash.   Gastrointestinal: Negative for nausea and vomiting.   Hematological: Does not bruise/bleed easily.   All other systems reviewed and are negative.     Objective:     BP (!) 158/90   Pulse 72   Resp 20   Ht 6' (1.829 m)   Wt (!) 158.1 kg (348 lb 8.8 oz)   SpO2 97%   BMI 47.27 kg/m²   Body mass index is 47.27 kg/m².    Physical Exam  Vitals signs and nursing note reviewed.   Constitutional:       Appearance: He is well-developed.      Comments: Morbidly obese   HENT:      Head: Normocephalic and atraumatic.   Eyes:      Conjunctiva/sclera: Conjunctivae normal.      Pupils:  Pupils are equal, round, and reactive to light.   Neck:      Musculoskeletal: Normal range of motion and neck supple.   Cardiovascular:      Rate and Rhythm: Normal rate.   Pulmonary:      Effort: Pulmonary effort is normal.      Breath sounds: Normal breath sounds.   Abdominal:      General: Bowel sounds are normal.      Palpations: Abdomen is soft.   Musculoskeletal: Normal range of motion.   Skin:     General: Skin is warm and dry.   Neurological:      Mental Status: He is alert and oriented to person, place, and time.         Personal Diagnostic Review  none pertinent    Assessment / plan:       Discussed diagnosis, its evaluation, treatment and usual course. All questions answered.     Problem List Items Addressed This Visit        Endocrine    Morbid obesity with BMI of 45.0-49.9, adult    Current Assessment & Plan     General weight loss/lifestyle modification strategies discussed (elicit support from others; identify saboteurs; non-food rewards, etc).  Diet interventions: low calorie (1000 kCal/d) deficit diet.            Other    Hypoventilation associated with obesity (Chronic)    Current Assessment & Plan     General weight loss/lifestyle modification strategies discussed (elicit support from others; identify saboteurs; non-food rewards, etc).  Diet interventions: low calorie (1000 kCal/d) deficit diet.         ALIREZA treated with AVAPS - Primary    Current Assessment & Plan     Continue AVAPS AE: AVAPS /5/22:14/6/8:8. (DME -  [facility])     Discussed therapeutic goals for positive airway pressure therapy(CPAP or BiPAP): Ideal is usage 100% of nights for 6 - 8 hours per night. Minimum usage is 70% of night for at least 4 hours per night used. Pateint expressed understanding. All Questions answered.    SUPPLIES RENEWED.          Relevant Orders    CPAP/BIPAP SUPPLIES        TIME SPENT WITH PATIENT: Time spent: 25 minutes in face to face  discussion concerning diagnosis, prognosis, review of lab and test  results, benefits of treatment as well as management of disease, counseling of patient and coordination of care between various health  care providers . Greater than half the time spent was used for coordination of care and counseling of patient.          Follow up in about 1 year (around 12/28/2021) for ALIREZA, Morbid Obesity.

## 2021-03-09 ENCOUNTER — PATIENT MESSAGE (OUTPATIENT)
Dept: INTERNAL MEDICINE | Facility: CLINIC | Age: 66
End: 2021-03-09

## 2021-04-05 ENCOUNTER — LAB VISIT (OUTPATIENT)
Dept: LAB | Facility: HOSPITAL | Age: 66
End: 2021-04-05
Attending: INTERNAL MEDICINE
Payer: COMMERCIAL

## 2021-04-05 DIAGNOSIS — E11.9 TYPE 2 DIABETES MELLITUS WITHOUT COMPLICATION, WITHOUT LONG-TERM CURRENT USE OF INSULIN: ICD-10-CM

## 2021-04-05 DIAGNOSIS — Z12.5 ENCOUNTER FOR SCREENING FOR MALIGNANT NEOPLASM OF PROSTATE: ICD-10-CM

## 2021-04-05 LAB
ALBUMIN SERPL BCP-MCNC: 3.9 G/DL (ref 3.5–5.2)
ALP SERPL-CCNC: 77 U/L (ref 55–135)
ALT SERPL W/O P-5'-P-CCNC: 65 U/L (ref 10–44)
ANION GAP SERPL CALC-SCNC: 12 MMOL/L (ref 8–16)
AST SERPL-CCNC: 33 U/L (ref 10–40)
BASOPHILS # BLD AUTO: 0.04 K/UL (ref 0–0.2)
BASOPHILS NFR BLD: 0.5 % (ref 0–1.9)
BILIRUB SERPL-MCNC: 0.7 MG/DL (ref 0.1–1)
BUN SERPL-MCNC: 19 MG/DL (ref 8–23)
CALCIUM SERPL-MCNC: 9.5 MG/DL (ref 8.7–10.5)
CHLORIDE SERPL-SCNC: 99 MMOL/L (ref 95–110)
CHOLEST SERPL-MCNC: 123 MG/DL (ref 120–199)
CHOLEST/HDLC SERPL: 3 {RATIO} (ref 2–5)
CO2 SERPL-SCNC: 26 MMOL/L (ref 23–29)
COMPLEXED PSA SERPL-MCNC: 0.22 NG/ML (ref 0–4)
CREAT SERPL-MCNC: 1.1 MG/DL (ref 0.5–1.4)
DIFFERENTIAL METHOD: NORMAL
EOSINOPHIL # BLD AUTO: 0.2 K/UL (ref 0–0.5)
EOSINOPHIL NFR BLD: 2 % (ref 0–8)
ERYTHROCYTE [DISTWIDTH] IN BLOOD BY AUTOMATED COUNT: 13 % (ref 11.5–14.5)
EST. GFR  (AFRICAN AMERICAN): >60 ML/MIN/1.73 M^2
EST. GFR  (NON AFRICAN AMERICAN): >60 ML/MIN/1.73 M^2
ESTIMATED AVG GLUCOSE: 177 MG/DL (ref 68–131)
GLUCOSE SERPL-MCNC: 141 MG/DL (ref 70–110)
HBA1C MFR BLD: 7.8 % (ref 4–5.6)
HCT VFR BLD AUTO: 47.1 % (ref 40–54)
HDLC SERPL-MCNC: 41 MG/DL (ref 40–75)
HDLC SERPL: 33.3 % (ref 20–50)
HGB BLD-MCNC: 15.3 G/DL (ref 14–18)
IMM GRANULOCYTES # BLD AUTO: 0.02 K/UL (ref 0–0.04)
IMM GRANULOCYTES NFR BLD AUTO: 0.3 % (ref 0–0.5)
LDLC SERPL CALC-MCNC: 58.4 MG/DL (ref 63–159)
LYMPHOCYTES # BLD AUTO: 2.4 K/UL (ref 1–4.8)
LYMPHOCYTES NFR BLD: 30.6 % (ref 18–48)
MCH RBC QN AUTO: 30.1 PG (ref 27–31)
MCHC RBC AUTO-ENTMCNC: 32.5 G/DL (ref 32–36)
MCV RBC AUTO: 93 FL (ref 82–98)
MONOCYTES # BLD AUTO: 0.7 K/UL (ref 0.3–1)
MONOCYTES NFR BLD: 8.8 % (ref 4–15)
NEUTROPHILS # BLD AUTO: 4.6 K/UL (ref 1.8–7.7)
NEUTROPHILS NFR BLD: 57.8 % (ref 38–73)
NONHDLC SERPL-MCNC: 82 MG/DL
NRBC BLD-RTO: 0 /100 WBC
PLATELET # BLD AUTO: 276 K/UL (ref 150–450)
PMV BLD AUTO: 9.8 FL (ref 9.2–12.9)
POTASSIUM SERPL-SCNC: 4 MMOL/L (ref 3.5–5.1)
PROT SERPL-MCNC: 7.5 G/DL (ref 6–8.4)
RBC # BLD AUTO: 5.08 M/UL (ref 4.6–6.2)
SODIUM SERPL-SCNC: 137 MMOL/L (ref 136–145)
TRIGL SERPL-MCNC: 118 MG/DL (ref 30–150)
WBC # BLD AUTO: 7.93 K/UL (ref 3.9–12.7)

## 2021-04-05 PROCEDURE — 80061 LIPID PANEL: CPT | Performed by: INTERNAL MEDICINE

## 2021-04-05 PROCEDURE — 84153 ASSAY OF PSA TOTAL: CPT | Performed by: INTERNAL MEDICINE

## 2021-04-05 PROCEDURE — 85025 COMPLETE CBC W/AUTO DIFF WBC: CPT | Performed by: INTERNAL MEDICINE

## 2021-04-05 PROCEDURE — 36415 COLL VENOUS BLD VENIPUNCTURE: CPT | Mod: PO | Performed by: INTERNAL MEDICINE

## 2021-04-05 PROCEDURE — 80053 COMPREHEN METABOLIC PANEL: CPT | Performed by: INTERNAL MEDICINE

## 2021-04-05 PROCEDURE — 83036 HEMOGLOBIN GLYCOSYLATED A1C: CPT | Performed by: INTERNAL MEDICINE

## 2021-04-12 ENCOUNTER — OFFICE VISIT (OUTPATIENT)
Dept: INTERNAL MEDICINE | Facility: CLINIC | Age: 66
End: 2021-04-12
Payer: COMMERCIAL

## 2021-04-12 VITALS
HEART RATE: 92 BPM | TEMPERATURE: 98 F | DIASTOLIC BLOOD PRESSURE: 84 MMHG | HEIGHT: 72 IN | WEIGHT: 315 LBS | BODY MASS INDEX: 42.66 KG/M2 | SYSTOLIC BLOOD PRESSURE: 138 MMHG

## 2021-04-12 DIAGNOSIS — E66.01 MORBID OBESITY WITH BMI OF 45.0-49.9, ADULT: Primary | ICD-10-CM

## 2021-04-12 DIAGNOSIS — E11.59 HYPERTENSION ASSOCIATED WITH DIABETES: ICD-10-CM

## 2021-04-12 DIAGNOSIS — I15.2 HYPERTENSION ASSOCIATED WITH DIABETES: ICD-10-CM

## 2021-04-12 DIAGNOSIS — E11.9 TYPE 2 DIABETES MELLITUS WITHOUT COMPLICATION, WITHOUT LONG-TERM CURRENT USE OF INSULIN: ICD-10-CM

## 2021-04-12 PROCEDURE — 99213 PR OFFICE/OUTPT VISIT, EST, LEVL III, 20-29 MIN: ICD-10-PCS | Mod: S$GLB,,, | Performed by: PHYSICIAN ASSISTANT

## 2021-04-12 PROCEDURE — 3072F PR LOW RISK FOR RETINOPATHY: ICD-10-PCS | Mod: S$GLB,,, | Performed by: PHYSICIAN ASSISTANT

## 2021-04-12 PROCEDURE — 3051F PR MOST RECENT HEMOGLOBIN A1C LEVEL 7.0 - < 8.0%: ICD-10-PCS | Mod: CPTII,S$GLB,, | Performed by: PHYSICIAN ASSISTANT

## 2021-04-12 PROCEDURE — 1159F PR MEDICATION LIST DOCUMENTED IN MEDICAL RECORD: ICD-10-PCS | Mod: S$GLB,,, | Performed by: PHYSICIAN ASSISTANT

## 2021-04-12 PROCEDURE — 1159F MED LIST DOCD IN RCRD: CPT | Mod: S$GLB,,, | Performed by: PHYSICIAN ASSISTANT

## 2021-04-12 PROCEDURE — 99999 PR PBB SHADOW E&M-EST. PATIENT-LVL IV: CPT | Mod: PBBFAC,,, | Performed by: PHYSICIAN ASSISTANT

## 2021-04-12 PROCEDURE — 3051F HG A1C>EQUAL 7.0%<8.0%: CPT | Mod: CPTII,S$GLB,, | Performed by: PHYSICIAN ASSISTANT

## 2021-04-12 PROCEDURE — 1126F PR PAIN SEVERITY QUANTIFIED, NO PAIN PRESENT: ICD-10-PCS | Mod: S$GLB,,, | Performed by: PHYSICIAN ASSISTANT

## 2021-04-12 PROCEDURE — 3288F PR FALLS RISK ASSESSMENT DOCUMENTED: ICD-10-PCS | Mod: CPTII,S$GLB,, | Performed by: PHYSICIAN ASSISTANT

## 2021-04-12 PROCEDURE — 99213 OFFICE O/P EST LOW 20 MIN: CPT | Mod: S$GLB,,, | Performed by: PHYSICIAN ASSISTANT

## 2021-04-12 PROCEDURE — 1126F AMNT PAIN NOTED NONE PRSNT: CPT | Mod: S$GLB,,, | Performed by: PHYSICIAN ASSISTANT

## 2021-04-12 PROCEDURE — 3288F FALL RISK ASSESSMENT DOCD: CPT | Mod: CPTII,S$GLB,, | Performed by: PHYSICIAN ASSISTANT

## 2021-04-12 PROCEDURE — 3008F BODY MASS INDEX DOCD: CPT | Mod: CPTII,S$GLB,, | Performed by: PHYSICIAN ASSISTANT

## 2021-04-12 PROCEDURE — 1101F PT FALLS ASSESS-DOCD LE1/YR: CPT | Mod: CPTII,S$GLB,, | Performed by: PHYSICIAN ASSISTANT

## 2021-04-12 PROCEDURE — 1101F PR PT FALLS ASSESS DOC 0-1 FALLS W/OUT INJ PAST YR: ICD-10-PCS | Mod: CPTII,S$GLB,, | Performed by: PHYSICIAN ASSISTANT

## 2021-04-12 PROCEDURE — 99999 PR PBB SHADOW E&M-EST. PATIENT-LVL IV: ICD-10-PCS | Mod: PBBFAC,,, | Performed by: PHYSICIAN ASSISTANT

## 2021-04-12 PROCEDURE — 3008F PR BODY MASS INDEX (BMI) DOCUMENTED: ICD-10-PCS | Mod: CPTII,S$GLB,, | Performed by: PHYSICIAN ASSISTANT

## 2021-04-12 PROCEDURE — 3072F LOW RISK FOR RETINOPATHY: CPT | Mod: S$GLB,,, | Performed by: PHYSICIAN ASSISTANT

## 2021-05-06 ENCOUNTER — PATIENT MESSAGE (OUTPATIENT)
Dept: RESEARCH | Facility: HOSPITAL | Age: 66
End: 2021-05-06

## 2021-06-09 NOTE — LETTER
January 22, 2018      [facility]  [address] [place] [address]  Phone: [phone]  Fax: [phone]       Patient: Santhosh Goff   YOB: 1955  Date of Visit: 01/22/2018    To Whom It May Concern:    Jason Goff  was at [facility] on 01/22/2018. He may return to work/school on 0124/2018 with restrictions. If you have any questions or concerns, or if I can be of further assistance, please do not hesitate to contact me.    Sincerely,    Torrie Chávez, NP      NEW PATIENT    The patient is 62 year old male here today for evaluation of his left knee. He reports a 3-4 month history of pain over the medial aspect of his right knee. He does feel some clicking and popping with ambulation, which leads to increased pain. At times, he feels as though his knee will not support him. He ambulates without any assistive devices, and has not had any prior treatment for his right knee.    PAST MEDICAL HISTORY:    Sciatica                                                        Comment: resolved    Low back pain                                                 Colon polyps                                                  Syncope                                         2015          PAST SURGICAL HISTORY:    COLONOSCOPY W BIOPSY                            8/26/2011       Comment: 2 polyps, repeat 5 years    CURRENT MEDICATIONS:  Current Outpatient Medications   Medication   • ibuprofen (MOTRIN) 200 MG tablet   • naproxen sodium (ALEVE) 220 MG tablet   • Ascorbic Acid (VITAMIN C) 500 MG tablet   • cholecalciferol (VITAMIN D3) 1000 UNITS tablet   • Multiple Vitamins-Minerals (MULTI FOR HIM) TABS   • ibuprofen (MOTRIN) 800 MG tablet     No current facility-administered medications for this visit.       ALLERGIES:  ALLERGIES:  No Known Allergies     SOCIAL HISTORY:  Social History     Tobacco Use   • Smoking status: Never Smoker   • Smokeless tobacco: Current User     Types: Chew   Substance Use Topics   • Alcohol use: Yes     Comment: occasionally   • Drug use: No     The patient's employment status is: Employed    REVIEW OF SYSTEMS:  I have reviewed the review of systems by the medical assistant and concur with those findings.    PHYSICAL EXAMINATION:  Vital Signs:   Visit Vitals  Ht 6' 2.8\" (1.9 m)   Wt 109.3 kg   BMI 30.28 kg/m²     General: The patient is alert and oriented times 3. He is in no acute distress. He is well groomed and cooperative with the exam.    On examination of the left  knee, he has range of motion from -7-110 degrees. 1+ medial joint line tenderness. No lateral joint line tenderness. He has a varus deformity, which is passively correctable toward neutral. Sensory and motor exams are intact. Distal pulses are intact.     IMAGING:  X-rays taken in the clinic today of the left knee were reviewed. These demonstrate degenerative arthritic changes, most severe in the medial compartment, bone-on-bone.    Impression:  Advanced medial compartment osteoarthritis of the left knee.    PLAN:  Discussed treatment alternatives. I recommend a cortisone injection, followed by a course of formal physical therapy for his left knee arthritis.    After a sterile chlorhexidine skin prep, 2 cc of 2% lidocaine and 40 mg of triamcinolone were placed in the left knee joint. The patient tolerated the procedure well. I will see the patient back for followup in 6 weeks.    On 6/9/2021, IChacho scribed the services personally performed by ePdro Almendarez MD    The documentation recorded by the scribe accurately and completely reflects the service(s) I personally performed and the decisions made by me.    Pedro Almendarez MD

## 2021-06-21 ENCOUNTER — PATIENT MESSAGE (OUTPATIENT)
Dept: INTERNAL MEDICINE | Facility: CLINIC | Age: 66
End: 2021-06-21

## 2021-08-19 ENCOUNTER — HOSPITAL ENCOUNTER (OUTPATIENT)
Dept: RADIOLOGY | Facility: CLINIC | Age: 66
Discharge: HOME | End: 2021-08-19
Attending: NURSE PRACTITIONER
Payer: COMMERCIAL

## 2021-08-19 ENCOUNTER — OFFICE VISIT (OUTPATIENT)
Dept: URGENT CARE | Facility: CLINIC | Age: 66
End: 2021-08-19
Payer: COMMERCIAL

## 2021-08-19 VITALS
DIASTOLIC BLOOD PRESSURE: 53 MMHG | SYSTOLIC BLOOD PRESSURE: 106 MMHG | OXYGEN SATURATION: 94 % | BODY MASS INDEX: 42.66 KG/M2 | TEMPERATURE: 103 F | HEIGHT: 72 IN | WEIGHT: 315 LBS | RESPIRATION RATE: 16 BRPM | HEART RATE: 86 BPM

## 2021-08-19 DIAGNOSIS — R50.9 FEVER, UNSPECIFIED FEVER CAUSE: ICD-10-CM

## 2021-08-19 DIAGNOSIS — J12.82 PNEUMONIA DUE TO COVID-19 VIRUS: Primary | ICD-10-CM

## 2021-08-19 DIAGNOSIS — U07.1 COVID-19 VIRUS DETECTED: ICD-10-CM

## 2021-08-19 DIAGNOSIS — R05.9 COUGH: ICD-10-CM

## 2021-08-19 DIAGNOSIS — U07.1 PNEUMONIA DUE TO COVID-19 VIRUS: Primary | ICD-10-CM

## 2021-08-19 LAB
CTP QC/QA: YES
SARS-COV-2 RDRP RESP QL NAA+PROBE: POSITIVE

## 2021-08-19 PROCEDURE — 99214 PR OFFICE/OUTPT VISIT, EST, LEVL IV, 30-39 MIN: ICD-10-PCS | Mod: S$GLB,,, | Performed by: NURSE PRACTITIONER

## 2021-08-19 PROCEDURE — 3072F LOW RISK FOR RETINOPATHY: CPT | Mod: CPTII,S$GLB,, | Performed by: NURSE PRACTITIONER

## 2021-08-19 PROCEDURE — 3078F PR MOST RECENT DIASTOLIC BLOOD PRESSURE < 80 MM HG: ICD-10-PCS | Mod: CPTII,S$GLB,, | Performed by: NURSE PRACTITIONER

## 2021-08-19 PROCEDURE — 3008F PR BODY MASS INDEX (BMI) DOCUMENTED: ICD-10-PCS | Mod: CPTII,S$GLB,, | Performed by: NURSE PRACTITIONER

## 2021-08-19 PROCEDURE — U0002 COVID-19 LAB TEST NON-CDC: HCPCS | Mod: QW,S$GLB,, | Performed by: NURSE PRACTITIONER

## 2021-08-19 PROCEDURE — 71046 XR CHEST PA AND LATERAL: ICD-10-PCS | Mod: S$GLB,,, | Performed by: RADIOLOGY

## 2021-08-19 PROCEDURE — 3078F DIAST BP <80 MM HG: CPT | Mod: CPTII,S$GLB,, | Performed by: NURSE PRACTITIONER

## 2021-08-19 PROCEDURE — 3074F PR MOST RECENT SYSTOLIC BLOOD PRESSURE < 130 MM HG: ICD-10-PCS | Mod: CPTII,S$GLB,, | Performed by: NURSE PRACTITIONER

## 2021-08-19 PROCEDURE — 99214 OFFICE O/P EST MOD 30 MIN: CPT | Mod: S$GLB,,, | Performed by: NURSE PRACTITIONER

## 2021-08-19 PROCEDURE — 3008F BODY MASS INDEX DOCD: CPT | Mod: CPTII,S$GLB,, | Performed by: NURSE PRACTITIONER

## 2021-08-19 PROCEDURE — 3072F PR LOW RISK FOR RETINOPATHY: ICD-10-PCS | Mod: CPTII,S$GLB,, | Performed by: NURSE PRACTITIONER

## 2021-08-19 PROCEDURE — 3051F HG A1C>EQUAL 7.0%<8.0%: CPT | Mod: CPTII,S$GLB,, | Performed by: NURSE PRACTITIONER

## 2021-08-19 PROCEDURE — 71046 X-RAY EXAM CHEST 2 VIEWS: CPT | Mod: S$GLB,,, | Performed by: RADIOLOGY

## 2021-08-19 PROCEDURE — 3051F PR MOST RECENT HEMOGLOBIN A1C LEVEL 7.0 - < 8.0%: ICD-10-PCS | Mod: CPTII,S$GLB,, | Performed by: NURSE PRACTITIONER

## 2021-08-19 PROCEDURE — U0002: ICD-10-PCS | Mod: QW,S$GLB,, | Performed by: NURSE PRACTITIONER

## 2021-08-19 PROCEDURE — 3074F SYST BP LT 130 MM HG: CPT | Mod: CPTII,S$GLB,, | Performed by: NURSE PRACTITIONER

## 2021-08-19 RX ORDER — ALBUTEROL SULFATE 90 UG/1
2 AEROSOL, METERED RESPIRATORY (INHALATION) EVERY 6 HOURS PRN
Qty: 18 G | Refills: 0 | Status: SHIPPED | OUTPATIENT
Start: 2021-08-19 | End: 2022-07-08

## 2021-08-19 RX ORDER — ACETAMINOPHEN 500 MG
1000 TABLET ORAL
Status: DISCONTINUED | OUTPATIENT
Start: 2021-08-19 | End: 2023-03-13

## 2021-08-19 RX ORDER — AZITHROMYCIN 250 MG/1
TABLET, FILM COATED ORAL
Qty: 6 TABLET | Refills: 0 | Status: SHIPPED | OUTPATIENT
Start: 2021-08-19 | End: 2022-07-08

## 2021-08-19 RX ORDER — BENZONATATE 200 MG/1
200 CAPSULE ORAL 3 TIMES DAILY PRN
Qty: 30 CAPSULE | Refills: 0 | Status: SHIPPED | OUTPATIENT
Start: 2021-08-19 | End: 2021-08-29

## 2021-08-19 RX ORDER — PROMETHAZINE HYDROCHLORIDE AND DEXTROMETHORPHAN HYDROBROMIDE 6.25; 15 MG/5ML; MG/5ML
5 SYRUP ORAL
Qty: 118 ML | Refills: 0 | Status: SHIPPED | OUTPATIENT
Start: 2021-08-19 | End: 2021-08-29

## 2021-08-19 RX ORDER — AMOXICILLIN AND CLAVULANATE POTASSIUM 875; 125 MG/1; MG/1
1 TABLET, FILM COATED ORAL 2 TIMES DAILY
Qty: 14 TABLET | Refills: 0 | Status: SHIPPED | OUTPATIENT
Start: 2021-08-19 | End: 2021-08-26

## 2021-08-20 ENCOUNTER — INFUSION (OUTPATIENT)
Dept: INFECTIOUS DISEASES | Facility: HOSPITAL | Age: 66
End: 2021-08-20
Attending: NURSE PRACTITIONER
Payer: COMMERCIAL

## 2021-08-20 VITALS
RESPIRATION RATE: 18 BRPM | SYSTOLIC BLOOD PRESSURE: 119 MMHG | HEART RATE: 66 BPM | DIASTOLIC BLOOD PRESSURE: 64 MMHG | TEMPERATURE: 98 F | OXYGEN SATURATION: 94 %

## 2021-08-20 DIAGNOSIS — U07.1 COVID-19: Primary | ICD-10-CM

## 2021-08-20 PROCEDURE — 25000003 PHARM REV CODE 250: Performed by: INTERNAL MEDICINE

## 2021-08-20 PROCEDURE — M0243 CASIRIVI AND IMDEVI INFUSION: HCPCS | Performed by: INTERNAL MEDICINE

## 2021-08-20 PROCEDURE — 63600175 PHARM REV CODE 636 W HCPCS: Performed by: INTERNAL MEDICINE

## 2021-08-20 RX ORDER — ALBUTEROL SULFATE 90 UG/1
2 AEROSOL, METERED RESPIRATORY (INHALATION)
Status: DISCONTINUED | OUTPATIENT
Start: 2021-08-20 | End: 2023-03-13

## 2021-08-20 RX ORDER — DIPHENHYDRAMINE HYDROCHLORIDE 50 MG/ML
25 INJECTION INTRAMUSCULAR; INTRAVENOUS ONCE AS NEEDED
Status: DISCONTINUED | OUTPATIENT
Start: 2021-08-20 | End: 2023-03-13

## 2021-08-20 RX ORDER — SODIUM CHLORIDE 0.9 % (FLUSH) 0.9 %
10 SYRINGE (ML) INJECTION
Status: DISCONTINUED | OUTPATIENT
Start: 2021-08-20 | End: 2023-03-13

## 2021-08-20 RX ORDER — EPINEPHRINE 0.3 MG/.3ML
0.3 INJECTION SUBCUTANEOUS
Status: DISCONTINUED | OUTPATIENT
Start: 2021-08-20 | End: 2023-03-13

## 2021-08-20 RX ORDER — ONDANSETRON 4 MG/1
4 TABLET, ORALLY DISINTEGRATING ORAL ONCE AS NEEDED
Status: DISCONTINUED | OUTPATIENT
Start: 2021-08-20 | End: 2023-03-13

## 2021-08-20 RX ORDER — ACETAMINOPHEN 325 MG/1
650 TABLET ORAL ONCE AS NEEDED
Status: DISCONTINUED | OUTPATIENT
Start: 2021-08-20 | End: 2023-03-13

## 2021-08-20 RX ADMIN — SODIUM CHLORIDE: 0.9 INJECTION, SOLUTION INTRAVENOUS at 01:08

## 2021-08-20 RX ADMIN — CASIRIVIMAB AND IMDEVIMAB 600 MG: 600; 600 INJECTION, SOLUTION, CONCENTRATE INTRAVENOUS at 01:08

## 2021-08-25 ENCOUNTER — PATIENT MESSAGE (OUTPATIENT)
Dept: INTERNAL MEDICINE | Facility: CLINIC | Age: 66
End: 2021-08-25

## 2021-08-28 ENCOUNTER — NURSE TRIAGE (OUTPATIENT)
Dept: ADMINISTRATIVE | Facility: CLINIC | Age: 66
End: 2021-08-28

## 2021-09-07 ENCOUNTER — PATIENT OUTREACH (OUTPATIENT)
Dept: ADMINISTRATIVE | Facility: HOSPITAL | Age: 66
End: 2021-09-07

## 2021-10-21 ENCOUNTER — OFFICE VISIT (OUTPATIENT)
Dept: OPHTHALMOLOGY | Facility: CLINIC | Age: 66
End: 2021-10-21
Payer: COMMERCIAL

## 2021-10-21 DIAGNOSIS — E11.36 DIABETIC CATARACT: ICD-10-CM

## 2021-10-21 DIAGNOSIS — E11.9 TYPE 2 DIABETES MELLITUS WITHOUT COMPLICATION, WITHOUT LONG-TERM CURRENT USE OF INSULIN: Primary | ICD-10-CM

## 2021-10-21 DIAGNOSIS — H52.03 HYPEROPIA, BILATERAL: ICD-10-CM

## 2021-10-21 DIAGNOSIS — H52.4 BILATERAL PRESBYOPIA: ICD-10-CM

## 2021-10-21 PROCEDURE — 4010F PR ACE/ARB THEARPY RXD/TAKEN: ICD-10-PCS | Mod: CPTII,S$GLB,, | Performed by: OPTOMETRIST

## 2021-10-21 PROCEDURE — 3051F HG A1C>EQUAL 7.0%<8.0%: CPT | Mod: CPTII,S$GLB,, | Performed by: OPTOMETRIST

## 2021-10-21 PROCEDURE — 3066F PR DOCUMENTATION OF TREATMENT FOR NEPHROPATHY: ICD-10-PCS | Mod: CPTII,S$GLB,, | Performed by: OPTOMETRIST

## 2021-10-21 PROCEDURE — 3051F PR MOST RECENT HEMOGLOBIN A1C LEVEL 7.0 - < 8.0%: ICD-10-PCS | Mod: CPTII,S$GLB,, | Performed by: OPTOMETRIST

## 2021-10-21 PROCEDURE — 99999 PR PBB SHADOW E&M-EST. PATIENT-LVL I: CPT | Mod: PBBFAC,,, | Performed by: OPTOMETRIST

## 2021-10-21 PROCEDURE — 1159F PR MEDICATION LIST DOCUMENTED IN MEDICAL RECORD: ICD-10-PCS | Mod: CPTII,S$GLB,, | Performed by: OPTOMETRIST

## 2021-10-21 PROCEDURE — 92014 COMPRE OPH EXAM EST PT 1/>: CPT | Mod: S$GLB,,, | Performed by: OPTOMETRIST

## 2021-10-21 PROCEDURE — 3061F PR NEG MICROALBUMINURIA RESULT DOCUMENTED/REVIEW: ICD-10-PCS | Mod: CPTII,S$GLB,, | Performed by: OPTOMETRIST

## 2021-10-21 PROCEDURE — 92014 PR EYE EXAM, EST PATIENT,COMPREHESV: ICD-10-PCS | Mod: S$GLB,,, | Performed by: OPTOMETRIST

## 2021-10-21 PROCEDURE — 92015 DETERMINE REFRACTIVE STATE: CPT | Mod: S$GLB,,, | Performed by: OPTOMETRIST

## 2021-10-21 PROCEDURE — 92015 PR REFRACTION: ICD-10-PCS | Mod: S$GLB,,, | Performed by: OPTOMETRIST

## 2021-10-21 PROCEDURE — 3066F NEPHROPATHY DOC TX: CPT | Mod: CPTII,S$GLB,, | Performed by: OPTOMETRIST

## 2021-10-21 PROCEDURE — 2023F DILAT RTA XM W/O RTNOPTHY: CPT | Mod: CPTII,S$GLB,, | Performed by: OPTOMETRIST

## 2021-10-21 PROCEDURE — 2023F PR DILATED RETINAL EXAM W/O EVID OF RETINOPATHY: ICD-10-PCS | Mod: CPTII,S$GLB,, | Performed by: OPTOMETRIST

## 2021-10-21 PROCEDURE — 4010F ACE/ARB THERAPY RXD/TAKEN: CPT | Mod: CPTII,S$GLB,, | Performed by: OPTOMETRIST

## 2021-10-21 PROCEDURE — 99999 PR PBB SHADOW E&M-EST. PATIENT-LVL I: ICD-10-PCS | Mod: PBBFAC,,, | Performed by: OPTOMETRIST

## 2021-10-21 PROCEDURE — 3061F NEG MICROALBUMINURIA REV: CPT | Mod: CPTII,S$GLB,, | Performed by: OPTOMETRIST

## 2021-10-21 PROCEDURE — 1159F MED LIST DOCD IN RCRD: CPT | Mod: CPTII,S$GLB,, | Performed by: OPTOMETRIST

## 2021-10-25 ENCOUNTER — TELEPHONE (OUTPATIENT)
Dept: PULMONOLOGY | Facility: CLINIC | Age: 66
End: 2021-10-25
Payer: COMMERCIAL

## 2021-10-27 DIAGNOSIS — E11.9 TYPE 2 DIABETES MELLITUS WITHOUT COMPLICATION: ICD-10-CM

## 2022-01-17 ENCOUNTER — OFFICE VISIT (OUTPATIENT)
Dept: URGENT CARE | Facility: CLINIC | Age: 67
End: 2022-01-17
Payer: COMMERCIAL

## 2022-01-17 VITALS
HEART RATE: 71 BPM | HEIGHT: 72 IN | TEMPERATURE: 98 F | BODY MASS INDEX: 42.66 KG/M2 | SYSTOLIC BLOOD PRESSURE: 140 MMHG | DIASTOLIC BLOOD PRESSURE: 70 MMHG | WEIGHT: 315 LBS | RESPIRATION RATE: 18 BRPM | OXYGEN SATURATION: 97 %

## 2022-01-17 DIAGNOSIS — L02.01 ABSCESS OF FACE: Primary | ICD-10-CM

## 2022-01-17 PROCEDURE — 10060 INCISION & DRAINAGE: ICD-10-PCS | Mod: S$GLB,,, | Performed by: PHYSICIAN ASSISTANT

## 2022-01-17 PROCEDURE — 10060 I&D ABSCESS SIMPLE/SINGLE: CPT | Mod: S$GLB,,, | Performed by: PHYSICIAN ASSISTANT

## 2022-01-17 PROCEDURE — 87075 CULTR BACTERIA EXCEPT BLOOD: CPT | Performed by: PHYSICIAN ASSISTANT

## 2022-01-17 PROCEDURE — 3008F PR BODY MASS INDEX (BMI) DOCUMENTED: ICD-10-PCS | Mod: CPTII,S$GLB,, | Performed by: PHYSICIAN ASSISTANT

## 2022-01-17 PROCEDURE — 3078F DIAST BP <80 MM HG: CPT | Mod: CPTII,S$GLB,, | Performed by: PHYSICIAN ASSISTANT

## 2022-01-17 PROCEDURE — 3077F SYST BP >= 140 MM HG: CPT | Mod: CPTII,S$GLB,, | Performed by: PHYSICIAN ASSISTANT

## 2022-01-17 PROCEDURE — 3072F LOW RISK FOR RETINOPATHY: CPT | Mod: CPTII,S$GLB,, | Performed by: PHYSICIAN ASSISTANT

## 2022-01-17 PROCEDURE — 87070 CULTURE OTHR SPECIMN AEROBIC: CPT | Performed by: PHYSICIAN ASSISTANT

## 2022-01-17 PROCEDURE — 87077 CULTURE AEROBIC IDENTIFY: CPT | Performed by: PHYSICIAN ASSISTANT

## 2022-01-17 PROCEDURE — 1160F RVW MEDS BY RX/DR IN RCRD: CPT | Mod: CPTII,S$GLB,, | Performed by: PHYSICIAN ASSISTANT

## 2022-01-17 PROCEDURE — 1125F AMNT PAIN NOTED PAIN PRSNT: CPT | Mod: CPTII,S$GLB,, | Performed by: PHYSICIAN ASSISTANT

## 2022-01-17 PROCEDURE — 1159F MED LIST DOCD IN RCRD: CPT | Mod: CPTII,S$GLB,, | Performed by: PHYSICIAN ASSISTANT

## 2022-01-17 PROCEDURE — 99214 OFFICE O/P EST MOD 30 MIN: CPT | Mod: 25,S$GLB,, | Performed by: PHYSICIAN ASSISTANT

## 2022-01-17 PROCEDURE — 3078F PR MOST RECENT DIASTOLIC BLOOD PRESSURE < 80 MM HG: ICD-10-PCS | Mod: CPTII,S$GLB,, | Performed by: PHYSICIAN ASSISTANT

## 2022-01-17 PROCEDURE — 99214 PR OFFICE/OUTPT VISIT, EST, LEVL IV, 30-39 MIN: ICD-10-PCS | Mod: 25,S$GLB,, | Performed by: PHYSICIAN ASSISTANT

## 2022-01-17 PROCEDURE — 3008F BODY MASS INDEX DOCD: CPT | Mod: CPTII,S$GLB,, | Performed by: PHYSICIAN ASSISTANT

## 2022-01-17 PROCEDURE — 1160F PR REVIEW ALL MEDS BY PRESCRIBER/CLIN PHARMACIST DOCUMENTED: ICD-10-PCS | Mod: CPTII,S$GLB,, | Performed by: PHYSICIAN ASSISTANT

## 2022-01-17 PROCEDURE — 87186 SC STD MICRODIL/AGAR DIL: CPT | Performed by: PHYSICIAN ASSISTANT

## 2022-01-17 PROCEDURE — 1159F PR MEDICATION LIST DOCUMENTED IN MEDICAL RECORD: ICD-10-PCS | Mod: CPTII,S$GLB,, | Performed by: PHYSICIAN ASSISTANT

## 2022-01-17 PROCEDURE — 3072F PR LOW RISK FOR RETINOPATHY: ICD-10-PCS | Mod: CPTII,S$GLB,, | Performed by: PHYSICIAN ASSISTANT

## 2022-01-17 PROCEDURE — 3077F PR MOST RECENT SYSTOLIC BLOOD PRESSURE >= 140 MM HG: ICD-10-PCS | Mod: CPTII,S$GLB,, | Performed by: PHYSICIAN ASSISTANT

## 2022-01-17 PROCEDURE — 1125F PR PAIN SEVERITY QUANTIFIED, PAIN PRESENT: ICD-10-PCS | Mod: CPTII,S$GLB,, | Performed by: PHYSICIAN ASSISTANT

## 2022-01-17 RX ORDER — CLINDAMYCIN HYDROCHLORIDE 300 MG/1
300 CAPSULE ORAL EVERY 8 HOURS
Qty: 21 CAPSULE | Refills: 0 | Status: SHIPPED | OUTPATIENT
Start: 2022-01-17 | End: 2022-01-24

## 2022-01-17 RX ORDER — TRAMADOL HYDROCHLORIDE 50 MG/1
50 TABLET ORAL EVERY 6 HOURS PRN
Qty: 12 TABLET | Refills: 0 | Status: SHIPPED | OUTPATIENT
Start: 2022-01-17 | End: 2022-07-08

## 2022-01-17 NOTE — PROGRESS NOTES
Subjective:       Patient ID: Santhosh Goff is a 66 y.o. male.    Vitals:  height is 6' (1.829 m) and weight is 149.7 kg (330 lb) (abnormal). His temperature is 98.3 °F (36.8 °C). His blood pressure is 140/70 (abnormal) and his pulse is 71. His respiration is 18 and oxygen saturation is 97%.     Chief Complaint: Recurrent Skin Infections    Patient has a sore on his face. Has progressively gotten worse. Has hx of MRSA infections on face before. Denies any fever/chills or drainage. Taking Ibuprofen for pain.     Other  This is a new problem. The current episode started in the past 7 days (1/10/22). The problem has been gradually worsening. Pertinent negatives include no abdominal pain, anorexia, arthralgias, change in bowel habit, chest pain, chills, congestion, coughing, diaphoresis, fatigue, fever, headaches, joint swelling, myalgias, nausea, neck pain, numbness, rash, sore throat, swollen glands, urinary symptoms, vertigo, visual change, vomiting or weakness. Nothing aggravates the symptoms. He has tried NSAIDs for the symptoms. The treatment provided no relief.       Constitution: Negative for chills, sweating, fatigue and fever.   HENT: Negative for congestion and sore throat.    Neck: Negative for neck pain.   Cardiovascular: Negative for chest pain.   Respiratory: Negative for cough.    Gastrointestinal: Negative for abdominal pain, nausea and vomiting.   Musculoskeletal: Negative for joint pain, joint swelling and muscle ache.   Skin: Positive for erythema and abscess. Negative for rash.   Neurological: Negative for history of vertigo, headaches and numbness.       Objective:      Physical Exam   Constitutional: He appears well-developed.  Non-toxic appearance. He does not appear ill. No distress. obesity  HENT:   Head: Normocephalic.       Ears:   Right Ear: External ear normal.   Left Ear: External ear normal.   Nose: Nose normal.   Eyes: Conjunctivae and EOM are normal.   Neck: Neck supple.    Pulmonary/Chest: Effort normal.   Abdominal: Normal appearance.   Musculoskeletal: Normal range of motion.         General: Normal range of motion.   Neurological: no focal deficit. He is alert.   Skin: Skin is warm, dry, not diaphoretic, not pale and no rash. erythema   Psychiatric: His behavior is normal.         Assessment:       1. Abscess of face          Plan:       - Take all antibiotics for full course. Cultures pending- pt will be contacted with results and antibiotics can be changed if necessary.  - Apply warm compresses to promote drainage and healing  - Packing needs to be removed within 48-72 hrs  - Recommend using antibacterial soap   - Tramadol prn for pain. Discussed with pt this may cause drowsiness.   - Follow up with PCP or rtc if symptoms worsen (redness spreads, fever 100.4 or greater, swelling or pain worsens)       Abscess of face  -     traMADoL (ULTRAM) 50 mg tablet; Take 1 tablet (50 mg total) by mouth every 6 (six) hours as needed for Pain.  Dispense: 12 tablet; Refill: 0  -     clindamycin (CLEOCIN) 300 MG capsule; Take 1 capsule (300 mg total) by mouth every 8 (eight) hours. for 7 days  Dispense: 21 capsule; Refill: 0  -     Culture, Aerobic  -     Culture, Anaerobic  -     Incision & Drainage

## 2022-01-18 NOTE — PATIENT INSTRUCTIONS
Patient Education       Abscess Incision and Drainage Discharge Instructions   About this topic   An abscess is a collection of pus under your skin. The doctors made a cut to open up the area and clean out the wound. Most of the time, the cut is not closed with stitches. The wound may be packed with a special gauze to keep it from closing too soon. Sometimes a small drain is placed.       What care is needed at home?   · Ask your doctor what you need to do when you go home. Make sure you ask questions if you do not understand what the doctor says.  · Keep your wound covered with gauze until it is fully healed.  · If you do not have any packing, soak the wound in warm soapy water a few times each day.  · If you have packing, do not soak the wound. Your regular doctor will change the packing during your appointment. This will need to be repeated until the wound stops draining. Then you can begin soaking it in warm soapy water a few times each day.  · If you have a drain in place, the doctor will give you special instructions on how to care for the drain.  · Keep your wound dry unless you are soaking it in warm soapy water as instructed.  · If the doctors order an antibiotic for you, be sure to take all of them, even if you start to feel better.  What follow-up care is needed?   · Your doctor may ask you to make visits to the office to check on your progress. Be sure to keep these visits.  · Your doctor may change or take out your packing a few days after the procedure.  · If you go home with a drain, your doctor will remove the tube after drainage has stopped and the infection is gone.  · If you have stitches or staples, you will need to have them taken out. Your doctor will often want to do this in 1 to 2 weeks.  What drugs may be needed?   Your doctor may order drugs to:  · Help with pain  · Fight an infection  Will physical activity be limited?   · You may have to limit your activity to help your wound heal. Talk  to your doctor about the right amount of activity for you.  · Splints may be needed if you have an abscess in your arm, hand, or leg. This will help control motion to let the wound heal faster.  What problems could happen?   · Bleeding  · Scarring  · Infection may not clear up  What can be done to prevent this health problem?   · Practice proper hygiene. Practice good hand washing, especially before and after touching the cut site.  · Do not share towels, razors, and other personal things with someone else.  · Be careful when you shave your face, underarms, or legs to avoid scratching or cutting your skin.  · If you cut yourself, keep the area clean and put petroleum jelly on it.  · If your infection is caused by MRSA, ask your doctor how to try to get rid of this bacteria.  When do I need to call the doctor?   · You get a fever of 100.4°F (38°C) or higher and have a red rash all over your body with red eyes, diarrhea, and/or mouth sores.  · You are confused or very sleepy.  · To have your packing changed if your wound is packed.  · You have a fever of 100.4°F (38°C) or higher or chills.  · Your wound is swollen, red, or warm.  · Your wound has thick yellow or green drainage.  Teach Back: Helping You Understand   The Teach Back Method helps you understand the information we are giving you. After you talk with the staff, tell them in your own words what you learned. This helps to make sure the staff has described each thing clearly. It also helps to explain things that may have been confusing. Before going home, make sure you can do these:  · I can tell you about my procedure.  · I can tell you how to care for my cut site.  · I can tell you what I will do if I have swelling, redness, or warmth around my wound.  Where can I learn more?   [facility] Choices  http://www.nhs.uk/Conditions/Abscess/Pages/Introduction.aspx   Last Reviewed Date   2021-06-09  Consumer Information Use and Disclaimer   This information is not specific  medical advice and does not replace information you receive from your health care provider. This is only a brief summary of general information. It does NOT include all information about conditions, illnesses, injuries, tests, procedures, treatments, therapies, discharge instructions or life-style choices that may apply to you. You must talk with your health care provider for complete information about your health and treatment options. This information should not be used to decide whether or not to accept your health care providers advice, instructions or recommendations. Only your health care provider has the knowledge and training to provide advice that is right for you.  Copyright   Copyright © 2021 [employer] and its affiliates and/or licensors. All rights reserved.    Please follow up with your Primary care provider within 2-5 days if your signs and symptoms have not resolved or worsen.     If your condition worsens or fails to improve we recommend that you receive another evaluation at the emergency room immediately or contact your primary medical clinic to discuss your concerns.   You must understand that you have received an Urgent Care treatment only and that you may be released before all of your medical problems are known or treated. You, the patient, will arrange for follow up care as instructed.     RED FLAGS/WARNING SYMPTOMS DISCUSSED WITH PATIENT THAT WOULD WARRANT EMERGENT MEDICAL ATTENTION. PATIENT VERBALIZED UNDERSTANDING.

## 2022-01-18 NOTE — PROCEDURES
Incision & Drainage    Date/Time: 1/17/2022 5:45 PM  Performed by: Yaritza Morales PA-C  Authorized by: Yaritza Morales PA-C     Consent Done?:  Yes (Verbal)    Type:  Abscess  Body area:  Head/neck  Location details:  Face  Anesthesia:  Local infiltration  Local anesthetic: lidocaine 1% without epinephrine  Anesthetic total (ml):  1  Scalpel size:  11  Incision type:  Single straight  Incision depth: dermal    Complexity:  Simple  Drainage:  Pus and bloody  Drainage amount:  Moderate  Wound treatment:  Incision, expression of material, deloculation and wound packed  Packing material:  1/4 in gauze  Patient tolerance:  Patient tolerated the procedure well with no immediate complications       Planned for surgery, seen by Dr. Sandoval (his patient) and plan to reschedule as outpatient. It is reportedly a hematoma by biopsy, but suspicion for CA not completely ruled out given paraneoplastic syndrome a consideration.

## 2022-01-20 ENCOUNTER — TELEPHONE (OUTPATIENT)
Dept: URGENT CARE | Facility: CLINIC | Age: 67
End: 2022-01-20
Payer: COMMERCIAL

## 2022-01-21 ENCOUNTER — TELEPHONE (OUTPATIENT)
Dept: URGENT CARE | Facility: CLINIC | Age: 67
End: 2022-01-21
Payer: COMMERCIAL

## 2022-01-21 ENCOUNTER — PATIENT MESSAGE (OUTPATIENT)
Dept: INTERNAL MEDICINE | Facility: CLINIC | Age: 67
End: 2022-01-21
Payer: COMMERCIAL

## 2022-01-21 LAB — BACTERIA SPEC AEROBE CULT: ABNORMAL

## 2022-01-21 NOTE — TELEPHONE ENCOUNTER
Spoke with patient's wife about culture results.  She states that patient's abscess is still draining but has improved since his visits.  Pain is also improved.  She was advised to have the patient complete full course of antibiotics and after antibiotics are finished if symptoms are still present needs to be re-evaluated.

## 2022-01-24 LAB — BACTERIA SPEC ANAEROBE CULT: NORMAL

## 2022-02-02 DIAGNOSIS — E11.59 HYPERTENSION ASSOCIATED WITH DIABETES: ICD-10-CM

## 2022-02-02 DIAGNOSIS — I15.2 HYPERTENSION ASSOCIATED WITH DIABETES: ICD-10-CM

## 2022-02-03 RX ORDER — LOSARTAN POTASSIUM 25 MG/1
TABLET ORAL
Qty: 90 TABLET | Refills: 0 | Status: SHIPPED | OUTPATIENT
Start: 2022-02-03 | End: 2022-09-06

## 2022-02-03 NOTE — TELEPHONE ENCOUNTER
Patient overdue for follow up.  Refill is given but the patient needs an appointment with Dr. Mohr or Anika Steward for follow up.

## 2022-03-02 ENCOUNTER — OFFICE VISIT (OUTPATIENT)
Dept: PULMONOLOGY | Facility: CLINIC | Age: 67
End: 2022-03-02
Payer: COMMERCIAL

## 2022-03-02 VITALS
BODY MASS INDEX: 42.66 KG/M2 | HEIGHT: 72 IN | OXYGEN SATURATION: 95 % | WEIGHT: 315 LBS | RESPIRATION RATE: 19 BRPM | SYSTOLIC BLOOD PRESSURE: 140 MMHG | DIASTOLIC BLOOD PRESSURE: 82 MMHG | HEART RATE: 68 BPM

## 2022-03-02 DIAGNOSIS — G47.33 OSA (OBSTRUCTIVE SLEEP APNEA): Primary | ICD-10-CM

## 2022-03-02 DIAGNOSIS — E66.01 MORBID OBESITY WITH BMI OF 45.0-49.9, ADULT: ICD-10-CM

## 2022-03-02 DIAGNOSIS — E11.59 HYPERTENSION ASSOCIATED WITH DIABETES: ICD-10-CM

## 2022-03-02 DIAGNOSIS — I15.2 HYPERTENSION ASSOCIATED WITH DIABETES: ICD-10-CM

## 2022-03-02 DIAGNOSIS — G47.33 OSA TREATED WITH BIPAP: ICD-10-CM

## 2022-03-02 PROCEDURE — 1159F PR MEDICATION LIST DOCUMENTED IN MEDICAL RECORD: ICD-10-PCS | Mod: CPTII,S$GLB,, | Performed by: INTERNAL MEDICINE

## 2022-03-02 PROCEDURE — 3288F FALL RISK ASSESSMENT DOCD: CPT | Mod: CPTII,S$GLB,, | Performed by: INTERNAL MEDICINE

## 2022-03-02 PROCEDURE — 1160F PR REVIEW ALL MEDS BY PRESCRIBER/CLIN PHARMACIST DOCUMENTED: ICD-10-PCS | Mod: CPTII,S$GLB,, | Performed by: INTERNAL MEDICINE

## 2022-03-02 PROCEDURE — 3072F PR LOW RISK FOR RETINOPATHY: ICD-10-PCS | Mod: CPTII,S$GLB,, | Performed by: INTERNAL MEDICINE

## 2022-03-02 PROCEDURE — 1159F MED LIST DOCD IN RCRD: CPT | Mod: CPTII,S$GLB,, | Performed by: INTERNAL MEDICINE

## 2022-03-02 PROCEDURE — 3072F LOW RISK FOR RETINOPATHY: CPT | Mod: CPTII,S$GLB,, | Performed by: INTERNAL MEDICINE

## 2022-03-02 PROCEDURE — 99214 OFFICE O/P EST MOD 30 MIN: CPT | Mod: S$GLB,,, | Performed by: INTERNAL MEDICINE

## 2022-03-02 PROCEDURE — 3079F PR MOST RECENT DIASTOLIC BLOOD PRESSURE 80-89 MM HG: ICD-10-PCS | Mod: CPTII,S$GLB,, | Performed by: INTERNAL MEDICINE

## 2022-03-02 PROCEDURE — 3051F HG A1C>EQUAL 7.0%<8.0%: CPT | Mod: CPTII,S$GLB,, | Performed by: INTERNAL MEDICINE

## 2022-03-02 PROCEDURE — 3077F SYST BP >= 140 MM HG: CPT | Mod: CPTII,S$GLB,, | Performed by: INTERNAL MEDICINE

## 2022-03-02 PROCEDURE — 99999 PR PBB SHADOW E&M-EST. PATIENT-LVL V: CPT | Mod: PBBFAC,,, | Performed by: INTERNAL MEDICINE

## 2022-03-02 PROCEDURE — 3051F PR MOST RECENT HEMOGLOBIN A1C LEVEL 7.0 - < 8.0%: ICD-10-PCS | Mod: CPTII,S$GLB,, | Performed by: INTERNAL MEDICINE

## 2022-03-02 PROCEDURE — 3077F PR MOST RECENT SYSTOLIC BLOOD PRESSURE >= 140 MM HG: ICD-10-PCS | Mod: CPTII,S$GLB,, | Performed by: INTERNAL MEDICINE

## 2022-03-02 PROCEDURE — 3008F PR BODY MASS INDEX (BMI) DOCUMENTED: ICD-10-PCS | Mod: CPTII,S$GLB,, | Performed by: INTERNAL MEDICINE

## 2022-03-02 PROCEDURE — 3288F PR FALLS RISK ASSESSMENT DOCUMENTED: ICD-10-PCS | Mod: CPTII,S$GLB,, | Performed by: INTERNAL MEDICINE

## 2022-03-02 PROCEDURE — 4010F PR ACE/ARB THEARPY RXD/TAKEN: ICD-10-PCS | Mod: CPTII,S$GLB,, | Performed by: INTERNAL MEDICINE

## 2022-03-02 PROCEDURE — 99999 PR PBB SHADOW E&M-EST. PATIENT-LVL V: ICD-10-PCS | Mod: PBBFAC,,, | Performed by: INTERNAL MEDICINE

## 2022-03-02 PROCEDURE — 1101F PT FALLS ASSESS-DOCD LE1/YR: CPT | Mod: CPTII,S$GLB,, | Performed by: INTERNAL MEDICINE

## 2022-03-02 PROCEDURE — 1101F PR PT FALLS ASSESS DOC 0-1 FALLS W/OUT INJ PAST YR: ICD-10-PCS | Mod: CPTII,S$GLB,, | Performed by: INTERNAL MEDICINE

## 2022-03-02 PROCEDURE — 3008F BODY MASS INDEX DOCD: CPT | Mod: CPTII,S$GLB,, | Performed by: INTERNAL MEDICINE

## 2022-03-02 PROCEDURE — 99214 PR OFFICE/OUTPT VISIT, EST, LEVL IV, 30-39 MIN: ICD-10-PCS | Mod: S$GLB,,, | Performed by: INTERNAL MEDICINE

## 2022-03-02 PROCEDURE — 3079F DIAST BP 80-89 MM HG: CPT | Mod: CPTII,S$GLB,, | Performed by: INTERNAL MEDICINE

## 2022-03-02 PROCEDURE — 4010F ACE/ARB THERAPY RXD/TAKEN: CPT | Mod: CPTII,S$GLB,, | Performed by: INTERNAL MEDICINE

## 2022-03-02 PROCEDURE — 1160F RVW MEDS BY RX/DR IN RCRD: CPT | Mod: CPTII,S$GLB,, | Performed by: INTERNAL MEDICINE

## 2022-03-02 NOTE — PROGRESS NOTES
Subjective:     Patient ID: Santhosh Goff is a 67 y.o. male.    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  [facility]  [phone]  For CDL     Post COVID follow up:  Hx of COVID 19 pneumonia July 19, 2021, antibody infusion       Past Medical History:   Diagnosis Date    Arthritis     Diabetes mellitus, type 2 2014     x 1 week ago 10/10/2018    Hyperlipidemia     Hypertension     Morbid obesity with BMI of 45.0-49.9, adult     Staphylococcus aureus infection, multiple-resistant (MRSA) 2009     Past Surgical History:   Procedure Laterality Date    COLONOSCOPY N/A 2/24/2020    Procedure: COLONOSCOPY;  Surgeon: Bela Vaughn MD;  Location: [facility];  Service: Endoscopy;  Laterality: N/A;    LUNG SURGERY      RIB FX       Review of patient's allergies indicates:   Allergen Reactions    Cephalosporins Nausea And Vomiting     Current Outpatient Medications on File Prior to Visit   Medication Sig Dispense Refill    albuterol (VENTOLIN HFA) 90 mcg/actuation inhaler Inhale 2 puffs into the lungs every 6 (six) hours as needed for Wheezing or Shortness of Breath. Rescue 18 g 0    aspirin (ECOTRIN) 81 MG EC tablet Take 81 mg by mouth once daily.      azithromycin (Z-DAVIS) 250 MG tablet Take 2 tablets by mouth on day 1; Take 1 tablet by mouth on days 2-5 6 tablet 0    FARXIGA 5 mg Tab tablet Take 1 tablet by mouth once daily 90 tablet 3    furosemide (LASIX) 20 MG tablet Take 1 tablet (20 mg total) by mouth once daily. 90 tablet 4    glimepiride (AMARYL) 4 MG tablet Take 1 tablet by mouth once daily with breakfast 90 tablet 3    losartan (COZAAR) 25 MG tablet Take 1 tablet by mouth once daily 90 tablet 0    metFORMIN (GLUCOPHAGE) 1000 MG tablet TAKE  1 TABLET BY MOUTH TWICE DAILY WITH MEALS 180 tablet 3    multivit-min/ferrous fumarate (MULTI VITAMIN ORAL)       potassium chloride SA (K-DUR,KLOR-CON) 20 MEQ tablet Take 1 tablet by mouth once daily 90 tablet 0    traMADoL (ULTRAM) 50 mg tablet Take 1 tablet (50 mg total) by mouth every 6 (six) hours as needed for Pain. 12 tablet 0    [DISCONTINUED] atorvastatin (LIPITOR) 40 MG tablet Take 1 tablet by mouth once daily 90 tablet 3    atorvastatin (LIPITOR) 40 MG tablet Take 1 tablet by mouth once daily 90 tablet 0     Current Facility-Administered Medications on File Prior to Visit   Medication Dose Route Frequency Provider Last Rate Last Admin    acetaminophen tablet 1,000 mg  1,000 mg Oral 1 time in Clinic/HOD Samina Diggs NP        acetaminophen tablet 650 mg  650 mg Oral Once PRN Baudilio Bryant MD        albuterol inhaler 2 puff  2 puff Inhalation Q20 Min PRN Baudilio Bryant MD        diphenhydrAMINE injection 25 mg  25 mg Intravenous Once PRN Baudilio Bryant MD        EPINEPHrine (EPIPEN) 0.3 mg/0.3 mL pen injection 0.3 mg  0.3 mg Intramuscular PRN Baudilio Bryant MD        methylPREDNISolone sodium succinate injection 40 mg  40 mg Intravenous Once PRN Baudilio Bryant MD        ondansetron disintegrating tablet 4 mg  4 mg Oral Once PRN Baudilio Bryant MD        sodium chloride 0.9% 500 mL flush bag   Intravenous PRN Baudilio Byrant MD   Stopped at 08/20/21 1450    sodium chloride 0.9% flush 10 mL  10 mL Intravenous PRN Baudilio Bryant MD         Social History     Socioeconomic History    Marital status:     Number of children: 3   Occupational History    Occupation:      Employer: [employer]   Tobacco Use    Smoking status: Never Smoker    Smokeless tobacco: Never Used   Substance and Sexual Activity    Alcohol use: No    Drug use: No    Sexual activity: Yes     Partners: Female     Social Determinants of  Health     Financial Resource Strain: Low Risk     Difficulty of Paying Living Expenses: Not very hard   Food Insecurity: No Food Insecurity    Worried About Running Out of Food in the Last Year: Never true    Ran Out of Food in the Last Year: Never true   Transportation Needs: No Transportation Needs    Lack of Transportation (Medical): No    Lack of Transportation (Non-Medical): No   Physical Activity: Unknown    Days of Exercise per Week: 2 days   Stress: Stress Concern Present    Feeling of Stress : To some extent   Social Connections: Unknown    Frequency of Communication with Friends and Family: More than three times a week    Frequency of Social Gatherings with Friends and Family: Once a week    Active Member of Clubs or Organizations: No    Attends Club or Organization Meetings: Never    Marital Status:      Family History   Problem Relation Age of Onset    Early death Father        Review of Systems   Constitutional: Positive for fatigue.   HENT: Negative.    Respiratory: Positive for apnea.    Cardiovascular: Negative.    Genitourinary: Negative.    Endocrine: endocrine negative   Musculoskeletal: Negative.    Skin: Negative.    Gastrointestinal: Negative.    Neurological: Negative.    Psychiatric/Behavioral: Positive for sleep disturbance.       Objective:      BP (!) 140/82   Pulse 68   Resp 19   Ht 6' (1.829 m)   Wt (!) 154.5 kg (340 lb 9.8 oz)   SpO2 95%   BMI 46.20 kg/m²   Physical Exam  Vitals and nursing note reviewed.   Constitutional:       Appearance: He is well-developed. He is obese.   HENT:      Head: Normocephalic and atraumatic.      Nose: Nose normal.   Eyes:      Conjunctiva/sclera: Conjunctivae normal.      Pupils: Pupils are equal, round, and reactive to light.   Neck:      Thyroid: No thyromegaly.      Vascular: No JVD.      Trachea: No tracheal deviation.   Cardiovascular:      Rate and Rhythm: Normal rate and regular rhythm.      Heart sounds: Normal heart  sounds.   Pulmonary:      Effort: Pulmonary effort is normal.      Breath sounds: Normal breath sounds.   Abdominal:      Palpations: Abdomen is soft.   Musculoskeletal:         General: Normal range of motion.      Cervical back: Neck supple.   Lymphadenopathy:      Cervical: No cervical adenopathy.   Skin:     General: Skin is warm and dry.   Neurological:      Mental Status: He is alert and oriented to person, place, and time.       Personal Diagnostic Review  PSG: significant for Obstructive Sleep Apnea      Pulmonary Studies Review 3/2/2022   SpO2 95   Ordering Provider -   Performing nurse/tech/RT -   Diagnosis -   Height 72   Weight 5449.77   BMI (Calculated) 46.2   Predicted Distance 255.9   Patient Race -   6MWT Status -   Patient Reported -   Was O2 used? -   Delivery Method -   6MW Distance walked (feet) -   Distance walked (meters) -   Did patient stop? -   How many times? -   Stop Time 1 -   Restart Time 1 -   Did patient restart? -   Oxygen Saturation -   Supplemental Oxygen -   Heart Rate -   Blood Pressure -   Mikaela Dyspnea Rating  -   Oxygen Saturation -   Supplemental Oxygen -   Heart Rate -   Blood Pressure -   Mikaela Dyspnea Rating  -   Recovery Time (seconds) -   Oxygen Saturation -   Supplemental Oxygen -   Heart Rate -   Blood Pressure -   Mikaela Dyspnea Rating  -   Is procedure ready for interpretation? -   Predicted Distance Meters (Calculated) 462.27       XR CHEST PA AND LATERAL  Narrative: EXAMINATION:  XR CHEST PA AND LATERAL    CLINICAL HISTORY:  COVID-19    TECHNIQUE:  PA and lateral views of the chest were performed.    COMPARISON:  02/09/2019.    FINDINGS:  The trachea is unremarkable.  The cardiomediastinal silhouette is enlarged.  There is no evidence of free air beneath the hemidiaphragms.  There are no pleural effusions.  There is no evidence of a pneumothorax.  There is no evidence of pneumomediastinum.  There are bibasilar ground-glass airspace opacities.  The osseous structures  demonstrate degenerative changes.  Impression: Bibasilar ground-glass airspace opacities, suggestive of pneumonitis.    Electronically signed by: Billy Lester MD  Date:    08/19/2021  Time:    19:57      Office Spirometry Results:     No flowsheet data found.  Pulmonary Studies Review 3/2/2022   SpO2 95   Ordering Provider -   Performing nurse/tech/RT -   Diagnosis -   Height 72   Weight 5449.77   BMI (Calculated) 46.2   Predicted Distance 255.9   Patient Race -   6MWT Status -   Patient Reported -   Was O2 used? -   Delivery Method -   6MW Distance walked (feet) -   Distance walked (meters) -   Did patient stop? -   How many times? -   Stop Time 1 -   Restart Time 1 -   Did patient restart? -   Oxygen Saturation -   Supplemental Oxygen -   Heart Rate -   Blood Pressure -   Mikaela Dyspnea Rating  -   Oxygen Saturation -   Supplemental Oxygen -   Heart Rate -   Blood Pressure -   Mikaela Dyspnea Rating  -   Recovery Time (seconds) -   Oxygen Saturation -   Supplemental Oxygen -   Heart Rate -   Blood Pressure -   Mikaela Dyspnea Rating  -   Is procedure ready for interpretation? -   Predicted Distance Meters (Calculated) 462.27         Assessment:       ALIREZA treated with AVAPS  durable medical equipment provider:  Mariah Caceres  [facility]  [phone]  For CDL     Morbid obesity with BMI of 45.0-49.9, adult    BMI noted to be elevated. Changes in weight over time reviewed in chart (if available) and by patient recollection. Patient advised and counseled concerning the adverse medical consequences of obesity. Treatment options discussed - calorie restriction, increasing calorie expenditure, medical and surgical options noted.  The patient's severe obesity was monitored, evaluated, addressed and/or treated.       Hypertension associated with diabetes  Blood pressure measurements reviewed, repeated and verified. Adverse effects of elevated blood pressure reviewed in detail. Importance of low sodium diet, medication compliance  stressed. Patient advised and counseled concerning the adverse medical consequences of untreated hypertension.The patient's hypertension was monitored, evaluated, addressed and/or treated. Asked to follow up with PCP.  Stage 1 - Systolic 130 to 139 mmHg or diastolic 80 to 89 mmHg  Stage 2 - Systolic at least 140 mmHg or diastolic at least 90 mmHg      ALIREZA (obstructive sleep apnea)    ALIREZA treated with AVAPS    Morbid obesity with BMI of 45.0-49.9, adult    Hypertension associated with diabetes          Outpatient Encounter Medications as of 3/2/2022   Medication Sig Dispense Refill    albuterol (VENTOLIN HFA) 90 mcg/actuation inhaler Inhale 2 puffs into the lungs every 6 (six) hours as needed for Wheezing or Shortness of Breath. Rescue 18 g 0    aspirin (ECOTRIN) 81 MG EC tablet Take 81 mg by mouth once daily.      azithromycin (Z-DAVIS) 250 MG tablet Take 2 tablets by mouth on day 1; Take 1 tablet by mouth on days 2-5 6 tablet 0    FARXIGA 5 mg Tab tablet Take 1 tablet by mouth once daily 90 tablet 3    furosemide (LASIX) 20 MG tablet Take 1 tablet (20 mg total) by mouth once daily. 90 tablet 4    glimepiride (AMARYL) 4 MG tablet Take 1 tablet by mouth once daily with breakfast 90 tablet 3    losartan (COZAAR) 25 MG tablet Take 1 tablet by mouth once daily 90 tablet 0    metFORMIN (GLUCOPHAGE) 1000 MG tablet TAKE 1 TABLET BY MOUTH TWICE DAILY WITH MEALS 180 tablet 3    multivit-min/ferrous fumarate (MULTI VITAMIN ORAL)       potassium chloride SA (K-DUR,KLOR-CON) 20 MEQ tablet Take 1 tablet by mouth once daily 90 tablet 0    traMADoL (ULTRAM) 50 mg tablet Take 1 tablet (50 mg total) by mouth every 6 (six) hours as needed for Pain. 12 tablet 0    [DISCONTINUED] atorvastatin (LIPITOR) 40 MG tablet Take 1 tablet by mouth once daily 90 tablet 3     Facility-Administered Encounter Medications as of 3/2/2022   Medication Dose Route Frequency Provider Last Rate Last Admin    acetaminophen tablet 1,000 mg  1,000  mg Oral 1 time in Clinic/HOD Samina Diggs, SHAUN        acetaminophen tablet 650 mg  650 mg Oral Once PRN Baudilio Bryant MD        albuterol inhaler 2 puff  2 puff Inhalation Q20 Min PRN Baudilio Bryant MD        diphenhydrAMINE injection 25 mg  25 mg Intravenous Once PRN Baudilio Bryant MD        EPINEPHrine (EPIPEN) 0.3 mg/0.3 mL pen injection 0.3 mg  0.3 mg Intramuscular PRN Baudilio Bryant MD        methylPREDNISolone sodium succinate injection 40 mg  40 mg Intravenous Once PRN Baudilio Bryant MD        ondansetron disintegrating tablet 4 mg  4 mg Oral Once PRN Baudilio Bryant MD        sodium chloride 0.9% 500 mL flush bag   Intravenous PRN Baudilio Bryant MD   Stopped at 08/20/21 1450    sodium chloride 0.9% flush 10 mL  10 mL Intravenous PRN Baudilio Bryant MD         Plan:       Requested Prescriptions      No prescriptions requested or ordered in this encounter     Problem List Items Addressed This Visit     Hypertension associated with diabetes    Current Assessment & Plan     Blood pressure measurements reviewed, repeated and verified. Adverse effects of elevated blood pressure reviewed in detail. Importance of low sodium diet, medication compliance stressed. Patient advised and counseled concerning the adverse medical consequences of untreated hypertension.The patient's hypertension was monitored, evaluated, addressed and/or treated. Asked to follow up with PCP.  Stage 1 - Systolic 130 to 139 mmHg or diastolic 80 to 89 mmHg  Stage 2 - Systolic at least 140 mmHg or diastolic at least 90 mmHg             Morbid obesity with BMI of 45.0-49.9, adult    Current Assessment & Plan       BMI noted to be elevated. Changes in weight over time reviewed in chart (if available) and by patient recollection. Patient advised and counseled concerning the adverse medical consequences of obesity. Treatment options discussed - calorie restriction, increasing calorie expenditure, medical and surgical  options noted.  The patient's severe obesity was monitored, evaluated, addressed and/or treated.              ALIREZA treated with AVAPS    Overview     durable medical equipment provider:  Mariah Caceres  [facility]  [phone]  For CDL            Current Assessment & Plan     durable medical equipment provider:  Mariah Caceres  [facility]  [phone]  For CDL              Other Visit Diagnoses     ALIREZA (obstructive sleep apnea)    -  Primary             Follow up in about 1 year (around 3/2/2023) for CPAP download - annual review.    MEDICAL DECISION MAKING: Moderate to high complexity.  Overall, the multiple problems listed are of moderate to high severity that may impact quality of life and activities of daily living. Side effects of medications, treatment plan as well as options and alternatives reviewed and discussed with patient. There was counseling of patient concerning these issues.    Total time spent in counseling and coordination of care - 30  minutes of total time spent on the encounter, which includes face to face time and non-face to face time preparing to see the patient (eg, review of tests), Obtaining and/or reviewing separately obtained history, Documenting clinical information in the electronic or other health record, Independently interpreting results (not separately reported) and communicating results to the patient/family/caregiver, or Care coordination (not separately reported).    Time was used in discussion of prognosis, risks, benefits of treatment, instructions and compliance with regimen . Discussion with other physicians and/or health care providers - home health or for use of durable medical equipment (oxygen, nebulizers, CPAP, BiPAP) occurred.

## 2022-03-22 ENCOUNTER — PATIENT MESSAGE (OUTPATIENT)
Dept: ADMINISTRATIVE | Facility: HOSPITAL | Age: 67
End: 2022-03-22
Payer: COMMERCIAL

## 2022-03-22 ENCOUNTER — PATIENT OUTREACH (OUTPATIENT)
Dept: ADMINISTRATIVE | Facility: HOSPITAL | Age: 67
End: 2022-03-22
Payer: COMMERCIAL

## 2022-03-22 NOTE — PROGRESS NOTES
Working HTN report:     Called to discuss scheduling appointment and to get updated BP reading. No answer, sent [employer] message.

## 2022-04-14 DIAGNOSIS — E11.9 TYPE 2 DIABETES MELLITUS WITHOUT COMPLICATION, WITHOUT LONG-TERM CURRENT USE OF INSULIN: ICD-10-CM

## 2022-04-27 ENCOUNTER — PATIENT MESSAGE (OUTPATIENT)
Dept: ADMINISTRATIVE | Facility: HOSPITAL | Age: 67
End: 2022-04-27
Payer: COMMERCIAL

## 2022-04-29 NOTE — PROGRESS NOTES
HPI     Diabetic Eye Exam    Additional comments: B.S 132 A FEW DAYS AGO           Comments     Patient returns for his yearly diabetic exam , patient states his B.S.   Runs between 120-140.      LAST SAW 09/02/16  +DM X 2-4 YEARS       Last edited by JUAN Lott on 10/2/2017  9:33 AM. (History)            Assessment /Plan     For exam results, see Encounter Report.      ICD-10-CM ICD-9-CM    1. Type 2 diabetes mellitus without complication, without long-term current use of insulin E11.9 250.00 Diabetes with no diabetic retinopathy on dilated exam.   Reviewed diabetic eye precautions including excellent blood sugar control, and importance of regular follow up.          2. Refraction disorder H52.7 367.9 Optional MR    3. AMD (age-related macular degeneration), bilateral H35.30 362.50 Very mild - follow   Exam consistent with mild age related macular degeneration. Discussed clinical condition and recommend avoidance of tobacco products. Patient advised to call immediately if any visual changes occur.              RETURN TO CLINIC 1 year and resume care with Dr Waldrop at [facility]                Hospitalist Progress Note   Admit Date:  2022  9:34 PM   Name:  Geovanni Silverio   Age:  79 y.o. Sex:  female  :  1940   MRN:  [id number]   Room:  520/01    Presenting Complaint: Generalized Body Aches    Reason(s) for Admission: Sepsis [facility]) [A41.9]     Hospital Course & Interval History:   Geovanni Silverio is a 79 y.o. female with history of COPD, chronic hypoxic resp failure (5L NC continous), HTN, morbid obesity, hx CVA, diet controlled diabetes mellitus, hx of nephrolithiasis, remote shingles who presented to the ER c/o dysuria x 1 week along with body aches, generalized weakness. In the ER pt's initial BP was 199/110 but upon recheck, BP was 61/47. Pt did have a low-grade fever of 99.5 and elevated WBC count and lactic acid levels. Despite IVF boluses, pt remained hypotensive and therefore levophed gtt was initiated and pulmonary critical care consulted for ICU admission for septic shock. Cxray without gross infiltrates / pulmonary edema.     Pt was given vanco-zosyn in the ER and then started on rocephin in the ICU. She was weaned off IV pressors overnight and was transferred out of the ICU thereafter. [employer] Hospitalist service consulted to assume care on 2022    Subjective/24hr Events (22):   On 3 lit/min   says doing fine  Improving creatinine    Assessment & Plan:       Septic shock  - resolving  - attributed to urinary process (hx of e coli / aerococcus viridans UTI)  - closely monitor BP, gentle IVF (off pressors)         Sepsis secondary to UTI  - cont empiric rocephin  2022- urine culture gram negative rods  Blood culture coag neg staph one bottle- will repeat blood culture       Lactic acidosis  - in setting of sepsis  - cont IVF, empiric abx  - f/u AM levels   2022- resolved       ATN / GILMAR  - avoid nephrotoxic rx  - cont gentle IVF  - f/u AM BMP  2022- improving on ivf- creatinine 1.4       Chronic hypoxic resp failure   - cont supplemental O2; again home O2 requirements 5L via NC  4/29/2022- presently on 3 lit/min       COPD / reformed cigarette smoker  - add flutter valve and scheduled ROGELIO nebs  - cont ICS-LABA       Morbid obesity  - adds to complexity   - encourage wt loss       Diet controlled diabetes mellitus  - last hgbA1c 5.5   - closely monitor; low-carb diet       Essential hypertension  - antihypertensive agent on hold in setting of septic shock  - closely monitor BP       Remote shingles  - cont neurontin tid for neuralgia  - prn norco with parameters to hold if SBP <100mmhg       Debility   - appreciate PT/OT eval, recommending STR       Discharge Planning:    - anticipate at least 2-3 midnight hospitalization     Diet:  ADULT DIET Regular; 3 carb choices (45 gm/meal)  DVT PPx: Hep sc  Code status: Full Code       Hospital Problems as of 4/29/2022 Date Reviewed: 8/31/2018          Codes Class Noted - Resolved POA    Sepsis ([facility] [place] [facility] ICD-10-CM: A41.9  ICD-9-CM: 038.9, 995.91  4/28/2022 - Present Unknown        * (Principal) Septic shock ([facility] [place] [facility] ICD-10-CM: A41.9, R65.21  ICD-9-CM: 038.9, 785.52, 995.92  4/28/2022 - Present Unknown        Acute UTI (urinary tract infection) ICD-10-CM: N39.0  ICD-9-CM: 599.0  10/31/2020 - Present Yes        Chronic obstructive lung disease ([facility] [place] [facility] ICD-10-CM: J44.9  ICD-9-CM: 838  9/18/2014 - Present Yes        Obstructive sleep apnea syndrome ICD-10-CM: G47.33  ICD-9-CM: 327.23  10/4/2013 - Present Yes        Essential hypertension ICD-10-CM: I10  ICD-9-CM: 401.9  9/18/2012 - Present Yes              Objective:     Patient Vitals for the past 24 hrs:   Temp Pulse Resp BP SpO2   04/29/22 0838 -- -- -- -- 96 %   04/29/22 0803 97.9 °F (36.6 °C) 68 16 (!) 97/55 99 %   04/29/22 0326 97.8 °F (36.6 °C) 76 17 (!) 110/33 99 %   04/28/22 2308 97.7 °F (36.5 °C) 80 17 (!) 101/37 93 %   04/28/22 2111 -- -- -- -- 94 %   04/28/22 1920 98.2 °F (36.8 °C) 74 18 (!) 109/38 94 %   04/28/22 1653 98.4 °F (36.9 °C) 76 19 (!) 103/48 96 % 04/28/22 1200 -- -- -- (!) 104/53 --   04/28/22 1142 98 °F (36.7 °C) 77 19 (!) 93/49 98 %   04/28/22 1034 -- 74 19 (!) 117/56 --   04/28/22 1003 -- 76 (!) 48 (!) 109/55 96 %     Oxygen Therapy  O2 Sat (%): 96 % (04/29/22 0838)  Pulse via Oximetry: 76 beats per minute (04/29/22 0838)  O2 Device: Hi flow nasal cannula (04/28/22 2111)  Skin Assessment: Clean, dry, & intact (04/28/22 1920)  Skin Protection for O2 Device: N/A (04/28/22 1920)  O2 Flow Rate (L/min): 3 l/min (04/29/22 0838)    Estimated body mass index is 39.25 kg/m² as calculated from the following:    Height as of this encounter: 5' 3\" (1.6 m). Weight as of this encounter: 100.5 kg (221 lb 9 oz). Intake/Output Summary (Last 24 hours) at 4/29/2022 0951  Last data filed at 4/29/2022 0745  Gross per 24 hour   Intake 1340 ml   Output 1375 ml   Net -35 ml         Physical Exam:     Blood pressure (!) 97/55, pulse 68, temperature 97.9 °F (36.6 °C), resp. rate 16, height 5' 3\" (1.6 m), weight 100.5 kg (221 lb 9 oz), SpO2 96 %, not currently breastfeeding. General:    Well nourished. No overt distress, on oxygen nasal cannula  Head:  Normocephalic, atraumatic  Eyes:  Sclerae appear normal.  Pupils equally round. ENT:  Nares appear normal, no drainage. Moist oral mucosa  Neck:  No restricted ROM. Trachea midline   CV:   RRR. No m/r/g. No jugular venous distension. Lungs:   CTAB. No wheezing, rhonchi, or rales. Respirations even, unlabored  Abdomen: Bowel sounds present. Soft, nontender, nondistended. Extremities: No cyanosis or clubbing. No edema  Skin:     No rashes and normal coloration. Warm and dry. Neuro:  CN II-XII grossly intact. Sensation intact. A&Ox3  Psych:  Normal mood and affect.       I have reviewed ordered lab tests and independently visualized imaging below:    Recent Labs:  Recent Results (from the past 48 hour(s))   CBC WITH AUTOMATED DIFF    Collection Time: 04/27/22  9:08 PM   Result Value Ref Range    WBC 15.2 (H) 4.3 - 11.1 K/uL    RBC 4.46 4.05 - 5.2 M/uL    HGB 14.0 11.7 - 15.4 g/dL    HCT 43.2 35.8 - 46.3 %    MCV 96.9 79.6 - 97.8 FL    MCH 31.4 26.1 - 32.9 PG    MCHC 32.4 31.4 - 35.0 g/dL    RDW 13.8 11.9 - 14.6 %    PLATELET 460 417 - 358 K/uL    MPV 13.2 (H) 9.4 - 12.3 FL    ABSOLUTE NRBC 0.00 0.0 - 0.2 K/uL    DF AUTOMATED      NEUTROPHILS 71 43 - 78 %    LYMPHOCYTES 15 13 - 44 %    MONOCYTES 12 4.0 - 12.0 %    EOSINOPHILS 1 0.5 - 7.8 %    BASOPHILS 0 0.0 - 2.0 %    IMMATURE GRANULOCYTES 1 0.0 - 5.0 %    ABS. NEUTROPHILS 10.8 (H) 1.7 - 8.2 K/UL    ABS. LYMPHOCYTES 2.3 0.5 - 4.6 K/UL    ABS. MONOCYTES 1.8 (H) 0.1 - 1.3 K/UL    ABS. EOSINOPHILS 0.1 0.0 - 0.8 K/UL    ABS. BASOPHILS 0.0 0.0 - 0.2 K/UL    ABS. IMM. GRANS. 0.1 0.0 - 0.5 K/UL   METABOLIC PANEL, COMPREHENSIVE    Collection Time: 04/27/22  9:08 PM   Result Value Ref Range    Sodium 137 136 - 145 mmol/L    Potassium 4.9 3.5 - 5.1 mmol/L    Chloride 105 98 - 107 mmol/L    CO2 25 21 - 32 mmol/L    Anion gap 7 7 - 16 mmol/L    Glucose 117 (H) 65 - 100 mg/dL    BUN 58 (H) 8 - 23 MG/DL    Creatinine 2.40 (H) 0.6 - 1.0 MG/DL    GFR est AA 25 (L) >60 ml/min/1.73m2    GFR est non-AA 20 (L) >60 ml/min/1.73m2    Calcium 8.8 8.3 - 10.4 MG/DL    Bilirubin, total 0.6 0.2 - 1.1 MG/DL    ALT (SGPT) 28 12 - 65 U/L    AST (SGOT) 38 (H) 15 - 37 U/L    Alk.  phosphatase 96 50 - 136 U/L    Protein, total 7.4 6.3 - 8.2 g/dL    Albumin 2.6 (L) 3.2 - 4.6 g/dL    Globulin 4.8 (H) 2.3 - 3.5 g/dL    A-G Ratio 0.5 (L) 1.2 - 3.5     CK    Collection Time: 04/27/22  9:08 PM   Result Value Ref Range     21 - 215 U/L   PROCALCITONIN    Collection Time: 04/27/22  9:08 PM   Result Value Ref Range    Procalcitonin 1.22 (H) 0.00 - 0.49 ng/mL   INFLUENZA A & B AG (RAPID TEST)    Collection Time: 04/27/22  9:10 PM   Result Value Ref Range    Influenza A Ag Negative NEG      Influenza B Ag Negative NEG      Source Nasopharyngeal     EKG, 12 LEAD, INITIAL    Collection Time: 04/27/22  9:47 PM Result Value Ref Range    Ventricular Rate 87 BPM    Atrial Rate 88 BPM    P-R Interval 176 ms    QRS Duration 139 ms    Q-T Interval 390 ms    QTC Calculation (Bezet) 470 ms    Calculated P Axis 74 degrees    Calculated R Axis -26 degrees    Calculated T Axis 67 degrees    Diagnosis       Sinus rhythm  Ventricular premature complex  Right bundle branch block    Confirmed by Keyona Mckenna MD (), Tanika Pelletier ([id number]) on 4/28/2022 6:17:52 AM     CULTURE, BLOOD    Collection Time: 04/27/22 10:07 PM    Specimen: Blood   Result Value Ref Range    Special Requests: LEFT  HAND        GRAM STAIN GRAM POSITIVE COCCI GRAM POSITIVE RODS      GRAM STAIN AEROBIC AND ANAEROBIC BOTTLES      GRAM STAIN        RESULTS VERIFIED, PHONED TO AND READ BACK BY MELINDA GASPAR @ 3656 ON 4/28/22 MG     GRAM STAIN        RESULTS VERIFIED, PHONED TO AND READ BACK BY MAILE ZAVALA RN AT 3627 ON 4/29/22 ACL    Culture result: CULTURE IN PROGRESS,FURTHER UPDATES TO FOLLOW      Culture result:        Refer to Blood Culture ID Panel Accession  [id number]     LACTIC ACID    Collection Time: 04/27/22 10:07 PM   Result Value Ref Range    Lactic acid 3.0 (H) 0.4 - 2.0 MMOL/L   BLOOD CULTURE ID PANEL    Collection Time: 04/27/22 10:07 PM    Specimen: Blood   Result Value Ref Range    Acc. no. from Micro Order [id number]     Staphylococcus Detected (A) NOTDET      mecA (Methicillin-Resistance Genes) Detected (A) NOTDET      INTERPRETATION        Gram positive cocci in clusters.  Identified by realtime PCR as  Coagulase negative Staphylococci   CULTURE, URINE    Collection Time: 04/27/22 10:17 PM    Specimen: Cath Urine    URINE   Result Value Ref Range    Special Requests: NO SPECIAL REQUESTS      Culture result: (A)       >100,000 COLONIES/mL GRAM NEGATIVE RODS IDENTIFICATION AND SUSCEPTIBILITY TO FOLLOW    Culture result: CULTURE IN PROGRESS,FURTHER UPDATES TO FOLLOW     URINE MICROSCOPIC    Collection Time: 04/27/22 10:17 PM   Result Value Ref Range    WBC >100 0 /hpf    RBC 10-20 0 /hpf    Epithelial cells 3-5 0 /hpf    Bacteria 4+ (H) 0 /hpf    Casts HYALINE 0 /lpf    Crystals, urine 0 0 /LPF    Mucus 0 0 /lpf    Yeast OCCASIONAL     LACTIC ACID    Collection Time: 04/27/22 11:51 PM   Result Value Ref Range    Lactic acid 4.4 (HH) 0.4 - 2.0 MMOL/L   GLUCOSE, POC    Collection Time: 04/28/22  1:31 AM   Result Value Ref Range    Glucose (POC) 93 65 - 100 mg/dL    Performed by Migel Mackey    TROPONIN-HIGH SENSITIVITY    Collection Time: 04/28/22  3:25 AM   Result Value Ref Range    Troponin-High Sensitivity 25.4 (H) 0 - 14 pg/mL   CBC WITH AUTOMATED DIFF    Collection Time: 04/28/22  3:25 AM   Result Value Ref Range    WBC 12.8 (H) 4.3 - 11.1 K/uL    RBC 4.41 4.05 - 5.2 M/uL    HGB 13.6 11.7 - 15.4 g/dL    HCT 44.2 35.8 - 46.3 %    .2 (H) 79.6 - 97.8 FL    MCH 30.8 26.1 - 32.9 PG    MCHC 30.8 (L) 31.4 - 35.0 g/dL    RDW 13.6 11.9 - 14.6 %    PLATELET 067 (L) 392 - 450 K/uL    MPV 12.6 (H) 9.4 - 12.3 FL    ABSOLUTE NRBC 0.00 0.0 - 0.2 K/uL    DF AUTOMATED      NEUTROPHILS 71 43 - 78 %    LYMPHOCYTES 14 13 - 44 %    MONOCYTES 13 (H) 4.0 - 12.0 %    EOSINOPHILS 1 0.5 - 7.8 %    BASOPHILS 0 0.0 - 2.0 %    IMMATURE GRANULOCYTES 1 0.0 - 5.0 %    ABS. NEUTROPHILS 9.1 (H) 1.7 - 8.2 K/UL    ABS. LYMPHOCYTES 1.9 0.5 - 4.6 K/UL    ABS. MONOCYTES 1.7 (H) 0.1 - 1.3 K/UL    ABS. EOSINOPHILS 0.1 0.0 - 0.8 K/UL    ABS. BASOPHILS 0.0 0.0 - 0.2 K/UL    ABS. IMM.  GRANS. 0.1 0.0 - 0.5 K/UL   LACTIC ACID    Collection Time: 04/28/22  3:25 AM   Result Value Ref Range    Lactic acid 1.1 0.4 - 2.0 MMOL/L   METABOLIC PANEL, COMPREHENSIVE    Collection Time: 04/28/22  3:25 AM   Result Value Ref Range    Sodium 140 136 - 145 mmol/L    Potassium 4.2 3.5 - 5.1 mmol/L    Chloride 108 (H) 98 - 107 mmol/L    CO2 24 21 - 32 mmol/L    Anion gap 8 7 - 16 mmol/L    Glucose 117 (H) 65 - 100 mg/dL    BUN 52 (H) 8 - 23 MG/DL    Creatinine 2.00 (H) 0.6 - 1.0 MG/DL    GFR est AA 31 (L) >60 ml/min/1.73m2 GFR est non-AA 25 (L) >60 ml/min/1.73m2    Calcium 8.8 8.3 - 10.4 MG/DL    Bilirubin, total 0.4 0.2 - 1.1 MG/DL    ALT (SGPT) 24 12 - 65 U/L    AST (SGOT) 27 15 - 37 U/L    Alk.  phosphatase 84 50 - 136 U/L    Protein, total 6.4 6.3 - 8.2 g/dL    Albumin 2.3 (L) 3.2 - 4.6 g/dL    Globulin 4.1 (H) 2.3 - 3.5 g/dL    A-G Ratio 0.6 (L) 1.2 - 3.5     CULTURE, BLOOD    Collection Time: 04/28/22  3:27 AM    Specimen: Blood   Result Value Ref Range    Special Requests: LEFT  FOREARM        Culture result: NO GROWTH 1 DAY     TROPONIN-HIGH SENSITIVITY    Collection Time: 04/28/22  5:23 AM   Result Value Ref Range    Troponin-High Sensitivity 24.7 (H) 0 - 14 pg/mL   GLUCOSE, POC    Collection Time: 04/28/22  5:40 AM   Result Value Ref Range    Glucose (POC) 101 (H) 65 - 100 mg/dL    Performed by Vita    GLUCOSE, POC    Collection Time: 04/28/22 11:09 AM   Result Value Ref Range    Glucose (POC) 98 65 - 100 mg/dL    Performed by Melchor    GLUCOSE, POC    Collection Time: 04/28/22  4:50 PM   Result Value Ref Range    Glucose (POC) 112 (H) 65 - 100 mg/dL    Performed by Ivania    NT-PRO BNP    Collection Time: 04/28/22  5:01 PM   Result Value Ref Range    NT pro- (H) <450 PG/ML   GLUCOSE, POC    Collection Time: 04/28/22  9:32 PM   Result Value Ref Range    Glucose (POC) 123 (H) 65 - 100 mg/dL    Performed by Mukund    CBC WITH AUTOMATED DIFF    Collection Time: 04/29/22  6:09 AM   Result Value Ref Range    WBC 7.9 4.3 - 11.1 K/uL    RBC 3.94 (L) 4.05 - 5.2 M/uL    HGB 12.1 11.7 - 15.4 g/dL    HCT 39.6 35.8 - 46.3 %    .5 (H) 79.6 - 97.8 FL    MCH 30.7 26.1 - 32.9 PG    MCHC 30.6 (L) 31.4 - 35.0 g/dL    RDW 13.7 11.9 - 14.6 %    PLATELET 650 (L) 325 - 450 K/uL    MPV 12.9 (H) 9.4 - 12.3 FL    ABSOLUTE NRBC 0.00 0.0 - 0.2 K/uL    DF AUTOMATED      NEUTROPHILS 54 43 - 78 %    LYMPHOCYTES 23 13 - 44 %    MONOCYTES 17 (H) 4.0 - 12.0 %    EOSINOPHILS 6 0.5 - 7.8 %    BASOPHILS 0 0.0 - 2.0 %    IMMATURE GRANULOCYTES 1 0.0 - 5.0 %    ABS. NEUTROPHILS 4.2 1.7 - 8.2 K/UL    ABS. LYMPHOCYTES 1.8 0.5 - 4.6 K/UL    ABS. MONOCYTES 1.3 0.1 - 1.3 K/UL    ABS. EOSINOPHILS 0.4 0.0 - 0.8 K/UL    ABS. BASOPHILS 0.0 0.0 - 0.2 K/UL    ABS. IMM. GRANS. 0.0 0.0 - 0.5 K/UL   LACTIC ACID    Collection Time: 04/29/22  6:09 AM   Result Value Ref Range    Lactic acid 1.3 0.4 - 2.0 MMOL/L   METABOLIC PANEL, COMPREHENSIVE    Collection Time: 04/29/22  6:09 AM   Result Value Ref Range    Sodium 145 136 - 145 mmol/L    Potassium 4.1 3.5 - 5.1 mmol/L    Chloride 111 (H) 98 - 107 mmol/L    CO2 29 21 - 32 mmol/L    Anion gap 5 (L) 7 - 16 mmol/L    Glucose 88 65 - 100 mg/dL    BUN 39 (H) 8 - 23 MG/DL    Creatinine 1.40 (H) 0.6 - 1.0 MG/DL    GFR est AA 46 (L) >60 ml/min/1.73m2    GFR est non-AA 38 (L) >60 ml/min/1.73m2    Calcium 8.6 8.3 - 10.4 MG/DL    Bilirubin, total 0.2 0.2 - 1.1 MG/DL    ALT (SGPT) 24 12 - 65 U/L    AST (SGOT) 29 15 - 37 U/L    Alk.  phosphatase 76 50 - 136 U/L    Protein, total 6.2 (L) 6.3 - 8.2 g/dL    Albumin 2.4 (L) 3.2 - 4.6 g/dL    Globulin 3.8 (H) 2.3 - 3.5 g/dL    A-G Ratio 0.6 (L) 1.2 - 3.5     GLUCOSE, POC    Collection Time: 04/29/22  7:48 AM   Result Value Ref Range    Glucose (POC) 132 (H) 65 - 100 mg/dL    Performed by Ivania        All Micro Results     Procedure Component Value Units Date/Time    CULTURE, URINE [[id number]]  (Abnormal) Collected: 04/27/22 2217    Order Status: Completed Specimen: Cath Urine Updated: 04/29/22 0902     Special Requests: NO SPECIAL REQUESTS        Culture result:       >100,000 COLONIES/mL GRAM NEGATIVE RODS IDENTIFICATION AND SUSCEPTIBILITY TO FOLLOW                  CULTURE IN PROGRESS,FURTHER UPDATES TO FOLLOW          BLOOD CULTURE [[id number]] Collected: 04/28/22 0327    Order Status: Completed Specimen: Blood Updated: 04/29/22 0754     Special Requests: --        LEFT  FOREARM       Culture result: NO GROWTH 1 DAY       CULTURE, BLOOD [[id number]]     Order Status: Sent Specimen: Blood     CULTURE, BLOOD [[id number]]     Order Status: Sent Specimen: Blood     BLOOD CULTURE ID PANEL [[id number]]  (Abnormal) Collected: 04/27/22 2207    Order Status: Completed Specimen: Blood Updated: 04/29/22 0654     Acc. no. from Micro Order [id number]     Staphylococcus Detected        Comment: RESULTS VERIFIED, PHONED TO AND READ BACK BY  MELINDA GASPAR RN @ 2215 ON 4/28/22 MG           mecA (Methicillin-Resistance Genes) Detected        Comment: Presence of mecA is highly indicative of methicillin resistance. The test does not replace traditional culture and susceptibilities        INTERPRETATION       Gram positive cocci in clusters. Identified by realtime PCR as  Coagulase negative Staphylococci           Comment: A single positive culture of coagulase negative Staph is likely to be a contaminant in adult patients. Consider discontinuation of antibiotics for gram positive bloodstream infections if patient asymptomatic.   THIS TEST DOES NOT REPLACE SENSITIVITY TESTING.       BLOOD CULTURE [[id number]] Collected: 04/27/22 2207    Order Status: Completed Specimen: Blood Updated: 04/29/22 0526     Special Requests: --        LEFT  HAND       GRAM STAIN       GRAM POSITIVE COCCI GRAM POSITIVE RODS                  AEROBIC AND ANAEROBIC BOTTLES                  RESULTS VERIFIED, PHONED TO AND READ BACK BY MELINDA GASPAR @ 0779 ON 4/28/22 MG                   RESULTS VERIFIED, PHONED TO AND READ BACK BY MAILE ZAVALA RN AT [address] [place] [address] ON 4/29/22 ACL           Culture result:       CULTURE IN [facility] UPDATES TO FOLLOW                  Refer to Blood Culture ID Panel Accession  [id number]      INFLUENZA A & B AG (RAPID TEST) [[id number]] Collected: 04/27/22 2110    Order Status: Completed Specimen: Nasopharyngeal from Nasal washing Updated: 04/27/22 2231     Influenza A Ag Negative        Comment: NEGATIVE FOR THE PRESENCE OF INFLUENZA A ANTIGEN  INFECTION DUE TO INFLUENZA A CANNOT BE RULED OUT. BECAUSE THE ANTIGEN PRESENT IN THE SAMPLE MAY BE BELOW  THE DETECTION LIMIT OF THE TEST. A NEGATIVE TEST IS PRESUMPTIVE AND IT IS RECOMMENDED THAT THESE RESULTS BE CONFIRMED BY VIRAL CULTURE OR AN FDA-CLEARED INFLUENZA A AND B MOLECULAR ASSAY. Influenza B Ag Negative        Comment: NEGATIVE FOR THE PRESENCE OF INFLUENZA B ANTIGEN  INFECTION DUE TO INFLUENZA B CANNOT BE RULED OUT. BECAUSE THE ANTIGEN PRESENT IN THE SAMPLE MAY BE BELOW  THE DETECTION LIMIT OF THE TEST. A NEGATIVE TEST IS PRESUMPTIVE AND IT IS RECOMMENDED THAT THESE RESULTS BE CONFIRMED BY VIRAL CULTURE OR AN FDA-CLEARED INFLUENZA A AND B MOLECULAR ASSAY. Source Nasopharyngeal             Other Studies:  US RETROPERITONEUM COMP    Result Date: 4/28/2022  Renal ultrasound, 4/28/2022 History: Evaluate for obstruction. Comparison: None. Technique: Grayscale and doppler images of the kidneys and bladder were obtained using a 3-5 MHz linear transducer. Findings: Today's study is limited by patient body habitus. The right kidney measures 10 cm, and the left kidney measures 11.5 cm in greatest longitudinal length. No stones or evidence of hydronephrosis is seen. Renal parenchymal echogenicity is normal.  A 2.2 cm benign cyst is seen in the midpole cortex of the right kidney. A 1 cm benign-appearing cyst is seen in the upper pole cortex of the left kidney. The urinary bladder is collapsed about a Rodrgiuez catheter and therefore not well assessed. The abdominal aorta is obscured by bowel gas and therefore not well assessed. The IVC is patent. 1.  No hydronephrosis of either kidney.        Current Meds:  Current Facility-Administered Medications   Medication Dose Route Frequency    sodium chloride (NS) flush 5-40 mL  5-40 mL IntraVENous Q8H    sodium chloride (NS) flush 5-40 mL  5-40 mL IntraVENous PRN    acetaminophen (TYLENOL) tablet 650 mg  650 mg Oral Q6H PRN    Or    acetaminophen (TYLENOL) suppository 650 mg  650 mg Rectal Q6H PRN    polyethylene glycol (MIRALAX) packet 17 g  17 g Oral DAILY PRN    cefTRIAXone (ROCEPHIN) 2 g in 0.9% sodium chloride (MBP/ADV) 50 mL MBP  2 g IntraVENous Q24H    heparin (porcine) injection 5,000 Units  5,000 Units SubCUTAneous Q8H    gabapentin (NEURONTIN) capsule 100 mg  100 mg Oral TID    atorvastatin (LIPITOR) tablet 10 mg  10 mg Oral DAILY    mometasone-formoterol (DULERA) 200mcg-5mcg/puff  2 Puff Inhalation BID RT    insulin lispro (HUMALOG) injection 0-10 Units  0-10 Units SubCUTAneous AC&HS    tuberculin injection 5 Units  5 Units IntraDERMal ONCE    0.9% sodium chloride infusion  75 mL/hr IntraVENous CONTINUOUS    albuterol-ipratropium (DUO-NEB) 2.5 MG-0.5 MG/3 ML  3 mL Nebulization Q4H PRN    HYDROcodone-acetaminophen (NORCO) 5-325 mg per tablet 1 Tablet  1 Tablet Oral Q6H PRN    diphenhydrAMINE (BENADRYL) capsule 25 mg  25 mg Oral Q6H PRN    cetirizine (ZYRTEC) tablet 5 mg  5 mg Oral DAILY PRN       Signed:  Goldie Samuel MD

## 2022-05-02 NOTE — TELEPHONE ENCOUNTER
Care Due:                  Date            Visit Type   Department     Provider  --------------------------------------------------------------------------------                                EP -                              PRIMARY      [facility] INTERNAL  Last Visit: 12-      [facility] ([facility])   MEDICINE       Jacek Mohr  Next Visit: None Scheduled  None         None Found                                                            Last  Test          Frequency    Reason                     Performed    Due Date  --------------------------------------------------------------------------------    Office Visit  12 months..  FARXIGA, atorvastatin,     12-   12-                             furosemide, glimepiride,                             losartan, metFORMIN......    CMP.........  12 months..  FARXIGA, atorvastatin,     04- 03-                             furosemide, glimepiride,                             losartan, metFORMIN......    HBA1C.......  6 months...  FARXIGA, glimepiride,      04-   10-                             metFORMIN................    Lipid Panel.  12 months..  atorvastatin.............  04- 03-    Powered by [employer]. Reference number: [id number].   5/02/2022 5:32:08 AM CDT

## 2022-05-02 NOTE — TELEPHONE ENCOUNTER
Refill Routing Note   Medication(s) are not appropriate for processing by [facility] for the following reason(s):      - Patient has not been seen in over 15 months by PCP  - Required laboratory values are outdated    [facility] action(s):  Defer Medication-related problems identified:   Requires labs  Requires appointment        Medication reconciliation completed: No     Appointments  past 12m or future 3m with PCP    Date Provider   Last Visit   12/10/2020 Jacek Mohr MD   Next Visit   Visit date not found Jacek Mohr MD   ED visits in past 90 days: 0        Note composed:5:21 PM 05/02/2022

## 2022-05-03 RX ORDER — POTASSIUM CHLORIDE 20 MEQ/1
TABLET, EXTENDED RELEASE ORAL
Qty: 90 TABLET | Refills: 0 | Status: SHIPPED | OUTPATIENT
Start: 2022-05-03 | End: 2022-08-01

## 2022-05-03 NOTE — TELEPHONE ENCOUNTER
Patient overdue for follow up.  Refill is given but the patient needs an appointment with Dr. Mohr or Anika Steward for follow up.   Veto Tang presents today for   Chief Complaint   Patient presents with    Cardiac Testing       Veto Tang preferred language for health care discussion is english/other. Personal Protective Equipment:   Personal Protective Equipment was used including: mask-surgical and hands-gloves. Patient was placed on no precaution(s). Patient was masked. Is someone accompanying this pt? no    Is the patient using any DME equipment during [address]? no    Depression Screening:  3 most recent PHQ Screens 9/18/2020   Little interest or pleasure in doing things Several days   Feeling down, depressed, irritable, or hopeless Several days   Total Score PHQ 2 2       Learning Assessment:  Learning Assessment 12/6/2018   PRIMARY LEARNER Patient   HIGHEST LEVEL OF EDUCATION - PRIMARY LEARNER  DID NOT GRADUATE HIGH SCHOOL   BARRIERS PRIMARY LEARNER NONE   CO-LEARNER CAREGIVER No   PRIMARY LANGUAGE ENGLISH   LEARNER PREFERENCE PRIMARY LISTENING   ANSWERED BY patient   RELATIONSHIP SELF       Abuse Screening:  Abuse Screening Questionnaire 8/7/2020   Do you ever feel afraid of your partner? N   Are you in a relationship with someone who physically or mentally threatens you? N   Is it safe for you to go home? Y       Fall Risk  Fall Risk Assessment, last 12 mths 8/7/2020   Able to walk? Yes   Fall in past 12 months? No       Pt currently taking Anticoagulant therapy? no    Coordination of Care:  1. Have you been to the ER, urgent care clinic since your last visit? Hospitalized since your last visit? no    2. Have you seen or consulted any other health care providers outside of the [address] [place] [address] since your last visit? Include any pap smears or colon screening.  no

## 2022-06-02 ENCOUNTER — PATIENT OUTREACH (OUTPATIENT)
Dept: ADMINISTRATIVE | Facility: HOSPITAL | Age: 67
End: 2022-06-02
Payer: COMMERCIAL

## 2022-06-02 NOTE — PROGRESS NOTES
DM Report: Attempted to contact the patient to schedule overdue annual exam with PCP and fasting labs, no answer, left voicemail.

## 2022-06-24 DIAGNOSIS — E11.9 TYPE 2 DIABETES MELLITUS WITHOUT COMPLICATION, WITHOUT LONG-TERM CURRENT USE OF INSULIN: ICD-10-CM

## 2022-06-24 RX ORDER — METFORMIN HYDROCHLORIDE 1000 MG/1
TABLET ORAL
Qty: 180 TABLET | Refills: 0 | Status: SHIPPED | OUTPATIENT
Start: 2022-06-24 | End: 2022-09-19

## 2022-06-24 NOTE — TELEPHONE ENCOUNTER
Patient overdue for follow up.  Refill is given but the patient needs an appointment with Dr. Mohr or Fern Najera NP, for follow up.

## 2022-06-24 NOTE — TELEPHONE ENCOUNTER
No new care gaps identified.  [facility] Embedded Care Gaps. Reference number: [id number]. 6/24/2022   9:18:55 AM KIRTT

## 2022-06-26 DIAGNOSIS — E78.5 HYPERLIPIDEMIA, UNSPECIFIED HYPERLIPIDEMIA TYPE: ICD-10-CM

## 2022-06-27 ENCOUNTER — TELEPHONE (OUTPATIENT)
Dept: INTERNAL MEDICINE | Facility: CLINIC | Age: 67
End: 2022-06-27
Payer: COMMERCIAL

## 2022-06-27 RX ORDER — ATORVASTATIN CALCIUM 40 MG/1
TABLET, FILM COATED ORAL
Qty: 90 TABLET | Refills: 0 | Status: SHIPPED | OUTPATIENT
Start: 2022-06-27 | End: 2022-09-28

## 2022-06-29 ENCOUNTER — TELEPHONE (OUTPATIENT)
Dept: PRIMARY CARE CLINIC | Facility: CLINIC | Age: 67
End: 2022-06-29
Payer: COMMERCIAL

## 2022-06-29 NOTE — TELEPHONE ENCOUNTER
----- Message from Marian Rubio sent at 6/29/2022 10:35 AM CDT -----  Regarding: Labs  Pt wants to see about getting lab work done.  He has an lab order that is outdated .  Please call him at [phone].  He needs to have lab results for work.  Thanks!

## 2022-07-06 ENCOUNTER — PATIENT MESSAGE (OUTPATIENT)
Dept: INTERNAL MEDICINE | Facility: CLINIC | Age: 67
End: 2022-07-06
Payer: COMMERCIAL

## 2022-07-06 DIAGNOSIS — Z12.5 ENCOUNTER FOR SCREENING FOR MALIGNANT NEOPLASM OF PROSTATE: ICD-10-CM

## 2022-07-06 DIAGNOSIS — I15.2 HYPERTENSION ASSOCIATED WITH DIABETES: ICD-10-CM

## 2022-07-06 DIAGNOSIS — E11.59 HYPERTENSION ASSOCIATED WITH DIABETES: ICD-10-CM

## 2022-07-06 DIAGNOSIS — E11.9 TYPE 2 DIABETES MELLITUS WITHOUT COMPLICATION, WITHOUT LONG-TERM CURRENT USE OF INSULIN: Primary | ICD-10-CM

## 2022-07-06 DIAGNOSIS — E78.5 HYPERLIPIDEMIA ASSOCIATED WITH TYPE 2 DIABETES MELLITUS: ICD-10-CM

## 2022-07-06 DIAGNOSIS — E11.69 HYPERLIPIDEMIA ASSOCIATED WITH TYPE 2 DIABETES MELLITUS: ICD-10-CM

## 2022-07-06 NOTE — LETTER
July 6, 2022    Santhosh Goff  [address] [address] [address]         [facility]  [address]  [facility] [address]  Phone: [phone]  Fax: [phone] July 6, 2022     Patient: Santhosh Goff   YOB: 1955   Date of Visit: 7/6/2022       To Whom It May Concern:    Mr. Santhosh Goff is under my care for his medical conditions.  He takes several prescription medications including metformin, glimiperide, aspirin, atorvastatin, albuterol, lisinopril, furosemide and potassium and farxiga.       His glimiperide and farxiga carry a risk of hypoglyemic side effects.  He has been on these medications for some time and has not demonstrated any issues with this complication.  His sugar control has been stable over the past year without unexpected fluctuations.     If you have any questions or concerns, please don't hesitate to call.      Sincerely,        Jacek Mohr MD

## 2022-07-08 ENCOUNTER — LAB VISIT (OUTPATIENT)
Dept: LAB | Facility: HOSPITAL | Age: 67
End: 2022-07-08
Attending: NURSE PRACTITIONER
Payer: COMMERCIAL

## 2022-07-08 ENCOUNTER — OFFICE VISIT (OUTPATIENT)
Dept: INTERNAL MEDICINE | Facility: CLINIC | Age: 67
End: 2022-07-08
Payer: COMMERCIAL

## 2022-07-08 VITALS
OXYGEN SATURATION: 95 % | TEMPERATURE: 98 F | BODY MASS INDEX: 42.66 KG/M2 | SYSTOLIC BLOOD PRESSURE: 136 MMHG | HEIGHT: 72 IN | WEIGHT: 315 LBS | DIASTOLIC BLOOD PRESSURE: 88 MMHG | HEART RATE: 66 BPM

## 2022-07-08 DIAGNOSIS — E11.69 HYPERLIPIDEMIA ASSOCIATED WITH TYPE 2 DIABETES MELLITUS: ICD-10-CM

## 2022-07-08 DIAGNOSIS — E11.9 TYPE 2 DIABETES MELLITUS WITHOUT COMPLICATION, WITHOUT LONG-TERM CURRENT USE OF INSULIN: ICD-10-CM

## 2022-07-08 DIAGNOSIS — E11.59 HYPERTENSION ASSOCIATED WITH DIABETES: ICD-10-CM

## 2022-07-08 DIAGNOSIS — E78.5 HYPERLIPIDEMIA ASSOCIATED WITH TYPE 2 DIABETES MELLITUS: ICD-10-CM

## 2022-07-08 DIAGNOSIS — G47.33 OSA TREATED WITH BIPAP: ICD-10-CM

## 2022-07-08 DIAGNOSIS — I15.2 HYPERTENSION ASSOCIATED WITH DIABETES: ICD-10-CM

## 2022-07-08 DIAGNOSIS — E66.01 MORBID OBESITY WITH BMI OF 45.0-49.9, ADULT: ICD-10-CM

## 2022-07-08 DIAGNOSIS — Z12.5 ENCOUNTER FOR SCREENING FOR MALIGNANT NEOPLASM OF PROSTATE: ICD-10-CM

## 2022-07-08 DIAGNOSIS — Z00.00 ANNUAL PHYSICAL EXAM: Primary | ICD-10-CM

## 2022-07-08 LAB
ALBUMIN SERPL BCP-MCNC: 4 G/DL (ref 3.5–5.2)
ALP SERPL-CCNC: 64 U/L (ref 55–135)
ALT SERPL W/O P-5'-P-CCNC: 51 U/L (ref 10–44)
ANION GAP SERPL CALC-SCNC: 11 MMOL/L (ref 8–16)
AST SERPL-CCNC: 24 U/L (ref 10–40)
BILIRUB SERPL-MCNC: 0.5 MG/DL (ref 0.1–1)
BUN SERPL-MCNC: 16 MG/DL (ref 8–23)
CALCIUM SERPL-MCNC: 9.4 MG/DL (ref 8.7–10.5)
CHLORIDE SERPL-SCNC: 105 MMOL/L (ref 95–110)
CHOLEST SERPL-MCNC: 128 MG/DL (ref 120–199)
CHOLEST/HDLC SERPL: 2.6 {RATIO} (ref 2–5)
CO2 SERPL-SCNC: 24 MMOL/L (ref 23–29)
CREAT SERPL-MCNC: 0.9 MG/DL (ref 0.5–1.4)
EST. GFR  (AFRICAN AMERICAN): >60 ML/MIN/1.73 M^2
EST. GFR  (NON AFRICAN AMERICAN): >60 ML/MIN/1.73 M^2
ESTIMATED AVG GLUCOSE: 186 MG/DL (ref 68–131)
GLUCOSE SERPL-MCNC: 123 MG/DL (ref 70–110)
HBA1C MFR BLD: 8.1 % (ref 4–5.6)
HDLC SERPL-MCNC: 49 MG/DL (ref 40–75)
HDLC SERPL: 38.3 % (ref 20–50)
LDLC SERPL CALC-MCNC: 53.6 MG/DL (ref 63–159)
NONHDLC SERPL-MCNC: 79 MG/DL
POTASSIUM SERPL-SCNC: 4.6 MMOL/L (ref 3.5–5.1)
PROT SERPL-MCNC: 6.9 G/DL (ref 6–8.4)
SODIUM SERPL-SCNC: 140 MMOL/L (ref 136–145)
TRIGL SERPL-MCNC: 127 MG/DL (ref 30–150)

## 2022-07-08 PROCEDURE — 3079F DIAST BP 80-89 MM HG: CPT | Mod: CPTII,S$GLB,, | Performed by: NURSE PRACTITIONER

## 2022-07-08 PROCEDURE — 4010F ACE/ARB THERAPY RXD/TAKEN: CPT | Mod: CPTII,S$GLB,, | Performed by: NURSE PRACTITIONER

## 2022-07-08 PROCEDURE — 99999 PR PBB SHADOW E&M-EST. PATIENT-LVL V: CPT | Mod: PBBFAC,,, | Performed by: NURSE PRACTITIONER

## 2022-07-08 PROCEDURE — 1160F RVW MEDS BY RX/DR IN RCRD: CPT | Mod: CPTII,S$GLB,, | Performed by: NURSE PRACTITIONER

## 2022-07-08 PROCEDURE — 85025 COMPLETE CBC W/AUTO DIFF WBC: CPT | Performed by: INTERNAL MEDICINE

## 2022-07-08 PROCEDURE — 1160F PR REVIEW ALL MEDS BY PRESCRIBER/CLIN PHARMACIST DOCUMENTED: ICD-10-PCS | Mod: CPTII,S$GLB,, | Performed by: NURSE PRACTITIONER

## 2022-07-08 PROCEDURE — 3072F LOW RISK FOR RETINOPATHY: CPT | Mod: CPTII,S$GLB,, | Performed by: NURSE PRACTITIONER

## 2022-07-08 PROCEDURE — 99397 PER PM REEVAL EST PAT 65+ YR: CPT | Mod: S$GLB,,, | Performed by: NURSE PRACTITIONER

## 2022-07-08 PROCEDURE — 1159F PR MEDICATION LIST DOCUMENTED IN MEDICAL RECORD: ICD-10-PCS | Mod: CPTII,S$GLB,, | Performed by: NURSE PRACTITIONER

## 2022-07-08 PROCEDURE — 36415 COLL VENOUS BLD VENIPUNCTURE: CPT | Mod: PO | Performed by: INTERNAL MEDICINE

## 2022-07-08 PROCEDURE — 99397 PR PREVENTIVE VISIT,EST,65 & OVER: ICD-10-PCS | Mod: S$GLB,,, | Performed by: NURSE PRACTITIONER

## 2022-07-08 PROCEDURE — 3288F PR FALLS RISK ASSESSMENT DOCUMENTED: ICD-10-PCS | Mod: CPTII,S$GLB,, | Performed by: NURSE PRACTITIONER

## 2022-07-08 PROCEDURE — 84153 ASSAY OF PSA TOTAL: CPT | Performed by: INTERNAL MEDICINE

## 2022-07-08 PROCEDURE — 3072F PR LOW RISK FOR RETINOPATHY: ICD-10-PCS | Mod: CPTII,S$GLB,, | Performed by: NURSE PRACTITIONER

## 2022-07-08 PROCEDURE — 4010F PR ACE/ARB THEARPY RXD/TAKEN: ICD-10-PCS | Mod: CPTII,S$GLB,, | Performed by: NURSE PRACTITIONER

## 2022-07-08 PROCEDURE — 1126F AMNT PAIN NOTED NONE PRSNT: CPT | Mod: CPTII,S$GLB,, | Performed by: NURSE PRACTITIONER

## 2022-07-08 PROCEDURE — 83036 HEMOGLOBIN GLYCOSYLATED A1C: CPT | Performed by: INTERNAL MEDICINE

## 2022-07-08 PROCEDURE — 80061 LIPID PANEL: CPT | Performed by: INTERNAL MEDICINE

## 2022-07-08 PROCEDURE — 1126F PR PAIN SEVERITY QUANTIFIED, NO PAIN PRESENT: ICD-10-PCS | Mod: CPTII,S$GLB,, | Performed by: NURSE PRACTITIONER

## 2022-07-08 PROCEDURE — 1159F MED LIST DOCD IN RCRD: CPT | Mod: CPTII,S$GLB,, | Performed by: NURSE PRACTITIONER

## 2022-07-08 PROCEDURE — 3008F BODY MASS INDEX DOCD: CPT | Mod: CPTII,S$GLB,, | Performed by: NURSE PRACTITIONER

## 2022-07-08 PROCEDURE — 3008F PR BODY MASS INDEX (BMI) DOCUMENTED: ICD-10-PCS | Mod: CPTII,S$GLB,, | Performed by: NURSE PRACTITIONER

## 2022-07-08 PROCEDURE — 1101F PR PT FALLS ASSESS DOC 0-1 FALLS W/OUT INJ PAST YR: ICD-10-PCS | Mod: CPTII,S$GLB,, | Performed by: NURSE PRACTITIONER

## 2022-07-08 PROCEDURE — 80053 COMPREHEN METABOLIC PANEL: CPT | Performed by: INTERNAL MEDICINE

## 2022-07-08 PROCEDURE — 3079F PR MOST RECENT DIASTOLIC BLOOD PRESSURE 80-89 MM HG: ICD-10-PCS | Mod: CPTII,S$GLB,, | Performed by: NURSE PRACTITIONER

## 2022-07-08 PROCEDURE — 1101F PT FALLS ASSESS-DOCD LE1/YR: CPT | Mod: CPTII,S$GLB,, | Performed by: NURSE PRACTITIONER

## 2022-07-08 PROCEDURE — 99999 PR PBB SHADOW E&M-EST. PATIENT-LVL V: ICD-10-PCS | Mod: PBBFAC,,, | Performed by: NURSE PRACTITIONER

## 2022-07-08 PROCEDURE — 3288F FALL RISK ASSESSMENT DOCD: CPT | Mod: CPTII,S$GLB,, | Performed by: NURSE PRACTITIONER

## 2022-07-08 PROCEDURE — 3075F PR MOST RECENT SYSTOLIC BLOOD PRESS GE 130-139MM HG: ICD-10-PCS | Mod: CPTII,S$GLB,, | Performed by: NURSE PRACTITIONER

## 2022-07-08 PROCEDURE — 3075F SYST BP GE 130 - 139MM HG: CPT | Mod: CPTII,S$GLB,, | Performed by: NURSE PRACTITIONER

## 2022-07-08 NOTE — PROGRESS NOTES
Subjective:       Patient ID: Santhosh Goff is a 67 y.o. male.    Chief Complaint: Annual Exam    Pt presents to clinic today for updated follow-up on his diabetes, hypertension, hyperlipidemia, sleep apnea and morbid obesity.  In regards to his diabetes, his last A1C was 7.9.  Due for updated labs.  He admits to not working on diet and exercise. His weight is up about 5 lbs in the last year.  He has not had any major issues with any significant lows.  No hypoglycemic symptoms.     Blood pressure and cholesterol have been stable over time.  He continues take his medications as prescribed.  No chest pain or palpitations no signs or symptoms of cardiac decompensation or noted.     His sleep apnea is was doing well with treatment.  He wears his device or nightly and does well with it.  He is not experiencing any significant problems from it.       In regards to his weight loss he is continue to focus that is is down overall.  Peak weight that was probably greater than 380 lb and he is now down to 344.  He is continuing to work on it and continue to try to do we can to lose weight.        /88   Pulse 66   Temp 98.2 °F (36.8 °C) (Temporal)   Ht 6' (1.829 m)   Wt (!) 156.3 kg (344 lb 9.3 oz)   SpO2 95%   BMI 46.73 kg/m²     Review of Systems   Constitutional: Negative for appetite change, chills, diaphoresis, fever and unexpected weight change.   HENT: Negative for congestion, ear pain, nosebleeds, postnasal drip, rhinorrhea, sinus pressure, sneezing, sore throat and trouble swallowing.    Eyes: Negative for photophobia, pain and visual disturbance.   Respiratory: Negative for apnea, cough, choking, chest tightness, shortness of breath and wheezing.    Cardiovascular: Negative for chest pain, palpitations and leg swelling.   Gastrointestinal: Negative for abdominal pain, blood in stool, constipation, diarrhea, nausea and vomiting.   Genitourinary: Negative for decreased urine volume, difficulty urinating,  dysuria, hematuria and urgency.   Musculoskeletal: Negative for arthralgias, gait problem, joint swelling and myalgias.   Skin: Negative for rash.   Neurological: Negative for dizziness, tremors, seizures, syncope, weakness, light-headedness, numbness and headaches.   Psychiatric/Behavioral: Negative for agitation, confusion, decreased concentration, hallucinations and sleep disturbance. The patient is not nervous/anxious.        Objective:      Physical Exam  Vitals and nursing note reviewed.   Constitutional:       General: He is not in acute distress.     Appearance: He is well-developed. He is not diaphoretic.   HENT:      Head: Normocephalic and atraumatic.   Eyes:      General:         Right eye: No discharge.         Left eye: No discharge.      Conjunctiva/sclera: Conjunctivae normal.   Cardiovascular:      Rate and Rhythm: Normal rate and regular rhythm.      Pulses:           Dorsalis pedis pulses are 1+ on the right side and 1+ on the left side.        Posterior tibial pulses are 1+ on the right side and 1+ on the left side.      Heart sounds: Normal heart sounds. No murmur heard.  Pulmonary:      Effort: Pulmonary effort is normal. No respiratory distress.      Breath sounds: Normal breath sounds. No wheezing or rales.   Chest:      Chest wall: No tenderness.   Abdominal:      General: There is no distension.      Palpations: Abdomen is soft.   Musculoskeletal:         General: Normal range of motion.      Right foot: Normal range of motion. No deformity.      Left foot: Normal range of motion. No deformity.   Feet:      Right foot:      Protective Sensation: 5 sites tested. 5 sites sensed.      Skin integrity: No skin breakdown or callus.      Toenail Condition: Right toenails are normal.      Left foot:      Protective Sensation: 5 sites tested. 5 sites sensed.      Skin integrity: No skin breakdown or callus.      Toenail Condition: Left toenails are normal.   Skin:     General: Skin is warm and dry.       Findings: No rash.   Neurological:      Mental Status: He is alert and oriented to person, place, and time.   Psychiatric:         Behavior: Behavior normal. Behavior is cooperative.         Thought Content: Thought content normal.         Judgment: Judgment normal.         Assessment:       1. Annual physical exam    2. Hyperlipidemia associated with type 2 diabetes mellitus    3. Type 2 diabetes mellitus without complication, without long-term current use of insulin    4. Hypertension associated with diabetes    5. ALIREZA treated with AVAPS    6. Morbid obesity with BMI of 45.0-49.9, adult        Plan:       Santhosh was seen today for annual exam.    Diagnoses and all orders for this visit:    Annual physical exam        -   for age discussed        - Diet and lifestyle discussed. Encouraged pt to focus on weight loss.    Hyperlipidemia associated with type 2 diabetes mellitus        - Stable- continue atorvastatin    Type 2 diabetes mellitus without complication, without long-term current use of insulin        - Due for updated labs. Obtain today        - Continue farxiga, amaryl and metformin    Hypertension associated with diabetes       - Stable, continue losartan    ALIREZA treated with AVAPS        - Pt wearing cpap nightly, benefiting from therapy.    Morbid obesity with BMI of 45.0-49.9, adult        - Diet modifications discussed    Follow up with Dr. RENDON in 6 months

## 2022-07-09 LAB
BASOPHILS # BLD AUTO: 0.03 K/UL (ref 0–0.2)
BASOPHILS NFR BLD: 0.4 % (ref 0–1.9)
COMPLEXED PSA SERPL-MCNC: 0.33 NG/ML (ref 0–4)
DIFFERENTIAL METHOD: NORMAL
EOSINOPHIL # BLD AUTO: 0.1 K/UL (ref 0–0.5)
EOSINOPHIL NFR BLD: 1.4 % (ref 0–8)
ERYTHROCYTE [DISTWIDTH] IN BLOOD BY AUTOMATED COUNT: 13.2 % (ref 11.5–14.5)
HCT VFR BLD AUTO: 45.7 % (ref 40–54)
HGB BLD-MCNC: 14.7 G/DL (ref 14–18)
IMM GRANULOCYTES # BLD AUTO: 0.02 K/UL (ref 0–0.04)
IMM GRANULOCYTES NFR BLD AUTO: 0.3 % (ref 0–0.5)
LYMPHOCYTES # BLD AUTO: 2.2 K/UL (ref 1–4.8)
LYMPHOCYTES NFR BLD: 30.2 % (ref 18–48)
MCH RBC QN AUTO: 30.9 PG (ref 27–31)
MCHC RBC AUTO-ENTMCNC: 32.2 G/DL (ref 32–36)
MCV RBC AUTO: 96 FL (ref 82–98)
MONOCYTES # BLD AUTO: 0.6 K/UL (ref 0.3–1)
MONOCYTES NFR BLD: 8.2 % (ref 4–15)
NEUTROPHILS # BLD AUTO: 4.3 K/UL (ref 1.8–7.7)
NEUTROPHILS NFR BLD: 59.5 % (ref 38–73)
NRBC BLD-RTO: 0 /100 WBC
PLATELET # BLD AUTO: 258 K/UL (ref 150–450)
PMV BLD AUTO: 10.3 FL (ref 9.2–12.9)
RBC # BLD AUTO: 4.76 M/UL (ref 4.6–6.2)
WBC # BLD AUTO: 7.18 K/UL (ref 3.9–12.7)

## 2022-09-19 DIAGNOSIS — E11.9 TYPE 2 DIABETES MELLITUS WITHOUT COMPLICATION, WITHOUT LONG-TERM CURRENT USE OF INSULIN: ICD-10-CM

## 2022-09-19 RX ORDER — METFORMIN HYDROCHLORIDE 1000 MG/1
TABLET ORAL
Qty: 180 TABLET | Refills: 0 | Status: SHIPPED | OUTPATIENT
Start: 2022-09-19 | End: 2022-12-27

## 2022-09-19 NOTE — TELEPHONE ENCOUNTER
No new care gaps identified.  [facility] Embedded Care Gaps. Reference number: [id number]. 9/19/2022   9:02:15 AM CDT

## 2022-11-08 ENCOUNTER — OFFICE VISIT (OUTPATIENT)
Dept: OPHTHALMOLOGY | Facility: CLINIC | Age: 67
End: 2022-11-08
Payer: COMMERCIAL

## 2022-11-08 ENCOUNTER — PATIENT MESSAGE (OUTPATIENT)
Dept: OPHTHALMOLOGY | Facility: CLINIC | Age: 67
End: 2022-11-08

## 2022-11-08 DIAGNOSIS — H52.4 BILATERAL PRESBYOPIA: ICD-10-CM

## 2022-11-08 DIAGNOSIS — E11.36 DIABETIC CATARACT: ICD-10-CM

## 2022-11-08 DIAGNOSIS — E11.9 TYPE 2 DIABETES MELLITUS WITHOUT COMPLICATION, WITHOUT LONG-TERM CURRENT USE OF INSULIN: Primary | ICD-10-CM

## 2022-11-08 DIAGNOSIS — H52.03 HYPEROPIA, BILATERAL: ICD-10-CM

## 2022-11-08 PROCEDURE — 99999 PR PBB SHADOW E&M-EST. PATIENT-LVL III: ICD-10-PCS | Mod: PBBFAC,,, | Performed by: OPTOMETRIST

## 2022-11-08 PROCEDURE — 1159F MED LIST DOCD IN RCRD: CPT | Mod: CPTII,S$GLB,, | Performed by: OPTOMETRIST

## 2022-11-08 PROCEDURE — 92014 PR EYE EXAM, EST PATIENT,COMPREHESV: ICD-10-PCS | Mod: S$GLB,,, | Performed by: OPTOMETRIST

## 2022-11-08 PROCEDURE — 1159F PR MEDICATION LIST DOCUMENTED IN MEDICAL RECORD: ICD-10-PCS | Mod: CPTII,S$GLB,, | Performed by: OPTOMETRIST

## 2022-11-08 PROCEDURE — 92015 DETERMINE REFRACTIVE STATE: CPT | Mod: S$GLB,,, | Performed by: OPTOMETRIST

## 2022-11-08 PROCEDURE — 4010F ACE/ARB THERAPY RXD/TAKEN: CPT | Mod: CPTII,S$GLB,, | Performed by: OPTOMETRIST

## 2022-11-08 PROCEDURE — 92015 PR REFRACTION: ICD-10-PCS | Mod: S$GLB,,, | Performed by: OPTOMETRIST

## 2022-11-08 PROCEDURE — 3052F PR MOST RECENT HEMOGLOBIN A1C LEVEL 8.0 - < 9.0%: ICD-10-PCS | Mod: CPTII,S$GLB,, | Performed by: OPTOMETRIST

## 2022-11-08 PROCEDURE — 99999 PR PBB SHADOW E&M-EST. PATIENT-LVL III: CPT | Mod: PBBFAC,,, | Performed by: OPTOMETRIST

## 2022-11-08 PROCEDURE — 92014 COMPRE OPH EXAM EST PT 1/>: CPT | Mod: S$GLB,,, | Performed by: OPTOMETRIST

## 2022-11-08 PROCEDURE — 3052F HG A1C>EQUAL 8.0%<EQUAL 9.0%: CPT | Mod: CPTII,S$GLB,, | Performed by: OPTOMETRIST

## 2022-11-08 PROCEDURE — 2023F DILAT RTA XM W/O RTNOPTHY: CPT | Mod: CPTII,S$GLB,, | Performed by: OPTOMETRIST

## 2022-11-08 PROCEDURE — 4010F PR ACE/ARB THEARPY RXD/TAKEN: ICD-10-PCS | Mod: CPTII,S$GLB,, | Performed by: OPTOMETRIST

## 2022-11-08 PROCEDURE — 2023F PR DILATED RETINAL EXAM W/O EVID OF RETINOPATHY: ICD-10-PCS | Mod: CPTII,S$GLB,, | Performed by: OPTOMETRIST

## 2022-11-08 NOTE — PROGRESS NOTES
HPI     Diabetic Eye Exam     Additional comments: No change since last visit           Comments    Lab Results       Component                Value               Date                       HGBA1C                   8.1 (H)             07/08/2022             Blood sugar unsure          Last edited by Dorina Pavon on 11/8/2022 10:26 AM.            Assessment /Plan     For exam results, see Encounter Report.    Type 2 diabetes mellitus without complication, without long-term current use of insulin    Diabetic cataract    Hyperopia, bilateral    Bilateral presbyopia      No Background Diabetic Retinopathy    Moderate cataracts OU, not surgical    Dispense Final Rx for glasses.  RTC 1 year  Discussed above and answered questions.

## 2022-12-05 ENCOUNTER — OFFICE VISIT (OUTPATIENT)
Dept: PULMONOLOGY | Facility: CLINIC | Age: 67
End: 2022-12-05
Payer: COMMERCIAL

## 2022-12-05 VITALS
WEIGHT: 315 LBS | RESPIRATION RATE: 18 BRPM | OXYGEN SATURATION: 95 % | HEART RATE: 68 BPM | SYSTOLIC BLOOD PRESSURE: 126 MMHG | HEIGHT: 72 IN | BODY MASS INDEX: 42.66 KG/M2 | DIASTOLIC BLOOD PRESSURE: 80 MMHG

## 2022-12-05 DIAGNOSIS — G47.33 OSA (OBSTRUCTIVE SLEEP APNEA): ICD-10-CM

## 2022-12-05 DIAGNOSIS — G47.33 OSA TREATED WITH BIPAP: Primary | ICD-10-CM

## 2022-12-05 DIAGNOSIS — J96.12 CHRONIC RESPIRATORY FAILURE WITH HYPOXIA AND HYPERCAPNIA: ICD-10-CM

## 2022-12-05 DIAGNOSIS — E66.2 CLASS 3 OBESITY WITH ALVEOLAR HYPOVENTILATION, SERIOUS COMORBIDITY, AND BODY MASS INDEX (BMI) OF 45.0 TO 49.9 IN ADULT: ICD-10-CM

## 2022-12-05 DIAGNOSIS — E66.01 MORBID OBESITY WITH BMI OF 45.0-49.9, ADULT: ICD-10-CM

## 2022-12-05 DIAGNOSIS — J96.11 CHRONIC RESPIRATORY FAILURE WITH HYPOXIA AND HYPERCAPNIA: ICD-10-CM

## 2022-12-05 PROCEDURE — 3052F HG A1C>EQUAL 8.0%<EQUAL 9.0%: CPT | Mod: CPTII,S$GLB,, | Performed by: INTERNAL MEDICINE

## 2022-12-05 PROCEDURE — 1101F PR PT FALLS ASSESS DOC 0-1 FALLS W/OUT INJ PAST YR: ICD-10-PCS | Mod: CPTII,S$GLB,, | Performed by: INTERNAL MEDICINE

## 2022-12-05 PROCEDURE — 3008F PR BODY MASS INDEX (BMI) DOCUMENTED: ICD-10-PCS | Mod: CPTII,S$GLB,, | Performed by: INTERNAL MEDICINE

## 2022-12-05 PROCEDURE — 99214 OFFICE O/P EST MOD 30 MIN: CPT | Mod: S$GLB,,, | Performed by: INTERNAL MEDICINE

## 2022-12-05 PROCEDURE — 99214 PR OFFICE/OUTPT VISIT, EST, LEVL IV, 30-39 MIN: ICD-10-PCS | Mod: S$GLB,,, | Performed by: INTERNAL MEDICINE

## 2022-12-05 PROCEDURE — 1159F PR MEDICATION LIST DOCUMENTED IN MEDICAL RECORD: ICD-10-PCS | Mod: CPTII,S$GLB,, | Performed by: INTERNAL MEDICINE

## 2022-12-05 PROCEDURE — 3288F PR FALLS RISK ASSESSMENT DOCUMENTED: ICD-10-PCS | Mod: CPTII,S$GLB,, | Performed by: INTERNAL MEDICINE

## 2022-12-05 PROCEDURE — 3072F LOW RISK FOR RETINOPATHY: CPT | Mod: CPTII,S$GLB,, | Performed by: INTERNAL MEDICINE

## 2022-12-05 PROCEDURE — 4010F ACE/ARB THERAPY RXD/TAKEN: CPT | Mod: CPTII,S$GLB,, | Performed by: INTERNAL MEDICINE

## 2022-12-05 PROCEDURE — 3074F PR MOST RECENT SYSTOLIC BLOOD PRESSURE < 130 MM HG: ICD-10-PCS | Mod: CPTII,S$GLB,, | Performed by: INTERNAL MEDICINE

## 2022-12-05 PROCEDURE — 99999 PR PBB SHADOW E&M-EST. PATIENT-LVL V: CPT | Mod: PBBFAC,,, | Performed by: INTERNAL MEDICINE

## 2022-12-05 PROCEDURE — 1101F PT FALLS ASSESS-DOCD LE1/YR: CPT | Mod: CPTII,S$GLB,, | Performed by: INTERNAL MEDICINE

## 2022-12-05 PROCEDURE — 4010F PR ACE/ARB THEARPY RXD/TAKEN: ICD-10-PCS | Mod: CPTII,S$GLB,, | Performed by: INTERNAL MEDICINE

## 2022-12-05 PROCEDURE — 3079F PR MOST RECENT DIASTOLIC BLOOD PRESSURE 80-89 MM HG: ICD-10-PCS | Mod: CPTII,S$GLB,, | Performed by: INTERNAL MEDICINE

## 2022-12-05 PROCEDURE — 3074F SYST BP LT 130 MM HG: CPT | Mod: CPTII,S$GLB,, | Performed by: INTERNAL MEDICINE

## 2022-12-05 PROCEDURE — 3008F BODY MASS INDEX DOCD: CPT | Mod: CPTII,S$GLB,, | Performed by: INTERNAL MEDICINE

## 2022-12-05 PROCEDURE — 3079F DIAST BP 80-89 MM HG: CPT | Mod: CPTII,S$GLB,, | Performed by: INTERNAL MEDICINE

## 2022-12-05 PROCEDURE — 1159F MED LIST DOCD IN RCRD: CPT | Mod: CPTII,S$GLB,, | Performed by: INTERNAL MEDICINE

## 2022-12-05 PROCEDURE — 3288F FALL RISK ASSESSMENT DOCD: CPT | Mod: CPTII,S$GLB,, | Performed by: INTERNAL MEDICINE

## 2022-12-05 PROCEDURE — 3052F PR MOST RECENT HEMOGLOBIN A1C LEVEL 8.0 - < 9.0%: ICD-10-PCS | Mod: CPTII,S$GLB,, | Performed by: INTERNAL MEDICINE

## 2022-12-05 PROCEDURE — 3072F PR LOW RISK FOR RETINOPATHY: ICD-10-PCS | Mod: CPTII,S$GLB,, | Performed by: INTERNAL MEDICINE

## 2022-12-05 PROCEDURE — 99999 PR PBB SHADOW E&M-EST. PATIENT-LVL V: ICD-10-PCS | Mod: PBBFAC,,, | Performed by: INTERNAL MEDICINE

## 2022-12-05 NOTE — PROGRESS NOTES
Subjective:     Patient ID: Santhosh Goff is a 67 y.o. male.    Chief Complaint:  Follow up for Obstructive Sleep Apnea and nocturnal ventilatiorn    HPI   He is on Trilogy ventilator at home for chronic resp failure and Obstructive Sleep Apnea   AVAPS - AE    Obstructive Sleep Apnea on Continuous Positive Airway Pressure:  Patient is using ventialtor as prescribed and benefiting from therapy. 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     Post COVID follow up:  Hx of COVID 19 pneumonia July 19, 2021, antibody infusion - no long term side effects      Past Medical History:   Diagnosis Date    Arthritis     Diabetes mellitus, type 2 2014     x 1 week ago 10/10/2018    Hyperlipidemia     Hypertension     Morbid obesity with BMI of 45.0-49.9, adult     Staphylococcus aureus infection, multiple-resistant (MRSA) 2009     Past Surgical History:   Procedure Laterality Date    COLONOSCOPY N/A 2/24/2020    Procedure: COLONOSCOPY;  Surgeon: Bela Vaughn MD;  Location: [facility];  Service: Endoscopy;  Laterality: N/A;    LUNG SURGERY      RIB FX       Review of patient's allergies indicates:   Allergen Reactions    Cephalosporins Nausea And Vomiting     Current Outpatient Medications on File Prior to Visit   Medication Sig Dispense Refill    aspirin (ECOTRIN) 81 MG EC tablet Take 81 mg by mouth once daily.      atorvastatin (LIPITOR) 40 MG tablet Take 1 tablet by mouth once daily 90 tablet 0    FARXIGA 5 mg Tab tablet Take 1 tablet by mouth once daily 90 tablet 0    furosemide (LASIX) 20 MG tablet Take 1 tablet (20 mg total) by mouth once daily. 90 tablet 4    glimepiride (AMARYL) 4 MG tablet Take 1 tablet by mouth once daily with breakfast 90 tablet 0    KLOR-CON M20 20 mEq tablet Take 1 tablet by mouth once daily 90 tablet 0    losartan (COZAAR) 25 MG tablet Take 1 tablet by mouth once daily 90 tablet 3    metFORMIN (GLUCOPHAGE) 1000  MG tablet TAKE 1 TABLET BY MOUTH TWICE DAILY WITH MEALS 180 tablet 0    multivit-min/ferrous fumarate (MULTI VITAMIN ORAL)       [DISCONTINUED] glimepiride (AMARYL) 4 MG tablet Take 1 tablet by mouth once daily with breakfast 90 tablet 3     Current Facility-Administered Medications on File Prior to Visit   Medication Dose Route Frequency Provider Last Rate Last Admin    acetaminophen tablet 1,000 mg  1,000 mg Oral 1 time in Clinic/HOD Samina Diggs NP        acetaminophen tablet 650 mg  650 mg Oral Once PRN Baudilio Bryant MD        albuterol inhaler 2 puff  2 puff Inhalation Q20 Min PRN Baudilio Bryant MD        diphenhydrAMINE injection 25 mg  25 mg Intravenous Once PRN Baudilio Bryant MD        EPINEPHrine (EPIPEN) 0.3 mg/0.3 mL pen injection 0.3 mg  0.3 mg Intramuscular PRN Baudilio Bryant MD        methylPREDNISolone sodium succinate injection 40 mg  40 mg Intravenous Once PRN Baudilio Bryant MD        ondansetron disintegrating tablet 4 mg  4 mg Oral Once PRN Buadilio Bryant MD        sodium chloride 0.9% 500 mL flush bag   Intravenous PRN Baudilio Bryant MD   Stopped at 08/20/21 1450    sodium chloride 0.9% flush 10 mL  10 mL Intravenous PRN Baudilio Bryant MD         Social History     Socioeconomic History    Marital status:     Number of children: 3   Occupational History    Occupation:      Employer: [employer]   Tobacco Use    Smoking status: Never    Smokeless tobacco: Never   Substance and Sexual Activity    Alcohol use: No    Drug use: No    Sexual activity: Yes     Partners: Female     Social Determinants of Health     Financial Resource Strain: Low Risk     Difficulty of Paying Living Expenses: Not hard at all   Food Insecurity: No Food Insecurity    Worried About Running Out of Food in the Last Year: Never true    Ran Out of Food in the Last Year: Never true   Transportation Needs: No Transportation Needs    Lack of  Transportation (Medical): No    Lack of Transportation (Non-Medical): No   Physical Activity: Insufficiently Active    Days of Exercise per Week: 3 days    Minutes of Exercise per Session: 20 min   Stress: No Stress Concern Present    Feeling of Stress : Not at all   Social Connections: Unknown    Frequency of Communication with Friends and Family: More than three times a week    Frequency of Social Gatherings with Friends and Family: Patient refused    Active Member of Clubs or Organizations: Yes    Attends Club or Organization Meetings: 1 to 4 times per year    Marital Status:    Housing Stability: Low Risk     Unable to Pay for Housing in the Last Year: No    Number of Places Lived in the Last Year: 1    Unstable Housing in the Last Year: No     Family History   Problem Relation Age of Onset    Early death Father        Review of Systems   Constitutional:  Positive for fatigue.   HENT: Negative.     Respiratory:  Positive for apnea.    Cardiovascular: Negative.    Genitourinary: Negative.    Endocrine: endocrine negative    Musculoskeletal: Negative.    Skin: Negative.    Gastrointestinal: Negative.    Neurological: Negative.    Psychiatric/Behavioral:  Positive for sleep disturbance.      Objective:      /80   Pulse 68   Resp 18   Ht 6' (1.829 m)   Wt (!) 153.6 kg (338 lb 10 oz)   SpO2 95%   BMI 45.93 kg/m²   Physical Exam  Vitals and nursing note reviewed.   Constitutional:       Appearance: He is well-developed. He is obese.   HENT:      Head: Normocephalic and atraumatic.      Nose: Nose normal.   Eyes:      Conjunctiva/sclera: Conjunctivae normal.      Pupils: Pupils are equal, round, and reactive to light.   Neck:      Thyroid: No thyromegaly.      Vascular: No JVD.      Trachea: No tracheal deviation.   Cardiovascular:      Rate and Rhythm: Normal rate and regular rhythm.      Heart sounds: Normal heart sounds.   Pulmonary:      Effort: Pulmonary effort is normal.      Breath sounds:  Normal breath sounds.   Abdominal:      Palpations: Abdomen is soft.   Musculoskeletal:         General: Normal range of motion.      Cervical back: Neck supple.   Lymphadenopathy:      Cervical: No cervical adenopathy.   Skin:     General: Skin is warm and dry.   Neurological:      Mental Status: He is alert and oriented to person, place, and time.     Personal Diagnostic Review  PSG: significant for Obstructive Sleep Apnea      Pulmonary Studies Review 12/5/2022   SpO2 95   Ordering Provider -   Performing nurse/tech/RT -   Diagnosis -   Height 72   Weight 5418.02   BMI (Calculated) 45.9   Predicted Distance 257.58   Patient Race -   6MWT Status -   Patient Reported -   Was O2 used? -   Delivery Method -   6MW Distance walked (feet) -   Distance walked (meters) -   Did patient stop? -   How many times? -   Stop Time 1 -   Restart Time 1 -   Did patient restart? -   Oxygen Saturation -   Supplemental Oxygen -   Heart Rate -   Blood Pressure -   Mikaela Dyspnea Rating  -   Oxygen Saturation -   Supplemental Oxygen -   Heart Rate -   Blood Pressure -   Mikaela Dyspnea Rating  -   Recovery Time (seconds) -   Oxygen Saturation -   Supplemental Oxygen -   Heart Rate -   Blood Pressure -   Mikaela Dyspnea Rating  -   Is procedure ready for interpretation? -   Predicted Distance Meters (Calculated) 463.86       XR CHEST PA AND LATERAL  Narrative: EXAMINATION:  XR CHEST PA AND LATERAL    CLINICAL HISTORY:  COVID-19    TECHNIQUE:  PA and lateral views of the chest were performed.    COMPARISON:  02/09/2019.    FINDINGS:  The trachea is unremarkable.  The cardiomediastinal silhouette is enlarged.  There is no evidence of free air beneath the hemidiaphragms.  There are no pleural effusions.  There is no evidence of a pneumothorax.  There is no evidence of pneumomediastinum.  There are bibasilar ground-glass airspace opacities.  The osseous structures demonstrate degenerative changes.  Impression: Bibasilar ground-glass airspace  opacities, suggestive of pneumonitis.    Electronically signed by: Billy Lester MD  Date:    08/19/2021  Time:    19:57      Office Spirometry Results:     No flowsheet data found.  Pulmonary Studies Review 12/5/2022   SpO2 95   Ordering Provider -   Performing nurse/tech/RT -   Diagnosis -   Height 72   Weight 5418.02   BMI (Calculated) 45.9   Predicted Distance 257.58   Patient Race -   6MWT Status -   Patient Reported -   Was O2 used? -   Delivery Method -   6MW Distance walked (feet) -   Distance walked (meters) -   Did patient stop? -   How many times? -   Stop Time 1 -   Restart Time 1 -   Did patient restart? -   Oxygen Saturation -   Supplemental Oxygen -   Heart Rate -   Blood Pressure -   Mikaela Dyspnea Rating  -   Oxygen Saturation -   Supplemental Oxygen -   Heart Rate -   Blood Pressure -   Mikaela Dyspnea Rating  -   Recovery Time (seconds) -   Oxygen Saturation -   Supplemental Oxygen -   Heart Rate -   Blood Pressure -   Mikaela Dyspnea Rating  -   Is procedure ready for interpretation? -   Predicted Distance Meters (Calculated) 463.86         Assessment:            ALIREZA treated with AVAPS  -     CPAP/BIPAP SUPPLIES    Morbid obesity with BMI of 45.0-49.9, adult    ALIREZA (obstructive sleep apnea)    Chronic respiratory failure with hypoxia and hypercapnia  -     CPAP/BIPAP SUPPLIES  -     Complete PFT with bronchodilator; Future; Expected date: 12/05/2022  -     Six Minute Walk Test to qualify for Home Oxygen; Future  -     X-Ray Chest PA And Lateral; Future; Expected date: 12/05/2022    Class 3 obesity with alveolar hypoventilation, serious comorbidity, and body mass index (BMI) of 45.0 to 49.9 in adult  -     X-Ray Chest PA And Lateral; Future; Expected date: 12/05/2022        Outpatient Encounter Medications as of 12/5/2022   Medication Sig Dispense Refill    aspirin (ECOTRIN) 81 MG EC tablet Take 81 mg by mouth once daily.      atorvastatin (LIPITOR) 40 MG tablet Take 1 tablet by mouth once daily 90 tablet  0    FARXIGA 5 mg Tab tablet Take 1 tablet by mouth once daily 90 tablet 0    furosemide (LASIX) 20 MG tablet Take 1 tablet (20 mg total) by mouth once daily. 90 tablet 4    glimepiride (AMARYL) 4 MG tablet Take 1 tablet by mouth once daily with breakfast 90 tablet 0    KLOR-CON M20 20 mEq tablet Take 1 tablet by mouth once daily 90 tablet 0    losartan (COZAAR) 25 MG tablet Take 1 tablet by mouth once daily 90 tablet 3    metFORMIN (GLUCOPHAGE) 1000 MG tablet TAKE 1 TABLET BY MOUTH TWICE DAILY WITH MEALS 180 tablet 0    multivit-min/ferrous fumarate (MULTI VITAMIN ORAL)       [DISCONTINUED] glimepiride (AMARYL) 4 MG tablet Take 1 tablet by mouth once daily with breakfast 90 tablet 3     Facility-Administered Encounter Medications as of 12/5/2022   Medication Dose Route Frequency Provider Last Rate Last Admin    acetaminophen tablet 1,000 mg  1,000 mg Oral 1 time in Clinic/HOD Samina Diggs NP        acetaminophen tablet 650 mg  650 mg Oral Once PRN Baudilio Bryant MD        albuterol inhaler 2 puff  2 puff Inhalation Q20 Min PRN Baudilio Bryant MD        diphenhydrAMINE injection 25 mg  25 mg Intravenous Once PRN Baudilio Bryant MD        EPINEPHrine (EPIPEN) 0.3 mg/0.3 mL pen injection 0.3 mg  0.3 mg Intramuscular PRN Baudilio Bryant MD        methylPREDNISolone sodium succinate injection 40 mg  40 mg Intravenous Once PRN Baudilio Bryant MD        ondansetron disintegrating tablet 4 mg  4 mg Oral Once PRN Baudilio Bryant MD        sodium chloride 0.9% 500 mL flush bag   Intravenous PRN Baudilio Bryant MD   Stopped at 08/20/21 1450    sodium chloride 0.9% flush 10 mL  10 mL Intravenous PRN Baudilio Bryant MD         Plan:       Requested Prescriptions      No prescriptions requested or ordered in this encounter     Problem List Items Addressed This Visit       Hypoventilation associated with obesity (Chronic)    Relevant Orders    X-Ray Chest PA And Lateral    Morbid obesity with BMI of  45.0-49.9, adult    ALIREZA treated with AVAPS - Primary    Overview     durable medical equipment provider:  Mariah Caceres  [facility]  [phone]  For CDL          Relevant Orders    CPAP/BIPAP SUPPLIES     Other Visit Diagnoses       ALIREZA (obstructive sleep apnea)        Chronic respiratory failure with hypoxia and hypercapnia        Relevant Orders    CPAP/BIPAP SUPPLIES    Complete PFT with bronchodilator    Six Minute Walk Test to qualify for Home Oxygen    X-Ray Chest PA And Lateral             Follow up in about 1 year (around 12/5/2023) for Review progress, 6 min walk - on return, PFT on return, Review CXR - on return.    MEDICAL DECISION MAKING: Moderate to high complexity.  Overall, the multiple problems listed are of moderate to high severity that may impact quality of life and activities of daily living. Side effects of medications, treatment plan as well as options and alternatives reviewed and discussed with patient. There was counseling of patient concerning these issues.    Total time spent in counseling and coordination of care - 30  minutes of total time spent on the encounter, which includes face to face time and non-face to face time preparing to see the patient (eg, review of tests), Obtaining and/or reviewing separately obtained history, Documenting clinical information in the electronic or other health record, Independently interpreting results (not separately reported) and communicating results to the patient/family/caregiver, or Care coordination (not separately reported).    Time was used in discussion of prognosis, risks, benefits of treatment, instructions and compliance with regimen . Discussion with other physicians and/or health care providers - home health or for use of durable medical equipment (oxygen, nebulizers, CPAP, BiPAP) occurred.

## 2022-12-05 NOTE — PATIENT INSTRUCTIONS
Inspire is an alternative to CPAP that works inside your body while you sleep. Its a small device placed during a same-day, outpatient procedure.  When youre ready for bed, simply click the remote to turn Inspire on. While you sleep, Inspire opens your airway, allowing you to breathe normally and sleep peacefully.    When patients with moderate to severe ALIREZA do not respond to conservative treatments or cannot tolerate CPAP therapy, the ear, nose and throat specialists at [facility] may recommend Inspire surgery as a treatment option.    Also known as hypoglossal nerve stimulation therapy, Inspire involves a surgery in which a device is implanted in the neck and upper chest just below the collarbone (clavicle). The device stimulates the hypoglossal nerve, which controls the muscles of the tongue. While you are sleeping, a sensing lead monitors your breathing, and it delivers mild stimulation to the hypoglossal nerve, causing the tongue to move and open the airway with each breath.    This FDA-approved implantable upper airway stimulation device functions like a pacemaker and stabilizes a patient's throat during sleep in order to prevent obstruction. The device consists of three components: a programmable neuro-stimulator located in a chest pocket, a pressure sensing lead that detects patient's breathing, and a stimulator lead that delivers mild stimulation to the tongue nerve. The stimulator is controlled via the patients handheld remote control. Patients turn on the device before going to bed to gently stimulate the throat muscles while sleeping.    Inspire surgery is generally performed as an outpatient surgery, although rarely patients may require a one-night stay in the hospital for monitoring. The operation itself usually takes about two hours and recovery time is typically shorter than with other types of sleep surgery.    Side effects are usually minimal and may include:  Pain and/or swelling at  the incision site, which is usually mild and temporary  Tongue weakness/soreness, which improves over time  Most patients are able to return to their normal activities after a few days. Approximately one month after implantation, patients will meet with their physician to establish their personal stimulation settings and learn how to use the Inspire sleep remote.        Qualifications:  [] You have moderate to severe ALIREZA with a diagnostic sleep study in the past 2 years.   [] You tried CPAP therapy and it didn't work.  [] You do not have significant trouble falling asleep.  [] You have a body mass index (BMI) of 32-35 or less.    If you meet these criteria a referral to an ENT specialist trained in placement of the INSPIRE device is indicated.  Contact:  Pierre Lara MD  [facility]  [address]  [place] [address] [phone]

## 2022-12-07 DIAGNOSIS — E11.9 TYPE 2 DIABETES MELLITUS WITHOUT COMPLICATION: ICD-10-CM

## 2022-12-16 ENCOUNTER — PATIENT MESSAGE (OUTPATIENT)
Dept: RESEARCH | Facility: HOSPITAL | Age: 67
End: 2022-12-16
Payer: COMMERCIAL

## 2023-02-14 RX ORDER — POTASSIUM CHLORIDE 20 MEQ/1
TABLET, EXTENDED RELEASE ORAL
Qty: 90 TABLET | Refills: 1 | Status: SHIPPED | OUTPATIENT
Start: 2023-02-14 | End: 2023-07-25

## 2023-02-14 NOTE — TELEPHONE ENCOUNTER
No new care gaps identified.  [facility] Embedded Care Gaps. Reference number: [id number]. 2/14/2023   10:01:14 AM CST

## 2023-03-02 DIAGNOSIS — E11.9 TYPE 2 DIABETES MELLITUS WITHOUT COMPLICATION, WITHOUT LONG-TERM CURRENT USE OF INSULIN: ICD-10-CM

## 2023-03-02 RX ORDER — GLIMEPIRIDE 4 MG/1
TABLET ORAL
Qty: 90 TABLET | Refills: 0 | OUTPATIENT
Start: 2023-03-02

## 2023-03-06 ENCOUNTER — TELEPHONE (OUTPATIENT)
Dept: INTERNAL MEDICINE | Facility: CLINIC | Age: 68
End: 2023-03-06
Payer: COMMERCIAL

## 2023-03-06 NOTE — TELEPHONE ENCOUNTER
----- Message from Jeanne López sent at 3/6/2023  2:14 PM CST -----  Contact: self [phone]  Patients wife called in this afternoon stating her  is out of glimepiride (AMARYL) 4 MG tablet. She also states that the refill request was denied by the doctor. Please call back to explain [phone]

## 2023-03-07 ENCOUNTER — TELEPHONE (OUTPATIENT)
Dept: INTERNAL MEDICINE | Facility: CLINIC | Age: 68
End: 2023-03-07
Payer: COMMERCIAL

## 2023-03-07 NOTE — TELEPHONE ENCOUNTER
----- Message from aGyle Crisostomo sent at 3/7/2023  7:57 AM CST -----  Regarding: Link Lab Orders  Pt is scheduled for blood work on tomorrow but no orders are linked. I wanted to see if orders could be linked.

## 2023-03-08 ENCOUNTER — LAB VISIT (OUTPATIENT)
Dept: LAB | Facility: HOSPITAL | Age: 68
End: 2023-03-08
Attending: INTERNAL MEDICINE
Payer: COMMERCIAL

## 2023-03-08 DIAGNOSIS — E11.9 TYPE 2 DIABETES MELLITUS WITHOUT COMPLICATION: ICD-10-CM

## 2023-03-08 DIAGNOSIS — E11.9 TYPE 2 DIABETES MELLITUS WITHOUT COMPLICATION, WITHOUT LONG-TERM CURRENT USE OF INSULIN: ICD-10-CM

## 2023-03-08 LAB
ALBUMIN SERPL BCP-MCNC: 3.7 G/DL (ref 3.5–5.2)
ALP SERPL-CCNC: 64 U/L (ref 55–135)
ALT SERPL W/O P-5'-P-CCNC: 49 U/L (ref 10–44)
ANION GAP SERPL CALC-SCNC: 8 MMOL/L (ref 8–16)
AST SERPL-CCNC: 26 U/L (ref 10–40)
BILIRUB SERPL-MCNC: 0.4 MG/DL (ref 0.1–1)
BUN SERPL-MCNC: 18 MG/DL (ref 8–23)
CALCIUM SERPL-MCNC: 10 MG/DL (ref 8.7–10.5)
CHLORIDE SERPL-SCNC: 103 MMOL/L (ref 95–110)
CO2 SERPL-SCNC: 26 MMOL/L (ref 23–29)
CREAT SERPL-MCNC: 1.1 MG/DL (ref 0.5–1.4)
EST. GFR  (NO RACE VARIABLE): >60 ML/MIN/1.73 M^2
ESTIMATED AVG GLUCOSE: 217 MG/DL (ref 68–131)
GLUCOSE SERPL-MCNC: 276 MG/DL (ref 70–110)
HBA1C MFR BLD: 9.2 % (ref 4–5.6)
POTASSIUM SERPL-SCNC: 4.5 MMOL/L (ref 3.5–5.1)
PROT SERPL-MCNC: 6.8 G/DL (ref 6–8.4)
SODIUM SERPL-SCNC: 137 MMOL/L (ref 136–145)

## 2023-03-08 PROCEDURE — 36415 COLL VENOUS BLD VENIPUNCTURE: CPT | Mod: PO | Performed by: INTERNAL MEDICINE

## 2023-03-08 PROCEDURE — 80053 COMPREHEN METABOLIC PANEL: CPT | Performed by: INTERNAL MEDICINE

## 2023-03-08 PROCEDURE — 83036 HEMOGLOBIN GLYCOSYLATED A1C: CPT | Performed by: INTERNAL MEDICINE

## 2023-03-08 NOTE — PROGRESS NOTES
Here are the results of your recent labs.  We will review them in detail at your follow up visit.    Sincerely,    Jacek Mohr M.D.        If you would like to review your experience with Dr. Mohr or [facility], please follow the link below:    [email or web address]

## 2023-03-13 ENCOUNTER — OFFICE VISIT (OUTPATIENT)
Dept: INTERNAL MEDICINE | Facility: CLINIC | Age: 68
End: 2023-03-13
Payer: COMMERCIAL

## 2023-03-13 VITALS
BODY MASS INDEX: 42.66 KG/M2 | HEIGHT: 72 IN | OXYGEN SATURATION: 98 % | DIASTOLIC BLOOD PRESSURE: 80 MMHG | HEART RATE: 64 BPM | TEMPERATURE: 97 F | SYSTOLIC BLOOD PRESSURE: 128 MMHG | WEIGHT: 315 LBS

## 2023-03-13 DIAGNOSIS — E66.01 MORBID OBESITY WITH BMI OF 40.0-44.9, ADULT: ICD-10-CM

## 2023-03-13 DIAGNOSIS — E11.9 TYPE 2 DIABETES MELLITUS WITHOUT COMPLICATION, WITHOUT LONG-TERM CURRENT USE OF INSULIN: Primary | ICD-10-CM

## 2023-03-13 DIAGNOSIS — I15.2 HYPERTENSION ASSOCIATED WITH DIABETES: ICD-10-CM

## 2023-03-13 DIAGNOSIS — E11.69 HYPERLIPIDEMIA ASSOCIATED WITH TYPE 2 DIABETES MELLITUS: ICD-10-CM

## 2023-03-13 DIAGNOSIS — G47.33 OSA TREATED WITH BIPAP: ICD-10-CM

## 2023-03-13 DIAGNOSIS — E78.5 HYPERLIPIDEMIA ASSOCIATED WITH TYPE 2 DIABETES MELLITUS: ICD-10-CM

## 2023-03-13 DIAGNOSIS — E11.59 HYPERTENSION ASSOCIATED WITH DIABETES: ICD-10-CM

## 2023-03-13 PROBLEM — Z12.11 ENCOUNTER FOR SCREENING COLONOSCOPY: Status: RESOLVED | Noted: 2020-02-24 | Resolved: 2023-03-13

## 2023-03-13 PROCEDURE — 3060F PR POS MICROALBUMINURIA RESULT DOCUMENTED/REVIEW: ICD-10-PCS | Mod: CPTII,S$GLB,, | Performed by: INTERNAL MEDICINE

## 2023-03-13 PROCEDURE — 3008F PR BODY MASS INDEX (BMI) DOCUMENTED: ICD-10-PCS | Mod: CPTII,S$GLB,, | Performed by: INTERNAL MEDICINE

## 2023-03-13 PROCEDURE — 3066F PR DOCUMENTATION OF TREATMENT FOR NEPHROPATHY: ICD-10-PCS | Mod: CPTII,S$GLB,, | Performed by: INTERNAL MEDICINE

## 2023-03-13 PROCEDURE — 3074F SYST BP LT 130 MM HG: CPT | Mod: CPTII,S$GLB,, | Performed by: INTERNAL MEDICINE

## 2023-03-13 PROCEDURE — 4010F PR ACE/ARB THEARPY RXD/TAKEN: ICD-10-PCS | Mod: CPTII,S$GLB,, | Performed by: INTERNAL MEDICINE

## 2023-03-13 PROCEDURE — 3079F DIAST BP 80-89 MM HG: CPT | Mod: CPTII,S$GLB,, | Performed by: INTERNAL MEDICINE

## 2023-03-13 PROCEDURE — 3288F PR FALLS RISK ASSESSMENT DOCUMENTED: ICD-10-PCS | Mod: CPTII,S$GLB,, | Performed by: INTERNAL MEDICINE

## 2023-03-13 PROCEDURE — 1159F MED LIST DOCD IN RCRD: CPT | Mod: CPTII,S$GLB,, | Performed by: INTERNAL MEDICINE

## 2023-03-13 PROCEDURE — 3079F PR MOST RECENT DIASTOLIC BLOOD PRESSURE 80-89 MM HG: ICD-10-PCS | Mod: CPTII,S$GLB,, | Performed by: INTERNAL MEDICINE

## 2023-03-13 PROCEDURE — 3060F POS MICROALBUMINURIA REV: CPT | Mod: CPTII,S$GLB,, | Performed by: INTERNAL MEDICINE

## 2023-03-13 PROCEDURE — 3072F PR LOW RISK FOR RETINOPATHY: ICD-10-PCS | Mod: CPTII,S$GLB,, | Performed by: INTERNAL MEDICINE

## 2023-03-13 PROCEDURE — 99999 PR PBB SHADOW E&M-EST. PATIENT-LVL IV: ICD-10-PCS | Mod: PBBFAC,,, | Performed by: INTERNAL MEDICINE

## 2023-03-13 PROCEDURE — 99999 PR PBB SHADOW E&M-EST. PATIENT-LVL IV: CPT | Mod: PBBFAC,,, | Performed by: INTERNAL MEDICINE

## 2023-03-13 PROCEDURE — 3008F BODY MASS INDEX DOCD: CPT | Mod: CPTII,S$GLB,, | Performed by: INTERNAL MEDICINE

## 2023-03-13 PROCEDURE — 3288F FALL RISK ASSESSMENT DOCD: CPT | Mod: CPTII,S$GLB,, | Performed by: INTERNAL MEDICINE

## 2023-03-13 PROCEDURE — 1159F PR MEDICATION LIST DOCUMENTED IN MEDICAL RECORD: ICD-10-PCS | Mod: CPTII,S$GLB,, | Performed by: INTERNAL MEDICINE

## 2023-03-13 PROCEDURE — 1101F PR PT FALLS ASSESS DOC 0-1 FALLS W/OUT INJ PAST YR: ICD-10-PCS | Mod: CPTII,S$GLB,, | Performed by: INTERNAL MEDICINE

## 2023-03-13 PROCEDURE — 99214 PR OFFICE/OUTPT VISIT, EST, LEVL IV, 30-39 MIN: ICD-10-PCS | Mod: S$GLB,,, | Performed by: INTERNAL MEDICINE

## 2023-03-13 PROCEDURE — 1101F PT FALLS ASSESS-DOCD LE1/YR: CPT | Mod: CPTII,S$GLB,, | Performed by: INTERNAL MEDICINE

## 2023-03-13 PROCEDURE — 4010F ACE/ARB THERAPY RXD/TAKEN: CPT | Mod: CPTII,S$GLB,, | Performed by: INTERNAL MEDICINE

## 2023-03-13 PROCEDURE — 3072F LOW RISK FOR RETINOPATHY: CPT | Mod: CPTII,S$GLB,, | Performed by: INTERNAL MEDICINE

## 2023-03-13 PROCEDURE — 3046F HEMOGLOBIN A1C LEVEL >9.0%: CPT | Mod: CPTII,S$GLB,, | Performed by: INTERNAL MEDICINE

## 2023-03-13 PROCEDURE — 3066F NEPHROPATHY DOC TX: CPT | Mod: CPTII,S$GLB,, | Performed by: INTERNAL MEDICINE

## 2023-03-13 PROCEDURE — 3074F PR MOST RECENT SYSTOLIC BLOOD PRESSURE < 130 MM HG: ICD-10-PCS | Mod: CPTII,S$GLB,, | Performed by: INTERNAL MEDICINE

## 2023-03-13 PROCEDURE — 99214 OFFICE O/P EST MOD 30 MIN: CPT | Mod: S$GLB,,, | Performed by: INTERNAL MEDICINE

## 2023-03-13 PROCEDURE — 3046F PR MOST RECENT HEMOGLOBIN A1C LEVEL > 9.0%: ICD-10-PCS | Mod: CPTII,S$GLB,, | Performed by: INTERNAL MEDICINE

## 2023-03-13 RX ORDER — ORAL SEMAGLUTIDE 7 MG/1
7 TABLET ORAL DAILY
Qty: 90 TABLET | Refills: 3 | Status: SHIPPED | OUTPATIENT
Start: 2023-03-13 | End: 2023-06-15 | Stop reason: DRUGHIGH

## 2023-03-13 RX ORDER — DAPAGLIFLOZIN 5 MG/1
5 TABLET, FILM COATED ORAL DAILY
Qty: 90 TABLET | Refills: 3 | Status: SHIPPED | OUTPATIENT
Start: 2023-03-13 | End: 2023-06-28 | Stop reason: SDUPTHER

## 2023-03-13 RX ORDER — METFORMIN HYDROCHLORIDE 1000 MG/1
1000 TABLET ORAL 2 TIMES DAILY WITH MEALS
Qty: 180 TABLET | Refills: 3 | Status: SHIPPED | OUTPATIENT
Start: 2023-03-13 | End: 2024-03-27

## 2023-03-13 RX ORDER — GLIMEPIRIDE 4 MG/1
4 TABLET ORAL DAILY
Qty: 90 TABLET | Refills: 3 | Status: SHIPPED | OUTPATIENT
Start: 2023-03-13 | End: 2024-03-01

## 2023-03-13 NOTE — PATIENT INSTRUCTIONS
Patient Education       Guide to Eating When You Have Diabetes   About this topic   This guide will help you control your blood sugar along with exercise and the drugs you are taking. You want to have a balanced amount of carbohydrates, fats, and protein in your meal. Following these diet guidelines may also help control your blood pressure and cholesterol.     What will the results be?   When you follow these diet guidelines, your blood sugar may be easier to keep within the goal blood sugar ranges. Ask your doctor for your goal blood sugar ranges.  What changes to diet are needed?   You need to balance how much carbohydrate, fat, and protein is in your meals. You also need to control the amount or portion size of the food you eat. Eat meals at about the same time every day. Do not skip a meal. Talk to your dietitian about making a personal meal plan for you.     Who should use this diet?   This diet is helpful to people with high blood sugar or diabetes.   What foods are good to eat?   Whole grains like:  1/3 cup (80 grams) brown rice  1/3 cup (80 grams) wild rice  1/3 cup (80 grams) whole wheat pasta  1 slice whole wheat or whole grain bread  3/4 cup (180 grams) high-fiber cereal  1/2 cup (120 grams) cooked oatmeal  1/2 (120 grams) English muffin  Fruits and vegetables like:  1/2 cup (120 grams) sweet potatoes  1/2 cup (120 grams) cooked vegetables, like squash, green beans, cauliflower, carrots, and cabbage  1 cup (240 grams) raw vegetables or salad greens  1 small apples or oranges  1/2 cup (120 grams) unsweetened fruit juice  Proteins like:  1 ounce (30 grams) lean beef or pork  1 ounce (30 grams) chicken, skin removed  1 ounce (30 grams) turkey, skin removed  1 ounce (30 grams) fish  1 ounce (30 grams) low-fat cheese or lunch meat  1/2 cup (120 grams) cooked beans ? black, kidney, chickpeas, or lentils  1 whole egg  What foods should be limited or avoided?   High fat or processed foods  like:  Zhu  Sausage  Hot dogs  Processed snacks  Fats and oils like:  Margarine  Salad dressings  Foods that are high in salt like:  Table salt  Salted breads, rolls, crackers  Commercially-prepared potatoes and vegetable mixes  Canned vegetables and juices  Canned soups  Smoked, cured, or salted meats  Starches that are not whole grain like:  White rice  White potatoes  French fries  Pasta  White bread  Sugary cereals  Instant oatmeal  Baked goods, pastries  Croissants  What can be done to prevent this health problem?   Type 1 diabetes is a lifelong problem and you cannot prevent it. Type 2 diabetes can be prevented or delayed with lifestyle changes. You can still lead a normal life. Diabetes can be managed through diet, exercise, and drugs. Family members and friends can help you practice good health behaviors.  When do I need to call the doctor?   Blood sugar level is above 240 for more than a day  Blood sugar level drops to less than 40  Abnormal urine test results  Trouble breathing  Very sleepy  Throw up more than once  Many loose stools  Questions about your diet plan  Helpful tips   Try to eat smaller portions of a healthy balanced diet throughout the day. Take time to plan your meals and snacks.  Where can I learn more?   American Diabetes Association  https://www.cdc.gov/diabetes/managing/eat-well/meal-plan-method.html   HelpGuide.org  http://www.helpguide.org/home-pages/healthy-eating.htm   HelpGuide.org  http://www.helpguide.org/articles/diet-weight-loss/diabetes-diet-and-food-tips.htm   Last Reviewed Date   2021-07-21  Consumer Information Use and Disclaimer   This information is not specific medical advice and does not replace information you receive from your health care provider. This is only a brief summary of general information. It does NOT include all information about conditions, illnesses, injuries, tests, procedures, treatments, therapies, discharge instructions or life-style choices that  may apply to you. You must talk with your health care provider for complete information about your health and treatment options. This information should not be used to decide whether or not to accept your health care providers advice, instructions or recommendations. Only your health care provider has the knowledge and training to provide advice that is right for you.   Copyright   Copyright © 2021 [employer] and its affiliates and/or licensors. All rights reserved.

## 2023-03-13 NOTE — PROGRESS NOTES
Subjective:       Patient ID: Santhosh Goff is a 68 y.o. male.    Chief Complaint: Annual Exam    HPI Patient is a 68-year-old male presenting today following up on his diabetes, hypertension, hyperlipidemia, sleep apnea, obesity.  He indicates he has been doing well since his last visit.  He indicates that he is taking his medication as prescribed.  Unfortunately in regards to his sugars his A1c remains elevated at 9.2.  We discussed further adjustments to medications at this time.  He is not interested in shots if it all he can avoid them.  We discussed an oral G LP 1 agonist and he is willing to try that prior to maybe trying a shot.      Blood pressure and cholesterol have been stable over time.  He continues take his medications as prescribed without any adverse effects.  There is no signs or symptoms any cardiac decompensation.      In regards to his sleep apnea he is stable at this time.  He is continuing to wear his CPAP.  Says generally he does pretty well but he does have some bad nights on occasion.      In regards to his weight he has been working hard on trying to lose some weight.  He has been limiting his carbohydrates in his sweets and he is had some improvement there but still there is some room for further work.    Review of Systems   Constitutional:  Negative for fever and unexpected weight change.   Respiratory:  Negative for cough, shortness of breath and wheezing.    Cardiovascular:  Negative for chest pain and palpitations.   Gastrointestinal:  Negative for constipation, diarrhea, nausea and vomiting.   Genitourinary:  Negative for dysuria and hematuria.     Objective:   /80   Pulse 64   Temp 97.2 °F (36.2 °C) (Temporal)   Ht 6' (1.829 m)   Wt (!) 150.4 kg (331 lb 9.2 oz)   SpO2 98%   BMI 44.97 kg/m²      Physical Exam  Vitals reviewed.   Constitutional:       Appearance: He is well-developed.   HENT:      Head: Normocephalic and atraumatic.      Right Ear: External ear normal.       Left Ear: External ear normal.   Eyes:      Pupils: Pupils are equal, round, and reactive to light.   Neck:      Thyroid: No thyromegaly.   Cardiovascular:      Rate and Rhythm: Normal rate and regular rhythm.      Heart sounds: Normal heart sounds. No murmur heard.    No friction rub. No gallop.   Pulmonary:      Effort: Pulmonary effort is normal.      Breath sounds: Normal breath sounds. No wheezing or rales.   Abdominal:      General: Bowel sounds are normal. There is no distension.      Palpations: Abdomen is soft.      Tenderness: There is no abdominal tenderness.   Musculoskeletal:      Cervical back: Neck supple.   Psychiatric:         Mood and Affect: Mood normal.       Lab Visit on 03/08/2023   Component Date Value    Sodium 03/08/2023 137     Potassium 03/08/2023 4.5     Chloride 03/08/2023 103     CO2 03/08/2023 26     Glucose 03/08/2023 276 (H)     BUN 03/08/2023 18     Creatinine 03/08/2023 1.1     Calcium 03/08/2023 10.0     Total Protein 03/08/2023 6.8     Albumin 03/08/2023 3.7     Total Bilirubin 03/08/2023 0.4     Alkaline Phosphatase 03/08/2023 64     AST 03/08/2023 26     ALT 03/08/2023 49 (H)     Anion Gap 03/08/2023 8     eGFR 03/08/2023 >60.0     Hemoglobin A1C 03/08/2023 9.2 (H)     Estimated Avg Glucose 03/08/2023 217 (H)    Lab Visit on 03/08/2023   Component Date Value    Microalbumin, Urine 03/08/2023 21.0     Creatinine, Urine 03/08/2023 48.0     Microalb/Creat Ratio 03/08/2023 43.8 (H)        Assessment:       1. Type 2 diabetes mellitus without complication, without long-term current use of insulin    2. Hypertension associated with diabetes    3. Hyperlipidemia associated with type 2 diabetes mellitus    4. ALIREZA treated with AVAPS    5. Morbid obesity with BMI of 40.0-44.9, adult          Plan:   Hypertension associated with diabetes  Blood pressure is under good control.  We will continue the current regimen.  Will work on regular aerobic exercise and a low salt diet.         Hyperlipidemia associated with type 2 diabetes mellitus  Cholesterol numbers look good.  We will continue the current regimen at this time.  Remain focused on low fat diet and high dietary fiber intake.      ALIREZA treated with AVAPS  Patient is compliant with use of cpap, using it the majority of nights.  Benefit from the use of the device is noted.    Type 2 diabetes mellitus without complication, without long-term current use of insulin  Comments:  a1c is not controlled at current regimen.  Continue meds, add rybelsus 7 mg once daily, a1c in 3 months  Orders:  -     metFORMIN (GLUCOPHAGE) 1000 MG tablet; Take 1 tablet (1,000 mg total) by mouth 2 (two) times daily with meals.  Dispense: 180 tablet; Refill: 3  -     glimepiride (AMARYL) 4 MG tablet; Take 1 tablet (4 mg total) by mouth once daily.  Dispense: 90 tablet; Refill: 3  -     dapagliflozin (FARXIGA) 5 mg Tab tablet; Take 1 tablet (5 mg total) by mouth once daily.  Dispense: 90 tablet; Refill: 3  -     semaglutide (RYBELSUS) 7 mg tablet; Take 1 tablet (7 mg total) by mouth once daily.  Dispense: 90 tablet; Refill: 3  -     Hemoglobin A1C; Future; Expected date: 06/11/2023    Hypertension associated with diabetes    Hyperlipidemia associated with type 2 diabetes mellitus    ALIREZA treated with AVAPS    Morbid obesity with BMI of 40.0-44.9, adult  Comments:  Continue working on limiting carbohydrates and participate in regular exercise          Follow up in about 3 months (around 6/13/2023) for DM, HTN, HLP, with Fern Najera.

## 2023-04-22 DIAGNOSIS — E78.5 HYPERLIPIDEMIA, UNSPECIFIED HYPERLIPIDEMIA TYPE: ICD-10-CM

## 2023-04-22 NOTE — TELEPHONE ENCOUNTER
Care Due:                  Date            Visit Type   Department     Provider  --------------------------------------------------------------------------------                                EP -                              PRIMARY      [facility] INTERNAL  Last Visit: 03-      CARE ([facility])   MEDICINE       Jacek Mohr  Next Visit: None Scheduled  None         None Found                                                            Last  Test          Frequency    Reason                     Performed    Due Date  --------------------------------------------------------------------------------    Lipid Panel.  12 months..  atorvastatin.............  07- 07-    [facility] Embedded Care Gaps. Reference number: [id number]. 4/22/2023   1:46:02 PM CDT

## 2023-04-23 RX ORDER — ATORVASTATIN CALCIUM 40 MG/1
TABLET, FILM COATED ORAL
Qty: 90 TABLET | Refills: 0 | Status: SHIPPED | OUTPATIENT
Start: 2023-04-23 | End: 2023-07-26

## 2023-04-23 NOTE — TELEPHONE ENCOUNTER
Refill Decision Note   Santhosh Goff  is requesting a refill authorization.  Brief Assessment and Rationale for Refill:  Approve     Medication Therapy Plan:       Medication Reconciliation Completed: No   Comments:     Provider Staff:     Action is required for this patient.   Please see care gap opportunities below in Care Due Message.     Thanks!  [facility]     Appointments      Date Provider   Last Visit   3/13/2023 Jacek Mohr MD   Next Visit   Visit date not found Jacek Mohr MD     Note composed:4:59 PM 04/23/2023           Note composed:4:59 PM 04/23/2023

## 2023-04-26 ENCOUNTER — PATIENT MESSAGE (OUTPATIENT)
Dept: INTERNAL MEDICINE | Facility: CLINIC | Age: 68
End: 2023-04-26
Payer: COMMERCIAL

## 2023-06-12 ENCOUNTER — LAB VISIT (OUTPATIENT)
Dept: LAB | Facility: HOSPITAL | Age: 68
End: 2023-06-12
Attending: INTERNAL MEDICINE
Payer: COMMERCIAL

## 2023-06-12 DIAGNOSIS — E11.9 TYPE 2 DIABETES MELLITUS WITHOUT COMPLICATION, WITHOUT LONG-TERM CURRENT USE OF INSULIN: ICD-10-CM

## 2023-06-12 LAB
ESTIMATED AVG GLUCOSE: 169 MG/DL (ref 68–131)
HBA1C MFR BLD: 7.5 % (ref 4–5.6)

## 2023-06-12 PROCEDURE — 36415 COLL VENOUS BLD VENIPUNCTURE: CPT | Mod: PO | Performed by: INTERNAL MEDICINE

## 2023-06-12 PROCEDURE — 83036 HEMOGLOBIN GLYCOSYLATED A1C: CPT | Performed by: INTERNAL MEDICINE

## 2023-06-13 NOTE — PROGRESS NOTES
Here are the results of your recent labs.  We will review them in detail at your follow up visit.    Sincerely,    Jacek Mohr M.D.        If you would like to review your experience with Dr. Mohr or [facility], please follow the link below:    [email or web address]

## 2023-06-15 ENCOUNTER — OFFICE VISIT (OUTPATIENT)
Dept: INTERNAL MEDICINE | Facility: CLINIC | Age: 68
End: 2023-06-15
Payer: COMMERCIAL

## 2023-06-15 DIAGNOSIS — E11.9 TYPE 2 DIABETES MELLITUS WITHOUT COMPLICATION, WITHOUT LONG-TERM CURRENT USE OF INSULIN: Primary | ICD-10-CM

## 2023-06-15 DIAGNOSIS — Z12.5 ENCOUNTER FOR SCREENING FOR MALIGNANT NEOPLASM OF PROSTATE: ICD-10-CM

## 2023-06-15 DIAGNOSIS — K63.5 POLYP OF COLON, UNSPECIFIED PART OF COLON, UNSPECIFIED TYPE: ICD-10-CM

## 2023-06-15 PROCEDURE — 3060F POS MICROALBUMINURIA REV: CPT | Mod: CPTII,95,, | Performed by: NURSE PRACTITIONER

## 2023-06-15 PROCEDURE — 3072F PR LOW RISK FOR RETINOPATHY: ICD-10-PCS | Mod: CPTII,95,, | Performed by: NURSE PRACTITIONER

## 2023-06-15 PROCEDURE — 4010F ACE/ARB THERAPY RXD/TAKEN: CPT | Mod: CPTII,95,, | Performed by: NURSE PRACTITIONER

## 2023-06-15 PROCEDURE — 1160F RVW MEDS BY RX/DR IN RCRD: CPT | Mod: CPTII,95,, | Performed by: NURSE PRACTITIONER

## 2023-06-15 PROCEDURE — 3051F PR MOST RECENT HEMOGLOBIN A1C LEVEL 7.0 - < 8.0%: ICD-10-PCS | Mod: CPTII,95,, | Performed by: NURSE PRACTITIONER

## 2023-06-15 PROCEDURE — 1159F MED LIST DOCD IN RCRD: CPT | Mod: CPTII,95,, | Performed by: NURSE PRACTITIONER

## 2023-06-15 PROCEDURE — 3072F LOW RISK FOR RETINOPATHY: CPT | Mod: CPTII,95,, | Performed by: NURSE PRACTITIONER

## 2023-06-15 PROCEDURE — 3060F PR POS MICROALBUMINURIA RESULT DOCUMENTED/REVIEW: ICD-10-PCS | Mod: CPTII,95,, | Performed by: NURSE PRACTITIONER

## 2023-06-15 PROCEDURE — 3066F PR DOCUMENTATION OF TREATMENT FOR NEPHROPATHY: ICD-10-PCS | Mod: CPTII,95,, | Performed by: NURSE PRACTITIONER

## 2023-06-15 PROCEDURE — 1160F PR REVIEW ALL MEDS BY PRESCRIBER/CLIN PHARMACIST DOCUMENTED: ICD-10-PCS | Mod: CPTII,95,, | Performed by: NURSE PRACTITIONER

## 2023-06-15 PROCEDURE — 99213 PR OFFICE/OUTPT VISIT, EST, LEVL III, 20-29 MIN: ICD-10-PCS | Mod: 95,,, | Performed by: NURSE PRACTITIONER

## 2023-06-15 PROCEDURE — 1159F PR MEDICATION LIST DOCUMENTED IN MEDICAL RECORD: ICD-10-PCS | Mod: CPTII,95,, | Performed by: NURSE PRACTITIONER

## 2023-06-15 PROCEDURE — 99213 OFFICE O/P EST LOW 20 MIN: CPT | Mod: 95,,, | Performed by: NURSE PRACTITIONER

## 2023-06-15 PROCEDURE — 4010F PR ACE/ARB THEARPY RXD/TAKEN: ICD-10-PCS | Mod: CPTII,95,, | Performed by: NURSE PRACTITIONER

## 2023-06-15 PROCEDURE — 3051F HG A1C>EQUAL 7.0%<8.0%: CPT | Mod: CPTII,95,, | Performed by: NURSE PRACTITIONER

## 2023-06-15 PROCEDURE — 3066F NEPHROPATHY DOC TX: CPT | Mod: CPTII,95,, | Performed by: NURSE PRACTITIONER

## 2023-06-15 RX ORDER — ORAL SEMAGLUTIDE 14 MG/1
14 TABLET ORAL DAILY
Qty: 90 TABLET | Refills: 3 | Status: SHIPPED | OUTPATIENT
Start: 2023-06-15

## 2023-06-15 NOTE — PROGRESS NOTES
Subjective:       Patient ID: Santhosh Goff is a 68 y.o. male.    Chief Complaint: Follow-up    The patient location is: home  The chief complaint leading to consultation is: lab follow up    Visit type: audiovisual    Face to Face time with patient: 10  20 minutes of total time spent on the encounter, which includes face to face time and non-face to face time preparing to see the patient (eg, review of tests), Obtaining and/or reviewing separately obtained history, Documenting clinical information in the electronic or other health record, Independently interpreting results (not separately reported) and communicating results to the patient/family/caregiver, or Care coordination (not separately reported).         Each patient to whom he or she provides medical services by telemedicine is:  (1) informed of the relationship between the physician and patient and the respective role of any other health care provider with respect to management of the patient; and (2) notified that he or she may decline to receive medical services by telemedicine and may withdraw from such care at any time.    Notes:   Pt seen virtually for his lab follow up  A1C is down from 9.2 to 7.5  He has changed the way he is eating  Tolerating the addition of the rybelsus   He feels like there is little room to improve his diet- he has been good the last few months        There were no vitals taken for this visit.    Review of Systems   Constitutional:  Negative for fatigue.   Cardiovascular:  Negative for chest pain.   Endocrine: Negative for polydipsia, polyphagia and polyuria.   Skin:  Negative for pallor.   Neurological:  Negative for dizziness, tremors, seizures, speech difficulty, weakness and headaches.   Psychiatric/Behavioral:  Negative for confusion. The patient is not nervous/anxious.      Objective:      Physical Exam  Constitutional:       General: He is not in acute distress.     Appearance: He is well-developed. He is not  ill-appearing, toxic-appearing or diaphoretic.   HENT:      Head: Normocephalic and atraumatic.      Right Ear: External ear normal.      Left Ear: External ear normal.      Nose: Nose normal.   Eyes:      General: Lids are normal. No scleral icterus.        Right eye: No discharge.         Left eye: No discharge.      Conjunctiva/sclera: Conjunctivae normal.   Pulmonary:      Effort: Pulmonary effort is normal. No tachypnea, accessory muscle usage or respiratory distress.      Breath sounds: No stridor.   Musculoskeletal:      Cervical back: Full passive range of motion without pain.   Skin:     Coloration: Skin is not pale.   Neurological:      Mental Status: He is alert. He is not disoriented.   Psychiatric:         Attention and Perception: He is attentive.         Mood and Affect: Mood is not anxious or depressed. Affect is not labile, blunt, angry or inappropriate.         Speech: Speech normal.         Behavior: Behavior normal.         Thought Content: Thought content normal.         Judgment: Judgment normal.       Assessment:       1. Type 2 diabetes mellitus without complication, without long-term current use of insulin    2. Encounter for screening for malignant neoplasm of prostate    3. Polyp of colon, unspecified part of colon, unspecified type        Plan:       Santhosh was seen today for follow-up.    Diagnoses and all orders for this visit:    Type 2 diabetes mellitus without complication, without long-term current use of insulin  -     Hemoglobin A1C; Future  -     semaglutide (RYBELSUS) 14 mg tablet; Take 1 tablet (14 mg total) by mouth once daily.  - Chronic, improving. Will increase his rybelsus to 14 mg and follow up with A1C in 3 months. Continue healthy diet and lifestyle changes     Encounter for screening for malignant neoplasm of prostate  -     Ambulatory referral/consult to Endo Procedure ; Future    Polyp of colon, unspecified part of colon, unspecified type  -     Ambulatory  referral/consult to Endo Procedure ; Future      Pt is due for cscope  Will place referral  Continue healthy diet and lifestyle  3 month a1c and follow up  Follow up for worsening or no improvement in symptoms and PRN.

## 2023-06-16 ENCOUNTER — TELEPHONE (OUTPATIENT)
Dept: INTERNAL MEDICINE | Facility: CLINIC | Age: 68
End: 2023-06-16
Payer: COMMERCIAL

## 2023-06-19 ENCOUNTER — HOSPITAL ENCOUNTER (OUTPATIENT)
Dept: PREADMISSION TESTING | Facility: HOSPITAL | Age: 68
Discharge: HOME | End: 2023-06-19
Attending: INTERNAL MEDICINE
Payer: COMMERCIAL

## 2023-06-19 DIAGNOSIS — Z12.5 ENCOUNTER FOR SCREENING FOR MALIGNANT NEOPLASM OF PROSTATE: ICD-10-CM

## 2023-06-19 DIAGNOSIS — K63.5 POLYP OF COLON, UNSPECIFIED PART OF COLON, UNSPECIFIED TYPE: ICD-10-CM

## 2023-06-19 DIAGNOSIS — K63.5 POLYP OF COLON, UNSPECIFIED PART OF COLON, UNSPECIFIED TYPE: Primary | ICD-10-CM

## 2023-06-19 RX ORDER — SODIUM, POTASSIUM,MAG SULFATES 17.5-3.13G
SOLUTION, RECONSTITUTED, ORAL ORAL
Qty: 1 KIT | Refills: 0 | Status: SHIPPED | OUTPATIENT
Start: 2023-06-19 | End: 2023-12-18

## 2023-06-28 DIAGNOSIS — E11.9 TYPE 2 DIABETES MELLITUS WITHOUT COMPLICATION, WITHOUT LONG-TERM CURRENT USE OF INSULIN: ICD-10-CM

## 2023-06-28 RX ORDER — DAPAGLIFLOZIN 5 MG/1
5 TABLET, FILM COATED ORAL DAILY
Qty: 90 TABLET | Refills: 3 | Status: SHIPPED | OUTPATIENT
Start: 2023-06-28

## 2023-06-28 NOTE — TELEPHONE ENCOUNTER
Care Due:                  Date            Visit Type   Department     Provider  --------------------------------------------------------------------------------                                EP -                              PRIMARY      [facility] INTERNAL  Last Visit: 03-      CARE ([facility])   MEDICINE       Jacek Mohr  Next Visit: None Scheduled  None         None Found                                                            Last  Test          Frequency    Reason                     Performed    Due Date  --------------------------------------------------------------------------------    Lipid Panel.  12 months..  atorvastatin.............  07- 07-    [facility] Embedded Care Due Messages. Reference number: [id number].   6/28/2023 10:32:05 AM CDT

## 2023-07-07 ENCOUNTER — PATIENT MESSAGE (OUTPATIENT)
Dept: INTERNAL MEDICINE | Facility: CLINIC | Age: 68
End: 2023-07-07
Payer: COMMERCIAL

## 2023-07-07 ENCOUNTER — PATIENT MESSAGE (OUTPATIENT)
Dept: INFECTIOUS DISEASES | Facility: CLINIC | Age: 68
End: 2023-07-07
Payer: COMMERCIAL

## 2023-07-11 ENCOUNTER — PATIENT MESSAGE (OUTPATIENT)
Dept: INTERNAL MEDICINE | Facility: CLINIC | Age: 68
End: 2023-07-11
Payer: COMMERCIAL

## 2023-07-12 DIAGNOSIS — E11.9 TYPE 2 DIABETES MELLITUS WITHOUT COMPLICATION: ICD-10-CM

## 2023-07-18 ENCOUNTER — ANESTHESIA EVENT (OUTPATIENT)
Dept: ENDOSCOPY | Facility: HOSPITAL | Age: 68
End: 2023-07-18
Payer: COMMERCIAL

## 2023-07-18 ENCOUNTER — ANESTHESIA (OUTPATIENT)
Dept: ENDOSCOPY | Facility: HOSPITAL | Age: 68
End: 2023-07-18
Payer: COMMERCIAL

## 2023-07-18 ENCOUNTER — HOSPITAL ENCOUNTER (OUTPATIENT)
Facility: HOSPITAL | Age: 68
Discharge: HOME | End: 2023-07-18
Attending: INTERNAL MEDICINE | Admitting: INTERNAL MEDICINE
Payer: COMMERCIAL

## 2023-07-18 DIAGNOSIS — Z12.11 COLON CANCER SCREENING: ICD-10-CM

## 2023-07-18 LAB — POCT GLUCOSE: 131 MG/DL (ref 70–110)

## 2023-07-18 PROCEDURE — 88305 TISSUE EXAM BY PATHOLOGIST: CPT | Performed by: PATHOLOGY

## 2023-07-18 PROCEDURE — 45385 COLONOSCOPY W/LESION REMOVAL: CPT | Mod: 33,,, | Performed by: INTERNAL MEDICINE

## 2023-07-18 PROCEDURE — 27201012 HC FORCEPS, HOT/COLD, DISP: Performed by: INTERNAL MEDICINE

## 2023-07-18 PROCEDURE — 88305 TISSUE EXAM BY PATHOLOGIST: CPT | Mod: 26,,, | Performed by: PATHOLOGY

## 2023-07-18 PROCEDURE — 37000008 HC ANESTHESIA 1ST 15 MINUTES: Performed by: INTERNAL MEDICINE

## 2023-07-18 PROCEDURE — 88305 TISSUE EXAM BY PATHOLOGIST: ICD-10-PCS | Mod: 26,,, | Performed by: PATHOLOGY

## 2023-07-18 PROCEDURE — 45385 PR COLONOSCOPY,REMV LESN,SNARE: ICD-10-PCS | Mod: 33,,, | Performed by: INTERNAL MEDICINE

## 2023-07-18 PROCEDURE — 45385 COLONOSCOPY W/LESION REMOVAL: CPT | Mod: PT | Performed by: INTERNAL MEDICINE

## 2023-07-18 PROCEDURE — 37000009 HC ANESTHESIA EA ADD 15 MINS: Performed by: INTERNAL MEDICINE

## 2023-07-18 PROCEDURE — 63600175 PHARM REV CODE 636 W HCPCS: Performed by: NURSE ANESTHETIST, CERTIFIED REGISTERED

## 2023-07-18 PROCEDURE — 25000003 PHARM REV CODE 250: Performed by: NURSE ANESTHETIST, CERTIFIED REGISTERED

## 2023-07-18 RX ORDER — PROPOFOL 10 MG/ML
VIAL (ML) INTRAVENOUS
Status: DISCONTINUED | OUTPATIENT
Start: 2023-07-18 | End: 2023-07-18

## 2023-07-18 RX ORDER — LIDOCAINE HYDROCHLORIDE 10 MG/ML
INJECTION, SOLUTION EPIDURAL; INFILTRATION; INTRACAUDAL; PERINEURAL
Status: DISCONTINUED | OUTPATIENT
Start: 2023-07-18 | End: 2023-07-18

## 2023-07-18 RX ADMIN — PROPOFOL 50 MG: 10 INJECTION, EMULSION INTRAVENOUS at 11:07

## 2023-07-18 RX ADMIN — SODIUM CHLORIDE, POTASSIUM CHLORIDE, SODIUM LACTATE AND CALCIUM CHLORIDE: 600; 310; 30; 20 INJECTION, SOLUTION INTRAVENOUS at 11:07

## 2023-07-18 RX ADMIN — PROPOFOL 30 MG: 10 INJECTION, EMULSION INTRAVENOUS at 11:07

## 2023-07-18 RX ADMIN — PROPOFOL 100 MG: 10 INJECTION, EMULSION INTRAVENOUS at 11:07

## 2023-07-18 RX ADMIN — LIDOCAINE HYDROCHLORIDE 50 MG: 10 SOLUTION INTRAVENOUS at 11:07

## 2023-07-18 NOTE — ANESTHESIA PREPROCEDURE EVALUATION
07/18/2023  Santhosh Goff is a 68 y.o., male.      Pre-op Assessment    I have reviewed the Patient Summary Reports.     I have reviewed the Nursing Notes. I have reviewed the NPO Status.   I have reviewed the Medications.     Review of Systems  Social:  Non-Smoker, No Alcohol Use    Cardiovascular:   Hypertension hyperlipidemia Normal sinus rhythm   Normal ECG   When compared with ECG of 05-FEB-2015 09:15,   No significant change was found   Confirmed by SHAYLA WILSON ([id number]) on 2/11/2019 1:58:41 PM    Pulmonary:   Sleep Apnea Hypoventilation associated with obesity    Hepatic/GI:   Bowel Prep. Hx polyps    Musculoskeletal:   Arthritis     Neurological:  Neurology Normal    Endocrine:   Diabetes, type 2  Morbid Obesity / BMI > 40      Physical Exam  General: Well nourished, Cooperative, Alert and Oriented    Airway:  Mallampati: III   Mouth Opening: Normal  TM Distance: Normal  Tongue: Normal  Neck ROM: Normal ROM    Dental:  Intact        Anesthesia Plan  Type of Anesthesia, risks & benefits discussed:    Anesthesia Type: Gen Natural Airway, MAC  Intra-op Monitoring Plan: Standard ASA Monitors  Post Op Pain Control Plan: IV/PO Opioids PRN  Induction:  IV  Informed Consent: Informed consent signed with the Patient and all parties understand the risks and agree with anesthesia plan.  All questions answered.   ASA Score: 3  Day of Surgery Review of History & Physical: H&P Update referred to the surgeon/provider.I have interviewed and examined the patient. I have reviewed the patient's H&P dated: There are no significant changes.     Ready For Surgery From Anesthesia Perspective.     .

## 2023-07-18 NOTE — PLAN OF CARE
MD at bedside to discuss results with pt and family. WENDY GOODE.   Denies nausea/pain  Discharge orders noted    Peripheral IV discontinued , cath intact

## 2023-07-18 NOTE — DISCHARGE SUMMARY
[facility] (Hospital)  Discharge Note  Short Stay    Procedure(s) (LRB):  COLONOSCOPY (N/A)      OUTCOME: Patient tolerated treatment/procedure well without complication and is now ready for discharge.    DISPOSITION: Home or Self Care    FINAL DIAGNOSIS:  Colon cancer screening    FOLLOWUP: With primary care provider    DISCHARGE INSTRUCTIONS:  No discharge procedures on file.     TIME SPENT ON DISCHARGE: 20 minutes

## 2023-07-18 NOTE — PROVATION PATIENT INSTRUCTIONS
Discharge Summary/Instructions after an Endoscopic Procedure  Patient Name: Santhosh Goff  Patient MRN: [id number]  Patient YOB: 1955 Tuesday, July 18, 2023 Arnold Stokes MD  Dear patient,  As a result of recent federal legislation (The Federal Cures Act), you may   receive lab or pathology results from your procedure in your [facility]   account before your physician is able to contact you. Your physician or   their representative will relay the results to you with their   recommendations at their soonest availability.  Thank you,  RESTRICTIONS:  During your procedure today, you received medications for sedation.  These   medications may affect your judgment, balance and coordination.  Therefore,   for 24 hours, you have the following restrictions:   - DO NOT drive a car, operate machinery, make legal/financial decisions,   sign important papers or drink alcohol.    ACTIVITY:  Today: no heavy lifting, straining or running due to procedural   sedation/anesthesia.  The following day: return to full activity including work.  DIET:  Eat and drink normally unless instructed otherwise.     TREATMENT FOR COMMON SIDE EFFECTS:  - Mild abdominal pain, nausea, belching, bloating or excessive gas:  rest,   eat lightly and use a heating pad.  - Sore Throat: treat with throat lozenges and/or gargle with warm salt   water.  - Because air was used during the procedure, expelling large amounts of air   from your rectum or belching is normal.  - If a bowel prep was taken, you may not have a bowel movement for 1-3 days.    This is normal.  SYMPTOMS TO WATCH FOR AND REPORT TO YOUR PHYSICIAN:  1. Abdominal pain or bloating, other than gas cramps.  2. Chest pain.  3. Back pain.  4. Signs of infection such as: chills or fever occurring within 24 hours   after the procedure.  5. Rectal bleeding, which would show as bright red, maroon, or black stools.   (A tablespoon of blood from the rectum is not serious, especially  if   hemorrhoids are present.)  6. Vomiting.  7. Weakness or dizziness.  GO DIRECTLY TO THE NEAREST EMERGENCY ROOM IF YOU HAVE ANY OF THE FOLLOWING:      Difficulty breathing              Chills and/or fever over 101 F   Persistent vomiting and/or vomiting blood   Severe abdominal pain   Severe chest pain   Black, tarry stools   Bleeding- more than one tablespoon   Any other symptom or condition that you feel may need urgent attention  Your doctor recommends these additional instructions:  If any biopsies were taken, your doctors clinic will contact you in 1 to 2   weeks with any results.  - Discharge patient to home.   - Resume previous diet.   - Continue present medications.   - Await pathology results.   - Repeat colonoscopy in 5 years for surveillance.   - Return to referring physician as previously scheduled.   - Patient has a contact number available for emergencies.  The signs and   symptoms of potential delayed complications were discussed with the   patient.  Return to normal activities tomorrow.  Written discharge   instructions were provided to the patient.  For questions, problems or results please call your physician Arnold Stokes MD at Work:  [phone]  If you have any questions about the above instructions, call the GI   department at [phone] or call the endoscopy unit at [phone]   from 7am until 3 pm.  [facility] [place], EMERGENCY ROOM PHONE NUMBER:   [phone]  IF A COMPLICATION OR EMERGENCY SITUATION ARISES AND YOU ARE UNABLE TO REACH   YOUR PHYSICIAN - GO DIRECTLY TO THE EMERGENCY ROOM.  I have read or have had read to me these discharge instructions for my   procedure and have received a written copy.  I understand these   instructions and will follow-up with my physician if I have any questions.     __________________________________       _____________________________________  Nurse Signature                                           Patient/Designated   Responsible Party Signature  MD Arnold Corona MD  7/18/2023 12:00:09 PM  This report has been verified and signed electronically.  Dear patient,  As a result of recent federal legislation (The Federal Cures Act), you may   receive lab or pathology results from your procedure in your [facility]   account before your physician is able to contact you. Your physician or   their representative will relay the results to you with their   recommendations at their soonest availability.  Thank you,  PROVATION

## 2023-07-18 NOTE — ANESTHESIA POSTPROCEDURE EVALUATION
Anesthesia Post Evaluation    Patient: Santhosh Goff    Procedure(s) Performed: Procedure(s) (LRB):  COLONOSCOPY (N/A)    Final Anesthesia Type: MAC      Patient location during evaluation: GI PACU  Patient participation: Yes- Able to Participate  Level of consciousness: awake and alert  Post-procedure vital signs: reviewed and stable  Pain management: adequate  Airway patency: patent    PONV status at discharge: No PONV  Anesthetic complications: no      Cardiovascular status: hemodynamically stable  Respiratory status: unassisted, room air and spontaneous ventilation  Hydration status: euvolemic  Follow-up not needed.          Vitals Value Taken Time   /78 07/18/23 1211   Temp 36.6 °C (97.8 °F) 07/18/23 1201   Pulse 60 07/18/23 1211   Resp 20 07/18/23 1211   SpO2 96 % 07/18/23 1211         Event Time   Out of Recovery 12:24:49         Pain/Delmi Score: Delmi Score: 10 (7/18/2023 12:11 PM)

## 2023-07-18 NOTE — H&P
Endoscopy History and Physical    PCP - Jacek Mohr MD  Referring Physician - Fern Najera NP  [id number] [place] [address]      ASA - per anesthesia  Mallampati - per anesthesia  History of Anesthesia problems - no  Family history Anesthesia problems -  no   Plan of anesthesia - General    HPI  68 y.o. male    Planned Procedure: Colonoscopy  Diagnosis: previous adenomatous polyp  Chief Complaint: Same as above    Personnel H/o colon polyps:yes  FH of colon cancer:no  Anticoagulation:no      ROS:  Constitutional: No fevers, chills, No weight loss  CV: No chest pain  Pulm: No cough, No shortness of breath  GI: see HPI    Medical History:  has a past medical history of Arthritis, Diabetes mellitus, type 2 (2014), Hyperlipidemia, Hypertension, Morbid obesity with BMI of 45.0-49.9, adult, and Staphylococcus aureus infection, multiple-resistant (MRSA) (2009).    Surgical History:  has a past surgical history that includes Lung surgery; RIB FX; and Colonoscopy (N/A, 2/24/2020).    Family History: family history includes Early death in his father..    Social History:  reports that he has never smoked. He has never used smokeless tobacco. He reports that he does not drink alcohol and does not use drugs.    Review of patient's allergies indicates:   Allergen Reactions    Cephalosporins Nausea And Vomiting       Medications:   Medications Prior to Admission   Medication Sig Dispense Refill Last Dose    aspirin (ECOTRIN) 81 MG EC tablet Take 81 mg by mouth once daily.   Past Week    atorvastatin (LIPITOR) 40 MG tablet Take 1 tablet by mouth once daily 90 tablet 0 Past Week    dapagliflozin propanediol (FARXIGA) 5 mg Tab tablet Take 1 tablet (5 mg total) by mouth once daily. 90 tablet 3 Past Week    furosemide (LASIX) 20 MG tablet Take 1 tablet (20 mg total) by mouth once daily. 90 tablet 4 Past Week    glimepiride (AMARYL) 4 MG tablet Take 1 tablet (4 mg total) by mouth once daily. 90 tablet 3 Past Week     losartan (COZAAR) 25 MG tablet Take 1 tablet by mouth once daily 90 tablet 3 Past Week    metFORMIN (GLUCOPHAGE) 1000 MG tablet Take 1 tablet (1,000 mg total) by mouth 2 (two) times daily with meals. 180 tablet 3 Past Week    multivit-min/ferrous fumarate (MULTI VITAMIN ORAL)    Past Week    potassium chloride SA (K-DUR,KLOR-CON) 20 MEQ tablet Take 1 tablet by mouth once daily 90 tablet 1 Past Week    semaglutide (RYBELSUS) 14 mg tablet Take 1 tablet (14 mg total) by mouth once daily. 90 tablet 3 Past Week    sodium,potassium,mag sulfates (SUPREP BOWEL PREP KIT) 17.5-3.13-1.6 gram SolR Use as directed 1 kit 0 7/18/2023       Physical Exam:    Vital Signs:   Vitals:    07/18/23 1112   BP: (!) 166/73   Pulse: 68   Resp: 12   Temp: 97.7 °F (36.5 °C)       General Appearance: Well appearing in no acute distress  Abdomen: Soft, non tender, non distended with normal bowel sounds, no masses    Labs:  Lab Results   Component Value Date    WBC 7.18 07/08/2022    HGB 14.7 07/08/2022    HCT 45.7 07/08/2022     07/08/2022    CHOL 128 07/08/2022    TRIG 127 07/08/2022    HDL 49 07/08/2022    ALT 49 (H) 03/08/2023    AST 26 03/08/2023     03/08/2023    K 4.5 03/08/2023     03/08/2023    CREATININE 1.1 03/08/2023    BUN 18 03/08/2023    CO2 26 03/08/2023    TSH 1.284 02/09/2019    PSA 0.33 07/08/2022    INR 1.1 02/09/2019    HGBA1C 7.5 (H) 06/12/2023       I have explained the risks and benefits of this endoscopic procedure to the patient including but not limited to bleeding, inflammation, infection, perforation, and death.    SEDATION PLAN: per anesthesia       History reviewed, vital signs satisfactory, cardiopulmonary status satisfactory, sedation options, risks and plans have been discussed with the patient  All their questions were answered and the patient agrees to the sedation procedures as planned and the patient is deemed an appropriate candidate for the sedation as planned.     The risks, benefits and  alternatives of the procedure were discussed with the patient in detail. This discussion was had in the presence of endoscopy staff. The risks include, risks of adverse reaction to sedation requiring the use of reversal agents, bleeding requiring blood transfusion, perforation requiring surgical intervention and technical failure. Other risks include aspiration leading to respiratory distress and respiratory failure resulting in endotracheal intubation and mechanical ventilation including death. If anesthesia is being utilized for this procedure, it is up to the anesthesiologist to determine airway safety including elective endotracheal intubation. Questions were answered, they agree to proceed. There was no language barriers.       Procedure explained to patient, informed consent obtained and placed in chart.       Arnold Stokes MD

## 2023-07-18 NOTE — TRANSFER OF CARE
Anesthesia Transfer of Care Note    Patient: Santhosh Goff    Procedure(s) Performed: Procedure(s) (LRB):  COLONOSCOPY (N/A)    Patient location: GI    Anesthesia Type: MAC    Transport from OR: Transported from OR on room air with adequate spontaneous ventilation    Post pain: adequate analgesia    Post assessment: no apparent anesthetic complications    Post vital signs: stable    Level of consciousness: awake, alert and oriented    Nausea/Vomiting: no nausea/vomiting    Complications: none    Transfer of care protocol was followed      Last vitals:   Visit Vitals  BP (!) 166/73 (BP Location: Left arm, Patient Position: Lying)   Pulse 68   Temp 36.5 °C (97.7 °F)   Resp 12   Ht 6' (1.829 m)   Wt (!) 150.1 kg (331 lb)   SpO2 96%   BMI 44.89 kg/m²

## 2023-07-18 NOTE — TRANSFER OF CARE
Anesthesia Transfer of Care Note    Patient: Santhosh Goff    Procedure(s) Performed: Procedure(s) (LRB):  COLONOSCOPY (N/A)    Patient location: GI    Anesthesia Type: MAC    Transport from OR: Transported from OR on room air with adequate spontaneous ventilation    Post pain: adequate analgesia    Post assessment: no apparent anesthetic complications    Post vital signs: stable    Level of consciousness: awake, alert and oriented    Nausea/Vomiting: no nausea/vomiting    Complications: none    Transfer of care protocol was followed      Last vitals:   Visit Vitals  BP (!) 146/78   Pulse 60   Temp 36.6 °C (97.8 °F) (Temporal)   Resp 20   Ht 6' (1.829 m)   Wt (!) 150.1 kg (331 lb)   SpO2 96%   BMI 44.89 kg/m²

## 2023-07-19 VITALS
DIASTOLIC BLOOD PRESSURE: 78 MMHG | TEMPERATURE: 98 F | BODY MASS INDEX: 42.66 KG/M2 | SYSTOLIC BLOOD PRESSURE: 146 MMHG | HEART RATE: 60 BPM | HEIGHT: 72 IN | OXYGEN SATURATION: 96 % | RESPIRATION RATE: 20 BRPM | WEIGHT: 315 LBS

## 2023-07-20 LAB
FINAL PATHOLOGIC DIAGNOSIS: NORMAL
GROSS: NORMAL
Lab: NORMAL

## 2023-09-06 ENCOUNTER — PATIENT MESSAGE (OUTPATIENT)
Dept: INTERNAL MEDICINE | Facility: CLINIC | Age: 68
End: 2023-09-06
Payer: COMMERCIAL

## 2023-09-06 DIAGNOSIS — R30.0 DYSURIA: Primary | ICD-10-CM

## 2023-09-07 ENCOUNTER — LAB VISIT (OUTPATIENT)
Dept: LAB | Facility: HOSPITAL | Age: 68
End: 2023-09-07
Attending: NURSE PRACTITIONER
Payer: COMMERCIAL

## 2023-09-07 DIAGNOSIS — E11.9 TYPE 2 DIABETES MELLITUS WITHOUT COMPLICATION, WITHOUT LONG-TERM CURRENT USE OF INSULIN: ICD-10-CM

## 2023-09-07 LAB
ESTIMATED AVG GLUCOSE: 166 MG/DL (ref 68–131)
HBA1C MFR BLD: 7.4 % (ref 4–5.6)

## 2023-09-07 PROCEDURE — 36415 COLL VENOUS BLD VENIPUNCTURE: CPT | Mod: PO | Performed by: NURSE PRACTITIONER

## 2023-09-07 PROCEDURE — 83036 HEMOGLOBIN GLYCOSYLATED A1C: CPT | Performed by: NURSE PRACTITIONER

## 2023-09-07 NOTE — TELEPHONE ENCOUNTER
Pt would like orders placed to have his urine tested for a bladder infection when he does his bloodwork at the lab today

## 2023-09-14 ENCOUNTER — OFFICE VISIT (OUTPATIENT)
Dept: INTERNAL MEDICINE | Facility: CLINIC | Age: 68
End: 2023-09-14
Payer: COMMERCIAL

## 2023-09-14 DIAGNOSIS — Z12.5 SCREENING FOR MALIGNANT NEOPLASM OF PROSTATE: ICD-10-CM

## 2023-09-14 DIAGNOSIS — E11.9 TYPE 2 DIABETES MELLITUS WITHOUT COMPLICATION, WITHOUT LONG-TERM CURRENT USE OF INSULIN: Primary | ICD-10-CM

## 2023-09-14 PROCEDURE — 3072F LOW RISK FOR RETINOPATHY: CPT | Mod: CPTII,95,, | Performed by: NURSE PRACTITIONER

## 2023-09-14 PROCEDURE — 1159F MED LIST DOCD IN RCRD: CPT | Mod: CPTII,95,, | Performed by: NURSE PRACTITIONER

## 2023-09-14 PROCEDURE — 4010F PR ACE/ARB THEARPY RXD/TAKEN: ICD-10-PCS | Mod: CPTII,95,, | Performed by: NURSE PRACTITIONER

## 2023-09-14 PROCEDURE — 3066F PR DOCUMENTATION OF TREATMENT FOR NEPHROPATHY: ICD-10-PCS | Mod: CPTII,95,, | Performed by: NURSE PRACTITIONER

## 2023-09-14 PROCEDURE — 3051F PR MOST RECENT HEMOGLOBIN A1C LEVEL 7.0 - < 8.0%: ICD-10-PCS | Mod: CPTII,95,, | Performed by: NURSE PRACTITIONER

## 2023-09-14 PROCEDURE — 3060F POS MICROALBUMINURIA REV: CPT | Mod: CPTII,95,, | Performed by: NURSE PRACTITIONER

## 2023-09-14 PROCEDURE — 1159F PR MEDICATION LIST DOCUMENTED IN MEDICAL RECORD: ICD-10-PCS | Mod: CPTII,95,, | Performed by: NURSE PRACTITIONER

## 2023-09-14 PROCEDURE — 1160F PR REVIEW ALL MEDS BY PRESCRIBER/CLIN PHARMACIST DOCUMENTED: ICD-10-PCS | Mod: CPTII,95,, | Performed by: NURSE PRACTITIONER

## 2023-09-14 PROCEDURE — 3060F PR POS MICROALBUMINURIA RESULT DOCUMENTED/REVIEW: ICD-10-PCS | Mod: CPTII,95,, | Performed by: NURSE PRACTITIONER

## 2023-09-14 PROCEDURE — 99214 OFFICE O/P EST MOD 30 MIN: CPT | Mod: 95,,, | Performed by: NURSE PRACTITIONER

## 2023-09-14 PROCEDURE — 3051F HG A1C>EQUAL 7.0%<8.0%: CPT | Mod: CPTII,95,, | Performed by: NURSE PRACTITIONER

## 2023-09-14 PROCEDURE — 99214 PR OFFICE/OUTPT VISIT, EST, LEVL IV, 30-39 MIN: ICD-10-PCS | Mod: 95,,, | Performed by: NURSE PRACTITIONER

## 2023-09-14 PROCEDURE — 1160F RVW MEDS BY RX/DR IN RCRD: CPT | Mod: CPTII,95,, | Performed by: NURSE PRACTITIONER

## 2023-09-14 PROCEDURE — 3072F PR LOW RISK FOR RETINOPATHY: ICD-10-PCS | Mod: CPTII,95,, | Performed by: NURSE PRACTITIONER

## 2023-09-14 PROCEDURE — 3066F NEPHROPATHY DOC TX: CPT | Mod: CPTII,95,, | Performed by: NURSE PRACTITIONER

## 2023-09-14 PROCEDURE — 4010F ACE/ARB THERAPY RXD/TAKEN: CPT | Mod: CPTII,95,, | Performed by: NURSE PRACTITIONER

## 2023-09-14 NOTE — PROGRESS NOTES
Subjective:       Patient ID: Santhosh Goff is a 68 y.o. male.    Chief Complaint: Follow-up    The patient location is: home  The chief complaint leading to consultation is: diabetes follow up    Visit type: audiovisual    Face to Face time with patient: 15  20 minutes of total time spent on the encounter, which includes face to face time and non-face to face time preparing to see the patient (eg, review of tests), Obtaining and/or reviewing separately obtained history, Documenting clinical information in the electronic or other health record, Independently interpreting results (not separately reported) and communicating results to the patient/family/caregiver, or Care coordination (not separately reported).         Each patient to whom he or she provides medical services by telemedicine is:  (1) informed of the relationship between the physician and patient and the respective role of any other health care provider with respect to management of the patient; and (2) notified that he or she may decline to receive medical services by telemedicine and may withdraw from such care at any time.    Notes:   Pt seen virtually for his lab follow up  A1C is down from 7.5 to 7.4  He has changed the way he is eating  Tolerating the addition of the rybelsus   He feels like there is little room to improve his diet- he has been good the last few months  Trying to watch carbs   Feels like he has lost weight- clothes are fitting better         There were no vitals taken for this visit.    Review of Systems   Constitutional:  Negative for activity change and unexpected weight change.   HENT:  Negative for hearing loss, rhinorrhea and trouble swallowing.    Eyes:  Negative for discharge and visual disturbance.   Respiratory:  Negative for chest tightness and wheezing.    Cardiovascular:  Negative for chest pain and palpitations.   Gastrointestinal:  Negative for blood in stool, constipation, diarrhea and vomiting.   Endocrine:  Negative for polydipsia and polyuria.   Genitourinary:  Negative for difficulty urinating, hematuria and urgency.   Musculoskeletal:  Negative for arthralgias, joint swelling and neck pain.   Neurological:  Negative for weakness and headaches.   Psychiatric/Behavioral:  Negative for confusion and dysphoric mood.        Objective:      Physical Exam  Constitutional:       General: He is not in acute distress.     Appearance: He is well-developed. He is not ill-appearing, toxic-appearing or diaphoretic.   HENT:      Head: Normocephalic and atraumatic.      Right Ear: External ear normal.      Left Ear: External ear normal.      Nose: Nose normal.   Eyes:      General: Lids are normal. No scleral icterus.        Right eye: No discharge.         Left eye: No discharge.      Conjunctiva/sclera: Conjunctivae normal.   Pulmonary:      Effort: Pulmonary effort is normal. No tachypnea, accessory muscle usage or respiratory distress.      Breath sounds: No stridor.   Musculoskeletal:      Cervical back: Full passive range of motion without pain.   Skin:     Coloration: Skin is not pale.   Neurological:      Mental Status: He is alert. He is not disoriented.   Psychiatric:         Attention and Perception: He is attentive.         Mood and Affect: Mood is not anxious or depressed. Affect is not labile, blunt, angry or inappropriate.         Speech: Speech normal.         Behavior: Behavior normal.         Thought Content: Thought content normal.         Judgment: Judgment normal.         Assessment:       1. Type 2 diabetes mellitus without complication, without long-term current use of insulin    2. Screening for malignant neoplasm of prostate        Plan:       Santhosh was seen today for follow-up.    Diagnoses and all orders for this visit:    Type 2 diabetes mellitus without complication, without long-term current use of insulin  -     Hemoglobin A1C; Future  -     CBC Auto Differential; Future  -     Comprehensive  Metabolic Panel; Future  -     Lipid Panel; Future    Screening for malignant neoplasm of prostate  -     PSA, Screening; Future        Continue healthy diet and lifestyle  Continue meds as prescribed   3 month a1c and follow up  Follow up for worsening or no improvement in symptoms and PRN.

## 2023-11-01 ENCOUNTER — OFFICE VISIT (OUTPATIENT)
Dept: URGENT CARE | Facility: CLINIC | Age: 68
End: 2023-11-01
Payer: COMMERCIAL

## 2023-11-01 VITALS
SYSTOLIC BLOOD PRESSURE: 128 MMHG | BODY MASS INDEX: 42.66 KG/M2 | HEIGHT: 72 IN | RESPIRATION RATE: 18 BRPM | HEART RATE: 62 BPM | OXYGEN SATURATION: 97 % | WEIGHT: 315 LBS | DIASTOLIC BLOOD PRESSURE: 62 MMHG | TEMPERATURE: 98 F

## 2023-11-01 DIAGNOSIS — R81 GLUCOSURIA: ICD-10-CM

## 2023-11-01 DIAGNOSIS — R51.9 SINUS HEADACHE: ICD-10-CM

## 2023-11-01 DIAGNOSIS — R09.81 COUGH WITH CONGESTION OF PARANASAL SINUS: ICD-10-CM

## 2023-11-01 DIAGNOSIS — E11.69 TYPE 2 DIABETES MELLITUS WITH OTHER SPECIFIED COMPLICATION, UNSPECIFIED WHETHER LONG TERM INSULIN USE: Primary | ICD-10-CM

## 2023-11-01 DIAGNOSIS — R42 DIZZINESS: ICD-10-CM

## 2023-11-01 DIAGNOSIS — R05.8 COUGH WITH CONGESTION OF PARANASAL SINUS: ICD-10-CM

## 2023-11-01 LAB
BILIRUB UR QL STRIP: NEGATIVE
COLOR UR: YELLOW
GLUCOSE SERPL-MCNC: 110 MG/DL (ref 70–110)
GLUCOSE UR QL STRIP: POSITIVE
KETONES UR QL STRIP: POSITIVE
LEUKOCYTE ESTERASE UR QL STRIP: NEGATIVE
PH, POC UA: 5
POC BLOOD, URINE: NEGATIVE
POC NITRATES, URINE: NEGATIVE
PROT UR QL STRIP: NEGATIVE
SP GR UR STRIP: 1.02 (ref 1–1.03)
UROBILINOGEN UR STRIP-ACNC: NORMAL (ref 0.3–2.2)

## 2023-11-01 PROCEDURE — 81003 POCT URINALYSIS, DIPSTICK, AUTOMATED, W/O SCOPE: ICD-10-PCS | Mod: QW,S$GLB,, | Performed by: PHYSICIAN ASSISTANT

## 2023-11-01 PROCEDURE — 82962 GLUCOSE BLOOD TEST: CPT | Mod: S$GLB,,, | Performed by: PHYSICIAN ASSISTANT

## 2023-11-01 PROCEDURE — 99214 PR OFFICE/OUTPT VISIT, EST, LEVL IV, 30-39 MIN: ICD-10-PCS | Mod: S$GLB,,, | Performed by: PHYSICIAN ASSISTANT

## 2023-11-01 PROCEDURE — 81003 URINALYSIS AUTO W/O SCOPE: CPT | Mod: QW,S$GLB,, | Performed by: PHYSICIAN ASSISTANT

## 2023-11-01 PROCEDURE — 99214 OFFICE O/P EST MOD 30 MIN: CPT | Mod: S$GLB,,, | Performed by: PHYSICIAN ASSISTANT

## 2023-11-01 PROCEDURE — 82962 POCT GLUCOSE, HAND-HELD DEVICE: ICD-10-PCS | Mod: S$GLB,,, | Performed by: PHYSICIAN ASSISTANT

## 2023-11-01 RX ORDER — MECLIZINE HCL 12.5 MG 12.5 MG/1
12.5 TABLET ORAL 3 TIMES DAILY PRN
Qty: 10 TABLET | Refills: 0 | Status: SHIPPED | OUTPATIENT
Start: 2023-11-01 | End: 2023-11-06

## 2023-11-01 RX ORDER — IBUPROFEN 800 MG/1
800 TABLET ORAL
COMMUNITY
Start: 2023-05-31

## 2023-11-01 NOTE — PATIENT INSTRUCTIONS
See PCP as soon as possible for consideration of adjustment of diabetes meds; We discussed importance of lifestyle mods.         HEADACHE/dizziness:  - meclizine as needed dizziness   - Drink plenty of fluids to remain hydrated.   - You can alternate over-the-counter Tylenol and Ibuprofen as needed for headache, as long as you don't have any allergies to these medications or medical conditions such as ulcers, liver or kidney disease or blood thinners etc that would prevent you from taking these meds.   - Get plenty rest.   - Slowly change from laying, sitting, and standing positions to help lightheadedness/dizziness.  - Sit or lie down immediately when dizziness/lightheaded to avoid further injury.   - Watch for increase pain, fever, vomiting, neck pain or mental confusion.      VIRAL URI: OVER THE COUNTER RECOMMENDATIONS/SUGGESTIONS--if needed    You can also take a daily anti-histamine such as Zyrtec, Claritin, Xyzal, OR Allegra-IN DAYTIME; NON DROWSY) AND/OR Benadryl- AT NIGHT; DROWSY) to help with runny nose/sneezing/sore throat/cough. Remember to switch antihistamines every 3 months, if taken daily.     COUGH:      Make sure you are getting rest and drinking lots of fluids.    You can use cough drops (recommend ricola lemon mint honey) or Cepacol to soothe your sore throat.     You can also take Elderberry and/or Emergen-C (vitamin C) to help boost your immune system.    -- you may use over-the-counter Coricidin HBP in the event that you have a history of high blood pressure    Honey is a natural cough suppressant that can be used.    If your symptoms do not improve, you should return to this clinic. If your symptoms worsen, go to the emergency room.         CONGESTION:  Make sure to stay well hydrated.    Use Nasal Saline to mechanically move any post nasal drip from your eustachian tube or from the back of your throat.    You may insert a whole garlic cloves into your nostrils and leave for 10-15 minutes.  When you remove them, mucus will be pulled down. This may burn as garlic is strong.  Repeat as often as needed and able to tolerate.  Please do not use garlic if you have an allergy to garlic.      PAIN/DISCOMFORT:  Tylenol up to 4,000 mg a day is safe for short periods and can be used for headache, body aches, pain, and fever. However in high doses and prolonged use it can cause liver irritation.    Ibuprofen is a non-steroidal anti-inflammatory that can be used for headache, body aches, pain, and fever. However it can also cause stomach irritation if over used.    If you have been discharged from the clinic prior to your point of care test results being completed, please make sure to check your [facility] account.  If there is a change in treatment, we will communicate with you through here.  If your test is positive, and medications are ordered, these will be sent to your preferred pharmacy.   If your test is negative, no further steps needed. If you do not hear from us or have questions, please call the clinic.      - You must understand that you have received an Urgent Care treatment only and that you may be released before all of your medical problems are known or treated.   - You, the patient, will arrange for follow up care as instructed with your primary care provider or recommended specialist.   - If your condition worsens or fails to improve we recommend that you receive another evaluation at the ER immediately or contact your PCP to discuss your concerns, or return here.   - Please do not drive or make any important decisions for 24 hours if you have received any pain medications, sedatives or mood altering drugs during your visit.    Disclaimer: This document was drafted with the use of a voice recognition device and is likely to have sound alike errors.

## 2023-11-01 NOTE — PROGRESS NOTES
Subjective:      Patient ID: Santhosh Goff is a 68 y.o. male.    Vitals:  height is 6' (1.829 m) and weight is 147 kg (324 lb) (abnormal). His oral temperature is 97.9 °F (36.6 °C). His blood pressure is 128/62 and his pulse is 62. His respiration is 18 and oxygen saturation is 97%.     Chief Complaint: Cough    68 year old male pt with symptoms of cough, dizziness, vomiting (1x) for 3 days. Pt denies known sick contacts but stated he is a . Pt denies CP, SOB and fever. Pt states he believes it may be his blood sugar. Pt states he has taken Ibuprofen, antihistamine and Zofran. Pt states home covid test this morning was negative.      Cough  This is a new problem. The current episode started yesterday. The problem has been unchanged. The problem occurs every few minutes. The cough is Non-productive. Associated symptoms include headaches. Pertinent negatives include no chest pain, chills, ear congestion, ear pain, fever, heartburn, hemoptysis, myalgias, nasal congestion, postnasal drip, rash, rhinorrhea, sore throat, shortness of breath, sweats, weight loss or wheezing. Nothing aggravates the symptoms. He has tried rest for the symptoms. The treatment provided no relief. There is no history of asthma or bronchitis.       Constitution: Negative for chills and fever.   HENT:  Negative for ear pain, postnasal drip and sore throat.    Cardiovascular:  Negative for chest pain.   Respiratory:  Positive for cough. Negative for bloody sputum, shortness of breath and wheezing.    Gastrointestinal:  Negative for heartburn.   Musculoskeletal:  Negative for muscle ache.   Skin:  Negative for rash.   Neurological:  Positive for headaches.      Objective:     Vitals:    11/01/23 1534   BP: 128/62   Pulse: 62   Resp: 18   Temp: 97.9 °F (36.6 °C)   TempSrc: Oral   SpO2: 97%   Weight: (!) 147 kg (324 lb)   Height: 6' (1.829 m)       Physical Exam   Constitutional: He is oriented to person, place, and time. He  appears well-developed. He is cooperative. He does not appear ill. obesity  HENT:   Head: Normocephalic and atraumatic.   Ears:   Right Ear: Hearing, tympanic membrane, external ear and ear canal normal.   Left Ear: Hearing, tympanic membrane, external ear and ear canal normal.   Nose: Nose normal. No mucosal edema, nasal deformity or congestion. No epistaxis. Right sinus exhibits no maxillary sinus tenderness and no frontal sinus tenderness. Left sinus exhibits no maxillary sinus tenderness and no frontal sinus tenderness.   Mouth/Throat: Uvula is midline, oropharynx is clear and moist and mucous membranes are normal. Mucous membranes are moist. No trismus in the jaw. Normal dentition. No uvula swelling. No posterior oropharyngeal erythema. Oropharynx is clear.   Eyes: Conjunctivae and lids are normal. Right eye exhibits normal extraocular motion and no nystagmus. Left eye exhibits normal extraocular motion and no nystagmus. Extraocular movement intact gaze aligned appropriately   Neck: Trachea normal and phonation normal. Neck supple.   Cardiovascular: Normal rate, regular rhythm, normal heart sounds and normal pulses.   Pulmonary/Chest: Effort normal and breath sounds normal.   Abdominal: Normal appearance and bowel sounds are normal. Soft.   Musculoskeletal: Normal range of motion.         General: Normal range of motion.   Neurological: He is alert, oriented to person, place, and time and at baseline. He exhibits normal muscle tone.   Skin: Skin is warm, dry and intact. Capillary refill takes less than 2 seconds.   Psychiatric: His speech is normal and behavior is normal. Mood, judgment and thought content normal.   Nursing note and vitals reviewed.      Assessment:     1. Type 2 diabetes mellitus with other specified complication, unspecified whether long term insulin use    2. Dizziness    3. Glucosuria    4. Sinus headache    5. Cough with congestion of paranasal sinus      Results for orders placed or  performed in visit on 11/01/23   POCT Urinalysis, Dipstick, Automated, W/O Scope   Result Value Ref Range    POC Blood, Urine Negative Negative    POC Bilirubin, Urine Negative Negative    POC Urobilinogen, Urine normal 0.3 - 2.2    POC Ketones, Urine Positive (A) Negative    POC Protein, Urine Negative Negative    POC Nitrates, Urine Negative Negative    POC Glucose, Urine Positive (A) Negative    pH, UA 5.0     POC Specific Gravity, Urine 1.020 1.003 - 1.029    POC Leukocytes, Urine Negative Negative    Color, UA Yellow Light Yellow, Yellow   POCT Glucose, Hand-Held Device   Result Value Ref Range    POC Glucose 110 70 - 110 MG/DL         Plan:       Type 2 diabetes mellitus with other specified complication, unspecified whether long term insulin use  -     POCT Urinalysis, Dipstick, Automated, W/O Scope  -     POCT Glucose, Hand-Held Device    Dizziness  -     meclizine (ANTIVERT) 12.5 mg tablet; Take 1 tablet (12.5 mg total) by mouth 3 (three) times daily as needed for Dizziness or Nausea.  Dispense: 10 tablet; Refill: 0    Glucosuria    Sinus headache    Cough with congestion of paranasal sinus          Medical Decision Making:   Initial Assessment:   VSS  Vision changes   No active dizziness   No head trauma  A&O x4     Clinical Tests:   Lab Tests: Ordered and Reviewed  Urgent Care Management:  Discussed importance of lifestyle modifications  Strict ER precautions discussed for new or worsening symptoms       Patient Instructions   See PCP as soon as possible for consideration of adjustment of diabetes meds; We discussed importance of lifestyle mods.         HEADACHE/dizziness:  - meclizine as needed dizziness   - Drink plenty of fluids to remain hydrated.   - You can alternate over-the-counter Tylenol and Ibuprofen as needed for headache, as long as you don't have any allergies to these medications or medical conditions such as ulcers, liver or kidney disease or blood thinners etc that would prevent you from  taking these meds.   - Get plenty rest.   - Slowly change from laying, sitting, and standing positions to help lightheadedness/dizziness.  - Sit or lie down immediately when dizziness/lightheaded to avoid further injury.   - Watch for increase pain, fever, vomiting, neck pain or mental confusion.      VIRAL URI: OVER THE COUNTER RECOMMENDATIONS/SUGGESTIONS--if needed    You can also take a daily anti-histamine such as Zyrtec, Claritin, Xyzal, OR Allegra-IN DAYTIME; NON DROWSY) AND/OR Benadryl- AT NIGHT; DROWSY) to help with runny nose/sneezing/sore throat/cough. Remember to switch antihistamines every 3 months, if taken daily.     COUGH:      Make sure you are getting rest and drinking lots of fluids.    You can use cough drops (recommend ricola lemon mint honey) or Cepacol to soothe your sore throat.     You can also take Elderberry and/or Emergen-C (vitamin C) to help boost your immune system.    -- you may use over-the-counter Coricidin HBP in the event that you have a history of high blood pressure    Honey is a natural cough suppressant that can be used.    If your symptoms do not improve, you should return to this clinic. If your symptoms worsen, go to the emergency room.         CONGESTION:  Make sure to stay well hydrated.    Use Nasal Saline to mechanically move any post nasal drip from your eustachian tube or from the back of your throat.    You may insert a whole garlic cloves into your nostrils and leave for 10-15 minutes. When you remove them, mucus will be pulled down. This may burn as garlic is strong.  Repeat as often as needed and able to tolerate.  Please do not use garlic if you have an allergy to garlic.      PAIN/DISCOMFORT:  Tylenol up to 4,000 mg a day is safe for short periods and can be used for headache, body aches, pain, and fever. However in high doses and prolonged use it can cause liver irritation.    Ibuprofen is a non-steroidal anti-inflammatory that can be used for headache, body  aches, pain, and fever. However it can also cause stomach irritation if over used.    If you have been discharged from the clinic prior to your point of care test results being completed, please make sure to check your MyChart account.  If there is a change in treatment, we will communicate with you through here.  If your test is positive, and medications are ordered, these will be sent to your preferred pharmacy.   If your test is negative, no further steps needed. If you do not hear from us or have questions, please call the clinic.      - You must understand that you have received an Urgent Care treatment only and that you may be released before all of your medical problems are known or treated.   - You, the patient, will arrange for follow up care as instructed with your primary care provider or recommended specialist.   - If your condition worsens or fails to improve we recommend that you receive another evaluation at the ER immediately or contact your PCP to discuss your concerns, or return here.   - Please do not drive or make any important decisions for 24 hours if you have received any pain medications, sedatives or mood altering drugs during your visit.    Disclaimer: This document was drafted with the use of a voice recognition device and is likely to have sound alike errors.

## 2023-11-12 ENCOUNTER — OFFICE VISIT (OUTPATIENT)
Dept: URGENT CARE | Facility: CLINIC | Age: 68
End: 2023-11-12
Payer: COMMERCIAL

## 2023-11-12 VITALS
DIASTOLIC BLOOD PRESSURE: 80 MMHG | WEIGHT: 315 LBS | RESPIRATION RATE: 16 BRPM | HEIGHT: 72 IN | SYSTOLIC BLOOD PRESSURE: 129 MMHG | TEMPERATURE: 99 F | OXYGEN SATURATION: 96 % | HEART RATE: 79 BPM | BODY MASS INDEX: 42.66 KG/M2

## 2023-11-12 DIAGNOSIS — R05.9 COUGH, UNSPECIFIED TYPE: ICD-10-CM

## 2023-11-12 DIAGNOSIS — J32.9 SINOBRONCHITIS: Primary | ICD-10-CM

## 2023-11-12 DIAGNOSIS — J40 SINOBRONCHITIS: Primary | ICD-10-CM

## 2023-11-12 PROCEDURE — 99213 PR OFFICE/OUTPT VISIT, EST, LEVL III, 20-29 MIN: ICD-10-PCS | Mod: S$GLB,,,

## 2023-11-12 PROCEDURE — 99213 OFFICE O/P EST LOW 20 MIN: CPT | Mod: S$GLB,,,

## 2023-11-12 RX ORDER — PROMETHAZINE HYDROCHLORIDE AND DEXTROMETHORPHAN HYDROBROMIDE 6.25; 15 MG/5ML; MG/5ML
5 SYRUP ORAL NIGHTLY PRN
Qty: 118 ML | Refills: 0 | Status: SHIPPED | OUTPATIENT
Start: 2023-11-12 | End: 2023-12-18

## 2023-11-12 RX ORDER — DOXYCYCLINE HYCLATE 100 MG
100 TABLET ORAL 2 TIMES DAILY
Qty: 14 TABLET | Refills: 0 | Status: SHIPPED | OUTPATIENT
Start: 2023-11-12 | End: 2023-11-19

## 2023-11-12 RX ORDER — METHYLPREDNISOLONE 4 MG/1
TABLET ORAL
Qty: 21 EACH | Refills: 0 | Status: SHIPPED | OUTPATIENT
Start: 2023-11-12 | End: 2023-12-03

## 2023-11-12 NOTE — PROGRESS NOTES
Subjective:      Patient ID: Santhosh Goff is a 68 y.o. male.    Vitals:  height is 6' (1.829 m) and weight is 147 kg (324 lb) (abnormal). His oral temperature is 98.9 °F (37.2 °C). His blood pressure is 129/80 and his pulse is 79. His respiration is 16 and oxygen saturation is 96%.     Chief Complaint: Cough    Santhosh Goff is a 68 y.o. male who presents for productive cough which onset 3 weeks ago. Patient was seen on 11/1/23 for same symptoms and states symptoms are still present and chest congestion has gotten worse. Associated sxs include congestion and HA. Patient denies any fever, chills, SOB, CP, abd pain, n/v/d, rash, dizziness, or numbness/tingling. Prior Tx includes Mucinex, Allegra D and tessalon pearls. Patient states he is unable to sleep due to symptoms.     Cough  This is a new problem. The current episode started 1 to 4 weeks ago. The problem has been unchanged. The problem occurs every few minutes. The cough is Productive of sputum (yellow/green). Associated symptoms include headaches and myalgias. Pertinent negatives include no chest pain, chills, ear congestion, ear pain, fever, heartburn, hemoptysis, nasal congestion, postnasal drip, rash, rhinorrhea, sore throat, shortness of breath, sweats, weight loss or wheezing. The symptoms are aggravated by lying down. He has tried OTC cough suppressant, prescription cough suppressant and rest for the symptoms. The treatment provided no relief. There is no history of asthma, bronchitis or COPD.       Constitution: Negative for appetite change, chills, sweating, fatigue and fever.   HENT:  Negative for ear pain, ear discharge, foreign body in ear, hearing loss, congestion, postnasal drip, sinus pain, sinus pressure, sore throat and trouble swallowing.    Cardiovascular:  Negative for chest pain.   Respiratory:  Positive for cough. Negative for sputum production, bloody sputum, shortness of breath and wheezing.    Gastrointestinal:  Negative for  abdominal pain, nausea, vomiting, diarrhea and heartburn.   Musculoskeletal:  Positive for muscle ache.   Skin:  Negative for rash.   Neurological:  Positive for headaches. Negative for dizziness, numbness and tingling.      Objective:     Physical Exam   Constitutional: He is oriented to person, place, and time. He appears well-developed. He is cooperative.  Non-toxic appearance. He does not appear ill. No distress.   HENT:   Head: Normocephalic and atraumatic.   Ears:   Right Ear: Hearing, tympanic membrane, external ear and ear canal normal.   Left Ear: Hearing, tympanic membrane, external ear and ear canal normal.   Nose: Nose normal. No mucosal edema or nasal deformity. No epistaxis. Right sinus exhibits no maxillary sinus tenderness and no frontal sinus tenderness. Left sinus exhibits no maxillary sinus tenderness and no frontal sinus tenderness.   Mouth/Throat: Uvula is midline, oropharynx is clear and moist and mucous membranes are normal. Mucous membranes are moist. No trismus in the jaw. Normal dentition. No uvula swelling. No oropharyngeal exudate, posterior oropharyngeal edema or posterior oropharyngeal erythema.   Eyes: Conjunctivae and lids are normal. No scleral icterus. Extraocular movement intact   Neck: Trachea normal and phonation normal. Neck supple. No edema present. No erythema present. No neck rigidity present.   Cardiovascular: Normal rate, regular rhythm, normal heart sounds and normal pulses.   Pulmonary/Chest: Effort normal and breath sounds normal. No stridor. No respiratory distress. He has no decreased breath sounds. He has no wheezes. He has no rhonchi. He has no rales.   Abdominal: Normal appearance.   Musculoskeletal: Normal range of motion.         General: No deformity. Normal range of motion.   Neurological: He is alert and oriented to person, place, and time. He exhibits normal muscle tone. Coordination normal.   Skin: Skin is warm, dry, intact, not diaphoretic and not pale.    Psychiatric: His speech is normal and behavior is normal. Judgment and thought content normal.   Nursing note and vitals reviewed.      Assessment:     1. Sinobronchitis    2. Cough, unspecified type        Plan:       Sinobronchitis  -     doxycycline (VIBRA-TABS) 100 MG tablet; Take 1 tablet (100 mg total) by mouth 2 (two) times daily. for 7 days  Dispense: 14 tablet; Refill: 0  -     methylPREDNISolone (MEDROL DOSEPACK) 4 mg tablet; use as directed  Dispense: 21 each; Refill: 0    Cough, unspecified type  -     promethazine-dextromethorphan (PROMETHAZINE-DM) 6.25-15 mg/5 mL Syrp; Take 5 mLs by mouth nightly as needed (for cough).  Dispense: 118 mL; Refill: 0      Afebrile. VSS. Patient is in NAD.  Lungs clear to ausculation. No indications for imaging at this time.  Educated patient on sinobronchitis.   Meds: doxycycline, medrol dose pack, and promethazine DM sent to preferred pharmacy. Discussed side effects of steroid use. Patient verbalized understanding.  Advised patient to begin OTC decongestant and oral antihistamine for symptom relief.    Increase fluid intake and plenty of rest.  Tylenol/Ibuprofen (as permitted) as needed for any pain or discomfort.  If symptoms do not resolve, return to clinic for further evaluation.  ER precautions given such as SOB, CP, or fever not resolved with fever-reducing medications.

## 2023-11-12 NOTE — PATIENT INSTRUCTIONS
"You received an oral steroid today. This can elevate your blood pressure, elevate your blood sugar, cause water weight gain, and cause anxiousness. If you received a steroid shot, this in addition may cause dimpling or thinning of the skin.     Bronchitis  If your condition worsens or fails to improve we recommend that you receive another evaluation at the ER immediately or contact your PCP to discuss your concerns or return here. You must understand that you've received an urgent care treatment only and that you may be released before all your medical problems are known or treated. You the patient will arrange for follow-up care as instructed.  Rest and fluids are important.  Can use honey with john to soothe your throat.  Take inhaler as prescribed and needed for wheezing.  Take prescription cough meds (pills) as prescribed; take prescription cough syrup at night as needed for cough. Do not take both the prescribed cough pills and syrup at the same time.  -  Flonase (fluticasone) is a nasal spray which is available over the counter and may help with your symptoms.   -  Zyrtec D, Claritin D or Allegra D can also help with symptoms of congestion and drainage.   -  If you have hypertension avoid using the "D" which is the decongestant.  Instead you can use Coricidin HBP for cold and cough symptoms.    -  If you just have drainage you can take plain Zyrtec, Claritin or Allegra.  -  If you just have a congested feeling you can take pseudoephedrine (unless you have high blood pressure) which you have to sign for behind the counter. Do not buy the phenylephrine which is on the shelf as it is not effective.  -  Rest and fluids are also important.   -  Tylenol or ibuprofen can also be used as directed for pain unless you have an allergy to them or medical condition such as stomach ulcers, kidney or liver disease or blood thinners etc for which you should not be taking these type of medications.   Please follow-up with " your primary care doctor or specialist in the next 48-72 hrs as needed and if no improvement.  If you smoke, please stop smoking.

## 2023-11-28 ENCOUNTER — CLINICAL SUPPORT (OUTPATIENT)
Dept: PULMONOLOGY | Facility: CLINIC | Age: 68
End: 2023-11-28
Payer: COMMERCIAL

## 2023-11-28 ENCOUNTER — HOSPITAL ENCOUNTER (OUTPATIENT)
Dept: RADIOLOGY | Facility: HOSPITAL | Age: 68
Discharge: HOME | End: 2023-11-28
Attending: INTERNAL MEDICINE
Payer: COMMERCIAL

## 2023-11-28 VITALS — WEIGHT: 315 LBS | HEIGHT: 72 IN | BODY MASS INDEX: 42.66 KG/M2

## 2023-11-28 DIAGNOSIS — E66.2 CLASS 3 OBESITY WITH ALVEOLAR HYPOVENTILATION, SERIOUS COMORBIDITY, AND BODY MASS INDEX (BMI) OF 45.0 TO 49.9 IN ADULT: ICD-10-CM

## 2023-11-28 DIAGNOSIS — J96.12 CHRONIC RESPIRATORY FAILURE WITH HYPOXIA AND HYPERCAPNIA: ICD-10-CM

## 2023-11-28 DIAGNOSIS — J96.11 CHRONIC RESPIRATORY FAILURE WITH HYPOXIA AND HYPERCAPNIA: ICD-10-CM

## 2023-11-28 LAB
BRPFT: ABNORMAL
DLCO ADJ PRE: 25.16 ML/(MIN*MMHG) (ref 22.18–36.03)
DLCO SINGLE BREATH LLN: 22.18
DLCO SINGLE BREATH PRE REF: 86.5 %
DLCO SINGLE BREATH REF: 29.11
DLCOC SBVA LLN: 2.77
DLCOC SBVA PRE REF: 141.2 %
DLCOC SBVA REF: 3.86
DLCOC SINGLE BREATH LLN: 22.18
DLCOC SINGLE BREATH PRE REF: 86.5 %
DLCOC SINGLE BREATH REF: 29.11
DLCOVA LLN: 2.77
DLCOVA PRE REF: 141.2 %
DLCOVA PRE: 5.45 ML/(MIN*MMHG*L) (ref 2.77–4.95)
DLCOVA REF: 3.86
DLVAADJ PRE: 5.45 ML/(MIN*MMHG*L) (ref 2.77–4.95)
ERV LLN: -16448.87
ERV PRE REF: 51 %
ERV REF: 1.13
FEF 25 75 CHG: -21.6 %
FEF 25 75 LLN: 1.12
FEF 25 75 POST REF: 79 %
FEF 25 75 PRE REF: 100.7 %
FEF 25 75 REF: 2.58
FET100 CHG: 103.4 %
FEV1 CHG: 5.3 %
FEV1 FVC CHG: -8 %
FEV1 FVC LLN: 62
FEV1 FVC POST REF: 97.4 %
FEV1 FVC PRE REF: 105.9 %
FEV1 FVC REF: 76
FEV1 LLN: 2.48
FEV1 POST REF: 82.7 %
FEV1 PRE REF: 78.5 %
FEV1 REF: 3.43
FRCPLETH LLN: 2.83
FRCPLETH PREREF: 85.5 %
FRCPLETH REF: 3.81
FVC CHG: 14.5 %
FVC LLN: 3.39
FVC POST REF: 84.4 %
FVC PRE REF: 73.8 %
FVC REF: 4.56
IVC PRE: 3.05 L (ref 3.39–5.74)
IVC SINGLE BREATH LLN: 3.39
IVC SINGLE BREATH PRE REF: 66.9 %
IVC SINGLE BREATH REF: 4.56
MVV LLN: 115
MVV PRE REF: 69.6 %
MVV REF: 135
PEF CHG: 14.6 %
PEF LLN: 6.47
PEF POST REF: 90.3 %
PEF PRE REF: 78.8 %
PEF REF: 8.93
POST FEF 25 75: 2.04 L/S (ref 1.12–4.03)
POST FET 100: 12.1 SEC
POST FEV1 FVC: 73.68 % (ref 62.35–88.96)
POST FEV1: 2.84 L (ref 2.48–4.38)
POST FVC: 3.85 L (ref 3.39–5.74)
POST PEF: 8.06 L/S (ref 6.47–11.38)
PRE DLCO: 25.16 ML/(MIN*MMHG) (ref 22.18–36.03)
PRE ERV: 0.57 L (ref -16448.87–16451.13)
PRE FEF 25 75: 2.6 L/S (ref 1.12–4.03)
PRE FET 100: 5.95 SEC
PRE FEV1 FVC: 80.09 % (ref 62.35–88.96)
PRE FEV1: 2.69 L (ref 2.48–4.38)
PRE FRC PL: 3.26 L
PRE FVC: 3.37 L (ref 3.39–5.74)
PRE MVV: 94 L/MIN (ref 114.85–155.39)
PRE PEF: 7.03 L/S (ref 6.47–11.38)
PRE RV: 2.47 L (ref 2.01–3.36)
PRE TLC: 6.13 L (ref 6.39–8.69)
RAW LLN: 3.06
RAW PRE REF: 149.1 %
RAW PRE: 4.56 CMH2O*S/L (ref 3.06–3.06)
RAW REF: 3.06
RV LLN: 2.01
RV PRE REF: 92.1 %
RV REF: 2.69
RVTLC LLN: 32
RVTLC PRE REF: 98.7 %
RVTLC PRE: 40.33 % (ref 31.89–49.85)
RVTLC REF: 41
TLC LLN: 6.39
TLC PRE REF: 81.3 %
TLC REF: 7.54
VA PRE: 4.62 L (ref 7.39–7.39)
VA SINGLE BREATH LLN: 7.39
VA SINGLE BREATH PRE REF: 62.5 %
VA SINGLE BREATH REF: 7.39
VC LLN: 3.39
VC PRE REF: 80.2 %
VC PRE: 3.66 L (ref 3.39–5.74)
VC REF: 4.56
VTGRAWPRE: 2.78 L

## 2023-11-28 PROCEDURE — 94618 PULMONARY STRESS TESTING: ICD-10-PCS | Mod: S$GLB,,, | Performed by: INTERNAL MEDICINE

## 2023-11-28 PROCEDURE — 94726 PLETHYSMOGRAPHY LUNG VOLUMES: CPT | Mod: S$GLB,,, | Performed by: INTERNAL MEDICINE

## 2023-11-28 PROCEDURE — 71046 X-RAY EXAM CHEST 2 VIEWS: CPT | Mod: 26,,, | Performed by: RADIOLOGY

## 2023-11-28 PROCEDURE — 94726 PULM FUNCT TST PLETHYSMOGRAP: ICD-10-PCS | Mod: S$GLB,,, | Performed by: INTERNAL MEDICINE

## 2023-11-28 PROCEDURE — 94729 DIFFUSING CAPACITY: CPT | Mod: S$GLB,,, | Performed by: INTERNAL MEDICINE

## 2023-11-28 PROCEDURE — 71046 XR CHEST PA AND LATERAL: ICD-10-PCS | Mod: 26,,, | Performed by: RADIOLOGY

## 2023-11-28 PROCEDURE — 94060 PR EVAL OF BRONCHOSPASM: ICD-10-PCS | Mod: 59,S$GLB,, | Performed by: INTERNAL MEDICINE

## 2023-11-28 PROCEDURE — 71046 X-RAY EXAM CHEST 2 VIEWS: CPT | Mod: TC

## 2023-11-28 PROCEDURE — 94729 PR C02/MEMBANE DIFFUSE CAPACITY: ICD-10-PCS | Mod: S$GLB,,, | Performed by: INTERNAL MEDICINE

## 2023-11-28 PROCEDURE — 99999 PR PBB SHADOW E&M-EST. PATIENT-LVL I: CPT | Mod: PBBFAC,,,

## 2023-11-28 PROCEDURE — 94618 PULMONARY STRESS TESTING: CPT | Mod: S$GLB,,, | Performed by: INTERNAL MEDICINE

## 2023-11-28 PROCEDURE — 94060 EVALUATION OF WHEEZING: CPT | Mod: 59,S$GLB,, | Performed by: INTERNAL MEDICINE

## 2023-11-28 PROCEDURE — 99999 PR PBB SHADOW E&M-EST. PATIENT-LVL I: ICD-10-PCS | Mod: PBBFAC,,,

## 2023-11-28 NOTE — PROCEDURES
[facility] - Pulmonary Function  Six Minute Walk     SUMMARY     Ordering Provider: Telly ARAGON   Interpreting Provider: Ozzie ARAGON  Performing nurse/tech/RT: LS RRT  Diagnosis:  (Chronic Resp. Failure)  Height: 6' (182.9 cm)  Weight: (!) 147 kg (324 lb 1.2 oz)  BMI (Calculated): 43.9   Patient Race:             Phase Oxygen Assessment Supplemental O2 Heart   Rate Blood Pressure Mikaela Dyspnea Scale Rating   Resting 95 % Room Air 90 bpm 138/76 0   Exercise        Minute        1 95 % Room Air 119 bpm     2 93 % Room Air 130 bpm     3 92 % Room Air 151 bpm     4 92 % Room Air 150 bpm     5 91 % Room Air 147 bpm     6  93 % Room Air 130 bpm 153/82 3   Recovery        Minute        1 95 % Room Air 116 bpm     2 96 % Room Air 107 bpm     3 96 % Room Air 104 bpm     4 95 % Room Air 96 bpm 154/74 0     Six Minute Walk Summary  6MWT Status: completed without stopping  Patient Reported: No complaints     Interpretation:  Did the patient stop or pause?: No                                         Total Time Walked (Calculated): 360 seconds  Final Partial Lap Distance (feet): 150 feet  Total Distance Meters (Calculated): 350.52 meters  Predicted Distance Meters (Calculated): 470.45 meters  Percentage of Predicted (Calculated): 74.51  Peak VO2 (Calculated): 14.5  Mets: 4.14  Has The Patient Had a Previous Six Minute Walk Test?: Yes       Previous 6MWT Results  Has The Patient Had a Previous Six Minute Walk Test?: Yes  Date of Previous Test: 11/04/14  Total Time Walked: 360 seconds  Total Distance (meters): 365.7  Predicted Distance (meters): 365.7 meters  Percentage of Predicted: 99  Percent Change (Calculated): 0.04

## 2023-12-11 ENCOUNTER — LAB VISIT (OUTPATIENT)
Dept: LAB | Facility: HOSPITAL | Age: 68
End: 2023-12-11
Attending: NURSE PRACTITIONER
Payer: COMMERCIAL

## 2023-12-11 DIAGNOSIS — Z12.5 SCREENING FOR MALIGNANT NEOPLASM OF PROSTATE: ICD-10-CM

## 2023-12-11 DIAGNOSIS — E11.9 TYPE 2 DIABETES MELLITUS WITHOUT COMPLICATION, WITHOUT LONG-TERM CURRENT USE OF INSULIN: ICD-10-CM

## 2023-12-11 LAB
ALBUMIN SERPL BCP-MCNC: 3.6 G/DL (ref 3.5–5.2)
ALP SERPL-CCNC: 62 U/L (ref 55–135)
ALT SERPL W/O P-5'-P-CCNC: 48 U/L (ref 10–44)
ANION GAP SERPL CALC-SCNC: 10 MMOL/L (ref 8–16)
AST SERPL-CCNC: 24 U/L (ref 10–40)
BASOPHILS # BLD AUTO: 0.06 K/UL (ref 0–0.2)
BASOPHILS NFR BLD: 0.9 % (ref 0–1.9)
BILIRUB SERPL-MCNC: 0.7 MG/DL (ref 0.1–1)
BUN SERPL-MCNC: 19 MG/DL (ref 8–23)
CALCIUM SERPL-MCNC: 9 MG/DL (ref 8.7–10.5)
CHLORIDE SERPL-SCNC: 103 MMOL/L (ref 95–110)
CHOLEST SERPL-MCNC: 122 MG/DL (ref 120–199)
CHOLEST/HDLC SERPL: 2.7 {RATIO} (ref 2–5)
CO2 SERPL-SCNC: 25 MMOL/L (ref 23–29)
COMPLEXED PSA SERPL-MCNC: 0.31 NG/ML (ref 0–4)
CREAT SERPL-MCNC: 1 MG/DL (ref 0.5–1.4)
DIFFERENTIAL METHOD: NORMAL
EOSINOPHIL # BLD AUTO: 0.2 K/UL (ref 0–0.5)
EOSINOPHIL NFR BLD: 2.8 % (ref 0–8)
ERYTHROCYTE [DISTWIDTH] IN BLOOD BY AUTOMATED COUNT: 13.9 % (ref 11.5–14.5)
EST. GFR  (NO RACE VARIABLE): >60 ML/MIN/1.73 M^2
ESTIMATED AVG GLUCOSE: 157 MG/DL (ref 68–131)
GLUCOSE SERPL-MCNC: 174 MG/DL (ref 70–110)
HBA1C MFR BLD: 7.1 % (ref 4–5.6)
HCT VFR BLD AUTO: 42.9 % (ref 40–54)
HDLC SERPL-MCNC: 45 MG/DL (ref 40–75)
HDLC SERPL: 36.9 % (ref 20–50)
HGB BLD-MCNC: 14.3 G/DL (ref 14–18)
IMM GRANULOCYTES # BLD AUTO: 0.01 K/UL (ref 0–0.04)
IMM GRANULOCYTES NFR BLD AUTO: 0.1 % (ref 0–0.5)
LDLC SERPL CALC-MCNC: 56.4 MG/DL (ref 63–159)
LYMPHOCYTES # BLD AUTO: 2.4 K/UL (ref 1–4.8)
LYMPHOCYTES NFR BLD: 35.3 % (ref 18–48)
MCH RBC QN AUTO: 30.1 PG (ref 27–31)
MCHC RBC AUTO-ENTMCNC: 33.3 G/DL (ref 32–36)
MCV RBC AUTO: 90 FL (ref 82–98)
MONOCYTES # BLD AUTO: 0.7 K/UL (ref 0.3–1)
MONOCYTES NFR BLD: 9.7 % (ref 4–15)
NEUTROPHILS # BLD AUTO: 3.5 K/UL (ref 1.8–7.7)
NEUTROPHILS NFR BLD: 51.2 % (ref 38–73)
NONHDLC SERPL-MCNC: 77 MG/DL
NRBC BLD-RTO: 0 /100 WBC
PLATELET # BLD AUTO: 256 K/UL (ref 150–450)
PMV BLD AUTO: 9.9 FL (ref 9.2–12.9)
POTASSIUM SERPL-SCNC: 4.4 MMOL/L (ref 3.5–5.1)
PROT SERPL-MCNC: 7 G/DL (ref 6–8.4)
RBC # BLD AUTO: 4.75 M/UL (ref 4.6–6.2)
SODIUM SERPL-SCNC: 138 MMOL/L (ref 136–145)
TRIGL SERPL-MCNC: 103 MG/DL (ref 30–150)
WBC # BLD AUTO: 6.78 K/UL (ref 3.9–12.7)

## 2023-12-11 PROCEDURE — 83036 HEMOGLOBIN GLYCOSYLATED A1C: CPT | Performed by: NURSE PRACTITIONER

## 2023-12-11 PROCEDURE — 85025 COMPLETE CBC W/AUTO DIFF WBC: CPT | Performed by: NURSE PRACTITIONER

## 2023-12-11 PROCEDURE — 80061 LIPID PANEL: CPT | Performed by: NURSE PRACTITIONER

## 2023-12-11 PROCEDURE — 36415 COLL VENOUS BLD VENIPUNCTURE: CPT | Mod: PO | Performed by: NURSE PRACTITIONER

## 2023-12-11 PROCEDURE — 84153 ASSAY OF PSA TOTAL: CPT | Performed by: NURSE PRACTITIONER

## 2023-12-11 PROCEDURE — 80053 COMPREHEN METABOLIC PANEL: CPT | Performed by: NURSE PRACTITIONER

## 2023-12-12 NOTE — PROGRESS NOTES
Here are the results of your recent labs.  We will review them in detail at your follow up visit.    Sincerely,    Jacek Mohr M.D.        If you would like to review your experience with Dr. Mohr or [facility], please follow the link below:    [email or web address]   Pt. returning to the ED after being treated and released today for cellulitis of the right arm. Pt. reports "I didn't have time to  my prescriptions and my arm hurts".

## 2023-12-12 NOTE — PROGRESS NOTES
Here are the results of your recent labs.  We will review them in detail at your follow up visit.    Sincerely,    Jacek Mohr M.D.        If you would like to review your experience with Dr. Mohr or [facility], please follow the link below:    [email or web address]

## 2023-12-18 ENCOUNTER — OFFICE VISIT (OUTPATIENT)
Dept: INTERNAL MEDICINE | Facility: CLINIC | Age: 68
End: 2023-12-18
Payer: COMMERCIAL

## 2023-12-18 VITALS
HEART RATE: 82 BPM | WEIGHT: 315 LBS | DIASTOLIC BLOOD PRESSURE: 72 MMHG | BODY MASS INDEX: 43.47 KG/M2 | SYSTOLIC BLOOD PRESSURE: 120 MMHG | TEMPERATURE: 98 F | OXYGEN SATURATION: 98 %

## 2023-12-18 DIAGNOSIS — E78.5 HYPERLIPIDEMIA ASSOCIATED WITH TYPE 2 DIABETES MELLITUS: ICD-10-CM

## 2023-12-18 DIAGNOSIS — E11.59 HYPERTENSION ASSOCIATED WITH DIABETES: ICD-10-CM

## 2023-12-18 DIAGNOSIS — E11.69 HYPERLIPIDEMIA ASSOCIATED WITH TYPE 2 DIABETES MELLITUS: ICD-10-CM

## 2023-12-18 DIAGNOSIS — Z00.00 ROUTINE GENERAL MEDICAL EXAMINATION AT HEALTH CARE FACILITY: Primary | ICD-10-CM

## 2023-12-18 DIAGNOSIS — G47.33 OSA TREATED WITH BIPAP: ICD-10-CM

## 2023-12-18 DIAGNOSIS — I15.2 HYPERTENSION ASSOCIATED WITH DIABETES: ICD-10-CM

## 2023-12-18 DIAGNOSIS — E11.9 TYPE 2 DIABETES MELLITUS WITHOUT COMPLICATION, WITHOUT LONG-TERM CURRENT USE OF INSULIN: ICD-10-CM

## 2023-12-18 PROCEDURE — 3078F DIAST BP <80 MM HG: CPT | Mod: CPTII,S$GLB,, | Performed by: INTERNAL MEDICINE

## 2023-12-18 PROCEDURE — 3074F SYST BP LT 130 MM HG: CPT | Mod: CPTII,S$GLB,, | Performed by: INTERNAL MEDICINE

## 2023-12-18 PROCEDURE — 3288F PR FALLS RISK ASSESSMENT DOCUMENTED: ICD-10-PCS | Mod: CPTII,S$GLB,, | Performed by: INTERNAL MEDICINE

## 2023-12-18 PROCEDURE — 1101F PR PT FALLS ASSESS DOC 0-1 FALLS W/OUT INJ PAST YR: ICD-10-PCS | Mod: CPTII,S$GLB,, | Performed by: INTERNAL MEDICINE

## 2023-12-18 PROCEDURE — 3060F POS MICROALBUMINURIA REV: CPT | Mod: CPTII,S$GLB,, | Performed by: INTERNAL MEDICINE

## 2023-12-18 PROCEDURE — 99214 PR OFFICE/OUTPT VISIT, EST, LEVL IV, 30-39 MIN: ICD-10-PCS | Mod: S$GLB,,, | Performed by: INTERNAL MEDICINE

## 2023-12-18 PROCEDURE — 3060F PR POS MICROALBUMINURIA RESULT DOCUMENTED/REVIEW: ICD-10-PCS | Mod: CPTII,S$GLB,, | Performed by: INTERNAL MEDICINE

## 2023-12-18 PROCEDURE — 3008F PR BODY MASS INDEX (BMI) DOCUMENTED: ICD-10-PCS | Mod: CPTII,S$GLB,, | Performed by: INTERNAL MEDICINE

## 2023-12-18 PROCEDURE — 1160F RVW MEDS BY RX/DR IN RCRD: CPT | Mod: CPTII,S$GLB,, | Performed by: INTERNAL MEDICINE

## 2023-12-18 PROCEDURE — 3078F PR MOST RECENT DIASTOLIC BLOOD PRESSURE < 80 MM HG: ICD-10-PCS | Mod: CPTII,S$GLB,, | Performed by: INTERNAL MEDICINE

## 2023-12-18 PROCEDURE — 99999 PR PBB SHADOW E&M-EST. PATIENT-LVL III: ICD-10-PCS | Mod: PBBFAC,,, | Performed by: INTERNAL MEDICINE

## 2023-12-18 PROCEDURE — 3051F HG A1C>EQUAL 7.0%<8.0%: CPT | Mod: CPTII,S$GLB,, | Performed by: INTERNAL MEDICINE

## 2023-12-18 PROCEDURE — 3008F BODY MASS INDEX DOCD: CPT | Mod: CPTII,S$GLB,, | Performed by: INTERNAL MEDICINE

## 2023-12-18 PROCEDURE — 1126F AMNT PAIN NOTED NONE PRSNT: CPT | Mod: CPTII,S$GLB,, | Performed by: INTERNAL MEDICINE

## 2023-12-18 PROCEDURE — 99214 OFFICE O/P EST MOD 30 MIN: CPT | Mod: S$GLB,,, | Performed by: INTERNAL MEDICINE

## 2023-12-18 PROCEDURE — 3288F FALL RISK ASSESSMENT DOCD: CPT | Mod: CPTII,S$GLB,, | Performed by: INTERNAL MEDICINE

## 2023-12-18 PROCEDURE — 3066F NEPHROPATHY DOC TX: CPT | Mod: CPTII,S$GLB,, | Performed by: INTERNAL MEDICINE

## 2023-12-18 PROCEDURE — 1101F PT FALLS ASSESS-DOCD LE1/YR: CPT | Mod: CPTII,S$GLB,, | Performed by: INTERNAL MEDICINE

## 2023-12-18 PROCEDURE — 3051F PR MOST RECENT HEMOGLOBIN A1C LEVEL 7.0 - < 8.0%: ICD-10-PCS | Mod: CPTII,S$GLB,, | Performed by: INTERNAL MEDICINE

## 2023-12-18 PROCEDURE — 4010F PR ACE/ARB THEARPY RXD/TAKEN: ICD-10-PCS | Mod: CPTII,S$GLB,, | Performed by: INTERNAL MEDICINE

## 2023-12-18 PROCEDURE — 3072F PR LOW RISK FOR RETINOPATHY: ICD-10-PCS | Mod: CPTII,S$GLB,, | Performed by: INTERNAL MEDICINE

## 2023-12-18 PROCEDURE — 1160F PR REVIEW ALL MEDS BY PRESCRIBER/CLIN PHARMACIST DOCUMENTED: ICD-10-PCS | Mod: CPTII,S$GLB,, | Performed by: INTERNAL MEDICINE

## 2023-12-18 PROCEDURE — 3074F PR MOST RECENT SYSTOLIC BLOOD PRESSURE < 130 MM HG: ICD-10-PCS | Mod: CPTII,S$GLB,, | Performed by: INTERNAL MEDICINE

## 2023-12-18 PROCEDURE — 3072F LOW RISK FOR RETINOPATHY: CPT | Mod: CPTII,S$GLB,, | Performed by: INTERNAL MEDICINE

## 2023-12-18 PROCEDURE — 1159F PR MEDICATION LIST DOCUMENTED IN MEDICAL RECORD: ICD-10-PCS | Mod: CPTII,S$GLB,, | Performed by: INTERNAL MEDICINE

## 2023-12-18 PROCEDURE — 4010F ACE/ARB THERAPY RXD/TAKEN: CPT | Mod: CPTII,S$GLB,, | Performed by: INTERNAL MEDICINE

## 2023-12-18 PROCEDURE — 3066F PR DOCUMENTATION OF TREATMENT FOR NEPHROPATHY: ICD-10-PCS | Mod: CPTII,S$GLB,, | Performed by: INTERNAL MEDICINE

## 2023-12-18 PROCEDURE — 1126F PR PAIN SEVERITY QUANTIFIED, NO PAIN PRESENT: ICD-10-PCS | Mod: CPTII,S$GLB,, | Performed by: INTERNAL MEDICINE

## 2023-12-18 PROCEDURE — 99999 PR PBB SHADOW E&M-EST. PATIENT-LVL III: CPT | Mod: PBBFAC,,, | Performed by: INTERNAL MEDICINE

## 2023-12-18 PROCEDURE — 1159F MED LIST DOCD IN RCRD: CPT | Mod: CPTII,S$GLB,, | Performed by: INTERNAL MEDICINE

## 2023-12-18 NOTE — PATIENT INSTRUCTIONS
Patient Education       Guide to Eating When You Have Diabetes   About this topic   This guide will help you control your blood sugar along with exercise and the drugs you are taking. You want to have a balanced amount of carbohydrates, fats, and protein in your meal. Following these diet guidelines may also help control your blood pressure and cholesterol.     What will the results be?   When you follow these diet guidelines, your blood sugar may be easier to keep within the goal blood sugar ranges. Ask your doctor for your goal blood sugar ranges.  What changes to diet are needed?   You need to balance how much carbohydrate, fat, and protein is in your meals. You also need to control the amount or portion size of the food you eat. Eat meals at about the same time every day. Do not skip a meal. Talk to your dietitian about making a personal meal plan for you.     Who should use this diet?   This diet is helpful to people with high blood sugar or diabetes.   What foods are good to eat?   Whole grains like:  1/3 cup (80 grams) brown rice  1/3 cup (80 grams) wild rice  1/3 cup (80 grams) whole wheat pasta  1 slice whole wheat or whole grain bread  3/4 cup (180 grams) high-fiber cereal  1/2 cup (120 grams) cooked oatmeal  1/2 (120 grams) English muffin  Fruits and vegetables like:  1/2 cup (120 grams) sweet potatoes  1/2 cup (120 grams) cooked vegetables, like squash, green beans, cauliflower, carrots, and cabbage  1 cup (240 grams) raw vegetables or salad greens  1 small apples or oranges  1/2 cup (120 grams) unsweetened fruit juice  Proteins like:  1 ounce (30 grams) lean beef or pork  1 ounce (30 grams) chicken, skin removed  1 ounce (30 grams) turkey, skin removed  1 ounce (30 grams) fish  1 ounce (30 grams) low-fat cheese or lunch meat  1/2 cup (120 grams) cooked beans ? black, kidney, chickpeas, or lentils  1 whole egg  What foods should be limited or avoided?   High fat or processed foods  like:  Zhu  Sausage  Hot dogs  Processed snacks  Fats and oils like:  Margarine  Salad dressings  Foods that are high in salt like:  Table salt  Salted breads, rolls, crackers  Commercially-prepared potatoes and vegetable mixes  Canned vegetables and juices  Canned soups  Smoked, cured, or salted meats  Starches that are not whole grain like:  White rice  White potatoes  French fries  Pasta  White bread  Sugary cereals  Instant oatmeal  Baked goods, pastries  Croissants  What can be done to prevent this health problem?   Type 1 diabetes is a lifelong problem and you cannot prevent it. Type 2 diabetes can be prevented or delayed with lifestyle changes. You can still lead a normal life. Diabetes can be managed through diet, exercise, and drugs. Family members and friends can help you practice good health behaviors.  When do I need to call the doctor?   Blood sugar level is above 240 for more than a day  Blood sugar level drops to less than 40  Abnormal urine test results  Trouble breathing  Very sleepy  Throw up more than once  Many loose stools  Questions about your diet plan  Helpful tips   Try to eat smaller portions of a healthy balanced diet throughout the day. Take time to plan your meals and snacks.  Where can I learn more?   American Diabetes Association  https://www.cdc.gov/diabetes/managing/eat-well/meal-plan-method.html   HelpGuide.org  http://www.helpguide.org/home-pages/healthy-eating.htm   HelpGuide.org  http://www.helpguide.org/articles/diet-weight-loss/diabetes-diet-and-food-tips.htm   Last Reviewed Date   2021-07-21  Consumer Information Use and Disclaimer   This information is not specific medical advice and does not replace information you receive from your health care provider. This is only a brief summary of general information. It does NOT include all information about conditions, illnesses, injuries, tests, procedures, treatments, therapies, discharge instructions or life-style choices that  may apply to you. You must talk with your health care provider for complete information about your health and treatment options. This information should not be used to decide whether or not to accept your health care providers advice, instructions or recommendations. Only your health care provider has the knowledge and training to provide advice that is right for you.   Copyright   Copyright © 2021 [employer] and its affiliates and/or licensors. All rights reserved.

## 2023-12-18 NOTE — PROGRESS NOTES
Subjective:       Patient ID: Santhosh Goff is a 68 y.o. male.    Chief Complaint: follow up      HPI:    Patient is a 68-year-old male presenting today for updated physical exam review of chronic health issues.  Patient has history of type 2 diabetes, hypertension, hyperlipidemia, sleep apnea.  He indicates he has been doing well he is tolerating his medications well.  His most recent A1c is 7.1 just slightly above goal.  We talked about the importance of engaging fully in lifestyle modification in addition to his medications to try to drive these numbers to the goal.  He is very close to where he needs to be.  He is due for his eye exam which we will get scheduled.  He had his foot exam today.    Review of Systems   Constitutional:  Negative for fever and unexpected weight change.   HENT:  Negative for hearing loss, postnasal drip and rhinorrhea.    Eyes:  Negative for pain and visual disturbance.   Respiratory:  Negative for cough, shortness of breath and wheezing.    Cardiovascular:  Negative for chest pain and palpitations.   Gastrointestinal:  Negative for constipation, diarrhea, nausea and vomiting.   Genitourinary:  Negative for dysuria and hematuria.   Musculoskeletal:  Negative for arthralgias, back pain, myalgias and neck stiffness.   Skin:  Negative for pallor and rash.   Neurological:  Negative for seizures, syncope and headaches.   Hematological:  Negative for adenopathy.   Psychiatric/Behavioral:  Negative for dysphoric mood. The patient is not nervous/anxious.        Objective:   /72   Pulse 82   Temp 97.7 °F (36.5 °C)   Wt (!) 145.4 kg (320 lb 8.8 oz)   SpO2 98%   BMI 43.47 kg/m²      Physical Exam  Vitals reviewed.   Constitutional:       General: He is not in acute distress.     Appearance: Normal appearance. He is well-developed and normal weight. He is not ill-appearing.   HENT:      Head: Normocephalic and atraumatic.      Right Ear: Tympanic membrane, ear canal and external ear  normal.      Left Ear: Tympanic membrane, ear canal and external ear normal.   Eyes:      Pupils: Pupils are equal, round, and reactive to light.   Neck:      Thyroid: No thyromegaly.      Vascular: No JVD.   Cardiovascular:      Rate and Rhythm: Normal rate and regular rhythm.      Pulses:           Dorsalis pedis pulses are 2+ on the right side and 2+ on the left side.        Posterior tibial pulses are 2+ on the right side and 2+ on the left side.      Heart sounds: Normal heart sounds. No murmur heard.     No friction rub. No gallop.   Pulmonary:      Effort: Pulmonary effort is normal.      Breath sounds: Normal breath sounds. No wheezing or rales.   Abdominal:      General: Abdomen is flat. Bowel sounds are normal. There is no distension.      Palpations: Abdomen is soft.      Tenderness: There is no abdominal tenderness. There is no guarding or rebound.   Musculoskeletal:         General: Normal range of motion.      Cervical back: Normal range of motion and neck supple.      Right foot: Normal range of motion. No deformity.      Left foot: Normal range of motion. No deformity.   Feet:      Right foot:      Protective Sensation: 10 sites tested.  10 sites sensed.      Skin integrity: No ulcer, blister, skin breakdown, erythema, callus or dry skin.      Left foot:      Protective Sensation: 10 sites tested.  10 sites sensed.      Skin integrity: No ulcer, blister, skin breakdown, erythema, callus or dry skin.   Lymphadenopathy:      Cervical: No cervical adenopathy.   Skin:     General: Skin is warm and dry.      Findings: No rash.   Neurological:      General: No focal deficit present.      Mental Status: He is alert and oriented to person, place, and time.      Cranial Nerves: No cranial nerve deficit.      Deep Tendon Reflexes: Reflexes are normal and symmetric.   Psychiatric:         Mood and Affect: Mood normal.         Judgment: Judgment normal.         Lab Visit on 12/11/2023   Component Date Value     Hemoglobin A1C 12/11/2023 7.1 (H)     Estimated Avg Glucose 12/11/2023 157 (H)     PSA, Screen 12/11/2023 0.31     WBC 12/11/2023 6.78     RBC 12/11/2023 4.75     Hemoglobin 12/11/2023 14.3     Hematocrit 12/11/2023 42.9     MCV 12/11/2023 90     MCH 12/11/2023 30.1     MCHC 12/11/2023 33.3     RDW 12/11/2023 13.9     Platelets 12/11/2023 256     MPV 12/11/2023 9.9     Immature Granulocytes 12/11/2023 0.1     Gran # (ANC) 12/11/2023 3.5     Immature Grans (Abs) 12/11/2023 0.01     Lymph # 12/11/2023 2.4     Mono # 12/11/2023 0.7     Eos # 12/11/2023 0.2     Baso # 12/11/2023 0.06     nRBC 12/11/2023 0     Gran % 12/11/2023 51.2     Lymph % 12/11/2023 35.3     Mono % 12/11/2023 9.7     Eosinophil % 12/11/2023 2.8     Basophil % 12/11/2023 0.9     Differential Method 12/11/2023 Automated     Sodium 12/11/2023 138     Potassium 12/11/2023 4.4     Chloride 12/11/2023 103     CO2 12/11/2023 25     Glucose 12/11/2023 174 (H)     BUN 12/11/2023 19     Creatinine 12/11/2023 1.0     Calcium 12/11/2023 9.0     Total Protein 12/11/2023 7.0     Albumin 12/11/2023 3.6     Total Bilirubin 12/11/2023 0.7     Alkaline Phosphatase 12/11/2023 62     AST 12/11/2023 24     ALT 12/11/2023 48 (H)     eGFR 12/11/2023 >60.0     Anion Gap 12/11/2023 10     Cholesterol 12/11/2023 122     Triglycerides 12/11/2023 103     HDL 12/11/2023 45     LDL Cholesterol 12/11/2023 56.4 (L)     HDL/Cholesterol Ratio 12/11/2023 36.9     Total Cholesterol/HDL Ra* 12/11/2023 2.7     Non-HDL Cholesterol 12/11/2023 77        Assessment:       1. Routine general medical examination at health care facility    2. Type 2 diabetes mellitus without complication, without long-term current use of insulin    3. Hypertension associated with diabetes    4. Hyperlipidemia associated with type 2 diabetes mellitus    5. ALIREZA treated with AVAPS        Plan:   Hypertension associated with diabetes  Blood pressure is under good control.  We will continue the current regimen.   Will work on regular aerobic exercise and a low salt diet.        Hyperlipidemia associated with type 2 diabetes mellitus  Cholesterol numbers look good.  We will continue the current regimen at this time.  Remain focused on low fat diet and high dietary fiber intake.      ALIREZA treated with AVAPS  Patient is compliant with use of cpap, using it the majority of nights.  Benefit from the use of the device is noted.    Routine general medical examination at health care facility    Type 2 diabetes mellitus without complication, without long-term current use of insulin  Comments:  Continue meds, more lean protein, less carbs.  Repeat a1c in 3 months  Orders:  -     Hemoglobin A1C; Future; Expected date: 03/17/2024    Hypertension associated with diabetes    Hyperlipidemia associated with type 2 diabetes mellitus    ALIREZA treated with AVAPS          Follow up in about 3 months (around 3/18/2024) for DM, HTN, HLP, with Fern Najera.

## 2024-01-30 ENCOUNTER — OFFICE VISIT (OUTPATIENT)
Dept: PULMONOLOGY | Facility: CLINIC | Age: 69
End: 2024-01-30
Payer: COMMERCIAL

## 2024-01-30 VITALS
WEIGHT: 304.56 LBS | OXYGEN SATURATION: 95 % | DIASTOLIC BLOOD PRESSURE: 76 MMHG | HEART RATE: 80 BPM | BODY MASS INDEX: 41.25 KG/M2 | RESPIRATION RATE: 18 BRPM | HEIGHT: 72 IN | SYSTOLIC BLOOD PRESSURE: 160 MMHG

## 2024-01-30 DIAGNOSIS — J96.11 CHRONIC RESPIRATORY FAILURE WITH HYPOXIA AND HYPERCAPNIA: ICD-10-CM

## 2024-01-30 DIAGNOSIS — J30.89 NON-SEASONAL ALLERGIC RHINITIS DUE TO OTHER ALLERGIC TRIGGER: Primary | ICD-10-CM

## 2024-01-30 DIAGNOSIS — G47.33 OSA TREATED WITH BIPAP: ICD-10-CM

## 2024-01-30 DIAGNOSIS — I15.2 HYPERTENSION ASSOCIATED WITH DIABETES: ICD-10-CM

## 2024-01-30 DIAGNOSIS — J96.12 CHRONIC RESPIRATORY FAILURE WITH HYPOXIA AND HYPERCAPNIA: ICD-10-CM

## 2024-01-30 DIAGNOSIS — E11.59 HYPERTENSION ASSOCIATED WITH DIABETES: ICD-10-CM

## 2024-01-30 DIAGNOSIS — R06.09 DOE (DYSPNEA ON EXERTION): ICD-10-CM

## 2024-01-30 DIAGNOSIS — E66.01 MORBID OBESITY WITH BMI OF 40.0-44.9, ADULT: ICD-10-CM

## 2024-01-30 PROCEDURE — 99999 PR PBB SHADOW E&M-EST. PATIENT-LVL IV: CPT | Mod: PBBFAC,,, | Performed by: INTERNAL MEDICINE

## 2024-01-30 PROCEDURE — 3288F FALL RISK ASSESSMENT DOCD: CPT | Mod: CPTII,S$GLB,, | Performed by: INTERNAL MEDICINE

## 2024-01-30 PROCEDURE — 1159F MED LIST DOCD IN RCRD: CPT | Mod: CPTII,S$GLB,, | Performed by: INTERNAL MEDICINE

## 2024-01-30 PROCEDURE — 99214 OFFICE O/P EST MOD 30 MIN: CPT | Mod: S$GLB,,, | Performed by: INTERNAL MEDICINE

## 2024-01-30 PROCEDURE — 3008F BODY MASS INDEX DOCD: CPT | Mod: CPTII,S$GLB,, | Performed by: INTERNAL MEDICINE

## 2024-01-30 PROCEDURE — 1126F AMNT PAIN NOTED NONE PRSNT: CPT | Mod: CPTII,S$GLB,, | Performed by: INTERNAL MEDICINE

## 2024-01-30 PROCEDURE — 3078F DIAST BP <80 MM HG: CPT | Mod: CPTII,S$GLB,, | Performed by: INTERNAL MEDICINE

## 2024-01-30 PROCEDURE — 1101F PT FALLS ASSESS-DOCD LE1/YR: CPT | Mod: CPTII,S$GLB,, | Performed by: INTERNAL MEDICINE

## 2024-01-30 PROCEDURE — 3077F SYST BP >= 140 MM HG: CPT | Mod: CPTII,S$GLB,, | Performed by: INTERNAL MEDICINE

## 2024-01-30 RX ORDER — FLUTICASONE PROPIONATE 50 MCG
2 SPRAY, SUSPENSION (ML) NASAL DAILY
Qty: 16 G | Refills: 11
Start: 2024-01-30

## 2024-01-30 NOTE — PROGRESS NOTES
Subjective:     Patient ID: Santhosh Goff is a 69 y.o. male.    Chief Complaint:  Follow up for Obstructive Sleep Apnea and nocturnal ventilatiorn    HPI 68 y/o with significant weight loss on nocturnal ventilation for chronic respiratory failure and Obstructive Sleep Apnea   Feeling well  Has not passed out   He is on Trilogy ventilator at home for chronic resp failure and Obstructive Sleep Apnea   AVAPS - AE    Obstructive Sleep Apnea on Continuous Positive Airway Pressure:  Patient is using ventialtor as prescribed and benefiting from therapy. 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     Post COVID follow up:  Hx of COVID 19 pneumonia July 19, 2021, antibody infusion - no long term side effects    Settings IPAP 18  EPAP 8  Rate 12   AVAPS Rate 5 cm   ml   Max pressure 22  Pressure support  min 6  \    Past Medical History:   Diagnosis Date    Arthritis     Diabetes mellitus, type 2 2014     x 1 week ago 10/10/2018    Hyperlipidemia     Hypertension     Morbid obesity with BMI of 45.0-49.9, adult     Staphylococcus aureus infection, multiple-resistant (MRSA) 2009     Past Surgical History:   Procedure Laterality Date    COLONOSCOPY N/A 2/24/2020    Procedure: COLONOSCOPY;  Surgeon: Bela Vaughn MD;  Location: [facility];  Service: Endoscopy;  Laterality: N/A;    COLONOSCOPY N/A 7/18/2023    Procedure: COLONOSCOPY;  Surgeon: Arnold Stokes MD;  Location: [facility];  Service: Endoscopy;  Laterality: N/A;    LUNG SURGERY      RIB FX       Review of patient's allergies indicates:   Allergen Reactions    Cephalosporins Nausea And Vomiting     Current Outpatient Medications on File Prior to Visit   Medication Sig Dispense Refill    atorvastatin (LIPITOR) 40 MG tablet Take 1 tablet by mouth once daily 90 tablet 3    dapagliflozin propanediol (FARXIGA) 5 mg Tab tablet Take 1 tablet (5 mg total) by mouth once daily. 90  tablet 3    glimepiride (AMARYL) 4 MG tablet Take 1 tablet (4 mg total) by mouth once daily. 90 tablet 3    ibuprofen (ADVIL,MOTRIN) 800 MG tablet Take 800 mg by mouth.      losartan (COZAAR) 25 MG tablet Take 1 tablet by mouth once daily 90 tablet 3    metFORMIN (GLUCOPHAGE) 1000 MG tablet Take 1 tablet (1,000 mg total) by mouth 2 (two) times daily with meals. 180 tablet 3    multivit-min/ferrous fumarate (MULTI VITAMIN ORAL)       potassium chloride SA (K-DUR,KLOR-CON) 20 MEQ tablet TAKE 1  BY MOUTH ONCE DAILY 90 tablet 2    semaglutide (RYBELSUS) 14 mg tablet Take 1 tablet (14 mg total) by mouth once daily. 90 tablet 3    meclizine (ANTIVERT) 12.5 mg tablet Take 1 tablet (12.5 mg total) by mouth 3 (three) times daily as needed for Dizziness or Nausea. 10 tablet 0     No current facility-administered medications on file prior to visit.     Social History     Socioeconomic History    Marital status:     Number of children: 3   Occupational History    Occupation:      Employer: [employer]   Tobacco Use    Smoking status: Never     Passive exposure: Never    Smokeless tobacco: Never   Substance and Sexual Activity    Alcohol use: No    Drug use: No    Sexual activity: Yes     Partners: Female     Social Determinants of Health     Financial Resource Strain: Low Risk  (12/17/2023)    Overall Financial Resource Strain (CARDIA)     Difficulty of Paying Living Expenses: Not hard at all   Food Insecurity: No Food Insecurity (12/17/2023)    Hunger Vital Sign     Worried About Running Out of Food in the Last Year: Never true     Ran Out of Food in the Last Year: Never true   Transportation Needs: No Transportation Needs (12/17/2023)    PRAPARE - Transportation     Lack of Transportation (Medical): No     Lack of Transportation (Non-Medical): No   Physical Activity: Insufficiently Active (12/17/2023)    Exercise Vital Sign     Days of Exercise per Week: 4 days      Minutes of Exercise per Session: 20 min   Stress: No Stress Concern Present (12/17/2023)    [place] [employer] - Occupational Stress Questionnaire     Feeling of Stress : Not at all   Social Connections: Unknown (12/17/2023)    Social Connection and Isolation Panel [NHANES]     Frequency of Communication with Friends and Family: Once a week     Frequency of Social Gatherings with Friends and Family: Patient declined     Active Member of Clubs or Organizations: No     Attends Club or Organization Meetings: Never     Marital Status:    Housing Stability: Low Risk  (12/17/2023)    Housing Stability Vital Sign     Unable to Pay for Housing in the Last Year: No     Number of Places Lived in the Last Year: 1     Unstable Housing in the Last Year: No     Family History   Problem Relation Age of Onset    Early death Father        Review of Systems   Constitutional:  Positive for fatigue.   HENT: Negative.     Respiratory:  Positive for apnea.    Cardiovascular: Negative.    Genitourinary: Negative.    Endocrine: endocrine negative    Musculoskeletal: Negative.    Skin: Negative.    Gastrointestinal: Negative.    Neurological: Negative.    Psychiatric/Behavioral:  Positive for sleep disturbance.        Objective:      BP (!) 160/76   Pulse 80   Resp 18   Ht 6' (1.829 m)   Wt (!) 138.2 kg (304 lb 9.1 oz)   SpO2 95%   BMI 41.31 kg/m²   Physical Exam  Vitals and nursing note reviewed.   Constitutional:       Appearance: He is well-developed. He is obese.   HENT:      Head: Normocephalic and atraumatic.      Nose: Nose normal.   Eyes:      Conjunctiva/sclera: Conjunctivae normal.      Pupils: Pupils are equal, round, and reactive to light.   Neck:      Thyroid: No thyromegaly.      Vascular: No JVD.      Trachea: No tracheal deviation.   Cardiovascular:      Rate and Rhythm: Normal rate and regular rhythm.      Heart sounds: Normal heart sounds.   Pulmonary:      Effort: Pulmonary effort is  normal.      Breath sounds: Normal breath sounds.   Abdominal:      Palpations: Abdomen is soft.   Musculoskeletal:         General: Normal range of motion.      Cervical back: Neck supple.   Lymphadenopathy:      Cervical: No cervical adenopathy.   Skin:     General: Skin is warm and dry.   Neurological:      Mental Status: He is alert and oriented to person, place, and time.       Personal Diagnostic Review  PSG: significant for Obstructive Sleep Apnea          1/30/2024    10:19 AM   Pulmonary Studies Review   SpO2 95 %   Height 6' (1.829 m)   Weight 138.2 kg (304 lb 9.1 oz)   BMI (Calculated) 41.3   Predicted Distance 269.51   Predicted Distance Meters (Calculated) 481.01 meters       X-Ray Chest PA And Lateral  Narrative: EXAM:  XR CHEST PA AND LATERAL    CLINICAL HISTORY: Chronic respiratory failure    COMPARISON: 08/19/2021    FINDINGS: No confluent airspace opacity or consolidation.  There is no evidence of pleural effusion, pneumothorax, or other acute pulmonary disease.  The cardiomediastinal silhouette is within normal limits.  No acute osseous abnormality is evident.      Mild spondylosis.  Impression:  No acute cardiopulmonary abnormality.    Finalized on: 11/28/2023 10:04 AM By:  Juan Zimmerman MD  [facility]# [id number]      2023-11-28 10:06:[id number]    [facility]      Office Spirometry Results:    [facility] - Pulmonary Function  Six Minute Walk      SUMMARY      Ordering Provider: Telly ARAGON             Interpreting Provider: Ozzie ARAGON  Performing nurse/tech/RT: LS RRT  Diagnosis:  (Chronic Resp. Failure)  Height: 6' (182.9 cm)  Weight: (!) 147 kg (324 lb 1.2 oz)  BMI (Calculated): 43.9              Patient Race:                                                                Phase Oxygen Assessment Supplemental O2 Heart   Rate Blood Pressure Mikaela Dyspnea Scale Rating   Resting 95 % Room Air 90 bpm 138/76 0   Exercise             Minute             1 95 % Room Air 119 bpm       2 93 % Room Air 130 bpm       3 92  % Room Air 151 bpm       4 92 % Room Air 150 bpm       5 91 % Room Air 147 bpm       6  93 % Room Air 130 bpm 153/82 3   Recovery             Minute             1 95 % Room Air 116 bpm       2 96 % Room Air 107 bpm       3 96 % Room Air 104 bpm       4 95 % Room Air 96 bpm 154/74 0      Six Minute Walk Summary  6MWT Status: completed without stopping  Patient Reported: No complaints             Interpretation:  Did the patient stop or pause?: No  Total Time Walked (Calculated): 360 seconds  Final Partial Lap Distance (feet): 150 feet  Total Distance Meters (Calculated): 350.52 meters  Predicted Distance Meters (Calculated): 470.45 meters  Percentage of Predicted (Calculated): 74.51  Peak VO2 (Calculated): 14.5  Mets: 4.14  Has The Patient Had a Previous Six Minute Walk Test?: Yes     Previous 6MWT Results  Has The Patient Had a Previous Six Minute Walk Test?: Yes  Date of Previous Test: 11/04/14  Total Time Walked: 360 seconds  Total Distance (meters): 365.7  Predicted Distance (meters): 365.7 meters  Percentage of Predicted: 99  Percent Change (Calculated): 0.04            1/30/2024    10:19 AM 12/18/2023    10:36 AM 11/28/2023    10:45 AM 11/12/2023    10:58 AM 11/1/2023     3:34 PM 7/18/2023    12:11 PM 7/18/2023    12:01 PM   Pulmonary Function Tests   SpO2 95 % 98 %  96 % 97 % 96 % 95 %   Ordering Provider   Telly ARAGON       Performing nurse/tech/RT   LS RRT       Height 6' (1.829 m)  6' (1.829 m) 6' (1.829 m) 6' (1.829 m)     Weight 138.2 kg (304 lb 9.1 oz) 145.4 kg (320 lb 8.8 oz) 147 kg (324 lb 1.2 oz) 147 kg (324 lb) 147 kg (324 lb)     BMI (Calculated) 41.3  43.9 43.9 43.9     Patient Race          6MWT Status   completed without stopping       Patient Reported   No complaints       Was O2 used?   No       6MW Distance walked (feet)   1150 feet       Distance walked (meters)   350.52 meters       Did patient stop?   No       Type of assistive device(s) used?   no assistive devices       Oxygen  Saturation   95 %       Supplemental Oxygen   Room Air       Heart Rate   90 bpm       Blood Pressure   138/76       Mikaela Dyspnea Rating    nothing at all       Oxygen Saturation   93 %       Supplemental Oxygen   Room Air       Heart Rate   130 bpm       Blood Pressure   153/82       Mikaela Dyspnea Rating    moderate       Recovery Time (seconds)   240 seconds       Oxygen Saturation   95 %       Supplemental Oxygen   Room Air       Heart Rate   96 bpm       Blood Pressure   154/74       Mikaela Dyspnea Rating    nothing at all       Is procedure ready for interpretation?   Yes       Oxygen Qualification?   No             1/30/2024    10:19 AM   Pulmonary Studies Review   SpO2 95 %   Height 6' (1.829 m)   Weight 138.2 kg (304 lb 9.1 oz)   BMI (Calculated) 41.3   Predicted Distance 269.51   Predicted Distance Meters (Calculated) 481.01 meters         Assessment:       Hypertension associated with diabetes  Blood pressure measurements reviewed, repeated and verified. Adverse effects of elevated blood pressure reviewed in detail. Importance of low sodium diet, medication compliance stressed. Patient advised and counseled concerning the adverse medical consequences of untreated hypertension.The patient's hypertension was monitored, evaluated, addressed and/or treated. Asked to follow up with PCP.  Isolated systolic hypertension is a blood pressure ?130/<80 mmHg        Morbid obesity with BMI of 40.0-44.9, adult  Last documentation =       BMI noted to be elevated. Changes in weight over time reviewed in chart (if available) and by patient recollection. Patient advised and counseled concerning the adverse medical consequences of obesity. Treatment options discussed - calorie restriction, increasing calorie expenditure, medical and surgical options noted.  The patient's severe obesity was monitored, evaluated, addressed and/or treated.   2013 AHA/ACC/TOS guideline for the management of overweight and obesity in adults: a  report of the American College of Cardiology/American Heart Association Task Force on Practice Guidelines and The Obesity Society    Non-seasonal allergic rhinitis due to other allergic trigger  -     fluticasone propionate (FLONASE) 50 mcg/actuation nasal spray; 2 sprays (100 mcg total) by Each Nostril route once daily.  Dispense: 16 g; Refill: 11    RUBIO (dyspnea on exertion)  -     Six Minute Walk Test to qualify for Home Oxygen; Future  -     X-Ray Chest PA And Lateral; Future; Expected date: 01/30/2024    ALIREZA treated with BiPAP  -     X-Ray Chest PA And Lateral; Future; Expected date: 01/30/2024    Hypertension associated with diabetes    Morbid obesity with BMI of 40.0-44.9, adult    Chronic respiratory failure with hypoxia and hypercapnia          Outpatient Encounter Medications as of 1/30/2024   Medication Sig Dispense Refill    atorvastatin (LIPITOR) 40 MG tablet Take 1 tablet by mouth once daily 90 tablet 3    dapagliflozin propanediol (FARXIGA) 5 mg Tab tablet Take 1 tablet (5 mg total) by mouth once daily. 90 tablet 3    glimepiride (AMARYL) 4 MG tablet Take 1 tablet (4 mg total) by mouth once daily. 90 tablet 3    ibuprofen (ADVIL,MOTRIN) 800 MG tablet Take 800 mg by mouth.      losartan (COZAAR) 25 MG tablet Take 1 tablet by mouth once daily 90 tablet 3    metFORMIN (GLUCOPHAGE) 1000 MG tablet Take 1 tablet (1,000 mg total) by mouth 2 (two) times daily with meals. 180 tablet 3    multivit-min/ferrous fumarate (MULTI VITAMIN ORAL)       potassium chloride SA (K-DUR,KLOR-CON) 20 MEQ tablet TAKE 1  BY MOUTH ONCE DAILY 90 tablet 2    semaglutide (RYBELSUS) 14 mg tablet Take 1 tablet (14 mg total) by mouth once daily. 90 tablet 3    fluticasone propionate (FLONASE) 50 mcg/actuation nasal spray 2 sprays (100 mcg total) by Each Nostril route once daily. 16 g 11    meclizine (ANTIVERT) 12.5 mg tablet Take 1 tablet (12.5 mg total) by mouth 3 (three) times daily as needed for Dizziness or Nausea. 10 tablet 0      No facility-administered encounter medications on file as of 2024.     Plan:       Requested Prescriptions     Signed Prescriptions Disp Refills    fluticasone propionate (FLONASE) 50 mcg/actuation nasal spray 16 g 11     Si sprays (100 mcg total) by Each Nostril route once daily.     Problem List Items Addressed This Visit       Hypertension associated with diabetes    Current Assessment & Plan     Blood pressure measurements reviewed, repeated and verified. Adverse effects of elevated blood pressure reviewed in detail. Importance of low sodium diet, medication compliance stressed. Patient advised and counseled concerning the adverse medical consequences of untreated hypertension.The patient's hypertension was monitored, evaluated, addressed and/or treated. Asked to follow up with PCP.  Isolated systolic hypertension is a blood pressure ?130/<80 mmHg             Morbid obesity with BMI of 40.0-44.9, adult    Current Assessment & Plan     Last documentation =       BMI noted to be elevated. Changes in weight over time reviewed in chart (if available) and by patient recollection. Patient advised and counseled concerning the adverse medical consequences of obesity. Treatment options discussed - calorie restriction, increasing calorie expenditure, medical and surgical options noted.  The patient's severe obesity was monitored, evaluated, addressed and/or treated.   2013 AHA/ACC/TOS guideline for the management of overweight and obesity in adults: a report of the American College of Cardiology/American Heart Association Task Force on Practice Guidelines and The Obesity Society           ALIREZA treated with AVAPS    Overview     durable medical equipment provider:  Mariah Caceres  [facility] [employer]  [phone]  For CDL          Relevant Orders    X-Ray Chest PA And Lateral     Other Visit Diagnoses       Non-seasonal allergic rhinitis due to other allergic trigger    -  Primary    Relevant Medications    fluticasone  propionate (FLONASE) 50 mcg/actuation nasal spray    RUBIO (dyspnea on exertion)        Relevant Orders    Six Minute Walk Test to qualify for Home Oxygen    X-Ray Chest PA And Lateral    Chronic respiratory failure with hypoxia and hypercapnia                 Follow up in about 1 year (around 1/30/2025) for CPAP download - day of visit, 6 min walk - on return, CXR on return.    MEDICAL DECISION MAKING: Moderate to high complexity.  Overall, the multiple problems listed are of moderate to high severity that may impact quality of life and activities of daily living. Side effects of medications, treatment plan as well as options and alternatives reviewed and discussed with patient. There was counseling of patient concerning these issues.    Total time spent in counseling and coordination of care - 30  minutes of total time spent on the encounter, which includes face to face time and non-face to face time preparing to see the patient (eg, review of tests), Obtaining and/or reviewing separately obtained history, Documenting clinical information in the electronic or other health record, Independently interpreting results (not separately reported) and communicating results to the patient/family/caregiver, or Care coordination (not separately reported).    Time was used in discussion of prognosis, risks, benefits of treatment, instructions and compliance with regimen . Discussion with other physicians and/or health care providers - home health or for use of durable medical equipment (oxygen, nebulizers, CPAP, BiPAP) occurred.

## 2024-01-31 NOTE — ASSESSMENT & PLAN NOTE
Blood pressure measurements reviewed, repeated and verified. Adverse effects of elevated blood pressure reviewed in detail. Importance of low sodium diet, medication compliance stressed. Patient advised and counseled concerning the adverse medical consequences of untreated hypertension.The patient's hypertension was monitored, evaluated, addressed and/or treated. Asked to follow up with PCP.  Isolated systolic hypertension is a blood pressure ?130/<80 mmHg

## 2024-03-01 DIAGNOSIS — E11.9 TYPE 2 DIABETES MELLITUS WITHOUT COMPLICATION, WITHOUT LONG-TERM CURRENT USE OF INSULIN: ICD-10-CM

## 2024-03-01 RX ORDER — GLIMEPIRIDE 4 MG/1
4 TABLET ORAL
Qty: 90 TABLET | Refills: 1 | Status: SHIPPED | OUTPATIENT
Start: 2024-03-01

## 2024-03-01 NOTE — TELEPHONE ENCOUNTER
No care due was identified.  [facility] Embedded Care Due Messages. Reference number: [id number].   3/01/2024 7:01:36 AM CST

## 2024-03-01 NOTE — TELEPHONE ENCOUNTER
Refill Decision Note   Santhosh Goff  is requesting a refill authorization.  Brief Assessment and Rationale for Refill:  Approve     Medication Therapy Plan:        Comments:     Note composed:7:15 AM 03/01/2024

## 2024-03-18 ENCOUNTER — LAB VISIT (OUTPATIENT)
Dept: LAB | Facility: HOSPITAL | Age: 69
End: 2024-03-18
Attending: NURSE PRACTITIONER
Payer: COMMERCIAL

## 2024-03-18 ENCOUNTER — OFFICE VISIT (OUTPATIENT)
Dept: INTERNAL MEDICINE | Facility: CLINIC | Age: 69
End: 2024-03-18
Payer: COMMERCIAL

## 2024-03-18 VITALS
HEART RATE: 87 BPM | WEIGHT: 314.38 LBS | TEMPERATURE: 98 F | HEIGHT: 72 IN | SYSTOLIC BLOOD PRESSURE: 138 MMHG | DIASTOLIC BLOOD PRESSURE: 82 MMHG | OXYGEN SATURATION: 97 % | BODY MASS INDEX: 42.58 KG/M2

## 2024-03-18 DIAGNOSIS — E11.69 HYPERLIPIDEMIA ASSOCIATED WITH TYPE 2 DIABETES MELLITUS: ICD-10-CM

## 2024-03-18 DIAGNOSIS — I15.2 HYPERTENSION ASSOCIATED WITH DIABETES: ICD-10-CM

## 2024-03-18 DIAGNOSIS — G47.33 OSA TREATED WITH BIPAP: ICD-10-CM

## 2024-03-18 DIAGNOSIS — E11.59 HYPERTENSION ASSOCIATED WITH DIABETES: ICD-10-CM

## 2024-03-18 DIAGNOSIS — E11.9 TYPE 2 DIABETES MELLITUS WITHOUT COMPLICATION, WITHOUT LONG-TERM CURRENT USE OF INSULIN: Primary | ICD-10-CM

## 2024-03-18 DIAGNOSIS — E78.5 HYPERLIPIDEMIA ASSOCIATED WITH TYPE 2 DIABETES MELLITUS: ICD-10-CM

## 2024-03-18 DIAGNOSIS — N47.8 FORESKIN PROBLEM: ICD-10-CM

## 2024-03-18 DIAGNOSIS — E11.9 TYPE 2 DIABETES MELLITUS WITHOUT COMPLICATION, WITHOUT LONG-TERM CURRENT USE OF INSULIN: ICD-10-CM

## 2024-03-18 DIAGNOSIS — N52.9 ERECTILE DYSFUNCTION, UNSPECIFIED ERECTILE DYSFUNCTION TYPE: ICD-10-CM

## 2024-03-18 LAB
ALBUMIN/CREAT UR: 51 UG/MG (ref 0–30)
CREAT UR-MCNC: 49 MG/DL (ref 23–375)
MICROALBUMIN UR DL<=1MG/L-MCNC: 25 UG/ML

## 2024-03-18 PROCEDURE — 82043 UR ALBUMIN QUANTITATIVE: CPT | Performed by: NURSE PRACTITIONER

## 2024-03-18 PROCEDURE — 1159F MED LIST DOCD IN RCRD: CPT | Mod: CPTII,S$GLB,, | Performed by: NURSE PRACTITIONER

## 2024-03-18 PROCEDURE — 4010F ACE/ARB THERAPY RXD/TAKEN: CPT | Mod: CPTII,S$GLB,, | Performed by: NURSE PRACTITIONER

## 2024-03-18 PROCEDURE — 1126F AMNT PAIN NOTED NONE PRSNT: CPT | Mod: CPTII,S$GLB,, | Performed by: NURSE PRACTITIONER

## 2024-03-18 PROCEDURE — 1160F RVW MEDS BY RX/DR IN RCRD: CPT | Mod: CPTII,S$GLB,, | Performed by: NURSE PRACTITIONER

## 2024-03-18 PROCEDURE — 3008F BODY MASS INDEX DOCD: CPT | Mod: CPTII,S$GLB,, | Performed by: NURSE PRACTITIONER

## 2024-03-18 PROCEDURE — 1101F PT FALLS ASSESS-DOCD LE1/YR: CPT | Mod: CPTII,S$GLB,, | Performed by: NURSE PRACTITIONER

## 2024-03-18 PROCEDURE — 3288F FALL RISK ASSESSMENT DOCD: CPT | Mod: CPTII,S$GLB,, | Performed by: NURSE PRACTITIONER

## 2024-03-18 PROCEDURE — 3075F SYST BP GE 130 - 139MM HG: CPT | Mod: CPTII,S$GLB,, | Performed by: NURSE PRACTITIONER

## 2024-03-18 PROCEDURE — 99999 PR PBB SHADOW E&M-EST. PATIENT-LVL V: CPT | Mod: PBBFAC,,, | Performed by: NURSE PRACTITIONER

## 2024-03-18 PROCEDURE — 99214 OFFICE O/P EST MOD 30 MIN: CPT | Mod: S$GLB,,, | Performed by: NURSE PRACTITIONER

## 2024-03-18 PROCEDURE — 3079F DIAST BP 80-89 MM HG: CPT | Mod: CPTII,S$GLB,, | Performed by: NURSE PRACTITIONER

## 2024-03-18 NOTE — PROGRESS NOTES
"Subjective:       Patient ID: Santhosh Goff is a 69 y.o. male.    Chief Complaint: Follow-up    Pt presents to clinic today for follow up of his diabetes  Pt with history of type 2 diabetes, hypertension, hyperlipidemia, sleep apnea.      He indicates he has been doing well   he is tolerating his medications well.    His most recent A1c is 7.1- due to be updated today  just slightly above goal.    He is due for his eye exam which we will get scheduled.      BP well controlled  Tolerating meds as prescribed    He reports he has had issues with ED since he was dx with HTN many years ago  He is currently taking a supplement from "[employer]" online- some form of Viagra   Helps with an erection however he believes there is some type of issue with his foreskin  Feels like he cannot full achieve an erection due to the skin restricting him  Reports he is circumcised   Wife has been applying some otc yeast cream with seems to have helped but he feels his "foreskin is too tight" and has been for years  He decided to forget his pride and just ask about it   Does not hurt  At one point was yet- but no longer red              /82   Pulse 87   Temp 97.5 °F (36.4 °C) (Tympanic)   Ht 6' (1.829 m)   Wt (!) 142.6 kg (314 lb 6 oz)   SpO2 97%   BMI 42.64 kg/m²     Review of Systems   Constitutional:  Negative for activity change, appetite change, chills, diaphoresis, fatigue, fever and unexpected weight change.   HENT: Negative.     Eyes: Negative.    Respiratory:  Negative for apnea, chest tightness, shortness of breath and stridor.    Cardiovascular:  Negative for chest pain, palpitations and leg swelling.   Gastrointestinal: Negative.    Endocrine: Negative.    Genitourinary: Negative.    Musculoskeletal:  Negative for arthralgias and myalgias.   Skin:  Negative for color change, pallor, rash and wound.   Allergic/Immunologic: Negative.    Neurological:  Negative for dizziness, facial asymmetry, light-headedness and " headaches.   Hematological:  Negative for adenopathy.   Psychiatric/Behavioral:  Negative for agitation and behavioral problems.        Objective:      Physical Exam  Vitals and nursing note reviewed.   Constitutional:       General: He is not in acute distress.     Appearance: He is well-developed. He is not diaphoretic.   HENT:      Head: Normocephalic and atraumatic.   Cardiovascular:      Rate and Rhythm: Normal rate and regular rhythm.      Heart sounds: Normal heart sounds.   Pulmonary:      Effort: Pulmonary effort is normal. No respiratory distress.      Breath sounds: Normal breath sounds.   Genitourinary:     Comments: Pt declined  exam   Skin:     General: Skin is warm and dry.      Findings: No rash.   Neurological:      Mental Status: He is alert and oriented to person, place, and time.   Psychiatric:         Behavior: Behavior normal.         Thought Content: Thought content normal.         Judgment: Judgment normal.         Assessment:       1. Type 2 diabetes mellitus without complication, without long-term current use of insulin    2. Hypertension associated with diabetes    3. Hyperlipidemia associated with type 2 diabetes mellitus    4. ALIREZA treated with AVAPS    5. Foreskin problem    6. Erectile dysfunction, unspecified erectile dysfunction type    7. BMI 40.0-44.9, adult        Plan:       Santhosh was seen today for follow-up.    Diagnoses and all orders for this visit:    Type 2 diabetes mellitus without complication, without long-term current use of insulin  -     Hemoglobin A1C; Future  -     Microalbumin/Creatinine Ratio, Urine; Future  - Chronic, due for updated labs. Will do today     Hypertension associated with diabetes      - Chronic, stable on current meds. Continue to work on diet and weight loss   Hyperlipidemia associated with type 2 diabetes mellitus      - Chronic, stable   ALIREZA treated with AVAPS     - Chronic, stable   Foreskin problem  -     Ambulatory referral/consult to  Urology; Future    Erectile dysfunction, unspecified erectile dysfunction type    BMI 40.0-44.9, adult      We discussed the nature of his concern with his foreskin  He really wants to see a urologist to see if there is anything that can be done  We discussed red flags such  He declined exam today in clinic  Will refer to urology  Follow up with Dr. Mohr in 6 months for DM, HTN

## 2024-03-26 DIAGNOSIS — E11.9 TYPE 2 DIABETES MELLITUS WITHOUT COMPLICATION, WITHOUT LONG-TERM CURRENT USE OF INSULIN: ICD-10-CM

## 2024-03-26 NOTE — TELEPHONE ENCOUNTER
No care due was identified.  [facility] Embedded Care Due Messages. Reference number: [id number].   3/26/2024 7:01:37 AM CDT

## 2024-03-27 RX ORDER — METFORMIN HYDROCHLORIDE 1000 MG/1
1000 TABLET ORAL 2 TIMES DAILY WITH MEALS
Qty: 180 TABLET | Refills: 1 | Status: SHIPPED | OUTPATIENT
Start: 2024-03-27

## 2024-03-27 NOTE — TELEPHONE ENCOUNTER
Refill Decision Note   Santhosh Goff  is requesting a refill authorization.  Brief Assessment and Rationale for Refill:  Approve     Medication Therapy Plan:         Comments:     Note composed:6:57 AM 03/27/2024

## 2024-04-18 RX ORDER — POTASSIUM CHLORIDE 1500 MG/1
20 TABLET, EXTENDED RELEASE ORAL
Qty: 90 TABLET | Refills: 2 | Status: SHIPPED | OUTPATIENT
Start: 2024-04-18

## 2024-04-18 NOTE — TELEPHONE ENCOUNTER
No care due was identified.  [facility] Embedded Care Due Messages. Reference number: [id number].   4/18/2024 7:01:27 AM CDT

## 2024-04-19 NOTE — TELEPHONE ENCOUNTER
Refill Decision Note   Santhosh Goff  is requesting a refill authorization.  Brief Assessment and Rationale for Refill:  Approve     Medication Therapy Plan:         Comments:     Note composed:10:11 PM 04/18/2024

## 2024-06-26 DIAGNOSIS — E11.9 TYPE 2 DIABETES MELLITUS WITHOUT COMPLICATION: ICD-10-CM

## 2024-06-28 DIAGNOSIS — E11.9 TYPE 2 DIABETES MELLITUS WITHOUT COMPLICATION, WITHOUT LONG-TERM CURRENT USE OF INSULIN: ICD-10-CM

## 2024-06-28 RX ORDER — ORAL SEMAGLUTIDE 14 MG/1
14 TABLET ORAL
Qty: 30 TABLET | Refills: 2 | Status: SHIPPED | OUTPATIENT
Start: 2024-06-28

## 2024-07-18 DIAGNOSIS — E78.5 HYPERLIPIDEMIA, UNSPECIFIED HYPERLIPIDEMIA TYPE: ICD-10-CM

## 2024-07-18 RX ORDER — ATORVASTATIN CALCIUM 40 MG/1
TABLET, FILM COATED ORAL
Qty: 90 TABLET | Refills: 1 | Status: SHIPPED | OUTPATIENT
Start: 2024-07-18

## 2024-07-18 NOTE — TELEPHONE ENCOUNTER
Refill Decision Note   Santhosh Goff  is requesting a refill authorization.  Brief Assessment and Rationale for Refill:  Approve     Medication Therapy Plan:         Comments:     Note composed:10:53 AM 07/18/2024

## 2024-07-18 NOTE — TELEPHONE ENCOUNTER
No care due was identified.  [facility] Embedded Care Due Messages. Reference number: [id number].   7/18/2024 7:01:27 AM CDT

## 2024-07-19 NOTE — ASSESSMENT & PLAN NOTE
-  Repeat colonoscopy in 3 - 5 years for surveillance.        Okay to resume xarelto tomorrow on 7-20-24                     Return to normal activities tomorrow.     Cholesterol numbers look good.  We will continue the current regimen at this time.  Remain focused on low fat diet and high dietary fiber intake.

## 2024-07-25 ENCOUNTER — TELEPHONE (OUTPATIENT)
Dept: INTERNAL MEDICINE | Facility: CLINIC | Age: 69
End: 2024-07-25
Payer: COMMERCIAL

## 2024-07-25 NOTE — TELEPHONE ENCOUNTER
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 Call back  Best Call Back Number: [phone]  Additional Information: [id number]

## 2024-07-25 NOTE — TELEPHONE ENCOUNTER
Called and spoke to pt's wife June. 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

## 2024-07-25 NOTE — LETTER
July 25, 2024    Santhosh Goff  [address] [address] [address]         [facility]  [address]  [facility] [address]  Phone: [phone]  Fax: [phone] July 25, 2024     Patient: Santhosh Goff   YOB: 1955   Date of Visit: 7/25/2024       To Whom It May Concern:    Mr. Santhosh Goff is under my care.  He takes several prescription medications including metformin, glimiperide, aspirin, atorvastatin, albuterol, lisinopril, furosemide, potassium, rybelsus and farxiga.       His glimiperide, rybelsus and farxiga carry a risk of hypoglyemic side effects.  He has been on these medications for some time and has not demonstrated any issues with this complication.  His sugar control has been stable over the past year without unexpected fluctuatoins.    If you have any questions or concerns, please don't hesitate to call.    Sincerely,        Jacek Mohr MD

## 2024-08-03 DIAGNOSIS — I15.2 HYPERTENSION ASSOCIATED WITH DIABETES: ICD-10-CM

## 2024-08-03 DIAGNOSIS — E11.59 HYPERTENSION ASSOCIATED WITH DIABETES: ICD-10-CM

## 2024-08-04 RX ORDER — LOSARTAN POTASSIUM 25 MG/1
TABLET ORAL
Qty: 90 TABLET | Refills: 1 | Status: SHIPPED | OUTPATIENT
Start: 2024-08-04

## 2024-08-15 ENCOUNTER — TELEPHONE (OUTPATIENT)
Dept: PHARMACY | Facility: CLINIC | Age: 69
End: 2024-08-15
Payer: COMMERCIAL

## 2024-08-15 NOTE — TELEPHONE ENCOUNTER
[facility]/Population Health Chart Review & Patient Outreach Details For Medication Adherence Project    Reason for Outreach Encounter: 3rd Party payor non-compliance report ([employer], [employer], C, etc)    2.  Patient Outreach Method: N/A, reviewed patient chart     3.   Medication in question:   Hyperlipidemia Medications               atorvastatin (LIPITOR) 40 MG tablet Take 1 tablet by mouth once daily                LAST FILLED: 7/21/24 for 90 day supply    4.  Reviewed and or Updates Made To: Patient Chart    5. Outreach Outcomes and/or actions taken: Patient filled medication and is on track to be adherent    Additional Notes:

## 2024-08-27 DIAGNOSIS — E11.9 TYPE 2 DIABETES MELLITUS WITHOUT COMPLICATION, WITHOUT LONG-TERM CURRENT USE OF INSULIN: ICD-10-CM

## 2024-08-27 RX ORDER — GLIMEPIRIDE 4 MG/1
4 TABLET ORAL
Qty: 90 TABLET | Refills: 0 | Status: SHIPPED | OUTPATIENT
Start: 2024-08-27

## 2024-09-03 ENCOUNTER — OFFICE VISIT (OUTPATIENT)
Dept: UROLOGY | Facility: CLINIC | Age: 69
End: 2024-09-03
Payer: COMMERCIAL

## 2024-09-03 VITALS
HEIGHT: 72 IN | BODY MASS INDEX: 42.66 KG/M2 | SYSTOLIC BLOOD PRESSURE: 144 MMHG | DIASTOLIC BLOOD PRESSURE: 86 MMHG | HEART RATE: 92 BPM | WEIGHT: 315 LBS

## 2024-09-03 DIAGNOSIS — N47.6 BALANOPOSTHITIS: Primary | ICD-10-CM

## 2024-09-03 DIAGNOSIS — N47.1 PHIMOSIS: ICD-10-CM

## 2024-09-03 DIAGNOSIS — R31.29 MICROSCOPIC HEMATURIA: ICD-10-CM

## 2024-09-03 LAB
BILIRUB UR QL STRIP: NEGATIVE
GLUCOSE UR QL STRIP: POSITIVE
KETONES UR QL STRIP: NEGATIVE
LEUKOCYTE ESTERASE UR QL STRIP: NEGATIVE
PH, POC UA: 5.5
POC BLOOD, URINE: POSITIVE
POC NITRATES, URINE: NEGATIVE
PROT UR QL STRIP: NEGATIVE
SP GR UR STRIP: 1.01 (ref 1–1.03)
UROBILINOGEN UR STRIP-ACNC: 0.2 (ref 0.3–2.2)

## 2024-09-03 PROCEDURE — 3008F BODY MASS INDEX DOCD: CPT | Mod: CPTII,S$GLB,, | Performed by: UROLOGY

## 2024-09-03 PROCEDURE — 1101F PT FALLS ASSESS-DOCD LE1/YR: CPT | Mod: CPTII,S$GLB,, | Performed by: UROLOGY

## 2024-09-03 PROCEDURE — 3077F SYST BP >= 140 MM HG: CPT | Mod: CPTII,S$GLB,, | Performed by: UROLOGY

## 2024-09-03 PROCEDURE — 3079F DIAST BP 80-89 MM HG: CPT | Mod: CPTII,S$GLB,, | Performed by: UROLOGY

## 2024-09-03 PROCEDURE — 3060F POS MICROALBUMINURIA REV: CPT | Mod: CPTII,S$GLB,, | Performed by: UROLOGY

## 2024-09-03 PROCEDURE — 3046F HEMOGLOBIN A1C LEVEL >9.0%: CPT | Mod: CPTII,S$GLB,, | Performed by: UROLOGY

## 2024-09-03 PROCEDURE — 1159F MED LIST DOCD IN RCRD: CPT | Mod: CPTII,S$GLB,, | Performed by: UROLOGY

## 2024-09-03 PROCEDURE — 1126F AMNT PAIN NOTED NONE PRSNT: CPT | Mod: CPTII,S$GLB,, | Performed by: UROLOGY

## 2024-09-03 PROCEDURE — 99999 PR PBB SHADOW E&M-EST. PATIENT-LVL III: CPT | Mod: PBBFAC,,, | Performed by: UROLOGY

## 2024-09-03 PROCEDURE — 99204 OFFICE O/P NEW MOD 45 MIN: CPT | Mod: S$GLB,,, | Performed by: UROLOGY

## 2024-09-03 PROCEDURE — 3288F FALL RISK ASSESSMENT DOCD: CPT | Mod: CPTII,S$GLB,, | Performed by: UROLOGY

## 2024-09-03 PROCEDURE — 81003 URINALYSIS AUTO W/O SCOPE: CPT | Mod: QW,S$GLB,, | Performed by: UROLOGY

## 2024-09-03 PROCEDURE — 4010F ACE/ARB THERAPY RXD/TAKEN: CPT | Mod: CPTII,S$GLB,, | Performed by: UROLOGY

## 2024-09-03 PROCEDURE — 3066F NEPHROPATHY DOC TX: CPT | Mod: CPTII,S$GLB,, | Performed by: UROLOGY

## 2024-09-03 RX ORDER — CLOTRIMAZOLE AND BETAMETHASONE DIPROPIONATE 10; .64 MG/G; MG/G
CREAM TOPICAL 2 TIMES DAILY
Qty: 15 G | Refills: 1 | Status: SHIPPED | OUTPATIENT
Start: 2024-09-03

## 2024-09-03 NOTE — PROGRESS NOTES
Chief Complaint:   Encounter Diagnoses   Name Primary?    Microscopic hematuria     Balanoposthitis Yes    Phimosis        HPI:  HPI Santhosh Goff flaco 69 y.o. male who presents with complaints of his foreskin becoming tighter and more painful.  He has a diabetic and not compliant with his diet.  He is here with his wife today.  His last A1c was greater than 9.  He has been trying some over-the-counter ointments for the trouble with his foreskin.  He states over the year it has become narrow or narrow.  He was able to void without difficulty.    History:  Social History     Tobacco Use    Smoking status: Never     Passive exposure: Never    Smokeless tobacco: Never   Substance Use Topics    Alcohol use: No    Drug use: No     Past Medical History:   Diagnosis Date    Arthritis     Diabetes mellitus, type 2 2014     x 1 week ago 10/10/2018    Hyperlipidemia     Hypertension     Morbid obesity with BMI of 45.0-49.9, adult     Staphylococcus aureus infection, multiple-resistant (MRSA) 2009     Past Surgical History:   Procedure Laterality Date    COLONOSCOPY N/A 2/24/2020    Procedure: COLONOSCOPY;  Surgeon: Bela Vaughn MD;  Location: [facility] ENDO;  Service: Endoscopy;  Laterality: N/A;    COLONOSCOPY N/A 7/18/2023    Procedure: COLONOSCOPY;  Surgeon: Arnold Stokes MD;  Location: [facility] ENDO;  Service: Endoscopy;  Laterality: N/A;    LUNG SURGERY      RIB FX       Family History   Problem Relation Name Age of Onset    Early death Father         Current Outpatient Medications on File Prior to Visit   Medication Sig Dispense Refill    atorvastatin (LIPITOR) 40 MG tablet Take 1 tablet by mouth once daily 90 tablet 1    FARXIGA 5 mg Tab tablet Take 1 tablet by mouth once daily 90 tablet 0    fluticasone propionate (FLONASE) 50 mcg/actuation nasal spray 2 sprays (100 mcg total) by Each Nostril route once daily. 16 g 11    glimepiride (AMARYL) 4 MG tablet Take 1 tablet by mouth once daily 90 tablet 0     ibuprofen (ADVIL,MOTRIN) 800 MG tablet Take 800 mg by mouth.      KLOR-CON M20 20 mEq tablet TAKE 1  BY MOUTH ONCE DAILY 90 tablet 2    losartan (COZAAR) 25 MG tablet Take 1 tablet by mouth once daily 90 tablet 1    metFORMIN (GLUCOPHAGE) 1000 MG tablet TAKE 1 TABLET BY MOUTH TWICE DAILY WITH MEALS 180 tablet 1    multivit-min/ferrous fumarate (MULTI VITAMIN ORAL)       RYBELSUS 14 mg tablet Take 1 tablet by mouth once daily 30 tablet 2    meclizine (ANTIVERT) 12.5 mg tablet Take 1 tablet (12.5 mg total) by mouth 3 (three) times daily as needed for Dizziness or Nausea. 10 tablet 0     No current facility-administered medications on file prior to visit.        Objective:     Vitals:    09/03/24 1047   BP: (!) 144/86   BP Location: Right arm   Patient Position: Sitting   Pulse: 92   Weight: (!) 142.9 kg (315 lb)   Height: 6' (1.829 m)      BMI Readings from Last 1 Encounters:   09/03/24 42.72 kg/m²          Physical Exam  Balanoposthitis with phimosis.    Lab Results   Component Value Date    PSA 0.31 12/11/2023    PSA 0.33 07/08/2022    PSA 0.22 04/05/2021    PSA 0.21 03/19/2020    PSA 0.23 08/22/2019    PSA 0.20 08/06/2018    PSA 0.22 05/19/2017    PSA 0.38 04/27/2015    PSA 0.25 04/01/2014        Lab Results   Component Value Date    CREATININE 1.0 12/11/2023      Assessment:       1. Balanoposthitis    2. Microscopic hematuria    3. Phimosis        Plan:     1. Balanoposthitis    2. Microscopic hematuria    3. Phimosis       Orders Placed This Encounter    POCT Urinalysis, Dipstick, Automated, W/O Scope    clotrimazole-betamethasone 1-0.05% (LOTRISONE) cream      Discussed balanoposthitis and phimosis or due to diabetes which is not under control.  Counseled on proper diabetic diet.  Recommend starting Lotrisone temporarily for relief.  Recommend follow up in 3 months.  Discuss trying to get A1c below 8 before considering circumcision..

## 2024-09-07 DIAGNOSIS — E11.9 TYPE 2 DIABETES MELLITUS WITHOUT COMPLICATION, WITHOUT LONG-TERM CURRENT USE OF INSULIN: ICD-10-CM

## 2024-09-07 NOTE — TELEPHONE ENCOUNTER
Care Due:                  Date            Visit Type   Department     Provider  --------------------------------------------------------------------------------                                EP -                              PRIMARY      [facility] INTERNAL  Last Visit: 12-      CARE ([facility])   MEDICINE       Jacek Mohr                               -                              PRIMARY      [facility] INTERNAL  Next Visit: 09-      CARE ([facility])   MEDICINE       Jacek Mohr                                                            Last  Test          Frequency    Reason                     Performed    Due Date  --------------------------------------------------------------------------------    CMP.........  12 months..  YONAS FRANCES,         12- 12-                             atorvastatin,                             glimepiride, losartan,                             metFORMIN................    Lipid Panel.  12 months..  atorvastatin.............  12- 12-    [facility] Embedded Care Due Messages. Reference number: [id number].   9/07/2024 11:24:24 AM CDT

## 2024-09-08 RX ORDER — METFORMIN HYDROCHLORIDE 1000 MG/1
1000 TABLET ORAL 2 TIMES DAILY WITH MEALS
Qty: 180 TABLET | Refills: 0 | Status: SHIPPED | OUTPATIENT
Start: 2024-09-08

## 2024-09-08 RX ORDER — DAPAGLIFLOZIN 5 MG/1
5 TABLET, FILM COATED ORAL
Qty: 90 TABLET | Refills: 0 | Status: SHIPPED | OUTPATIENT
Start: 2024-09-08

## 2024-09-08 NOTE — TELEPHONE ENCOUNTER
Provider Staff:  Action required for this patient     Please see care gap opportunities below in Care Due Message.    Thanks!  [facility]     Appointments      Date Provider   Last Visit   12/18/2023 Jacek Mohr MD   Next Visit   9/19/2024 Jacek Mohr MD      Refill Decision Note   Santhosh Goff  is requesting a refill authorization.  Brief Assessment and Rationale for Refill:  Approve     Medication Therapy Plan:         Comments:     Note composed:4:06 AM 09/08/2024

## 2024-09-16 ENCOUNTER — PATIENT OUTREACH (OUTPATIENT)
Dept: ADMINISTRATIVE | Facility: HOSPITAL | Age: 69
End: 2024-09-16
Payer: COMMERCIAL

## 2024-09-20 ENCOUNTER — PATIENT MESSAGE (OUTPATIENT)
Dept: ADMINISTRATIVE | Facility: HOSPITAL | Age: 69
End: 2024-09-20
Payer: COMMERCIAL

## 2024-10-16 DIAGNOSIS — E11.9 TYPE 2 DIABETES MELLITUS WITHOUT COMPLICATION: ICD-10-CM

## 2024-10-17 ENCOUNTER — PATIENT OUTREACH (OUTPATIENT)
Dept: ADMINISTRATIVE | Facility: HOSPITAL | Age: 69
End: 2024-10-17
Payer: COMMERCIAL

## 2024-10-17 NOTE — PROGRESS NOTES
VBHM Score: 3     Eye Exam  Hemoglobin A1c  Uncontrolled BP    Influenza Vaccine  Shingles/Zoster Vaccine  RSV Vaccine        Tried calling patient. No answer, LVM. Will send portal message.

## 2024-10-20 DIAGNOSIS — E11.9 TYPE 2 DIABETES MELLITUS WITHOUT COMPLICATION, WITHOUT LONG-TERM CURRENT USE OF INSULIN: ICD-10-CM

## 2024-10-21 RX ORDER — ORAL SEMAGLUTIDE 14 MG/1
14 TABLET ORAL
Qty: 30 TABLET | Refills: 0 | Status: SHIPPED | OUTPATIENT
Start: 2024-10-21

## 2024-11-05 ENCOUNTER — TELEPHONE (OUTPATIENT)
Dept: INTERNAL MEDICINE | Facility: CLINIC | Age: 69
End: 2024-11-05
Payer: COMMERCIAL

## 2024-11-05 ENCOUNTER — PATIENT OUTREACH (OUTPATIENT)
Dept: ADMINISTRATIVE | Facility: HOSPITAL | Age: 69
End: 2024-11-05
Payer: COMMERCIAL

## 2024-11-05 NOTE — TELEPHONE ENCOUNTER
----- Message from Mirella sent at 11/5/2024  2:13 PM CST -----  Contact: pt  Type:  Patient Returning Call    Who Called: pt  Who Left Message for Patient: GONZALEZ  Does the patient know what this is regarding?:   Would the patient rather a call back or a response via [facility]?  phone  Best Call Back Number: [phone]  Additional Information:

## 2024-11-05 NOTE — PROGRESS NOTES
Working eye exam report; Chart searched; Called pt and LVM for pt to call back. Portal message sent.

## 2024-11-18 DIAGNOSIS — E11.9 TYPE 2 DIABETES MELLITUS WITHOUT COMPLICATION, WITHOUT LONG-TERM CURRENT USE OF INSULIN: ICD-10-CM

## 2024-11-18 RX ORDER — ORAL SEMAGLUTIDE 14 MG/1
14 TABLET ORAL
Qty: 30 TABLET | Refills: 0 | Status: SHIPPED | OUTPATIENT
Start: 2024-11-18

## 2024-11-29 DIAGNOSIS — E11.9 TYPE 2 DIABETES MELLITUS WITHOUT COMPLICATION, WITHOUT LONG-TERM CURRENT USE OF INSULIN: ICD-10-CM

## 2024-11-29 NOTE — TELEPHONE ENCOUNTER
Refill Routing Note   Medication(s) are not appropriate for processing by [facility] for the following reason(s):        Required labs outdated    [facility] action(s):  Defer     Requires labs : Yes           Appointments  past 12m or future 3m with PCP    Date Provider   Last Visit   12/18/2023 Jacek Mohr MD   Next Visit   Visit date not found Jacek Mohr MD   ED visits in past 90 days: 0        Note composed:2:21 PM 11/29/2024

## 2024-11-29 NOTE — TELEPHONE ENCOUNTER
Care Due:                  Date            Visit Type   Department     Provider  --------------------------------------------------------------------------------                                EP -                              PRIMARY      [facility] INTERNAL  Last Visit: 12-      CARE ([facility])   MEDICINE       Jacek Mohr  Next Visit: None Scheduled  None         None Found                                                            Last  Test          Frequency    Reason                     Performed    Due Date  --------------------------------------------------------------------------------    CMP.........  12 months..  KLOR-CON, atorvastatin,    12- 12-                             dapagliflozin,                             glimepiride, losartan,                             metFORMIN................    HBA1C.......  6 months...  dapagliflozin,             03- 09-                             glimepiride, metFORMIN...    Lipid Panel.  12 months..  atorvastatin.............  12- 12-    [facility] Embedded Care Due Messages. Reference number: [id number].   11/29/2024 7:01:44 AM CST

## 2024-12-02 RX ORDER — GLIMEPIRIDE 4 MG/1
4 TABLET ORAL DAILY
Qty: 90 TABLET | Refills: 0 | Status: SHIPPED | OUTPATIENT
Start: 2024-12-02

## 2024-12-18 DIAGNOSIS — E11.9 TYPE 2 DIABETES MELLITUS WITHOUT COMPLICATION, WITHOUT LONG-TERM CURRENT USE OF INSULIN: ICD-10-CM

## 2024-12-18 RX ORDER — ORAL SEMAGLUTIDE 14 MG/1
14 TABLET ORAL
Qty: 30 TABLET | Refills: 0 | Status: SHIPPED | OUTPATIENT
Start: 2024-12-18

## 2024-12-18 NOTE — TELEPHONE ENCOUNTER
Care Due:                  Date            Visit Type   Department     Provider  --------------------------------------------------------------------------------                                EP -                              PRIMARY      [facility] INTERNAL  Last Visit: 12-      CARE ([facility])   MEDICINE       Jacek Mohr  Next Visit: None Scheduled  None         None Found                                                            Last  Test          Frequency    Reason                     Performed    Due Date  --------------------------------------------------------------------------------    Office Visit  15 months..  KLOR-CON, atorvastatin,    12- 03-                             dapagliflozin,                             glimepiride, losartan,                             metFORMIN................    [employer] Embedded Care Due Messages. Reference number: [id number].   12/18/2024 7:02:01 AM CST

## 2024-12-19 ENCOUNTER — OFFICE VISIT (OUTPATIENT)
Dept: OPHTHALMOLOGY | Facility: CLINIC | Age: 69
End: 2024-12-19
Payer: COMMERCIAL

## 2024-12-19 DIAGNOSIS — E11.36 DIABETIC CATARACT: ICD-10-CM

## 2024-12-19 DIAGNOSIS — H35.3131 NONEXUDATIVE AGE-RELATED MACULAR DEGENERATION, BILATERAL, EARLY DRY STAGE: ICD-10-CM

## 2024-12-19 DIAGNOSIS — E11.9 DIABETES MELLITUS TYPE 2 WITHOUT RETINOPATHY: Primary | ICD-10-CM

## 2024-12-19 DIAGNOSIS — H52.7 REFRACTIVE ERRORS: ICD-10-CM

## 2024-12-19 PROCEDURE — 3066F NEPHROPATHY DOC TX: CPT | Mod: CPTII,S$GLB,, | Performed by: OPTOMETRIST

## 2024-12-19 PROCEDURE — 3046F HEMOGLOBIN A1C LEVEL >9.0%: CPT | Mod: CPTII,S$GLB,, | Performed by: OPTOMETRIST

## 2024-12-19 PROCEDURE — 92015 DETERMINE REFRACTIVE STATE: CPT | Mod: S$GLB,,, | Performed by: OPTOMETRIST

## 2024-12-19 PROCEDURE — 2023F DILAT RTA XM W/O RTNOPTHY: CPT | Mod: CPTII,S$GLB,, | Performed by: OPTOMETRIST

## 2024-12-19 PROCEDURE — 3060F POS MICROALBUMINURIA REV: CPT | Mod: CPTII,S$GLB,, | Performed by: OPTOMETRIST

## 2024-12-19 PROCEDURE — 4010F ACE/ARB THERAPY RXD/TAKEN: CPT | Mod: CPTII,S$GLB,, | Performed by: OPTOMETRIST

## 2024-12-19 PROCEDURE — 92014 COMPRE OPH EXAM EST PT 1/>: CPT | Mod: S$GLB,,, | Performed by: OPTOMETRIST

## 2024-12-19 PROCEDURE — 99999 PR PBB SHADOW E&M-EST. PATIENT-LVL III: CPT | Mod: PBBFAC,,, | Performed by: OPTOMETRIST

## 2024-12-19 PROCEDURE — 1159F MED LIST DOCD IN RCRD: CPT | Mod: CPTII,S$GLB,, | Performed by: OPTOMETRIST

## 2024-12-19 RX ORDER — DAPAGLIFLOZIN 5 MG/1
5 TABLET, FILM COATED ORAL
Qty: 90 TABLET | Refills: 0 | Status: SHIPPED | OUTPATIENT
Start: 2024-12-19

## 2024-12-19 RX ORDER — METFORMIN HYDROCHLORIDE 1000 MG/1
1000 TABLET ORAL 2 TIMES DAILY WITH MEALS
Qty: 180 TABLET | Refills: 0 | Status: SHIPPED | OUTPATIENT
Start: 2024-12-19

## 2024-12-19 NOTE — PROGRESS NOTES
SUBJECTIVE  Santhosh Goff is 69 y.o. male  Corrected distance visual acuity was 20/20 in the right eye and 20/25 in the left eye. Corrected near visual acuity was J1 in the right eye and J1 in the left eye.   Chief Complaint   Patient presents with    Diabetic Eye Exam    Hypertensive Eye Exam    Eye Exam          HPI    NIDDM exam.  No visual complaints.  Last eye exam 11/08/2022 TRF.  Update glasses RX.  Lab Results       Component                Value               Date                       HGBA1C                   9.5 (H)             03/18/2024   LAST SAW 09/02/16  +DM X 2-4 YEARS               Last edited by Keyla López MA on 12/19/2024  9:21 AM.         Assessment /Plan :  1. Diabetes mellitus type 2 without retinopathy  No Background Diabetic Retinopathy  Strict BG control, f/u w/ PCP, and annual DFE  Stressed importance of DM control to preserve vision     2. Diabetic cataract  Cataracts are not visually significant and not affecting activities of daily living.   Annual observation is recommended at this time.   Patient to call or return to clinic with any significant change in vision prior to next visit.    3. Nonexudative age-related macular degeneration, bilateral, early dry stage  Mottling Macular OS>OD AREDS2 suggested     4. Refractive errors  Dispense Final Rx for glasses.  RTC 1 year  Discussed above and answered questions.

## 2024-12-19 NOTE — TELEPHONE ENCOUNTER
Refill Routing Note   Medication(s) are not appropriate for processing by [facility] for the following reason(s):        Required labs outdated    [facility] action(s):  Defer   Requires appointment : Yes             Appointments  past 12m or future 3m with PCP    Date Provider   Last Visit   12/18/2023 Jacek Mohr MD   Next Visit   Visit date not found Jacek Mohr MD   ED visits in past 90 days: 0        Note composed:8:36 PM 12/18/2024

## 2025-01-08 DIAGNOSIS — E11.9 TYPE 2 DIABETES MELLITUS WITHOUT COMPLICATION, WITHOUT LONG-TERM CURRENT USE OF INSULIN: ICD-10-CM

## 2025-01-08 DIAGNOSIS — E11.9 TYPE 2 DIABETES MELLITUS WITHOUT COMPLICATION: ICD-10-CM

## 2025-01-14 DIAGNOSIS — E78.5 HYPERLIPIDEMIA, UNSPECIFIED HYPERLIPIDEMIA TYPE: ICD-10-CM

## 2025-01-14 RX ORDER — POTASSIUM CHLORIDE 1500 MG/1
20 TABLET, EXTENDED RELEASE ORAL
Qty: 90 TABLET | Refills: 0 | Status: SHIPPED | OUTPATIENT
Start: 2025-01-14

## 2025-01-14 RX ORDER — ATORVASTATIN CALCIUM 40 MG/1
TABLET, FILM COATED ORAL
Qty: 90 TABLET | Refills: 0 | Status: SHIPPED | OUTPATIENT
Start: 2025-01-14

## 2025-01-14 NOTE — TELEPHONE ENCOUNTER
No care due was identified.  [facility] Embedded Care Due Messages. Reference number: [id number].   1/14/2025 7:01:45 AM CST

## 2025-01-14 NOTE — TELEPHONE ENCOUNTER
Refill Routing Note   Medication(s) are not appropriate for processing by [facility] for the following reason(s):        Required labs outdated    [facility] action(s):  Defer             Appointments  past 12m or future 3m with PCP    Date Provider   Last Visit   12/18/2023 Jacek Mohr MD   Next Visit   Visit date not found Jacek Mohr MD   ED visits in past 90 days: 0        Note composed:8:12 AM 01/14/2025

## 2025-01-18 DIAGNOSIS — E11.9 TYPE 2 DIABETES MELLITUS WITHOUT COMPLICATION, WITHOUT LONG-TERM CURRENT USE OF INSULIN: ICD-10-CM

## 2025-01-19 RX ORDER — ORAL SEMAGLUTIDE 14 MG/1
14 TABLET ORAL
Qty: 30 TABLET | Refills: 0 | Status: SHIPPED | OUTPATIENT
Start: 2025-01-19

## 2025-01-20 ENCOUNTER — TELEPHONE (OUTPATIENT)
Dept: PULMONOLOGY | Facility: CLINIC | Age: 70
End: 2025-01-20
Payer: COMMERCIAL

## 2025-01-28 ENCOUNTER — LAB VISIT (OUTPATIENT)
Dept: LAB | Facility: HOSPITAL | Age: 70
End: 2025-01-28
Attending: UROLOGY
Payer: COMMERCIAL

## 2025-01-28 ENCOUNTER — OFFICE VISIT (OUTPATIENT)
Dept: UROLOGY | Facility: CLINIC | Age: 70
End: 2025-01-28
Payer: COMMERCIAL

## 2025-01-28 VITALS
WEIGHT: 300.38 LBS | DIASTOLIC BLOOD PRESSURE: 88 MMHG | SYSTOLIC BLOOD PRESSURE: 138 MMHG | HEART RATE: 81 BPM | BODY MASS INDEX: 40.69 KG/M2 | HEIGHT: 72 IN

## 2025-01-28 DIAGNOSIS — N47.1 PHIMOSIS: Primary | ICD-10-CM

## 2025-01-28 DIAGNOSIS — N47.6 BALANOPOSTHITIS: ICD-10-CM

## 2025-01-28 DIAGNOSIS — N47.1 PHIMOSIS: ICD-10-CM

## 2025-01-28 LAB
ESTIMATED AVG GLUCOSE: 301 MG/DL (ref 68–131)
HBA1C MFR BLD: 12.1 % (ref 4–5.6)

## 2025-01-28 PROCEDURE — 3288F FALL RISK ASSESSMENT DOCD: CPT | Mod: CPTII,S$GLB,, | Performed by: UROLOGY

## 2025-01-28 PROCEDURE — 3079F DIAST BP 80-89 MM HG: CPT | Mod: CPTII,S$GLB,, | Performed by: UROLOGY

## 2025-01-28 PROCEDURE — 99999 PR PBB SHADOW E&M-EST. PATIENT-LVL III: CPT | Mod: PBBFAC,,, | Performed by: UROLOGY

## 2025-01-28 PROCEDURE — 3075F SYST BP GE 130 - 139MM HG: CPT | Mod: CPTII,S$GLB,, | Performed by: UROLOGY

## 2025-01-28 PROCEDURE — 99214 OFFICE O/P EST MOD 30 MIN: CPT | Mod: S$GLB,,, | Performed by: UROLOGY

## 2025-01-28 PROCEDURE — 3072F LOW RISK FOR RETINOPATHY: CPT | Mod: CPTII,S$GLB,, | Performed by: UROLOGY

## 2025-01-28 PROCEDURE — 36415 COLL VENOUS BLD VENIPUNCTURE: CPT | Performed by: UROLOGY

## 2025-01-28 PROCEDURE — 1159F MED LIST DOCD IN RCRD: CPT | Mod: CPTII,S$GLB,, | Performed by: UROLOGY

## 2025-01-28 PROCEDURE — 3008F BODY MASS INDEX DOCD: CPT | Mod: CPTII,S$GLB,, | Performed by: UROLOGY

## 2025-01-28 PROCEDURE — 1126F AMNT PAIN NOTED NONE PRSNT: CPT | Mod: CPTII,S$GLB,, | Performed by: UROLOGY

## 2025-01-28 PROCEDURE — 83036 HEMOGLOBIN GLYCOSYLATED A1C: CPT | Performed by: UROLOGY

## 2025-01-28 PROCEDURE — 1101F PT FALLS ASSESS-DOCD LE1/YR: CPT | Mod: CPTII,S$GLB,, | Performed by: UROLOGY

## 2025-02-02 DIAGNOSIS — E11.59 HYPERTENSION ASSOCIATED WITH DIABETES: ICD-10-CM

## 2025-02-02 DIAGNOSIS — I15.2 HYPERTENSION ASSOCIATED WITH DIABETES: ICD-10-CM

## 2025-02-02 NOTE — TELEPHONE ENCOUNTER
Care Due:                  Date            Visit Type   Department     Provider  --------------------------------------------------------------------------------                                EP -                              PRIMARY      [facility] INTERNAL  Last Visit: 12-      CARE ([facility])   MEDICINE       Jacek Mohr  Next Visit: None Scheduled  None         None Found                                                            Last  Test          Frequency    Reason                     Performed    Due Date  --------------------------------------------------------------------------------    CMP.........  12 months..  FARXIGA, glimepiride,      12- 12-                             losartan, metFORMIN......    [employer] Embedded Care Due Messages. Reference number: [id number].   2/02/2025 8:00:39 AM CST

## 2025-02-03 RX ORDER — LOSARTAN POTASSIUM 25 MG/1
TABLET ORAL
Qty: 90 TABLET | Refills: 0 | Status: SHIPPED | OUTPATIENT
Start: 2025-02-03

## 2025-02-03 NOTE — TELEPHONE ENCOUNTER
Refill Routing Note   Medication(s) are not appropriate for processing by [facility] for the following reason(s):        Required labs outdated    [facility] action(s):  Defer     Requires labs : Yes             Appointments  past 12m or future 3m with PCP    Date Provider   Last Visit   12/18/2023 Jacek Mohr MD   Next Visit   Visit date not found Jacek Mohr MD   ED visits in past 90 days: 0        Note composed:3:40 AM 02/03/2025

## 2025-02-27 NOTE — OR NURSING
Renee Pandya PA-C  You10 minutes ago (1:40 PM)     [place], Please call [employer].  It sounds like it is \"already covered \"and therefor does not need a PA.  Please clarify by calling [employer] before additional letters are written     Ave spoke to Becky MONTGOMERY at [facility] regarding PA and what they mean by quantity above the benefit limit for plan.    Writer confirmed medication script:  USTEkinumab (Stelara) 90 MG/ML injection - subcutaneous every 8 weeks    She stated the max is anything over the 30 days.  It could be due to the refill qty doesn't line for 1 year use at 11 refills & needs to be 6.  Writer needed to confirm the corrected script & refills with [facility] pharmacy to be 6 refills, as well as provide ICD code.  She stated it will take 4 days to review.  Case #[id number].    Writer will update PA dept.    Awaiting update from them for approval in a few days.   Noted on exam:  Diverticulosis and polyps \.

## 2025-03-02 DIAGNOSIS — E11.9 TYPE 2 DIABETES MELLITUS WITHOUT COMPLICATION, WITHOUT LONG-TERM CURRENT USE OF INSULIN: ICD-10-CM

## 2025-03-02 NOTE — TELEPHONE ENCOUNTER
Care Due:                  Date            Visit Type   Department     Provider  --------------------------------------------------------------------------------                                EP -                              PRIMARY      [facility] INTERNAL  Last Visit: 12-      CARE ([facility])   MEDICINE       Jacek Mohr                              EP -                              PRIMARY      [facility] INTERNAL  Next Visit: 03-      CARE ([facility])   MEDICINE       Jacek Mohr                                                            Last  Test          Frequency    Reason                     Performed    Due Date  --------------------------------------------------------------------------------    Cr..........  12 months..  FARXIGA, glimepiride,      12- 12-                             metFORMIN................    [facility] Embedded Care Due Messages. Reference number: [id number].   3/02/2025 8:01:50 AM CST

## 2025-03-03 RX ORDER — GLIMEPIRIDE 4 MG/1
4 TABLET ORAL
Qty: 90 TABLET | Refills: 0 | Status: SHIPPED | OUTPATIENT
Start: 2025-03-03

## 2025-03-03 NOTE — TELEPHONE ENCOUNTER
Refill Routing Note   Medication(s) are not appropriate for processing by [facility] for the following reason(s):        Required labs outdated    [facility] action(s):  Defer   Requires labs : Yes      Medication Therapy Plan: fovs      Appointments  past 12m or future 3m with PCP    Date Provider   Last Visit   12/18/2023 Jacek Mohr MD   Next Visit   3/20/2025 Jacek Mohr MD   ED visits in past 90 days: 0        Note composed:11:17 PM 03/02/2025

## 2025-03-04 DIAGNOSIS — E11.9 TYPE 2 DIABETES MELLITUS WITHOUT COMPLICATION, WITHOUT LONG-TERM CURRENT USE OF INSULIN: ICD-10-CM

## 2025-03-05 RX ORDER — ORAL SEMAGLUTIDE 14 MG/1
14 TABLET ORAL
Qty: 30 TABLET | Refills: 0 | Status: SHIPPED | OUTPATIENT
Start: 2025-03-05

## 2025-03-25 ENCOUNTER — OFFICE VISIT (OUTPATIENT)
Dept: PULMONOLOGY | Facility: CLINIC | Age: 70
End: 2025-03-25
Attending: INTERNAL MEDICINE
Payer: COMMERCIAL

## 2025-03-25 ENCOUNTER — HOSPITAL ENCOUNTER (OUTPATIENT)
Dept: RADIOLOGY | Facility: HOSPITAL | Age: 70
Discharge: HOME | End: 2025-03-25
Attending: INTERNAL MEDICINE
Payer: COMMERCIAL

## 2025-03-25 VITALS
BODY MASS INDEX: 39.96 KG/M2 | HEIGHT: 72 IN | OXYGEN SATURATION: 97 % | WEIGHT: 295 LBS | WEIGHT: 295 LBS | HEIGHT: 72 IN | HEART RATE: 112 BPM | DIASTOLIC BLOOD PRESSURE: 71 MMHG | BODY MASS INDEX: 39.96 KG/M2 | RESPIRATION RATE: 18 BRPM | SYSTOLIC BLOOD PRESSURE: 134 MMHG

## 2025-03-25 DIAGNOSIS — E66.2 CLASS 3 OBESITY WITH ALVEOLAR HYPOVENTILATION, SERIOUS COMORBIDITY, AND BODY MASS INDEX (BMI) OF 40.0 TO 44.9 IN ADULT: Chronic | ICD-10-CM

## 2025-03-25 DIAGNOSIS — R06.09 DOE (DYSPNEA ON EXERTION): ICD-10-CM

## 2025-03-25 DIAGNOSIS — J96.11 CHRONIC RESPIRATORY FAILURE WITH HYPOXIA AND HYPERCAPNIA: Primary | ICD-10-CM

## 2025-03-25 DIAGNOSIS — R09.02 EXERCISE HYPOXEMIA: ICD-10-CM

## 2025-03-25 DIAGNOSIS — J30.89 NON-SEASONAL ALLERGIC RHINITIS DUE TO OTHER ALLERGIC TRIGGER: ICD-10-CM

## 2025-03-25 DIAGNOSIS — G47.33 OSA TREATED WITH BIPAP: ICD-10-CM

## 2025-03-25 DIAGNOSIS — E66.813 CLASS 3 OBESITY WITH ALVEOLAR HYPOVENTILATION, SERIOUS COMORBIDITY, AND BODY MASS INDEX (BMI) OF 40.0 TO 44.9 IN ADULT: Chronic | ICD-10-CM

## 2025-03-25 DIAGNOSIS — J96.12 CHRONIC RESPIRATORY FAILURE WITH HYPOXIA AND HYPERCAPNIA: Primary | ICD-10-CM

## 2025-03-25 PROCEDURE — 3288F FALL RISK ASSESSMENT DOCD: CPT | Mod: CPTII,S$GLB,, | Performed by: INTERNAL MEDICINE

## 2025-03-25 PROCEDURE — 3078F DIAST BP <80 MM HG: CPT | Mod: CPTII,S$GLB,, | Performed by: INTERNAL MEDICINE

## 2025-03-25 PROCEDURE — 94618 PULMONARY STRESS TESTING: CPT | Mod: S$GLB,,, | Performed by: INTERNAL MEDICINE

## 2025-03-25 PROCEDURE — 1160F RVW MEDS BY RX/DR IN RCRD: CPT | Mod: CPTII,S$GLB,, | Performed by: INTERNAL MEDICINE

## 2025-03-25 PROCEDURE — 99213 OFFICE O/P EST LOW 20 MIN: CPT | Mod: 25,S$GLB,, | Performed by: INTERNAL MEDICINE

## 2025-03-25 PROCEDURE — 3046F HEMOGLOBIN A1C LEVEL >9.0%: CPT | Mod: CPTII,S$GLB,, | Performed by: INTERNAL MEDICINE

## 2025-03-25 PROCEDURE — 3072F LOW RISK FOR RETINOPATHY: CPT | Mod: CPTII,S$GLB,, | Performed by: INTERNAL MEDICINE

## 2025-03-25 PROCEDURE — 3008F BODY MASS INDEX DOCD: CPT | Mod: CPTII,S$GLB,, | Performed by: INTERNAL MEDICINE

## 2025-03-25 PROCEDURE — 71046 X-RAY EXAM CHEST 2 VIEWS: CPT | Mod: TC

## 2025-03-25 PROCEDURE — 3075F SYST BP GE 130 - 139MM HG: CPT | Mod: CPTII,S$GLB,, | Performed by: INTERNAL MEDICINE

## 2025-03-25 PROCEDURE — 99999 PR PBB SHADOW E&M-EST. PATIENT-LVL I: CPT | Mod: PBBFAC,,,

## 2025-03-25 PROCEDURE — 71046 X-RAY EXAM CHEST 2 VIEWS: CPT | Mod: 26,,, | Performed by: RADIOLOGY

## 2025-03-25 PROCEDURE — 1101F PT FALLS ASSESS-DOCD LE1/YR: CPT | Mod: CPTII,S$GLB,, | Performed by: INTERNAL MEDICINE

## 2025-03-25 PROCEDURE — 4010F ACE/ARB THERAPY RXD/TAKEN: CPT | Mod: CPTII,S$GLB,, | Performed by: INTERNAL MEDICINE

## 2025-03-25 PROCEDURE — 1126F AMNT PAIN NOTED NONE PRSNT: CPT | Mod: CPTII,S$GLB,, | Performed by: INTERNAL MEDICINE

## 2025-03-25 PROCEDURE — 99999 PR PBB SHADOW E&M-EST. PATIENT-LVL III: CPT | Mod: PBBFAC,,, | Performed by: INTERNAL MEDICINE

## 2025-03-25 PROCEDURE — 1159F MED LIST DOCD IN RCRD: CPT | Mod: CPTII,S$GLB,, | Performed by: INTERNAL MEDICINE

## 2025-03-25 RX ORDER — FLUTICASONE PROPIONATE 50 MCG
2 SPRAY, SUSPENSION (ML) NASAL DAILY
Qty: 16 G | Refills: 11 | Status: SHIPPED | OUTPATIENT
Start: 2025-03-25

## 2025-03-25 NOTE — PROGRESS NOTES
Subjective:     Patient ID: Santhosh Goff is a 70 y.o. male.    Chief Complaint:  Follow up for Obstructive Sleep Apnea and nocturnal ventilation - SIGNIFICANT WEIGHT LOSS SINCE LAST SEEN     HPI 70 y/o with significant weight loss on nocturnal ventilation for chronic respiratory failure and Obstructive Sleep Apnea   Feeling well but has sinus allergies. Using flonase    He is on Trilogy ventilator at home for chronic resp failure and Obstructive Sleep Apnea   AVAPS - AE    Obstructive Sleep Apnea on Continuous Positive Airway Pressure:  Patient is using ventialtor as prescribed and benefiting from therapy. 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     Post COVID follow up:  Hx of COVID 19 pneumonia July 19, 2021, antibody infusion -     Settings IPAP 18  EPAP 8  Rate 12   AVAPS Rate 5 cm   ml   Max pressure 22  Pressure support  min 6  \    Past Medical History:   Diagnosis Date    Arthritis     Diabetes mellitus, type 2 2014     x 1 week ago 10/10/2018    Hyperlipidemia     Hypertension     Morbid obesity with BMI of 45.0-49.9, adult     Staphylococcus aureus infection, multiple-resistant (MRSA) 2009     Past Surgical History:   Procedure Laterality Date    COLONOSCOPY N/A 2/24/2020    Procedure: COLONOSCOPY;  Surgeon: Bela Vaughn MD;  Location: [facility];  Service: Endoscopy;  Laterality: N/A;    COLONOSCOPY N/A 7/18/2023    Procedure: COLONOSCOPY;  Surgeon: Arnold Stokes MD;  Location: [facility];  Service: Endoscopy;  Laterality: N/A;    LUNG SURGERY      RIB FX       Review of patient's allergies indicates:   Allergen Reactions    Cephalosporins Nausea And Vomiting     Current Outpatient Medications on File Prior to Visit   Medication Sig Dispense Refill    atorvastatin (LIPITOR) 40 MG tablet Take 1 tablet by mouth once daily 90 tablet 0    FARXIGA 5 mg Tab tablet Take 1 tablet by mouth once daily 90 tablet  3    glimepiride (AMARYL) 4 MG tablet Take 1 tablet by mouth once daily 90 tablet 0    KLOR-CON M20 20 mEq tablet Take 1 tablet by mouth once daily 90 tablet 0    losartan (COZAAR) 25 MG tablet Take 1 tablet by mouth once daily 90 tablet 0    metFORMIN (GLUCOPHAGE) 1000 MG tablet TAKE 1 TABLET BY MOUTH TWICE DAILY WITH MEALS 180 tablet 3    multivit-min/ferrous fumarate (MULTI VITAMIN ORAL)       RYBELSUS 14 mg tablet Take 1 tablet by mouth once daily 30 tablet 0    [DISCONTINUED] clotrimazole-betamethasone 1-0.05% (LOTRISONE) cream Apply topically 2 (two) times daily. 15 g 1    [DISCONTINUED] fluticasone propionate (FLONASE) 50 mcg/actuation nasal spray 2 sprays (100 mcg total) by Each Nostril route once daily. 16 g 11    [DISCONTINUED] ibuprofen (ADVIL,MOTRIN) 800 MG tablet Take 800 mg by mouth.      [DISCONTINUED] meclizine (ANTIVERT) 12.5 mg tablet Take 1 tablet (12.5 mg total) by mouth 3 (three) times daily as needed for Dizziness or Nausea. 10 tablet 0     No current facility-administered medications on file prior to visit.     Social History     Socioeconomic History    Marital status:     Number of children: 3   Occupational History    Occupation:      Employer: [employer]   Tobacco Use    Smoking status: Never     Passive exposure: Never    Smokeless tobacco: Never   Substance and Sexual Activity    Alcohol use: No    Drug use: No    Sexual activity: Yes     Partners: Female     Social Drivers of Health     Financial Resource Strain: Low Risk  (12/17/2023)    Overall Financial Resource Strain (CARDIA)     Difficulty of Paying Living Expenses: Not hard at all   Food Insecurity: No Food Insecurity (12/17/2023)    Hunger Vital Sign     Worried About Running Out of Food in the Last Year: Never true     Ran Out of Food in the Last Year: Never true   Transportation Needs: No Transportation Needs (12/17/2023)    PRAPARE - Transportation     Lack of  Transportation (Medical): No     Lack of Transportation (Non-Medical): No   Physical Activity: Insufficiently Active (12/17/2023)    Exercise Vital Sign     Days of Exercise per Week: 4 days     Minutes of Exercise per Session: 20 min   Stress: No Stress Concern Present (12/17/2023)    [employer] - Occupational Stress Questionnaire     Feeling of Stress : Not at all   Housing Stability: Low Risk  (12/17/2023)    Housing Stability Vital Sign     Unable to Pay for Housing in the Last Year: No     Number of Places Lived in the Last Year: 1     Unstable Housing in the Last Year: No     Family History   Problem Relation Name Age of Onset    Early death Father         Review of Systems   Constitutional:  Positive for fatigue.   HENT: Negative.     Respiratory:  Positive for apnea.    Cardiovascular: Negative.    Genitourinary: Negative.    Endocrine: endocrine negative    Musculoskeletal: Negative.    Skin: Negative.    Gastrointestinal: Negative.    Neurological: Negative.    Psychiatric/Behavioral:  Positive for sleep disturbance.        Objective:      /71   Pulse (!) 112   Resp 18   Ht 6' (1.829 m)   Wt 133.8 kg (294 lb 15.6 oz)   SpO2 97%   BMI 40.01 kg/m²   Physical Exam  Vitals and nursing note reviewed.   Constitutional:       Appearance: He is well-developed. He is obese.   HENT:      Head: Normocephalic and atraumatic.      Nose: Nose normal.   Eyes:      Conjunctiva/sclera: Conjunctivae normal.      Pupils: Pupils are equal, round, and reactive to light.   Neck:      Thyroid: No thyromegaly.      Vascular: No JVD.      Trachea: No tracheal deviation.   Cardiovascular:      Rate and Rhythm: Normal rate and regular rhythm.      Heart sounds: Normal heart sounds.   Pulmonary:      Effort: Pulmonary effort is normal.      Breath sounds: Normal breath sounds.   Abdominal:      Palpations: Abdomen is soft.   Musculoskeletal:         General: Normal range of motion.       Cervical back: Neck supple.   Lymphadenopathy:      Cervical: No cervical adenopathy.   Skin:     General: Skin is warm and dry.   Neurological:      Mental Status: He is alert and oriented to person, place, and time.       Personal Diagnostic Review  PSG: significant for Obstructive Sleep Apnea          3/25/2025    10:45 AM   Pulmonary Studies Review   SpO2 97 %   Height 6' (1.829 m)   Weight 133.8 kg (294 lb 15.6 oz)   BMI (Calculated) 40   Predicted Distance 269.86   Predicted Distance Meters (Calculated) 483.65 meters       X-Ray Chest PA And Lateral  Narrative: EXAM: XR CHEST PA AND LATERAL    CLINICAL HISTORY:  Dyspnea, sleep apnea    COMPARISON:  11/28/2023    FINDINGS:  2 view chest.  Heart size is normal and lungs are clear bilaterally.  Pulmonary vasculature is normal.  Impression: No evidence of acute disease.    Finalized on: 3/25/2025 10:55 AM By:  Macario Hauser  [facility]# [id number]      2025-03-25 10:57:57.131     [facility]      Office Spirometry Results:    [facility] - Pulmonary Function  Six Minute Walk      SUMMARY      Ordering Provider: Telly ARAGON             Interpreting Provider: Ozzie ARAGON  Performing nurse/tech/RT: LS RRT  Diagnosis:  (Chronic Resp. Failure)  Height: 6' (182.9 cm)  Weight: (!) 147 kg (324 lb 1.2 oz)  BMI (Calculated): 43.9              Patient Race:                                                                Phase Oxygen Assessment Supplemental O2 Heart   Rate Blood Pressure Mikaela Dyspnea Scale Rating   Resting 95 % Room Air 90 bpm 138/76 0   Exercise             Minute             1 95 % Room Air 119 bpm       2 93 % Room Air 130 bpm       3 92 % Room Air 151 bpm       4 92 % Room Air 150 bpm       5 91 % Room Air 147 bpm       6  93 % Room Air 130 bpm 153/82 3   Recovery             Minute             1 95 % Room Air 116 bpm       2 96 % Room Air 107 bpm       3 96 % Room Air 104 bpm       4 95 % Room Air 96 bpm 154/74 0      Six Minute Walk Summary  6MWT Status:  completed without stopping  Patient Reported: No complaints             Interpretation:  Did the patient stop or pause?: No  Total Time Walked (Calculated): 360 seconds  Final Partial Lap Distance (feet): 150 feet  Total Distance Meters (Calculated): 350.52 meters  Predicted Distance Meters (Calculated): 470.45 meters  Percentage of Predicted (Calculated): 74.51  Peak VO2 (Calculated): 14.5  Mets: 4.14  Has The Patient Had a Previous Six Minute Walk Test?: Yes     Previous 6MWT Results  Has The Patient Had a Previous Six Minute Walk Test?: Yes  Date of Previous Test: 11/04/14  Total Time Walked: 360 seconds  Total Distance (meters): 365.7  Predicted Distance (meters): 365.7 meters  Percentage of Predicted: 99  Percent Change (Calculated): 0.04            3/25/2025    10:45 AM 3/25/2025    10:26 AM 1/28/2025    10:09 AM 9/3/2024    10:47 AM 3/18/2024     9:40 AM 1/30/2024    10:19 AM 12/18/2023    10:36 AM   Pulmonary Function Tests   SpO2 97 %    97 % 95 % 98 %   Ordering Provider  Telly ARAGON        Performing nurse/tech/RT  LS RRT        Diagnosis  --        Height 6' (1.829 m) 6' (1.829 m) 6' (1.829 m) 6' (1.829 m) 6' (1.829 m) 6' (1.829 m)    Weight 133.8 kg (294 lb 15.6 oz) 133.8 kg (294 lb 15.6 oz) 136.3 kg (300 lb 6 oz) 142.9 kg (315 lb) 142.6 kg (314 lb 6 oz) 138.2 kg (304 lb 9.1 oz) 145.4 kg (320 lb 8.8 oz)   BMI (Calculated) 40 40 40.7 42.7 42.6 41.3    6MWT Status  completed without stopping        Patient Reported  No complaints        Was O2 used?  No        6MW Distance walked (feet)  1300 feet        Distance walked (meters)  396.24 meters        Did patient stop?  No        Type of assistive device(s) used?  no assistive devices        Oxygen Saturation  96 %        Supplemental Oxygen  Room Air        Heart Rate  93 bpm        Blood Pressure  141/83        Mikaela Dyspnea Rating   nothing at all        Oxygen Saturation  91 %        Supplemental Oxygen  Room Air        Heart Rate  145 bpm        Blood  Pressure  150/83        Mikaela Dyspnea Rating   light        Recovery Time (seconds)  240 seconds        Oxygen Saturation  97 %        Supplemental Oxygen  Room Air        Heart Rate  112 bpm        Blood Pressure  134/71        Mikaela Dyspnea Rating   nothing at all        Is procedure ready for interpretation?  Yes        Oxygen Qualification?  No              3/25/2025    10:45 AM   Pulmonary Studies Review   SpO2 97 %   Height 6' (1.829 m)   Weight 133.8 kg (294 lb 15.6 oz)   BMI (Calculated) 40   Predicted Distance 269.86   Predicted Distance Meters (Calculated) 483.65 meters         Assessment:            Chronic respiratory failure with hypoxia and hypercapnia - on nocturnal ventilation  -     CPAP/BIPAP SUPPLIES  -     Six Minute Walk Test to qualify for Home Oxygen; Future    ALIREZA treated with BiPAP  -     CPAP/BIPAP SUPPLIES    Class 3 obesity with alveolar hypoventilation, serious comorbidity, and body mass index (BMI) of 40.0 to 44.9 in adult    Non-seasonal allergic rhinitis due to other allergic trigger  -     fluticasone propionate (FLONASE) 50 mcg/actuation nasal spray; 2 sprays (100 mcg total) by Each Nostril route once daily.  Dispense: 16 g; Refill: 11    Exercise hypoxemia  -     Six Minute Walk Test to qualify for Home Oxygen; Future          Outpatient Encounter Medications as of 3/25/2025   Medication Sig Dispense Refill    atorvastatin (LIPITOR) 40 MG tablet Take 1 tablet by mouth once daily 90 tablet 0    FARXIGA 5 mg Tab tablet Take 1 tablet by mouth once daily 90 tablet 3    glimepiride (AMARYL) 4 MG tablet Take 1 tablet by mouth once daily 90 tablet 0    KLOR-CON M20 20 mEq tablet Take 1 tablet by mouth once daily 90 tablet 0    losartan (COZAAR) 25 MG tablet Take 1 tablet by mouth once daily 90 tablet 0    metFORMIN (GLUCOPHAGE) 1000 MG tablet TAKE 1 TABLET BY MOUTH TWICE DAILY WITH MEALS 180 tablet 3    multivit-min/ferrous fumarate (MULTI VITAMIN ORAL)       RYBELSUS 14 mg tablet Take 1  tablet by mouth once daily 30 tablet 0    fluticasone propionate (FLONASE) 50 mcg/actuation nasal spray 2 sprays (100 mcg total) by Each Nostril route once daily. 16 g 11    [DISCONTINUED] clotrimazole-betamethasone 1-0.05% (LOTRISONE) cream Apply topically 2 (two) times daily. 15 g 1    [DISCONTINUED] FARXIGA 5 mg Tab tablet Take 1 tablet by mouth once daily 90 tablet 0    [DISCONTINUED] fluticasone propionate (FLONASE) 50 mcg/actuation nasal spray 2 sprays (100 mcg total) by Each Nostril route once daily. 16 g 11    [DISCONTINUED] glimepiride (AMARYL) 4 MG tablet Take 1 tablet (4 mg total) by mouth once daily. 90 tablet 0    [DISCONTINUED] ibuprofen (ADVIL,MOTRIN) 800 MG tablet Take 800 mg by mouth.      [DISCONTINUED] meclizine (ANTIVERT) 12.5 mg tablet Take 1 tablet (12.5 mg total) by mouth 3 (three) times daily as needed for Dizziness or Nausea. 10 tablet 0    [DISCONTINUED] metFORMIN (GLUCOPHAGE) 1000 MG tablet TAKE 1 TABLET BY MOUTH TWICE DAILY WITH MEALS 180 tablet 0    [DISCONTINUED] RYBELSUS 14 mg tablet Take 1 tablet by mouth once daily 30 tablet 0     No facility-administered encounter medications on file as of 3/25/2025.     Plan:       Requested Prescriptions     Signed Prescriptions Disp Refills    fluticasone propionate (FLONASE) 50 mcg/actuation nasal spray 16 g 11     Si sprays (100 mcg total) by Each Nostril route once daily.     Problem List Items Addressed This Visit       Hypoventilation associated with obesity (Chronic)    ALIREZA treated with AVAPS    Overview   durable medical equipment provider:  Mariah Caceres  [employer]  [phone]  For CDL          Relevant Orders    CPAP/BIPAP SUPPLIES     Other Visit Diagnoses         Chronic respiratory failure with hypoxia and hypercapnia - on nocturnal ventilation    -  Primary    Relevant Orders    CPAP/BIPAP SUPPLIES    Six Minute Walk Test to qualify for Home Oxygen      Non-seasonal allergic rhinitis due to other allergic trigger        Relevant  Medications    fluticasone propionate (FLONASE) 50 mcg/actuation nasal spray      Exercise hypoxemia        Relevant Orders    Six Minute Walk Test to qualify for Home Oxygen             Follow up in about 1 year (around 3/25/2026) for Review progress, CPAP download - annual review, 6 min walk - on return.    MEDICAL DECISION MAKING: Moderate to high complexity.  Overall, the multiple problems listed are of moderate to high severity that may impact quality of life and activities of daily living. Side effects of medications, treatment plan as well as options and alternatives reviewed and discussed with patient. There was counseling of patient concerning these issues.    Total time spent in counseling and coordination of care - 30  minutes of total time spent on the encounter, which includes face to face time and non-face to face time preparing to see the patient (eg, review of tests), Obtaining and/or reviewing separately obtained history, Documenting clinical information in the electronic or other health record, Independently interpreting results (not separately reported) and communicating results to the patient/family/caregiver, or Care coordination (not separately reported).    Time was used in discussion of prognosis, risks, benefits of treatment, instructions and compliance with regimen . Discussion with other physicians and/or health care providers - home health or for use of durable medical equipment (oxygen, nebulizers, CPAP, BiPAP) occurred.

## 2025-03-25 NOTE — PROCEDURES
[facility] - Pulmonary Function  Six Minute Walk     SUMMARY     Ordering Provider: Telly ARAGON   Interpreting Provider: Telly ARAGON  Performing nurse/tech/RT: LS RRT  Diagnosis:  (RUBIO)  Height: 6' (182.9 cm)  Weight: 133.8 kg (294 lb 15.6 oz)  BMI (Calculated): 40                Phase Oxygen Assessment Supplemental O2 Heart   Rate Blood Pressure Mikaela Dyspnea Scale Rating   Resting 96 % Room Air 93 bpm 141/83 0   Exercise        Minute        1 93 % Room Air 125 bpm     2 91 % Room Air 130 bpm     3 91 % Room Air 136 bpm     4 92 % Room Air 134 bpm     5 92 % Room Air 141 bpm     6  91 % Room Air 145 bpm 150/83 2   Recovery        Minute        1 94 % Room Air 137 bpm     2 96 % Room Air 118 bpm     3 95 % Room Air 113 bpm     4 97 % Room Air 112 bpm 134/71 0     Six Minute Walk Summary  6MWT Status: completed without stopping  Patient Reported: No complaints     Interpretation:  Did the patient stop or pause?: No                                         Total Time Walked (Calculated): 360 seconds  Final Partial Lap Distance (feet): 100 feet  Total Distance Meters (Calculated): 396.24 meters  Predicted Distance Meters (Calculated): 483.65 meters  Percentage of Predicted (Calculated): 81.93  Peak VO2 (Calculated): 15.87  Mets: 4.53  Has The Patient Had a Previous Six Minute Walk Test?: Yes       Previous 6MWT Results  Has The Patient Had a Previous Six Minute Walk Test?: Yes  Date of Previous Test: 11/28/23  Total Time Walked: 360 seconds  Total Distance (meters): 350  Predicted Distance (meters): 470 meters  Percentage of Predicted: 74.5  Percent Change (Calculated): -0.13  Interpretation:  Total distance walked in six minutes is normal indicating normal functional capacity.   Patient did not meet criteria for oxygen prescription. Clinical correlation suggested.    [] Mild exercise-induced hypoxemia described as an arterial oxygen saturation of 93-95% (or 3-4% less than at rest),  [x] Moderate exercise-induced hypoxemia as  89-93%  [] Severe exercise induced hypoxemia as < 89% O2 saturation.  Medicare Criteria for oxygen prescription comments:   When arterial oxygen saturation is at or below 88% during exercise on room air (severe exercise induced hypoxemia) then the patient falls under Medicare Group 1 criteria for supplemental oxygen prescription.  Details about Medicare Group Criteria coverage can be found at http://www.cms.[facility].gov/manuals/downloads/     Juan Varner MD

## 2025-03-27 ENCOUNTER — OFFICE VISIT (OUTPATIENT)
Dept: INTERNAL MEDICINE | Facility: CLINIC | Age: 70
End: 2025-03-27
Payer: COMMERCIAL

## 2025-03-27 VITALS
WEIGHT: 293.63 LBS | DIASTOLIC BLOOD PRESSURE: 80 MMHG | TEMPERATURE: 97 F | HEART RATE: 87 BPM | OXYGEN SATURATION: 97 % | BODY MASS INDEX: 39.83 KG/M2 | SYSTOLIC BLOOD PRESSURE: 130 MMHG

## 2025-03-27 DIAGNOSIS — I15.2 HYPERTENSION ASSOCIATED WITH DIABETES: ICD-10-CM

## 2025-03-27 DIAGNOSIS — G47.33 OSA TREATED WITH BIPAP: ICD-10-CM

## 2025-03-27 DIAGNOSIS — E78.5 HYPERLIPIDEMIA ASSOCIATED WITH TYPE 2 DIABETES MELLITUS: ICD-10-CM

## 2025-03-27 DIAGNOSIS — E11.69 HYPERLIPIDEMIA ASSOCIATED WITH TYPE 2 DIABETES MELLITUS: ICD-10-CM

## 2025-03-27 DIAGNOSIS — Z12.5 ENCOUNTER FOR SCREENING FOR MALIGNANT NEOPLASM OF PROSTATE: ICD-10-CM

## 2025-03-27 DIAGNOSIS — E11.9 TYPE 2 DIABETES MELLITUS WITHOUT COMPLICATION, WITHOUT LONG-TERM CURRENT USE OF INSULIN: Primary | ICD-10-CM

## 2025-03-27 DIAGNOSIS — E11.59 HYPERTENSION ASSOCIATED WITH DIABETES: ICD-10-CM

## 2025-03-27 PROCEDURE — 99999 PR PBB SHADOW E&M-EST. PATIENT-LVL IV: CPT | Mod: PBBFAC,,, | Performed by: INTERNAL MEDICINE

## 2025-03-27 RX ORDER — LANCETS
EACH MISCELLANEOUS
Qty: 100 EACH | Refills: 11 | Status: SHIPPED | OUTPATIENT
Start: 2025-03-27

## 2025-03-27 RX ORDER — INSULIN PUMP SYRINGE, 3 ML
EACH MISCELLANEOUS
Qty: 1 EACH | Refills: 0 | Status: SHIPPED | OUTPATIENT
Start: 2025-03-27 | End: 2026-03-27

## 2025-03-27 RX ORDER — INSULIN GLARGINE 100 [IU]/ML
15 INJECTION, SOLUTION SUBCUTANEOUS DAILY
Qty: 13.5 ML | Refills: 3 | Status: SHIPPED | OUTPATIENT
Start: 2025-03-27

## 2025-03-27 RX ORDER — PEN NEEDLE, DIABETIC 30 GX3/16"
NEEDLE, DISPOSABLE MISCELLANEOUS
Qty: 100 EACH | Refills: 11 | Status: SHIPPED | OUTPATIENT
Start: 2025-03-27

## 2025-03-27 NOTE — PATIENT INSTRUCTIONS
Patient Education       Guide to Eating When You Have Diabetes   About this topic   This guide will help you control your blood sugar along with exercise and the drugs you are taking. You want to have a balanced amount of carbohydrates, fats, and protein in your meal. Following these diet guidelines may also help control your blood pressure and cholesterol.     What will the results be?   When you follow these diet guidelines, your blood sugar may be easier to keep within the goal blood sugar ranges. Ask your doctor for your goal blood sugar ranges.  What changes to diet are needed?   You need to balance how much carbohydrate, fat, and protein is in your meals. You also need to control the amount or portion size of the food you eat. Eat meals at about the same time every day. Do not skip a meal. Talk to your dietitian about making a personal meal plan for you.     Who should use this diet?   This diet is helpful to people with high blood sugar or diabetes.   What foods are good to eat?   Whole grains like:  1/3 cup (80 grams) brown rice  1/3 cup (80 grams) wild rice  1/3 cup (80 grams) whole wheat pasta  1 slice whole wheat or whole grain bread  3/4 cup (180 grams) high-fiber cereal  1/2 cup (120 grams) cooked oatmeal  1/2 (120 grams) English muffin  Fruits and vegetables like:  1/2 cup (120 grams) sweet potatoes  1/2 cup (120 grams) cooked vegetables, like squash, green beans, cauliflower, carrots, and cabbage  1 cup (240 grams) raw vegetables or salad greens  1 small apples or oranges  1/2 cup (120 grams) unsweetened fruit juice  Proteins like:  1 ounce (30 grams) lean beef or pork  1 ounce (30 grams) chicken, skin removed  1 ounce (30 grams) turkey, skin removed  1 ounce (30 grams) fish  1 ounce (30 grams) low-fat cheese or lunch meat  1/2 cup (120 grams) cooked beans ? black, kidney, chickpeas, or lentils  1 whole egg  What foods should be limited or avoided?   High fat or processed foods  like:  Zhu  Sausage  Hot dogs  Processed snacks  Fats and oils like:  Margarine  Salad dressings  Foods that are high in salt like:  Table salt  Salted breads, rolls, crackers  Commercially-prepared potatoes and vegetable mixes  Canned vegetables and juices  Canned soups  Smoked, cured, or salted meats  Starches that are not whole grain like:  White rice  White potatoes  French fries  Pasta  White bread  Sugary cereals  Instant oatmeal  Baked goods, pastries  Croissants  What can be done to prevent this health problem?   Type 1 diabetes is a lifelong problem and you cannot prevent it. Type 2 diabetes can be prevented or delayed with lifestyle changes. You can still lead a normal life. Diabetes can be managed through diet, exercise, and drugs. Family members and friends can help you practice good health behaviors.  When do I need to call the doctor?   Blood sugar level is above 240 for more than a day  Blood sugar level drops to less than 40  Abnormal urine test results  Trouble breathing  Very sleepy  Throw up more than once  Many loose stools  Questions about your diet plan  Helpful tips   Try to eat smaller portions of a healthy balanced diet throughout the day. Take time to plan your meals and snacks.  Where can I learn more?   American Diabetes Association  https://www.cdc.gov/diabetes/managing/eat-well/meal-plan-method.html   HelpGuide.org  http://www.helpguide.org/home-pages/healthy-eating.htm   HelpGuide.org  http://www.helpguide.org/articles/diet-weight-loss/diabetes-diet-and-food-tips.htm   Last Reviewed Date   2021-07-21  Consumer Information Use and Disclaimer   This information is not specific medical advice and does not replace information you receive from your health care provider. This is only a brief summary of general information. It does NOT include all information about conditions, illnesses, injuries, tests, procedures, treatments, therapies, discharge instructions or life-style choices that  may apply to you. You must talk with your health care provider for complete information about your health and treatment options. This information should not be used to decide whether or not to accept your health care providers advice, instructions or recommendations. Only your health care provider has the knowledge and training to provide advice that is right for you.   Copyright   Copyright © 2021 [employer] and its affiliates and/or licensors. All rights reserved.

## 2025-03-27 NOTE — PROGRESS NOTES
Subjective:       Patient ID: Santhosh Goff is a 70 y.o. male.    Chief Complaint: Annual Exam      HPI:  History of Present Illness    Patient presents today for follow up of diabetes management    He reports elevated blood sugar readings, most recently at 12 and previously at 9.5. He currently uses an old glucose meter and expresses interest in obtaining a new, more user-friendly device. He is willing to increase blood sugar monitoring frequency but not to daily checks with an updated meter. He continues Farxiga, Lymepiride, Metformin, and Rybelsus every morning for management.    He reports significant weight loss from 397 lbs to 290 lbs over the past couple years through dietary modifications, including reduced rice consumption and limiting breakfast to two pieces of toast daily.    He uses CPAP mask intermittently, typically for about five hours per night, using it when he feels necessary during sleep. He questions its effectiveness during non-usage hours.    He reports experiencing head cold and sinus problems for approximately one month, managing symptoms with Flonase nasal spray, DayQuil, and NyQuil.         Review of Systems   Constitutional:  Negative for fever and unexpected weight change.   Respiratory:  Negative for cough, shortness of breath and wheezing.    Cardiovascular:  Negative for chest pain and palpitations.   Gastrointestinal:  Negative for constipation, diarrhea, nausea and vomiting.   Genitourinary:  Negative for dysuria and hematuria.       Objective:   /80   Pulse 87   Temp 97.2 °F (36.2 °C) (Tympanic)   Wt 133.2 kg (293 lb 10.4 oz)   SpO2 97%   BMI 39.83 kg/m²      Physical Exam  Vitals reviewed.   Constitutional:       Appearance: Normal appearance. He is well-developed. He is not ill-appearing.   HENT:      Head: Normocephalic and atraumatic.      Right Ear: Tympanic membrane, ear canal and external ear normal.      Left Ear: Tympanic membrane, ear canal and external ear  normal.   Eyes:      Pupils: Pupils are equal, round, and reactive to light.   Neck:      Thyroid: No thyromegaly.   Cardiovascular:      Rate and Rhythm: Normal rate and regular rhythm.      Pulses:           Dorsalis pedis pulses are 2+ on the right side and 2+ on the left side.        Posterior tibial pulses are 2+ on the right side and 2+ on the left side.      Heart sounds: Normal heart sounds. No murmur heard.     No friction rub. No gallop.   Pulmonary:      Effort: Pulmonary effort is normal.      Breath sounds: Normal breath sounds. No wheezing or rales.   Abdominal:      General: Bowel sounds are normal. There is no distension.      Palpations: Abdomen is soft.      Tenderness: There is no abdominal tenderness.   Musculoskeletal:      Cervical back: Neck supple.      Right foot: Normal range of motion. No deformity.      Left foot: Normal range of motion. No deformity.   Feet:      Right foot:      Protective Sensation: 10 sites tested.  10 sites sensed.      Skin integrity: No ulcer, blister, skin breakdown, erythema, callus or dry skin.      Left foot:      Protective Sensation: 10 sites tested.  10 sites sensed.      Skin integrity: No ulcer, blister, skin breakdown, erythema, callus or dry skin.   Neurological:      General: No focal deficit present.      Mental Status: He is alert and oriented to person, place, and time.   Psychiatric:         Mood and Affect: Mood normal.         No visits with results within 2 Week(s) from this visit.   Latest known visit with results is:   Lab Visit on 01/28/2025   Component Date Value    Hemoglobin A1C 01/28/2025 12.1 (H)     Estimated Avg Glucose 01/28/2025 301 (H)        Assessment:       1. Type 2 diabetes mellitus without complication, without long-term current use of insulin    2. Hypertension associated with diabetes    3. Hyperlipidemia associated with type 2 diabetes mellitus    4. ALIREZA treated with AVAPS    5. Encounter for screening for malignant neoplasm  "of prostate        Plan:   No problem-specific Assessment & Plan notes found for this encounter.    Santhosh was seen today for annual exam.    Diagnoses and all orders for this visit:    Type 2 diabetes mellitus without complication, without long-term current use of insulin  Comments:  continue current meds, add lantus 15 units once daily, check sugars daily  Orders:  -     insulin glargine U-100, Lantus, (LANTUS SOLOSTAR U-100 INSULIN) 100 unit/mL (3 mL) InPn pen; Inject 15 Units into the skin once daily.  -     blood-glucose meter kit; To check BG four times daily, to use with insurance preferred meter. Insulin patient  -     lancets Misc; To check BG four times daily, to use with insurance preferred meter. Insulin patient  -     blood sugar diagnostic Strp; To check BG four times daily, to use with insurance preferred meter. Insulin patient  -     pen needle, diabetic 32 gauge x 5/32" Ndle; Use once daily to administer insulin  -     CBC Auto Differential; Future  -     Comprehensive Metabolic Panel; Future  -     Hemoglobin A1C; Future  -     Lipid Panel; Future  -     Microalbumin/Creatinine Ratio, Urine; Future  -     Microalbumin/Creatinine Ratio, Urine    Hypertension associated with diabetes  Comments:  continue meds, stable.    Hyperlipidemia associated with type 2 diabetes mellitus  Comments:  continue statin, update labs when due    ALIREZA treated with AVAPS  Comments:  continue following recs from pulmonaruy    Encounter for screening for malignant neoplasm of prostate  -     PSA, Screening; Future      Assessment & Plan    TYPE 2 DIABETES MELLITUS:  - Noted an increase in HbA1c from 9.5% to 12% in January, indicating poor glycemic control despite multiple oral antidiabetic medications.  - Suspect the patient's pancreas is no longer producing sufficient insulin to manage glucose levels effectively.  - Anticipate potential need for short-acting insulin in the future if postprandial glucose control remains " inadequate.  - Explained the concept of basal insulin secretion and how long-acting insulin simulates this physiological process.  - Discussed the importance of monitoring fasting glucose levels to guide insulin dose adjustments.  - Instructed the patient to check fasting glucose every morning.  - Prescribed Lantus insulin, starting at 15 units daily, to be adjusted based on morning blood sugar readings.  - Noted significant weight loss from 400 lbs to 290 lbs.  - Confirmed the patient is currently taking multiple oral hypoglycemic medications: Rybelsus, Farxiga, Lymepiride, and Metformin.  - Initiated long-acting insulin therapy to establish basal insulin levels and improve overall glycemic control.  - Prescribed Lantus (insulin glargine) 15 units subcutaneously daily in the morning.  - Instructed to increase by 5 units every 4-5 days if fasting glucose remains above target of 140 mg/dL.  - Ordered new glucose meter and testing supplies.  - Assessed the need to start insulin therapy due to inadequate control with oral medications.    OBSTRUCTIVE SLEEP APNEA:  - Noted the patient reports using CPAP mask for approximately 5 hours.  - Confirmed the patient discussed CPAP usage with pulmonologist.    FOLLOW-UP:  - Scheduled follow up in 1 month to review insulin management and adjust as needed.  - Scheduled follow up in 3 months for full lab work and comprehensive evaluation.         Follow up in about 4 weeks (around 4/24/2025) for DM, with Fern Najera.    This note was generated with the assistance of ambient listening technology. Verbal consent was obtained by the patient and accompanying visitor(s) for the recording of patient appointment to facilitate this note

## 2025-03-31 DIAGNOSIS — E11.9 TYPE 2 DIABETES MELLITUS WITHOUT COMPLICATION, WITHOUT LONG-TERM CURRENT USE OF INSULIN: ICD-10-CM

## 2025-03-31 RX ORDER — ORAL SEMAGLUTIDE 14 MG/1
14 TABLET ORAL
Qty: 30 TABLET | Refills: 0 | Status: SHIPPED | OUTPATIENT
Start: 2025-03-31

## 2025-04-14 DIAGNOSIS — E78.5 HYPERLIPIDEMIA, UNSPECIFIED HYPERLIPIDEMIA TYPE: ICD-10-CM

## 2025-04-14 RX ORDER — ATORVASTATIN CALCIUM 40 MG/1
40 TABLET, FILM COATED ORAL
Qty: 90 TABLET | Refills: 0 | Status: SHIPPED | OUTPATIENT
Start: 2025-04-14

## 2025-04-14 RX ORDER — POTASSIUM CHLORIDE 1500 MG/1
20 TABLET, EXTENDED RELEASE ORAL
Qty: 90 TABLET | Refills: 0 | Status: SHIPPED | OUTPATIENT
Start: 2025-04-14

## 2025-04-21 ENCOUNTER — PATIENT OUTREACH (OUTPATIENT)
Dept: ADMINISTRATIVE | Facility: HOSPITAL | Age: 70
End: 2025-04-21
Payer: MEDICARE

## 2025-04-21 NOTE — PROGRESS NOTES
VBHM Score: 2     Urine Screening  Lipid Panel    Influenza Vaccine  Shingles/Zoster Vaccine  RSV Vaccine            LVM for return call. Patient is due for labs. PCP appt scheduled for 4.23.25.

## 2025-04-23 ENCOUNTER — OFFICE VISIT (OUTPATIENT)
Dept: INTERNAL MEDICINE | Facility: CLINIC | Age: 70
End: 2025-04-23
Payer: MEDICARE

## 2025-04-23 ENCOUNTER — RESULTS FOLLOW-UP (OUTPATIENT)
Dept: INTERNAL MEDICINE | Facility: CLINIC | Age: 70
End: 2025-04-23

## 2025-04-23 ENCOUNTER — LAB VISIT (OUTPATIENT)
Dept: LAB | Facility: HOSPITAL | Age: 70
End: 2025-04-23
Attending: NURSE PRACTITIONER
Payer: MEDICARE

## 2025-04-23 VITALS
OXYGEN SATURATION: 98 % | DIASTOLIC BLOOD PRESSURE: 76 MMHG | TEMPERATURE: 98 F | BODY MASS INDEX: 41.57 KG/M2 | WEIGHT: 306.88 LBS | HEART RATE: 97 BPM | RESPIRATION RATE: 16 BRPM | HEIGHT: 72 IN | SYSTOLIC BLOOD PRESSURE: 134 MMHG

## 2025-04-23 DIAGNOSIS — Z79.4 UNCONTROLLED TYPE 2 DIABETES MELLITUS WITH HYPERGLYCEMIA, WITH LONG-TERM CURRENT USE OF INSULIN: ICD-10-CM

## 2025-04-23 DIAGNOSIS — I15.2 HYPERTENSION ASSOCIATED WITH DIABETES: ICD-10-CM

## 2025-04-23 DIAGNOSIS — E11.59 HYPERTENSION ASSOCIATED WITH DIABETES: ICD-10-CM

## 2025-04-23 DIAGNOSIS — E11.65 UNCONTROLLED TYPE 2 DIABETES MELLITUS WITH HYPERGLYCEMIA, WITH LONG-TERM CURRENT USE OF INSULIN: ICD-10-CM

## 2025-04-23 DIAGNOSIS — E11.65 UNCONTROLLED TYPE 2 DIABETES MELLITUS WITH HYPERGLYCEMIA, WITH LONG-TERM CURRENT USE OF INSULIN: Primary | ICD-10-CM

## 2025-04-23 DIAGNOSIS — G47.33 OSA TREATED WITH BIPAP: ICD-10-CM

## 2025-04-23 DIAGNOSIS — Z79.4 UNCONTROLLED TYPE 2 DIABETES MELLITUS WITH HYPERGLYCEMIA, WITH LONG-TERM CURRENT USE OF INSULIN: Primary | ICD-10-CM

## 2025-04-23 LAB
EAG (OHS): 258 MG/DL (ref 68–131)
HBA1C MFR BLD: 10.6 % (ref 4–5.6)

## 2025-04-23 PROCEDURE — G2211 COMPLEX E/M VISIT ADD ON: HCPCS | Mod: S$GLB,,, | Performed by: NURSE PRACTITIONER

## 2025-04-23 PROCEDURE — 83036 HEMOGLOBIN GLYCOSYLATED A1C: CPT

## 2025-04-23 PROCEDURE — 4010F ACE/ARB THERAPY RXD/TAKEN: CPT | Mod: CPTII,S$GLB,, | Performed by: NURSE PRACTITIONER

## 2025-04-23 PROCEDURE — 99999 PR PBB SHADOW E&M-EST. PATIENT-LVL V: CPT | Mod: PBBFAC,,, | Performed by: NURSE PRACTITIONER

## 2025-04-23 PROCEDURE — 36415 COLL VENOUS BLD VENIPUNCTURE: CPT | Mod: PO

## 2025-04-23 PROCEDURE — 3008F BODY MASS INDEX DOCD: CPT | Mod: CPTII,S$GLB,, | Performed by: NURSE PRACTITIONER

## 2025-04-23 PROCEDURE — 1126F AMNT PAIN NOTED NONE PRSNT: CPT | Mod: CPTII,S$GLB,, | Performed by: NURSE PRACTITIONER

## 2025-04-23 PROCEDURE — 3072F LOW RISK FOR RETINOPATHY: CPT | Mod: CPTII,S$GLB,, | Performed by: NURSE PRACTITIONER

## 2025-04-23 PROCEDURE — 1160F RVW MEDS BY RX/DR IN RCRD: CPT | Mod: CPTII,S$GLB,, | Performed by: NURSE PRACTITIONER

## 2025-04-23 PROCEDURE — 3288F FALL RISK ASSESSMENT DOCD: CPT | Mod: CPTII,S$GLB,, | Performed by: NURSE PRACTITIONER

## 2025-04-23 PROCEDURE — 3075F SYST BP GE 130 - 139MM HG: CPT | Mod: CPTII,S$GLB,, | Performed by: NURSE PRACTITIONER

## 2025-04-23 PROCEDURE — 99214 OFFICE O/P EST MOD 30 MIN: CPT | Mod: S$GLB,,, | Performed by: NURSE PRACTITIONER

## 2025-04-23 PROCEDURE — 3046F HEMOGLOBIN A1C LEVEL >9.0%: CPT | Mod: CPTII,S$GLB,, | Performed by: NURSE PRACTITIONER

## 2025-04-23 PROCEDURE — 3078F DIAST BP <80 MM HG: CPT | Mod: CPTII,S$GLB,, | Performed by: NURSE PRACTITIONER

## 2025-04-23 PROCEDURE — 1101F PT FALLS ASSESS-DOCD LE1/YR: CPT | Mod: CPTII,S$GLB,, | Performed by: NURSE PRACTITIONER

## 2025-04-23 PROCEDURE — 1159F MED LIST DOCD IN RCRD: CPT | Mod: CPTII,S$GLB,, | Performed by: NURSE PRACTITIONER

## 2025-04-23 RX ORDER — INSULIN GLARGINE 100 [IU]/ML
INJECTION, SOLUTION SUBCUTANEOUS
Qty: 13.5 ML | Refills: 3 | Status: SHIPPED | OUTPATIENT
Start: 2025-04-23

## 2025-04-23 NOTE — PROGRESS NOTES
Subjective:       Patient ID: Santhosh Goff is a 70 y.o. male.    CHIEF COMPLAINT:  - Mr. Goff presents for a follow-up visit to discuss and manage his diabetes, particularly his blood sugar and insulin regimen.    HPI:  Mr. Goff reports fluctuating blood sugar. After starting insulin, his levels were initially around 300, decreasing to between 200 and 250, with occasional drops to 130-120. This morning, his blood sugar was 201. He is currently taking insulin, with the dosage varying between 35 and 50 units, depending on his blood sugar readings.     His typical daily diet includes a morning drink consisting of a tablespoon of honey, two eggs, nonfat yogurt (about a cup), a banana, and half a cup of milk. He rarely feels hungry in the morning and often does not eat again until around 1 or 2 PM, when he might have a sandwich. His largest meal is typically dinner, which he tries to eat between 6 and 7:30 PM, noting that eating later causes his blood sugar to be higher.    He reports occasional snacking, including having a chocolate bar recently, but states he does not eat much candy or very sweet foods. He drinks a significant amount of water or flavored water, estimating 1.5 to 2 gallons per day. He also mentions diet soda consumption.    He uses a CPAP machine for sleep, reporting that he recently used it from 10 or 10:30 PM until 5 AM, which is unusual for him. 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    He mentions frequent urination, particularly at night, which he attributes to his high fluid intake during the day. This has decreased recently, especially if he does not drink much after 5:30 or 6 PM.    He denies frequent snacking late at night.        /76 (BP Location: Right arm, Patient Position: Sitting)   Pulse 97   Temp 97.5 °F (36.4 °C) (Tympanic)   Resp 16   Ht 6' (1.829 m)   Wt (!) 139.2 kg (306 lb 14.1 oz)   SpO2 98%   BMI 41.62 kg/m²  "    Review of Systems   Constitutional:  Negative for activity change, appetite change, chills, diaphoresis, fatigue, fever and unexpected weight change.   HENT: Negative.     Eyes: Negative.    Respiratory:  Negative for apnea, chest tightness, shortness of breath and stridor.    Cardiovascular:  Negative for chest pain, palpitations and leg swelling.   Gastrointestinal: Negative.    Endocrine: Negative.    Genitourinary: Negative.    Musculoskeletal:  Negative for arthralgias and myalgias.   Skin:  Negative for color change, pallor, rash and wound.   Allergic/Immunologic: Negative.    Neurological:  Negative for dizziness, facial asymmetry, light-headedness and headaches.   Hematological:  Negative for adenopathy.   Psychiatric/Behavioral:  Negative for agitation and behavioral problems.        Objective:      Physical Exam  Vitals and nursing note reviewed.   Constitutional:       General: He is not in acute distress.     Appearance: He is well-developed. He is not diaphoretic.   HENT:      Head: Normocephalic and atraumatic.   Cardiovascular:      Rate and Rhythm: Normal rate and regular rhythm.      Heart sounds: Normal heart sounds.   Pulmonary:      Effort: Pulmonary effort is normal. No respiratory distress.      Breath sounds: Normal breath sounds.   Skin:     General: Skin is warm and dry.      Findings: No rash.   Neurological:      Mental Status: He is alert and oriented to person, place, and time.   Psychiatric:         Behavior: Behavior normal.         Thought Content: Thought content normal.         Judgment: Judgment normal.         Assessment and Plan        Santhosh Potts" was seen today for diabetes.    Diagnoses and all orders for this visit:    Uncontrolled type 2 diabetes mellitus with hyperglycemia, with long-term current use of insulin  -     Hemoglobin A1C; Future  -     insulin glargine U-100, Lantus, (LANTUS SOLOSTAR U-100 INSULIN) 100 unit/mL (3 mL) InPn pen; Inject 35 units q am, titrate " up according to blood sugar to a max of 65 units per day.    Hypertension associated with diabetes    ALIREZA treated with AVAPS    BMI 40.0-44.9, adult      There are no Patient Instructions on file for this visit.      1. Uncontrolled type 2 diabetes mellitus with hyperglycemia, with long-term current use of insulin    2. Hypertension associated with diabetes    3. ALIREZA treated with AVAPS    4. BMI 40.0-44.9, adult        Assessment & Plan    TYPE 2 DIABETES MELLITUS WITH HYPERGLYCEMIA:  - Noted improvement in glucose control from initial levels around 300 to current range of 200-250.  - Set initial target for fasting glucose <200 mg/dL, with subsequent goal of ~170 mg/dL.  - Explained the concept of background insulin and its role in maintaining consistent glucose levels throughout the day.  - Discussed the gradual nature of insulin's effect on glucose, emphasizing it does not cause rapid drops.  - Educated the patient on the progressive nature of diabetes, including the concept of pancreatic failure over time.  - Ordered A1C test to assess glycemic control progress since January  - Adjusted insulin dosage to35 units consistently for 5 days, with instructions to increase by 5 units every 5 days if fasting blood sugar remains above 200, up to a maximum of 65 units without further consultation..  - Continued current oral medications: Farxiga, glimepiride, metformin, and Rybelsus along with insulin therapy.  - Noted current insulin usage varying between 35 and 50 units.  - Explained the need for consistent dosing in the insulin regimen.  - Continued current oral medications: Farxiga, glimepiride, metformin, and Rybelsus.  - Assessed that the patient is on almost every oral agent available for diabetes management.  - Recommend consistent meal times, particularly avoiding late evening meals.    OBSTRUCTIVE SLEEP APNEA:  - Instructed the patient to continue using CPAP machine for sleep apnea management, aiming for 4 hours  of use per night.  - Noted patient's report of unusual extended use of CPAP for 6.5 hours on one occasion.    HYPERTENSION:  - BP well controlled. Continue meds    FOLLOW-UP:  - Scheduled full panel labs for June.   - A1C today         This note was generated with the assistance of ambient listening technology. Verbal consent was obtained by the patient and accompanying visitor(s) for the recording of patient appointment to facilitate this note. I attest to having reviewed and edited the generated note for accuracy, though some syntax or spelling errors may persist. Please contact the author of this note for any clarification.

## 2025-04-29 DIAGNOSIS — E11.9 TYPE 2 DIABETES MELLITUS WITHOUT COMPLICATION, WITHOUT LONG-TERM CURRENT USE OF INSULIN: ICD-10-CM

## 2025-04-29 RX ORDER — ORAL SEMAGLUTIDE 14 MG/1
14 TABLET ORAL
Qty: 30 TABLET | Refills: 0 | Status: SHIPPED | OUTPATIENT
Start: 2025-04-29

## 2025-05-03 DIAGNOSIS — I15.2 HYPERTENSION ASSOCIATED WITH DIABETES: ICD-10-CM

## 2025-05-03 DIAGNOSIS — E11.59 HYPERTENSION ASSOCIATED WITH DIABETES: ICD-10-CM

## 2025-05-05 RX ORDER — LOSARTAN POTASSIUM 25 MG/1
25 TABLET ORAL
Qty: 90 TABLET | Refills: 0 | Status: SHIPPED | OUTPATIENT
Start: 2025-05-05

## 2025-05-21 DIAGNOSIS — E11.9 TYPE 2 DIABETES MELLITUS WITHOUT COMPLICATION: ICD-10-CM

## 2025-05-30 DIAGNOSIS — E11.9 TYPE 2 DIABETES MELLITUS WITHOUT COMPLICATION, WITHOUT LONG-TERM CURRENT USE OF INSULIN: ICD-10-CM

## 2025-05-30 RX ORDER — GLIMEPIRIDE 4 MG/1
4 TABLET ORAL
Qty: 90 TABLET | Refills: 0 | Status: SHIPPED | OUTPATIENT
Start: 2025-05-30

## 2025-05-31 DIAGNOSIS — E11.9 TYPE 2 DIABETES MELLITUS WITHOUT COMPLICATION, WITHOUT LONG-TERM CURRENT USE OF INSULIN: ICD-10-CM

## 2025-06-02 RX ORDER — ORAL SEMAGLUTIDE 14 MG/1
14 TABLET ORAL
Qty: 30 TABLET | Refills: 0 | Status: SHIPPED | OUTPATIENT
Start: 2025-06-02

## 2025-06-10 ENCOUNTER — PATIENT OUTREACH (OUTPATIENT)
Dept: ADMINISTRATIVE | Facility: HOSPITAL | Age: 70
End: 2025-06-10
Payer: MEDICARE

## 2025-06-12 ENCOUNTER — LAB VISIT (OUTPATIENT)
Dept: LAB | Facility: HOSPITAL | Age: 70
End: 2025-06-12
Attending: INTERNAL MEDICINE
Payer: MEDICARE

## 2025-06-12 DIAGNOSIS — E11.9 TYPE 2 DIABETES MELLITUS WITHOUT COMPLICATION: ICD-10-CM

## 2025-06-12 DIAGNOSIS — E11.9 TYPE 2 DIABETES MELLITUS WITHOUT COMPLICATION, WITHOUT LONG-TERM CURRENT USE OF INSULIN: ICD-10-CM

## 2025-06-12 DIAGNOSIS — Z12.5 ENCOUNTER FOR SCREENING FOR MALIGNANT NEOPLASM OF PROSTATE: ICD-10-CM

## 2025-06-12 LAB
ABSOLUTE EOSINOPHIL (OHS): 0.09 K/UL
ABSOLUTE MONOCYTE (OHS): 0.71 K/UL (ref 0.3–1)
ABSOLUTE NEUTROPHIL COUNT (OHS): 5.41 K/UL (ref 1.8–7.7)
ALBUMIN SERPL BCP-MCNC: 3.9 G/DL (ref 3.5–5.2)
ALBUMIN/CREAT UR: 20.3 UG/MG
ALP SERPL-CCNC: 64 UNIT/L (ref 40–150)
ALT SERPL W/O P-5'-P-CCNC: 23 UNIT/L (ref 10–44)
ANION GAP (OHS): 9 MMOL/L (ref 8–16)
AST SERPL-CCNC: 17 UNIT/L (ref 11–45)
BASOPHILS # BLD AUTO: 0.05 K/UL
BASOPHILS NFR BLD AUTO: 0.6 %
BILIRUB SERPL-MCNC: 0.4 MG/DL (ref 0.1–1)
BUN SERPL-MCNC: 19 MG/DL (ref 8–23)
CALCIUM SERPL-MCNC: 9.3 MG/DL (ref 8.7–10.5)
CHLORIDE SERPL-SCNC: 104 MMOL/L (ref 95–110)
CHOLEST SERPL-MCNC: 152 MG/DL (ref 120–199)
CHOLEST/HDLC SERPL: 3.4 {RATIO} (ref 2–5)
CO2 SERPL-SCNC: 27 MMOL/L (ref 23–29)
CREAT SERPL-MCNC: 1.1 MG/DL (ref 0.5–1.4)
CREAT UR-MCNC: 143 MG/DL (ref 23–375)
EAG (OHS): 206 MG/DL (ref 68–131)
ERYTHROCYTE [DISTWIDTH] IN BLOOD BY AUTOMATED COUNT: 13.2 % (ref 11.5–14.5)
GFR SERPLBLD CREATININE-BSD FMLA CKD-EPI: >60 ML/MIN/1.73/M2
GLUCOSE SERPL-MCNC: 129 MG/DL (ref 70–110)
HBA1C MFR BLD: 8.8 % (ref 4–5.6)
HCT VFR BLD AUTO: 47.7 % (ref 40–54)
HDLC SERPL-MCNC: 45 MG/DL (ref 40–75)
HDLC SERPL: 29.6 % (ref 20–50)
HGB BLD-MCNC: 15.4 GM/DL (ref 14–18)
IMM GRANULOCYTES # BLD AUTO: 0.03 K/UL (ref 0–0.04)
IMM GRANULOCYTES NFR BLD AUTO: 0.3 % (ref 0–0.5)
LDLC SERPL CALC-MCNC: 82.6 MG/DL (ref 63–159)
LYMPHOCYTES # BLD AUTO: 2.4 K/UL (ref 1–4.8)
MCH RBC QN AUTO: 30.2 PG (ref 27–31)
MCHC RBC AUTO-ENTMCNC: 32.3 G/DL (ref 32–36)
MCV RBC AUTO: 94 FL (ref 82–98)
MICROALBUMIN UR-MCNC: 29 UG/ML (ref ?–5000)
NONHDLC SERPL-MCNC: 107 MG/DL
NUCLEATED RBC (/100WBC) (OHS): 0 /100 WBC
PLATELET # BLD AUTO: 275 K/UL (ref 150–450)
PMV BLD AUTO: 9.6 FL (ref 9.2–12.9)
POTASSIUM SERPL-SCNC: 4.9 MMOL/L (ref 3.5–5.1)
PROT SERPL-MCNC: 7.6 GM/DL (ref 6–8.4)
PSA SERPL-MCNC: 0.4 NG/ML
RBC # BLD AUTO: 5.1 M/UL (ref 4.6–6.2)
RELATIVE EOSINOPHIL (OHS): 1 %
RELATIVE LYMPHOCYTE (OHS): 27.6 % (ref 18–48)
RELATIVE MONOCYTE (OHS): 8.2 % (ref 4–15)
RELATIVE NEUTROPHIL (OHS): 62.3 % (ref 38–73)
SODIUM SERPL-SCNC: 140 MMOL/L (ref 136–145)
TRIGL SERPL-MCNC: 122 MG/DL (ref 30–150)
WBC # BLD AUTO: 8.69 K/UL (ref 3.9–12.7)

## 2025-06-12 PROCEDURE — 36415 COLL VENOUS BLD VENIPUNCTURE: CPT | Mod: PO

## 2025-06-12 PROCEDURE — 84075 ASSAY ALKALINE PHOSPHATASE: CPT

## 2025-06-12 PROCEDURE — 85025 COMPLETE CBC W/AUTO DIFF WBC: CPT

## 2025-06-12 PROCEDURE — 84153 ASSAY OF PSA TOTAL: CPT

## 2025-06-12 PROCEDURE — 83036 HEMOGLOBIN GLYCOSYLATED A1C: CPT

## 2025-06-12 PROCEDURE — 82043 UR ALBUMIN QUANTITATIVE: CPT

## 2025-06-12 PROCEDURE — 83718 ASSAY OF LIPOPROTEIN: CPT

## 2025-06-13 ENCOUNTER — RESULTS FOLLOW-UP (OUTPATIENT)
Dept: INTERNAL MEDICINE | Facility: CLINIC | Age: 70
End: 2025-06-13

## 2025-06-19 ENCOUNTER — OFFICE VISIT (OUTPATIENT)
Dept: INTERNAL MEDICINE | Facility: CLINIC | Age: 70
End: 2025-06-19
Payer: MEDICARE

## 2025-06-19 VITALS
HEART RATE: 80 BPM | BODY MASS INDEX: 42.79 KG/M2 | SYSTOLIC BLOOD PRESSURE: 136 MMHG | DIASTOLIC BLOOD PRESSURE: 80 MMHG | OXYGEN SATURATION: 96 % | WEIGHT: 315 LBS | TEMPERATURE: 99 F

## 2025-06-19 DIAGNOSIS — E11.65 UNCONTROLLED TYPE 2 DIABETES MELLITUS WITH HYPERGLYCEMIA, WITH LONG-TERM CURRENT USE OF INSULIN: ICD-10-CM

## 2025-06-19 DIAGNOSIS — I15.2 HYPERTENSION ASSOCIATED WITH DIABETES: ICD-10-CM

## 2025-06-19 DIAGNOSIS — E11.59 HYPERTENSION ASSOCIATED WITH DIABETES: ICD-10-CM

## 2025-06-19 DIAGNOSIS — Z79.4 UNCONTROLLED TYPE 2 DIABETES MELLITUS WITH HYPERGLYCEMIA, WITH LONG-TERM CURRENT USE OF INSULIN: ICD-10-CM

## 2025-06-19 DIAGNOSIS — E66.01 MORBID OBESITY WITH BMI OF 40.0-44.9, ADULT: ICD-10-CM

## 2025-06-19 DIAGNOSIS — E11.9 TYPE 2 DIABETES MELLITUS WITHOUT COMPLICATION, WITHOUT LONG-TERM CURRENT USE OF INSULIN: Primary | ICD-10-CM

## 2025-06-19 DIAGNOSIS — G47.33 OSA TREATED WITH BIPAP: ICD-10-CM

## 2025-06-19 PROCEDURE — 99213 OFFICE O/P EST LOW 20 MIN: CPT | Mod: S$GLB,,, | Performed by: INTERNAL MEDICINE

## 2025-06-19 PROCEDURE — 99999 PR PBB SHADOW E&M-EST. PATIENT-LVL IV: CPT | Mod: PBBFAC,,, | Performed by: INTERNAL MEDICINE

## 2025-06-19 PROCEDURE — 1101F PT FALLS ASSESS-DOCD LE1/YR: CPT | Mod: CPTII,S$GLB,, | Performed by: INTERNAL MEDICINE

## 2025-06-19 PROCEDURE — 3072F LOW RISK FOR RETINOPATHY: CPT | Mod: CPTII,S$GLB,, | Performed by: INTERNAL MEDICINE

## 2025-06-19 PROCEDURE — G2211 COMPLEX E/M VISIT ADD ON: HCPCS | Mod: S$GLB,,, | Performed by: INTERNAL MEDICINE

## 2025-06-19 PROCEDURE — 4010F ACE/ARB THERAPY RXD/TAKEN: CPT | Mod: CPTII,S$GLB,, | Performed by: INTERNAL MEDICINE

## 2025-06-19 PROCEDURE — 3079F DIAST BP 80-89 MM HG: CPT | Mod: CPTII,S$GLB,, | Performed by: INTERNAL MEDICINE

## 2025-06-19 PROCEDURE — 3075F SYST BP GE 130 - 139MM HG: CPT | Mod: CPTII,S$GLB,, | Performed by: INTERNAL MEDICINE

## 2025-06-19 PROCEDURE — 1126F AMNT PAIN NOTED NONE PRSNT: CPT | Mod: CPTII,S$GLB,, | Performed by: INTERNAL MEDICINE

## 2025-06-19 PROCEDURE — 3066F NEPHROPATHY DOC TX: CPT | Mod: CPTII,S$GLB,, | Performed by: INTERNAL MEDICINE

## 2025-06-19 PROCEDURE — 3288F FALL RISK ASSESSMENT DOCD: CPT | Mod: CPTII,S$GLB,, | Performed by: INTERNAL MEDICINE

## 2025-06-19 PROCEDURE — 3008F BODY MASS INDEX DOCD: CPT | Mod: CPTII,S$GLB,, | Performed by: INTERNAL MEDICINE

## 2025-06-19 PROCEDURE — 3061F NEG MICROALBUMINURIA REV: CPT | Mod: CPTII,S$GLB,, | Performed by: INTERNAL MEDICINE

## 2025-06-19 PROCEDURE — 1159F MED LIST DOCD IN RCRD: CPT | Mod: CPTII,S$GLB,, | Performed by: INTERNAL MEDICINE

## 2025-06-19 PROCEDURE — 1160F RVW MEDS BY RX/DR IN RCRD: CPT | Mod: CPTII,S$GLB,, | Performed by: INTERNAL MEDICINE

## 2025-06-19 PROCEDURE — 3052F HG A1C>EQUAL 8.0%<EQUAL 9.0%: CPT | Mod: CPTII,S$GLB,, | Performed by: INTERNAL MEDICINE

## 2025-06-19 RX ORDER — INSULIN GLARGINE 100 [IU]/ML
INJECTION, SOLUTION SUBCUTANEOUS
Qty: 13.5 ML | Refills: 3 | Status: SHIPPED | OUTPATIENT
Start: 2025-06-19

## 2025-06-19 NOTE — PROGRESS NOTES
Subjective:       Patient ID: Santhosh Goff is a 70 y.o. male.    Chief Complaint: Follow-up      HPI:  History of Present Illness    Patient presents today for diabetes follow up    He reports improvement in glucose control since increasing insulin to 50 units, with morning blood sugars consistently below 140. Evening readings around 170 were noted after consuming beans and pork, with subsequent morning readings between 115-130. Previous A1C was 8.4.    He follows a low-carbohydrate, lean protein diet. His daily meals include two slices of low-calorie bread for breakfast and quarter-inch thick lunch meat for lunch.    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         Review of Systems   Constitutional:  Negative for fever and unexpected weight change.   HENT:  Negative for hearing loss, postnasal drip and rhinorrhea.    Eyes:  Negative for pain and visual disturbance.   Respiratory:  Negative for cough, shortness of breath and wheezing.    Cardiovascular:  Negative for chest pain and palpitations.   Gastrointestinal:  Negative for constipation, diarrhea, nausea and vomiting.   Genitourinary:  Negative for dysuria and hematuria.   Musculoskeletal:  Negative for arthralgias, back pain, myalgias and neck stiffness.   Skin:  Negative for pallor and rash.   Neurological:  Negative for seizures, syncope and headaches.   Hematological:  Negative for adenopathy.   Psychiatric/Behavioral:  Negative for dysphoric mood. The patient is not nervous/anxious.        Objective:   /80   Pulse 80   Temp 98.7 °F (37.1 °C) (Tympanic)   Wt (!) 143.1 kg (315 lb 7.7 oz)   SpO2 96%   BMI 42.79 kg/m²      Physical Exam  Vitals reviewed.   Constitutional:       General: He is not in acute distress.     Appearance: He is well-developed.   HENT:      Head: Normocephalic and atraumatic.      Right Ear: Tympanic membrane and ear canal normal.      Left Ear:  Tympanic membrane and ear canal normal.   Eyes:      Pupils: Pupils are equal, round, and reactive to light.   Neck:      Thyroid: No thyromegaly.      Vascular: No JVD.   Cardiovascular:      Rate and Rhythm: Normal rate and regular rhythm.      Heart sounds: Normal heart sounds. No murmur heard.     No friction rub. No gallop.   Pulmonary:      Effort: Pulmonary effort is normal.      Breath sounds: Normal breath sounds. No wheezing or rales.   Abdominal:      General: Bowel sounds are normal. There is no distension.      Palpations: Abdomen is soft.      Tenderness: There is no abdominal tenderness. There is no guarding or rebound.   Musculoskeletal:         General: Normal range of motion.      Cervical back: Normal range of motion and neck supple.   Lymphadenopathy:      Cervical: No cervical adenopathy.   Skin:     General: Skin is warm and dry.      Findings: No rash.   Neurological:      General: No focal deficit present.      Mental Status: He is alert and oriented to person, place, and time.      Cranial Nerves: No cranial nerve deficit.      Deep Tendon Reflexes: Reflexes are normal and symmetric.   Psychiatric:         Mood and Affect: Mood normal.         Judgment: Judgment normal.         Lab Visit on 06/12/2025   Component Date Value    Sodium 06/12/2025 140     Potassium 06/12/2025 4.9     Chloride 06/12/2025 104     CO2 06/12/2025 27     Glucose 06/12/2025 129 (H)     BUN 06/12/2025 19     Creatinine 06/12/2025 1.1     Calcium 06/12/2025 9.3     Protein Total 06/12/2025 7.6     Albumin 06/12/2025 3.9     Bilirubin Total 06/12/2025 0.4     ALP 06/12/2025 64     AST 06/12/2025 17     ALT 06/12/2025 23     Anion Gap 06/12/2025 9     eGFR 06/12/2025 >60     Hemoglobin A1c 06/12/2025 8.8 (H)     Estimated Average Glucose 06/12/2025 206 (H)     Cholesterol Total 06/12/2025 152     Triglyceride 06/12/2025 122     HDL Cholesterol 06/12/2025 45     LDL Cholesterol 06/12/2025 82.6     HDL/Cholesterol Ratio  "06/12/2025 29.6     Cholesterol/HDL Ratio 06/12/2025 3.4     Non HDL Cholesterol 06/12/2025 107     Prostate Specific Antigen 06/12/2025 0.40     Urine Microalbumin 06/12/2025 29.0     Urine Creatinine 06/12/2025 143.0     Microalbumin/Creatinine * 06/12/2025 20.3     WBC 06/12/2025 8.69     RBC 06/12/2025 5.10     HGB 06/12/2025 15.4     HCT 06/12/2025 47.7     MCV 06/12/2025 94     MCH 06/12/2025 30.2     MCHC 06/12/2025 32.3     RDW 06/12/2025 13.2     Platelet Count 06/12/2025 275     MPV 06/12/2025 9.6     Nucleated RBC 06/12/2025 0     Neut % 06/12/2025 62.3     Lymph % 06/12/2025 27.6     Mono % 06/12/2025 8.2     Eos % 06/12/2025 1.0     Basophil % 06/12/2025 0.6     Imm Grans % 06/12/2025 0.3     Neut # 06/12/2025 5.41     Lymph # 06/12/2025 2.40     Mono # 06/12/2025 0.71     Eos # 06/12/2025 0.09     Baso # 06/12/2025 0.05     Imm Grans # 06/12/2025 0.03        Assessment:       1. Type 2 diabetes mellitus without complication, without long-term current use of insulin    2. Hypertension associated with diabetes    3. Uncontrolled type 2 diabetes mellitus with hyperglycemia, with long-term current use of insulin    4. ALIREZA treated with AVAPS    5. Morbid obesity with BMI of 40.0-44.9, adult        Plan:   Hypertension associated with diabetes  Blood pressure is under good control.  We will continue the current regimen.  Will work on regular aerobic exercise and a low salt diet.        ALIREZA treated with AVAPS  Patient is compliant with use of cpap, using it the majority of nights.  Benefit from the use of the device is noted.    Santhosh Potts" was seen today for follow-up.    Diagnoses and all orders for this visit:    Type 2 diabetes mellitus without complication, without long-term current use of insulin  Comments:  continue lantus at 50 units at thsi time.  continue other meds. low carb diet  Orders:  -     Hemoglobin A1C; Future    Hypertension associated with diabetes    Uncontrolled type 2 diabetes " mellitus with hyperglycemia, with long-term current use of insulin  -     insulin glargine U-100, Lantus, (LANTUS SOLOSTAR U-100 INSULIN) 100 unit/mL (3 mL) InPn pen; Inject 50 units q am, titrate up according to blood sugar to a max of 65 units per day.    ALIREZA treated with AVAPS    Morbid obesity with BMI of 40.0-44.9, adult  Comments:  Focus on lean protein and low carb for diet      Assessment & Plan    TYPE 2 DIABETES MANAGEMENT:  - Assessed patient's insulin regimen and glucose levels, which range from 115-130, occasionally reaching 170.  - Morning sugar levels are consistently below 140 with current insulin dosage of 50 units.  - Determined this dosage is appropriate and updated prescription accordingly.  - Discussed impact of processed foods and added sugars on glucose levels, emphasizing importance of low-carb, lean protein diet for diabetes management.  - Specifically advised patient to be mindful of sugar content in sauces.  - Recommend regular glucose monitoring.  - Ordered A1C test for September to evaluate effectiveness of current insulin dosage and dietary changes.    FOLLOW-UP:  - Scheduled follow-up after A1C test in September.  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         Follow up in about 3 months (around 9/24/2025) for DM, HTN, HLP, with Fern Najera.    This note was generated with the assistance of ambient listening technology. Verbal consent was obtained by the patient and accompanying visitor(s) for the recording of patient appointment to facilitate this note

## 2025-06-30 DIAGNOSIS — E11.9 TYPE 2 DIABETES MELLITUS WITHOUT COMPLICATION, WITHOUT LONG-TERM CURRENT USE OF INSULIN: ICD-10-CM

## 2025-06-30 RX ORDER — ORAL SEMAGLUTIDE 14 MG/1
14 TABLET ORAL
Qty: 30 TABLET | Refills: 0 | Status: SHIPPED | OUTPATIENT
Start: 2025-06-30

## 2025-07-12 DIAGNOSIS — E78.5 HYPERLIPIDEMIA, UNSPECIFIED HYPERLIPIDEMIA TYPE: ICD-10-CM

## 2025-07-14 RX ORDER — ATORVASTATIN CALCIUM 40 MG/1
40 TABLET, FILM COATED ORAL
Qty: 90 TABLET | Refills: 0 | Status: SHIPPED | OUTPATIENT
Start: 2025-07-14

## 2025-07-14 RX ORDER — POTASSIUM CHLORIDE 1500 MG/1
20 TABLET, EXTENDED RELEASE ORAL
Qty: 90 TABLET | Refills: 0 | Status: SHIPPED | OUTPATIENT
Start: 2025-07-14

## 2025-07-30 DIAGNOSIS — E11.9 TYPE 2 DIABETES MELLITUS WITHOUT COMPLICATION, WITHOUT LONG-TERM CURRENT USE OF INSULIN: ICD-10-CM

## 2025-07-30 RX ORDER — ORAL SEMAGLUTIDE 14 MG/1
14 TABLET ORAL
Qty: 30 TABLET | Refills: 0 | Status: SHIPPED | OUTPATIENT
Start: 2025-07-30

## 2025-08-04 DIAGNOSIS — E11.59 HYPERTENSION ASSOCIATED WITH DIABETES: ICD-10-CM

## 2025-08-04 DIAGNOSIS — I15.2 HYPERTENSION ASSOCIATED WITH DIABETES: ICD-10-CM

## 2025-08-04 RX ORDER — LOSARTAN POTASSIUM 25 MG/1
25 TABLET ORAL
Qty: 90 TABLET | Refills: 0 | Status: SHIPPED | OUTPATIENT
Start: 2025-08-04

## 2025-08-25 DIAGNOSIS — Z00.00 ENCOUNTER FOR MEDICARE ANNUAL WELLNESS EXAM: ICD-10-CM

## 2025-08-29 DIAGNOSIS — E11.9 TYPE 2 DIABETES MELLITUS WITHOUT COMPLICATION, WITHOUT LONG-TERM CURRENT USE OF INSULIN: ICD-10-CM

## 2025-08-29 RX ORDER — GLIMEPIRIDE 4 MG/1
4 TABLET ORAL
Qty: 90 TABLET | Refills: 0 | Status: SHIPPED | OUTPATIENT
Start: 2025-08-29

## 2025-09-03 DIAGNOSIS — E11.9 TYPE 2 DIABETES MELLITUS WITHOUT COMPLICATION, WITHOUT LONG-TERM CURRENT USE OF INSULIN: ICD-10-CM

## 2025-09-03 RX ORDER — ORAL SEMAGLUTIDE 14 MG/1
14 TABLET ORAL
Qty: 30 TABLET | Refills: 3 | Status: SHIPPED | OUTPATIENT
Start: 2025-09-03